# Patient Record
Sex: MALE | Race: OTHER | Employment: OTHER | URBAN - METROPOLITAN AREA
[De-identification: names, ages, dates, MRNs, and addresses within clinical notes are randomized per-mention and may not be internally consistent; named-entity substitution may affect disease eponyms.]

---

## 2019-06-16 ENCOUNTER — APPOINTMENT (OUTPATIENT)
Dept: CT IMAGING | Age: 69
DRG: 683 | End: 2019-06-16
Payer: COMMERCIAL

## 2019-06-16 ENCOUNTER — APPOINTMENT (OUTPATIENT)
Dept: GENERAL RADIOLOGY | Age: 69
DRG: 683 | End: 2019-06-16
Payer: COMMERCIAL

## 2019-06-16 ENCOUNTER — HOSPITAL ENCOUNTER (INPATIENT)
Age: 69
LOS: 3 days | Discharge: NURSING, REHAB OR LONG-TERM CARE | DRG: 683 | End: 2019-06-19
Attending: EMERGENCY MEDICINE | Admitting: INTERNAL MEDICINE
Payer: COMMERCIAL

## 2019-06-16 DIAGNOSIS — R55 SYNCOPE AND COLLAPSE: Primary | ICD-10-CM

## 2019-06-16 DIAGNOSIS — R73.9 HYPERGLYCEMIA: ICD-10-CM

## 2019-06-16 DIAGNOSIS — E87.20 LACTIC ACIDOSIS: ICD-10-CM

## 2019-06-16 LAB
ALBUMIN SERPL-MCNC: 4.4 G/DL (ref 3.5–4.6)
ALP BLD-CCNC: 104 U/L (ref 35–104)
ALT SERPL-CCNC: 16 U/L (ref 0–41)
ANION GAP SERPL CALCULATED.3IONS-SCNC: 12 MEQ/L (ref 9–15)
ANION GAP SERPL CALCULATED.3IONS-SCNC: 13 MEQ/L (ref 9–15)
ANION GAP SERPL CALCULATED.3IONS-SCNC: 25 MEQ/L (ref 9–15)
AST SERPL-CCNC: 13 U/L (ref 0–40)
BASE EXCESS ARTERIAL: -1 (ref -3–3)
BASOPHILS ABSOLUTE: 0.1 K/UL (ref 0–0.2)
BASOPHILS RELATIVE PERCENT: 1 %
BETA-HYDROXYBUTYRATE: 1.3 MG/DL (ref 0.2–2.8)
BILIRUB SERPL-MCNC: 0.5 MG/DL (ref 0.2–0.7)
BILIRUBIN URINE: NEGATIVE
BLOOD, URINE: NEGATIVE
BUN BLDV-MCNC: 18 MG/DL (ref 8–23)
BUN BLDV-MCNC: 18 MG/DL (ref 8–23)
BUN BLDV-MCNC: 22 MG/DL (ref 8–23)
CALCIUM IONIZED: 1.19 MMOL/L (ref 1.12–1.32)
CALCIUM SERPL-MCNC: 8.9 MG/DL (ref 8.5–9.9)
CALCIUM SERPL-MCNC: 8.9 MG/DL (ref 8.5–9.9)
CALCIUM SERPL-MCNC: 9.7 MG/DL (ref 8.5–9.9)
CHLORIDE BLD-SCNC: 101 MEQ/L (ref 95–107)
CHLORIDE BLD-SCNC: 103 MEQ/L (ref 95–107)
CHLORIDE BLD-SCNC: 88 MEQ/L (ref 95–107)
CLARITY: CLEAR
CO2: 21 MEQ/L (ref 20–31)
CO2: 23 MEQ/L (ref 20–31)
CO2: 24 MEQ/L (ref 20–31)
COLOR: YELLOW
CREAT SERPL-MCNC: 0.8 MG/DL (ref 0.7–1.2)
CREAT SERPL-MCNC: 0.85 MG/DL (ref 0.7–1.2)
CREAT SERPL-MCNC: 1.24 MG/DL (ref 0.7–1.2)
CREATININE URINE: 67.6 MG/DL
EKG ATRIAL RATE: 92 BPM
EKG P AXIS: 32 DEGREES
EKG P-R INTERVAL: 206 MS
EKG Q-T INTERVAL: 352 MS
EKG QRS DURATION: 88 MS
EKG QTC CALCULATION (BAZETT): 435 MS
EKG R AXIS: 8 DEGREES
EKG T AXIS: 6 DEGREES
EKG VENTRICULAR RATE: 92 BPM
EOSINOPHILS ABSOLUTE: 0.1 K/UL (ref 0–0.7)
EOSINOPHILS RELATIVE PERCENT: 0.9 %
ETHANOL PERCENT: NORMAL G/DL
ETHANOL PERCENT: NORMAL G/DL
ETHANOL: <10 MG/DL (ref 0–0.08)
ETHANOL: <10 MG/DL (ref 0–0.08)
GFR AFRICAN AMERICAN: >60
GFR NON-AFRICAN AMERICAN: 57.9
GFR NON-AFRICAN AMERICAN: >60
GLOBULIN: 3.2 G/DL (ref 2.3–3.5)
GLUCOSE BLD-MCNC: 142 MG/DL (ref 60–115)
GLUCOSE BLD-MCNC: 154 MG/DL (ref 70–99)
GLUCOSE BLD-MCNC: 158 MG/DL (ref 70–99)
GLUCOSE BLD-MCNC: 171 MG/DL (ref 60–115)
GLUCOSE BLD-MCNC: 185 MG/DL (ref 60–115)
GLUCOSE BLD-MCNC: 214 MG/DL (ref 60–115)
GLUCOSE BLD-MCNC: 323 MG/DL (ref 60–115)
GLUCOSE BLD-MCNC: 394 MG/DL (ref 60–115)
GLUCOSE BLD-MCNC: 574 MG/DL (ref 60–115)
GLUCOSE BLD-MCNC: 687 MG/DL (ref 70–99)
GLUCOSE BLD-MCNC: 692 MG/DL (ref 60–115)
GLUCOSE URINE: >=1000 MG/DL
HBA1C MFR BLD: 11.7 % (ref 4.8–5.9)
HCO3 ARTERIAL: 24 MMOL/L (ref 21–29)
HCT VFR BLD CALC: 46.8 % (ref 42–52)
HEMOGLOBIN: 15.4 G/DL (ref 14–18)
HEMOGLOBIN: 15.9 GM/DL (ref 13.5–17.5)
KETONES, URINE: NEGATIVE MG/DL
LACTATE: 7.05 MMOL/L (ref 0.4–2)
LACTIC ACID: 2 MMOL/L (ref 0.5–2.2)
LACTIC ACID: 2.1 MMOL/L (ref 0.5–2.2)
LEUKOCYTE ESTERASE, URINE: NEGATIVE
LYMPHOCYTES ABSOLUTE: 2.9 K/UL (ref 1–4.8)
LYMPHOCYTES RELATIVE PERCENT: 45.9 %
MAGNESIUM: 2.2 MG/DL (ref 1.7–2.4)
MCH RBC QN AUTO: 29.7 PG (ref 27–31.3)
MCHC RBC AUTO-ENTMCNC: 33 % (ref 33–37)
MCV RBC AUTO: 89.9 FL (ref 80–100)
MONOCYTES ABSOLUTE: 0.6 K/UL (ref 0.2–0.8)
MONOCYTES RELATIVE PERCENT: 9.9 %
NEUTROPHILS ABSOLUTE: 2.7 K/UL (ref 1.4–6.5)
NEUTROPHILS RELATIVE PERCENT: 42.3 %
NITRITE, URINE: NEGATIVE
O2 SAT, ARTERIAL: 94 % (ref 93–100)
OSMOLALITY: 294 MOSM/KG (ref 280–303)
PCO2 ARTERIAL: 39 MM HG (ref 35–45)
PDW BLD-RTO: 13.5 % (ref 11.5–14.5)
PERFORMED ON: ABNORMAL
PH ARTERIAL: 7.39 (ref 7.35–7.45)
PH UA: 5 (ref 5–9)
PHOSPHORUS: 2.5 MG/DL (ref 2.3–4.8)
PLATELET # BLD: 174 K/UL (ref 130–400)
PO2 ARTERIAL: 72 MM HG (ref 75–108)
POC CHLORIDE: 95 MEQ/L (ref 99–110)
POC CREATININE: 0.8 MG/DL (ref 0.8–1.3)
POC HEMATOCRIT: 47 % (ref 41–53)
POC POTASSIUM: 4 MEQ/L (ref 3.5–5.1)
POC SAMPLE TYPE: ABNORMAL
POC SODIUM: 131 MEQ/L (ref 136–145)
POTASSIUM SERPL-SCNC: 3.6 MEQ/L (ref 3.4–4.9)
POTASSIUM SERPL-SCNC: 3.7 MEQ/L (ref 3.4–4.9)
POTASSIUM SERPL-SCNC: 4 MEQ/L (ref 3.4–4.9)
PROTEIN UA: NEGATIVE MG/DL
RBC # BLD: 5.2 M/UL (ref 4.7–6.1)
SODIUM BLD-SCNC: 134 MEQ/L (ref 135–144)
SODIUM BLD-SCNC: 137 MEQ/L (ref 135–144)
SODIUM BLD-SCNC: 139 MEQ/L (ref 135–144)
SODIUM URINE: 72 MEQ/L
SPECIFIC GRAVITY UA: 1.03 (ref 1–1.03)
TCO2 ARTERIAL: 25 (ref 22–29)
TOTAL CK: 69 U/L (ref 0–190)
TOTAL PROTEIN: 7.6 G/DL (ref 6.3–8)
TROPONIN: <0.01 NG/ML (ref 0–0.01)
TROPONIN: <0.01 NG/ML (ref 0–0.01)
URINE REFLEX TO CULTURE: ABNORMAL
UROBILINOGEN, URINE: 0.2 E.U./DL
WBC # BLD: 6.3 K/UL (ref 4.8–10.8)

## 2019-06-16 PROCEDURE — 6370000000 HC RX 637 (ALT 250 FOR IP): Performed by: INTERNAL MEDICINE

## 2019-06-16 PROCEDURE — 70450 CT HEAD/BRAIN W/O DYE: CPT

## 2019-06-16 PROCEDURE — 82570 ASSAY OF URINE CREATININE: CPT

## 2019-06-16 PROCEDURE — 71045 X-RAY EXAM CHEST 1 VIEW: CPT

## 2019-06-16 PROCEDURE — 82330 ASSAY OF CALCIUM: CPT

## 2019-06-16 PROCEDURE — 72170 X-RAY EXAM OF PELVIS: CPT

## 2019-06-16 PROCEDURE — 96374 THER/PROPH/DIAG INJ IV PUSH: CPT

## 2019-06-16 PROCEDURE — 80053 COMPREHEN METABOLIC PANEL: CPT

## 2019-06-16 PROCEDURE — 83605 ASSAY OF LACTIC ACID: CPT

## 2019-06-16 PROCEDURE — 99285 EMERGENCY DEPT VISIT HI MDM: CPT

## 2019-06-16 PROCEDURE — 6370000000 HC RX 637 (ALT 250 FOR IP): Performed by: EMERGENCY MEDICINE

## 2019-06-16 PROCEDURE — 82565 ASSAY OF CREATININE: CPT

## 2019-06-16 PROCEDURE — 36415 COLL VENOUS BLD VENIPUNCTURE: CPT

## 2019-06-16 PROCEDURE — 83735 ASSAY OF MAGNESIUM: CPT

## 2019-06-16 PROCEDURE — 85014 HEMATOCRIT: CPT

## 2019-06-16 PROCEDURE — 36600 WITHDRAWAL OF ARTERIAL BLOOD: CPT

## 2019-06-16 PROCEDURE — 2580000003 HC RX 258

## 2019-06-16 PROCEDURE — 82550 ASSAY OF CK (CPK): CPT

## 2019-06-16 PROCEDURE — 84132 ASSAY OF SERUM POTASSIUM: CPT

## 2019-06-16 PROCEDURE — G0480 DRUG TEST DEF 1-7 CLASSES: HCPCS

## 2019-06-16 PROCEDURE — 84600 ASSAY OF VOLATILES: CPT

## 2019-06-16 PROCEDURE — 83036 HEMOGLOBIN GLYCOSYLATED A1C: CPT

## 2019-06-16 PROCEDURE — 85025 COMPLETE CBC W/AUTO DIFF WBC: CPT

## 2019-06-16 PROCEDURE — 93005 ELECTROCARDIOGRAM TRACING: CPT | Performed by: EMERGENCY MEDICINE

## 2019-06-16 PROCEDURE — 83930 ASSAY OF BLOOD OSMOLALITY: CPT

## 2019-06-16 PROCEDURE — 82803 BLOOD GASES ANY COMBINATION: CPT

## 2019-06-16 PROCEDURE — 82010 KETONE BODYS QUAN: CPT

## 2019-06-16 PROCEDURE — 84300 ASSAY OF URINE SODIUM: CPT

## 2019-06-16 PROCEDURE — 82693 ASSAY OF ETHYLENE GLYCOL: CPT

## 2019-06-16 PROCEDURE — 81003 URINALYSIS AUTO W/O SCOPE: CPT

## 2019-06-16 PROCEDURE — 84484 ASSAY OF TROPONIN QUANT: CPT

## 2019-06-16 PROCEDURE — 82948 REAGENT STRIP/BLOOD GLUCOSE: CPT

## 2019-06-16 PROCEDURE — 82435 ASSAY OF BLOOD CHLORIDE: CPT

## 2019-06-16 PROCEDURE — 6370000000 HC RX 637 (ALT 250 FOR IP): Performed by: PSYCHIATRY & NEUROLOGY

## 2019-06-16 PROCEDURE — 2580000003 HC RX 258: Performed by: EMERGENCY MEDICINE

## 2019-06-16 PROCEDURE — 84295 ASSAY OF SERUM SODIUM: CPT

## 2019-06-16 PROCEDURE — 1210000000 HC MED SURG R&B

## 2019-06-16 PROCEDURE — 6360000002 HC RX W HCPCS: Performed by: INTERNAL MEDICINE

## 2019-06-16 PROCEDURE — 84100 ASSAY OF PHOSPHORUS: CPT

## 2019-06-16 RX ORDER — DEXTROSE MONOHYDRATE 50 MG/ML
100 INJECTION, SOLUTION INTRAVENOUS PRN
Status: DISCONTINUED | OUTPATIENT
Start: 2019-06-16 | End: 2019-06-19 | Stop reason: HOSPADM

## 2019-06-16 RX ORDER — AMANTADINE HYDROCHLORIDE 100 MG/1
100 CAPSULE, GELATIN COATED ORAL 2 TIMES DAILY
Status: DISCONTINUED | OUTPATIENT
Start: 2019-06-16 | End: 2019-06-19 | Stop reason: HOSPADM

## 2019-06-16 RX ORDER — INSULIN GLARGINE 100 [IU]/ML
15 INJECTION, SOLUTION SUBCUTANEOUS DAILY
Status: DISCONTINUED | OUTPATIENT
Start: 2019-06-16 | End: 2019-06-16

## 2019-06-16 RX ORDER — SODIUM CHLORIDE 450 MG/100ML
INJECTION, SOLUTION INTRAVENOUS CONTINUOUS
Status: DISCONTINUED | OUTPATIENT
Start: 2019-06-16 | End: 2019-06-16

## 2019-06-16 RX ORDER — DEXTROSE MONOHYDRATE 25 G/50ML
12.5 INJECTION, SOLUTION INTRAVENOUS PRN
Status: DISCONTINUED | OUTPATIENT
Start: 2019-06-16 | End: 2019-06-19 | Stop reason: HOSPADM

## 2019-06-16 RX ORDER — DEXTROSE MONOHYDRATE 25 G/50ML
12.5 INJECTION, SOLUTION INTRAVENOUS PRN
Status: DISCONTINUED | OUTPATIENT
Start: 2019-06-16 | End: 2019-06-16 | Stop reason: SDUPTHER

## 2019-06-16 RX ORDER — GLIMEPIRIDE 4 MG/1
2 TABLET ORAL
Status: DISCONTINUED | OUTPATIENT
Start: 2019-06-16 | End: 2019-06-17

## 2019-06-16 RX ORDER — MAGNESIUM SULFATE 1 G/100ML
1 INJECTION INTRAVENOUS PRN
Status: DISCONTINUED | OUTPATIENT
Start: 2019-06-16 | End: 2019-06-16

## 2019-06-16 RX ORDER — NABUMETONE 750 MG/1
750 TABLET, FILM COATED ORAL 2 TIMES DAILY
Status: ON HOLD | COMMUNITY
End: 2019-07-02 | Stop reason: HOSPADM

## 2019-06-16 RX ORDER — GLIMEPIRIDE 2 MG/1
2 TABLET ORAL
Status: ON HOLD | COMMUNITY
End: 2019-06-19 | Stop reason: HOSPADM

## 2019-06-16 RX ORDER — SODIUM CHLORIDE 0.9 % (FLUSH) 0.9 %
3 SYRINGE (ML) INJECTION EVERY 8 HOURS
Status: DISCONTINUED | OUTPATIENT
Start: 2019-06-16 | End: 2019-06-16 | Stop reason: HOSPADM

## 2019-06-16 RX ORDER — NICOTINE POLACRILEX 4 MG
15 LOZENGE BUCCAL PRN
Status: DISCONTINUED | OUTPATIENT
Start: 2019-06-16 | End: 2019-06-19 | Stop reason: HOSPADM

## 2019-06-16 RX ORDER — DEXTROSE, SODIUM CHLORIDE, AND POTASSIUM CHLORIDE 5; .45; .15 G/100ML; G/100ML; G/100ML
INJECTION INTRAVENOUS CONTINUOUS PRN
Status: DISCONTINUED | OUTPATIENT
Start: 2019-06-16 | End: 2019-06-16

## 2019-06-16 RX ORDER — SODIUM CHLORIDE 9 MG/ML
INJECTION, SOLUTION INTRAVENOUS
Status: COMPLETED
Start: 2019-06-16 | End: 2019-06-16

## 2019-06-16 RX ORDER — LOSARTAN POTASSIUM 25 MG/1
25 TABLET ORAL DAILY
Status: DISCONTINUED | OUTPATIENT
Start: 2019-06-16 | End: 2019-06-19 | Stop reason: HOSPADM

## 2019-06-16 RX ORDER — OXYCODONE HYDROCHLORIDE 5 MG/1
2.5 TABLET ORAL EVERY 4 HOURS PRN
Status: DISCONTINUED | OUTPATIENT
Start: 2019-06-16 | End: 2019-06-19 | Stop reason: HOSPADM

## 2019-06-16 RX ORDER — AMANTADINE HYDROCHLORIDE 100 MG/1
100 CAPSULE, GELATIN COATED ORAL 2 TIMES DAILY
Status: ON HOLD | COMMUNITY
End: 2019-07-02 | Stop reason: HOSPADM

## 2019-06-16 RX ORDER — POTASSIUM CHLORIDE 1.5 G/1.77G
20 POWDER, FOR SOLUTION ORAL 2 TIMES DAILY
Status: COMPLETED | OUTPATIENT
Start: 2019-06-16 | End: 2019-06-16

## 2019-06-16 RX ORDER — 0.9 % SODIUM CHLORIDE 0.9 %
2000 INTRAVENOUS SOLUTION INTRAVENOUS ONCE
Status: COMPLETED | OUTPATIENT
Start: 2019-06-16 | End: 2019-06-16

## 2019-06-16 RX ORDER — SODIUM CHLORIDE 9 MG/ML
INJECTION, SOLUTION INTRAVENOUS
Status: DISCONTINUED
Start: 2019-06-16 | End: 2019-06-16 | Stop reason: WASHOUT

## 2019-06-16 RX ORDER — LOSARTAN POTASSIUM 50 MG/1
50 TABLET ORAL DAILY
Status: ON HOLD | COMMUNITY
End: 2019-07-03 | Stop reason: SDUPTHER

## 2019-06-16 RX ORDER — SODIUM CHLORIDE 9 MG/ML
INJECTION, SOLUTION INTRAVENOUS CONTINUOUS
Status: DISCONTINUED | OUTPATIENT
Start: 2019-06-16 | End: 2019-06-17

## 2019-06-16 RX ORDER — SIMVASTATIN 40 MG
40 TABLET ORAL NIGHTLY
COMMUNITY
End: 2022-01-01 | Stop reason: SDUPTHER

## 2019-06-16 RX ORDER — ACETAMINOPHEN 325 MG/1
650 TABLET ORAL EVERY 6 HOURS PRN
Status: DISCONTINUED | OUTPATIENT
Start: 2019-06-16 | End: 2019-06-19 | Stop reason: HOSPADM

## 2019-06-16 RX ORDER — LORAZEPAM 2 MG/ML
1 INJECTION INTRAMUSCULAR
Status: ACTIVE | OUTPATIENT
Start: 2019-06-16 | End: 2019-06-16

## 2019-06-16 RX ORDER — POTASSIUM CHLORIDE 7.45 MG/ML
10 INJECTION INTRAVENOUS PRN
Status: DISCONTINUED | OUTPATIENT
Start: 2019-06-16 | End: 2019-06-16

## 2019-06-16 RX ORDER — SIMVASTATIN 40 MG
40 TABLET ORAL NIGHTLY
Status: DISCONTINUED | OUTPATIENT
Start: 2019-06-16 | End: 2019-06-19 | Stop reason: HOSPADM

## 2019-06-16 RX ADMIN — INSULIN LISPRO 2 UNITS: 100 INJECTION, SOLUTION INTRAVENOUS; SUBCUTANEOUS at 17:33

## 2019-06-16 RX ADMIN — POTASSIUM CHLORIDE 20 MEQ: 1.5 POWDER, FOR SOLUTION ORAL at 08:54

## 2019-06-16 RX ADMIN — SIMVASTATIN 40 MG: 40 TABLET, FILM COATED ORAL at 20:33

## 2019-06-16 RX ADMIN — AMANTADINE HYDROCHLORIDE 100 MG: 100 CAPSULE ORAL at 08:54

## 2019-06-16 RX ADMIN — CARBIDOPA AND LEVODOPA 1 TABLET: 25; 100 TABLET ORAL at 13:51

## 2019-06-16 RX ADMIN — INSULIN HUMAN 10 UNITS: 100 INJECTION, SOLUTION PARENTERAL at 03:50

## 2019-06-16 RX ADMIN — POTASSIUM CHLORIDE 20 MEQ: 1.5 POWDER, FOR SOLUTION ORAL at 20:33

## 2019-06-16 RX ADMIN — LOSARTAN POTASSIUM 25 MG: 25 TABLET ORAL at 08:55

## 2019-06-16 RX ADMIN — AMANTADINE HYDROCHLORIDE 100 MG: 100 CAPSULE ORAL at 20:33

## 2019-06-16 RX ADMIN — INSULIN LISPRO 8 UNITS: 100 INJECTION, SOLUTION INTRAVENOUS; SUBCUTANEOUS at 12:42

## 2019-06-16 RX ADMIN — SERTRALINE HYDROCHLORIDE 50 MG: 50 TABLET ORAL at 08:55

## 2019-06-16 RX ADMIN — ENOXAPARIN SODIUM 40 MG: 40 INJECTION SUBCUTANEOUS at 08:55

## 2019-06-16 RX ADMIN — SODIUM CHLORIDE 1000 ML: 9 INJECTION, SOLUTION INTRAVENOUS at 07:01

## 2019-06-16 RX ADMIN — SODIUM CHLORIDE 10 UNITS/HR: 9 INJECTION, SOLUTION INTRAVENOUS at 03:32

## 2019-06-16 RX ADMIN — GLIMEPIRIDE 2 MG: 4 TABLET ORAL at 08:58

## 2019-06-16 RX ADMIN — SODIUM CHLORIDE 2000 ML: 9 INJECTION, SOLUTION INTRAVENOUS at 03:32

## 2019-06-16 ASSESSMENT — PAIN SCALES - WONG BAKER
WONGBAKER_NUMERICALRESPONSE: 0
WONGBAKER_NUMERICALRESPONSE: 0
WONGBAKER_NUMERICALRESPONSE: 8
WONGBAKER_NUMERICALRESPONSE: 0

## 2019-06-16 ASSESSMENT — PAIN DESCRIPTION - ORIENTATION: ORIENTATION: LEFT;RIGHT

## 2019-06-16 ASSESSMENT — PAIN DESCRIPTION - PAIN TYPE: TYPE: ACUTE PAIN

## 2019-06-16 ASSESSMENT — PAIN DESCRIPTION - LOCATION: LOCATION: LEG

## 2019-06-16 NOTE — ED NOTES
Bed: 04  Expected date:   Expected time:   Means of arrival:   Comments:  59yr male fell in bathroom?  Frisian speaking only, diabetic, blood sugar 600, #20 LAC     Aj Morris RN  06/16/19 5903

## 2019-06-16 NOTE — CONSULTS
Inpatient consult to Neurology  Consult performed by: Kirstie Ferguson MD  Consult ordered by: Alec Ross, DO      Encephalopathy  Possible seizure secondary to Hyperosmolar State  PD, add sinemet  EEG  MRI r/o PRES    Syd Griffith MD, Fidelina Menendez, American Board of Psychiatry & Neurology  Board Certified in Vascular Neurology  Board Certified in Neuromuscular Medicine  Certified in . [address]

## 2019-06-16 NOTE — H&P
Hospital Medicine  History and Physical    Patient:  Adwoa Dumont  MRN: [id number]    CHIEF COMPLAINT:    Chief Complaint   Patient presents with    Fall       History Obtained From:  patient, electronic medical record. All history obtained from several different family members over the translation phone. Primary Care Physician: PINEDA Judd CNP    HISTORY OF PRESENT ILLNESS:   The patient is a 77 y.o. male with a history only of type 2 diabetes controlled with 70/30 insulin at home who presents to the emergency department after syncopal episode. Patient states that he was ambulating to the bathroom to urinate when he felt very shaky in his legs and then woke up on the floor. He does not remember passing out. He does not remember hitting his head. He does have some mild headache afterwards. He did not lose control of his bowels or his bladder. He does report increased urinary frequency recently. Family reports very poor glycemic control with glucose regularly above 200. Patient has not had any frequent or recent fevers, the patient has had no cough wheezing nausea vomiting diarrhea constipation. Patient has no reported history of seizures in the past.  No recent head injuries or traumas. Arrival into the emergency department the patient is afebrile the patient was initially tachycardic in the 100s 110 blood pressure is initially elevated 143/93 respirations of 14 saturating in the 90s on room air. On arrival the patient has an elevated anion gap of 25 BUN 22 creatinine 1.24. Patient is lactic acidosis at 7.05. Patient has hyperglycemia 627. Patient's osmolality is 294.   Complete blood count is within normal limits, urinalysis within normal limits ABG normal.  CT head normal.  X-ray without clear infiltrates    Past Medical History:  Insulin-dependent diabetes mellitus    Past Surgical History:  Reviewed noncontributory    Medications Prior to Admission:    Reports using insulin 70/30 daily medications    Allergies: None    Social History:   TOBACCO:   has no tobacco history on file. ETOH:   has no alcohol history on file. OCCUPATION: None  As of this on    Family History:   No family history on file. REVIEW OF SYSTEMS:  Ten systems reviewed and negative except for as above. Physical Exam:    Vitals: /85   Pulse 90   Temp 98.1 °F (36.7 °C) (Oral)   Resp 14   Ht 5' 5\" (1.651 m)   Wt 150 lb (68 kg)   SpO2 98%   BMI 24.96 kg/m²   Constitutional: alert, appears stated age and cooperative  Skin: Skin color, texture, turgor normal. No rashes or lesions  Eyes:Eye: Normal external eye, conjunctiva, LEXY. ENT: Head: Normocephalic, no lesions, without obvious abnormality. Neck: no adenopathy, no carotid bruit, no JVD, supple, symmetrical, trachea midline and thyroid not enlarged, symmetric, no tenderness/mass/nodules  Respiratory: clear to auscultation bilaterally  Cardiovascular: regular rate and rhythm, S1, S2 normal, no murmur, click, rub or gallop  Gastrointestinal: soft, non-tender; bowel sounds normal; no masses,  no organomegaly  Genitourinary: Deferred  Musculoskeletal:extremities normal, atraumatic, no cyanosis or edema  Neurologic: Mental status AAOx3 No facial asymmetry or droop. Normal muscle strength b/l. Psychiatric: Appropriate mood and affect.  Good insight and judgement  Hematologic: No obvious bruising or bleeding    Recent Labs     06/16/19  0300 06/16/19  0319   WBC 6.3  --    HGB 15.4 15.9     --      Recent Labs     06/16/19  0300 06/16/19  0319   *  --    K 4.0  --    CL 88*  --    CO2 21  --    BUN 22  --    CREATININE 1.24* 0.8   GLUCOSE 687*  --    AST 13  --    ALT 16  --    BILITOT 0.5  --    ALKPHOS 104  --      Troponin T:   Recent Labs     06/16/19  0300   TROPONINI <0.010     Lactate:7.05  ABGs:   Lab Results   Component Value Date    PHART 7.393 06/16/2019    PO2ART 72 06/16/2019    EAQ2BSE 39 06/16/2019     INR: No results for input(s): INR in the last 72 hours. URINALYSIS:  Recent Labs     06/16/19  0344   COLORU Yellow   PHUR 5.0   CLARITYU Clear   SPECGRAV 1.031   LEUKOCYTESUR Negative   UROBILINOGEN 0.2   BILIRUBINUR Negative   BLOODU Negative   GLUCOSEU >=1000*     -----------------------------------------------------------------   No results found. EKG: Normal sinus rhythm    Assessment and Plan   1. Anion gap metabolic acidosis with hyperglycemia: HHS v early DKA. Insulin drip, BMP every 4 hours, consult endocrine, HbA1c. IVF per insulin drip protocol. Osmolar gap of 20. Check methanol, ethylene glycol    - initially concerns for hyperglycemic hyperosmolar state concurrent with dehydration and possible metformin induced lactic acidosis. Stat osmolality is now returned and is normal.  Glucose is improving currently. Repeat stat lactic acidosis. Along with stat BMP. Question new onset seizure disorder. Discontinue insulin drip if an gap is closed with subcu insulin coverage continue insulin endocrinology consult. Beta hydroxybutyrate of 1 and there are no ketones on urinalysis. 2. Syncope: Check orthostatic vitals. neuro checks, seizure precautions, MRI/posterior imaging as recommended by neurology. EEG. trend troponin. ECHO. Tele. 3. MADDI: UA obtained, check urine sodium/creatinine. Repeat BMP following aggressive hydration. D/c nsaid use. 4. Lactic acidosis: repeat lactic following hydration. ? New seizure d/o, no clear signs of sepsis or hypoperfusion. Hold metformin.      Patient Active Problem List   Diagnosis Code    Lactic acidosis E87.2       Gil Stanley MD  Admitting Hospitalist    Emergency Contact:

## 2019-06-16 NOTE — ED TRIAGE NOTES
Pt arrived to ED 4 via squad. Per squad, family reports the found pt on bathroom floor. Due to language barrier, minimal other information is available. Telephone  engaged in order to obtain history and information. Per family, son found pt slumped over side of tub. Pt c/o pain in legs. Even with  pt is unable to answer questions appropriately. He either stares blankly when asked questions or gives incorrect answers per family. Per family, baseline is mostly alert and oriented.

## 2019-06-16 NOTE — FLOWSHEET NOTE
Pt up to floor at 0630 - pt speaks only Swedish - responding to commands - speech is clear - Dr. Luis Win at bedside at this time. 4654 - glucose POC was 142 - Dr. Luis Win - stated he is going to order stat labs  0650: Dr. Luis Win called and said that he spoke with Dr. Abram Mccarthy about the patient and that he will come and assess pt this AM   0655: Dr. Abram Mccarthy at bedside assessing pt at this time. 0700: hand off of care given to Vitaly Taylor at bedside - pt skin WDL - mepelex placed - SCDs in place.  Electronically signed by Amada Grant RN on 6/16/2019 at 7:45 AM

## 2019-06-16 NOTE — ED PROVIDER NOTES
[facility] ED  eMERGENCY dEPARTMENT eNCOUnter      Pt Name: Naima Bain  MRN: [id number]  [place] 1950  Date of evaluation: 6/16/2019  Provider: Jonah Nguyen MD    CHIEF COMPLAINT       Chief Complaint   Patient presents with    Fall         HISTORY OF PRESENT ILLNESS   (Location/Symptom, Timing/Onset,Context/Setting, Quality, Duration, Modifying Factors, Severity)  Note limiting factors. Naima Bani is a 77 y.o. male who presents to the emergency department who was found in the shower by his family collapsed. He was not responding at that time. EMS was called. He responded during EMS transport to the house, and was able to talk during transport. Normal sinus is seen on monitor during EMS transport. He has been feeling a little bit weak. Denies trauma. Family note that he is a diabetic. He also has the beginnings of Parkinson's disease. No other history is available    HPI    NursingNotes were reviewed. REVIEW OF SYSTEMS    (2-9 systems for level 4, 10 or more for level 5)     Review of Systems     Constitutional symptoms:  no Fatigue, no fever, no chills. Skin symptoms:  Negative except as documented in HPI. ENMT symptoms:  Negative except as documented in HPI. Respiratory symptoms:  Negative except as documented in HPI. Cardiovascular symptoms:  Negative except as documented in HPI. Gastrointestinal symptoms: Negative except for documented as above in the HPI   Genitourinary symptoms:  Negative except as documented in HPI. Musculoskeletal symptoms:  Negative except as documented in HPI. Neurologic symptoms:  Negative except as documented in HPI. Remainder of 10 systems, all negative except for mentioned above      Except as noted above the remainder of the review of systems was reviewed and negative. PAST MEDICAL HISTORY   No past medical history on file. SURGICALHISTORY     No past surgical history on file.       CURRENT MEDICATIONS -Vitals reviewed. EYES: -EOM intact, LEXY:              -Sclera normal and conjunctiva: clear bilaterally. ENT: - Normal pharynx pink and moist.    NECK: -Supple (chin-to-chest). CARD: -Rate and rhythm: Regular              -Murmurs: No  RESP: -Respiratory effort and chest excursion with respirations: Normal             -Breath sounds equal bilaterally: Clear             -Wheezes: No             -Rales: No    BACK: -Flank pain: No              -Pain on palpation: No    ABD: -Distended: No           -Bruits: No           -Bowel sounds: Normal.           -Deep palpation: Non-tender           -Organomegaly palpable: No           -Abnormal masses: No    EXT: Gross appearance and use of all four extremities: Normal     SKIN: -Good turgor warm and dry. -Apparent lesions or rashes: No    NEURO: -Patient: alert                -Oriented to: person, place and time. -Appearance and judgment: appropriate.                -Cranial Nerves: Normal.                -Speech: Speaks Cymraes          DIAGNOSTIC RESULTS     EKG: All EKG's are interpreted by the Emergency Department Physician who either signs or Co-signsthis chart in the absence of a cardiologist.    Echo review    RADIOLOGY:   Rudene Mask such as CT, Ultrasound and MRI are read by the radiologist. Plain radiographic images are visualized and preliminarily interpreted by the emergency physician with the below findings:    Lactic acid is 7.04, glucose initially 660 now corrected to the high 200s. Heart rate tachycardia is resolved. CT scan of brain is negative. Patient complained of bilateral hip pain, x-ray of this is negative. There is no neck tenderness. Urinalysis chest x-ray are benign. I will contact hospitalist regarding admission for syncope collapse, lactic acidosis and hyperglycemia.   Condition is stable at this juncture    Interpretation per the Radiologist below, if available at the time ofthis note:    XR CHEST PORTABLE    (Results Pending)   CT Head WO Contrast    (Results Pending)   XR PELVIS (1-2 VIEWS)    (Results Pending)         ED BEDSIDE ULTRASOUND:   Performed by ED Physician - none    LABS:  Labs Reviewed   COMPREHENSIVE METABOLIC PANEL - Abnormal; Notable for the following components:       Result Value    Sodium 134 (*)     Chloride 88 (*)     Anion Gap 25 (*)     Glucose 687 (*)     CREATININE 1.24 (*)     GFR Non- 57.9 (*)     All other components within normal limits    Narrative:     Lindsey Bear  ED tel. [phone],  Glucose results called to and read back by Ender Smith RN, 06/16/2019  04:12, by María Larson  Glucose results called to and read back by Ender Smith RN, 06/16/2019  04:11, by María Larson   URINE RT REFLEX TO CULTURE - Abnormal; Notable for the following components:    Glucose, Ur >=1000 (*)     All other components within normal limits   POCT ARTERIAL - Abnormal; Notable for the following components:    POC Sodium 131 (*)     POC Chloride 95 (*)     POC Glucose 692 (*)     pO2, Arterial 72 (*)     O2 Sat, Arterial 94 (*)     Lactate 7.05 (*)     All other components within normal limits   POCT GLUCOSE - Abnormal; Notable for the following components:    POC Glucose 574 (*)     All other components within normal limits   POCT GLUCOSE - Abnormal; Notable for the following components:    POC Glucose 394 (*)     All other components within normal limits   CBC WITH AUTO DIFFERENTIAL   ETHANOL   TROPONIN   CK   BLOOD GAS, ARTERIAL       All other labs were within normal range or not returned as of this dictation.     EMERGENCY DEPARTMENT COURSE and DIFFERENTIAL DIAGNOSIS/MDM:   Vitals:    Vitals:    06/16/19 0315 06/16/19 0356 06/16/19 0403 06/16/19 0410   BP: (!) 140/80  (!) 140/80 136/81   Pulse: 110 104 110 96   Resp: 14   14   Temp: 98.1 °F (36.7 °C)      TempSrc: Oral      SpO2: 96% 96%  96%   Weight:       Height:               MDM    CRITICAL CARE TIME   Total Critical Care time was  minutes, excluding separately reportableprocedures. There was a high probability of clinicallysignificant/life threatening deterioration in the patient's condition which required my urgent intervention. CONSULTS:  None    PROCEDURES:  Unless otherwise noted below, none     Procedures    FINAL IMPRESSION      1. Syncope and collapse    2. Lactic acidosis    3. Hyperglycemia          DISPOSITION/PLAN   DISPOSITION Decision To Admit 06/16/2019 05:05:22 AM      PATIENT REFERRED TO:  No follow-up provider specified.     DISCHARGE MEDICATIONS:  New Prescriptions    No medications on file          (Please note that portions of this note were completed with a voice recognition program.  Efforts were made to edit the dictations but occasionally words are mis-transcribed.)    Janae Lee MD (electronically signed)  Attending Emergency Physician          Janae Lee MD  06/16/19 2141

## 2019-06-16 NOTE — CONSULTS
[address]phanie [address]ating [address]                      [address] [facility], [address] [facility]                                  CONSULTATION    PATIENT NAME: Bayron Davis                :        1950  MED REC NO:   [id number]                            ROOM:       [facility]  ACCOUNT NO:   [de-identified]                           ADMIT DATE: 2019  PROVIDER:     Renan Burrows MD    CONSULT DATE:  2019    ATTENDING:  Viviana Temple DO    HISTORY OF PRESENT ILLNESS:  This is a 76-year-old right-handed Pakistani  speaking gentleman who was admitted with history of loss of  consciousness. The history is very difficult to ascertain as we have  various issues with language and was not able to find any way to  communicate with him. He is awake and follows all commands. History is  therefore only obtained from the emergency room. The patient was  brought in as he was found on the bathroom floor. Due to language  barrier, minimal other information was available. Telephone   engaged in order to obtain history and information. A family friend  found the patient slumped over the side of the tub. Even with the  , it was very difficult to appropriately have some answers  here. The family had reported that he was baseline when he came to the  hospital.  In any case, the patient appears to be doing quite well. He  was admitted to the intensive care unit and he appears to be stable from  hematologic stand point. He has not had any seizure-like activity. PAST MEDICAL HISTORY:  There is no reported past medical history again  due to all this language issue. CURRENT MEDICATIONS:  It does appear that his medication list suggest  that he has diabetes and hypertension, and hypercholesterolemia. He was  Amantadine twice a day. Medication list is further reviewed and he is  on Amaryl, insulin, Cozaar, Zoloft, Zocor, and Symmetrel. REVIEW OF SYSTEMS:  Otherwise, not possible. I am not quite sure who  his neurologist is because he has suggestion of Parkinson disease. Dr. Hayley Heart evaluation is much more detailed. The patient has not had any  previous loss of consciousness. Family reports that his sugars have  been around 200. He arrived with tachycardia and anion gap of 25. PHYSICAL EXAMINATION:  GENERAL:  He is in no distress. No skin lesions or skin marks. There  is no pedal edema. There is no cyanosis or clubbing. NECK:  Supple. There are no meningeal signs. NEUROLOGIC:  Subtle parkinsonian tremor notable on the left with  cogwheel rigidity. This is a rest tremor. Decreased blink response. He moves all his extremities with good strength of 5/5 and his reflexes  are 2+. He has some contractures of the knees. Decreased saccades are  notable. Sensory levels are difficult to interpret. CARDIOVASCULAR:  S1, S2 normal.  No murmurs appreciated. No carotid  bruits. RESPIRATORY:  Clear to auscultation. LABORATORY DATA:  A complete lab examination is reviewed and on arrival,  his blood sugar was 687 with BUN of 22 with a creatinine of 1.24, anion  gap of 25 and over. Liver function tests are normal.  Calcium 9.7 and  normal and his CPK level is 69 and normal.  No alcohol in the system. White count of 6.3. Hemoglobin 15.4, hematocrit 46.8, and platelets of  016,699. Initial CT scan of the head did not show anything significant. IMPRESSION:  Encephalopathy with possibility of febrile seizure, which  could be related to underlying hyperosmolar state with acidosis. These  findings appears to have reversed and he appears to be at baseline  according to the family as noted in the emergency room visits. There is  no other history available in the charts here and therefore, recommended  that we need to reinvestigate from the cerebrovascular standpoint given  the underlying risks factors of cerebrovascular disease and to make sure  he does not have PRES syndrome. Further, the patient does have  Parkinson's disease on my clinical examination. He is on Amantadine,  which is unlikely to help. We will start him on low-dose Sinemet. We  will try and see who sees him and we will follow him with you. Further  evaluation will be obtained when we have some [place] interpreters  around during the weekday. We will arrange for an EEG and a carotid  ultrasound. Rehabilitation consultation will be obtained. Thank you, Dr. Asha Enamorado, for asking us to assist in the care of this  patient.       Dexter Canela MD    D: 06/16/2019 11:53:08       T: 06/16/2019 13:54:58     [email or web address]  Job#: [id number]     Doc#: [id number]    CC:

## 2019-06-17 ENCOUNTER — APPOINTMENT (OUTPATIENT)
Dept: MRI IMAGING | Age: 69
DRG: 683 | End: 2019-06-17
Payer: COMMERCIAL

## 2019-06-17 LAB
ANION GAP SERPL CALCULATED.3IONS-SCNC: 11 MEQ/L (ref 9–15)
BASOPHILS ABSOLUTE: 0 K/UL (ref 0–0.2)
BASOPHILS RELATIVE PERCENT: 0.6 %
BUN BLDV-MCNC: 9 MG/DL (ref 8–23)
CALCIUM SERPL-MCNC: 8.6 MG/DL (ref 8.5–9.9)
CHLORIDE BLD-SCNC: 106 MEQ/L (ref 95–107)
CO2: 23 MEQ/L (ref 20–31)
CREAT SERPL-MCNC: 0.68 MG/DL (ref 0.7–1.2)
EOSINOPHILS ABSOLUTE: 0.1 K/UL (ref 0–0.7)
EOSINOPHILS RELATIVE PERCENT: 1.3 %
GFR AFRICAN AMERICAN: >60
GFR NON-AFRICAN AMERICAN: >60
GLUCOSE BLD-MCNC: 150 MG/DL (ref 70–99)
GLUCOSE BLD-MCNC: 169 MG/DL (ref 60–115)
GLUCOSE BLD-MCNC: 169 MG/DL (ref 60–115)
GLUCOSE BLD-MCNC: 249 MG/DL (ref 60–115)
GLUCOSE BLD-MCNC: 281 MG/DL (ref 60–115)
HCT VFR BLD CALC: 37.3 % (ref 42–52)
HEMOGLOBIN: 12.8 G/DL (ref 14–18)
LV EF: 70 %
LVEF MODALITY: NORMAL
LYMPHOCYTES ABSOLUTE: 1.2 K/UL (ref 1–4.8)
LYMPHOCYTES RELATIVE PERCENT: 21.6 %
MCH RBC QN AUTO: 29.9 PG (ref 27–31.3)
MCHC RBC AUTO-ENTMCNC: 34.2 % (ref 33–37)
MCV RBC AUTO: 87.3 FL (ref 80–100)
MONOCYTES ABSOLUTE: 0.5 K/UL (ref 0.2–0.8)
MONOCYTES RELATIVE PERCENT: 8.8 %
NEUTROPHILS ABSOLUTE: 3.6 K/UL (ref 1.4–6.5)
NEUTROPHILS RELATIVE PERCENT: 67.7 %
PDW BLD-RTO: 13.6 % (ref 11.5–14.5)
PERFORMED ON: ABNORMAL
PLATELET # BLD: 129 K/UL (ref 130–400)
POTASSIUM SERPL-SCNC: 4 MEQ/L (ref 3.4–4.9)
RBC # BLD: 4.27 M/UL (ref 4.7–6.1)
SODIUM BLD-SCNC: 140 MEQ/L (ref 135–144)
WBC # BLD: 5.3 K/UL (ref 4.8–10.8)

## 2019-06-17 PROCEDURE — 93306 TTE W/DOPPLER COMPLETE: CPT

## 2019-06-17 PROCEDURE — 2580000003 HC RX 258: Performed by: INTERNAL MEDICINE

## 2019-06-17 PROCEDURE — 99222 1ST HOSP IP/OBS MODERATE 55: CPT | Performed by: INTERNAL MEDICINE

## 2019-06-17 PROCEDURE — 80048 BASIC METABOLIC PNL TOTAL CA: CPT

## 2019-06-17 PROCEDURE — 85025 COMPLETE CBC W/AUTO DIFF WBC: CPT

## 2019-06-17 PROCEDURE — A9579 GAD-BASE MR CONTRAST NOS,1ML: HCPCS | Performed by: PSYCHIATRY & NEUROLOGY

## 2019-06-17 PROCEDURE — 70553 MRI BRAIN STEM W/O & W/DYE: CPT

## 2019-06-17 PROCEDURE — 95816 EEG AWAKE AND DROWSY: CPT

## 2019-06-17 PROCEDURE — 1210000000 HC MED SURG R&B

## 2019-06-17 PROCEDURE — 36415 COLL VENOUS BLD VENIPUNCTURE: CPT

## 2019-06-17 PROCEDURE — 6370000000 HC RX 637 (ALT 250 FOR IP): Performed by: INTERNAL MEDICINE

## 2019-06-17 PROCEDURE — 6360000002 HC RX W HCPCS: Performed by: INTERNAL MEDICINE

## 2019-06-17 PROCEDURE — 6360000004 HC RX CONTRAST MEDICATION: Performed by: PSYCHIATRY & NEUROLOGY

## 2019-06-17 PROCEDURE — 93010 ELECTROCARDIOGRAM REPORT: CPT | Performed by: INTERNAL MEDICINE

## 2019-06-17 PROCEDURE — 6370000000 HC RX 637 (ALT 250 FOR IP): Performed by: PSYCHIATRY & NEUROLOGY

## 2019-06-17 RX ORDER — INSULIN GLARGINE 100 [IU]/ML
40 INJECTION, SOLUTION SUBCUTANEOUS NIGHTLY
Status: DISCONTINUED | OUTPATIENT
Start: 2019-06-17 | End: 2019-06-18

## 2019-06-17 RX ORDER — INSULIN GLARGINE 100 [IU]/ML
10 INJECTION, SOLUTION SUBCUTANEOUS NIGHTLY
Status: DISCONTINUED | OUTPATIENT
Start: 2019-06-17 | End: 2019-06-17

## 2019-06-17 RX ORDER — 0.9 % SODIUM CHLORIDE 0.9 %
10 VIAL (ML) INJECTION ONCE
Status: DISCONTINUED | OUTPATIENT
Start: 2019-06-17 | End: 2019-06-19 | Stop reason: HOSPADM

## 2019-06-17 RX ADMIN — INSULIN LISPRO 2 UNITS: 100 INJECTION, SOLUTION INTRAVENOUS; SUBCUTANEOUS at 10:21

## 2019-06-17 RX ADMIN — GLIMEPIRIDE 2 MG: 4 TABLET ORAL at 10:20

## 2019-06-17 RX ADMIN — ENOXAPARIN SODIUM 40 MG: 40 INJECTION SUBCUTANEOUS at 10:16

## 2019-06-17 RX ADMIN — SERTRALINE HYDROCHLORIDE 50 MG: 50 TABLET ORAL at 10:16

## 2019-06-17 RX ADMIN — INSULIN GLARGINE 40 UNITS: 100 INJECTION, SOLUTION SUBCUTANEOUS at 21:12

## 2019-06-17 RX ADMIN — SODIUM CHLORIDE: 9 INJECTION, SOLUTION INTRAVENOUS at 04:07

## 2019-06-17 RX ADMIN — CARBIDOPA AND LEVODOPA 1 TABLET: 25; 100 TABLET ORAL at 10:15

## 2019-06-17 RX ADMIN — INSULIN LISPRO 6 UNITS: 100 INJECTION, SOLUTION INTRAVENOUS; SUBCUTANEOUS at 17:49

## 2019-06-17 RX ADMIN — GADOTERIDOL 15 ML: 279.3 INJECTION, SOLUTION INTRAVENOUS at 13:04

## 2019-06-17 RX ADMIN — AMANTADINE HYDROCHLORIDE 100 MG: 100 CAPSULE ORAL at 21:07

## 2019-06-17 RX ADMIN — INSULIN LISPRO 4 UNITS: 100 INJECTION, SOLUTION INTRAVENOUS; SUBCUTANEOUS at 13:56

## 2019-06-17 RX ADMIN — CARBIDOPA AND LEVODOPA 1 TABLET: 25; 100 TABLET ORAL at 13:56

## 2019-06-17 RX ADMIN — AMANTADINE HYDROCHLORIDE 100 MG: 100 CAPSULE ORAL at 10:15

## 2019-06-17 RX ADMIN — SIMVASTATIN 40 MG: 40 TABLET, FILM COATED ORAL at 21:16

## 2019-06-17 RX ADMIN — LOSARTAN POTASSIUM 25 MG: 25 TABLET ORAL at 10:16

## 2019-06-17 ASSESSMENT — ENCOUNTER SYMPTOMS
WHEEZING: 0
TROUBLE SWALLOWING: 0
COUGH: 0
VOMITING: 0
SHORTNESS OF BREATH: 0
COLOR CHANGE: 0
NAUSEA: 0

## 2019-06-17 ASSESSMENT — PAIN SCALES - WONG BAKER
WONGBAKER_NUMERICALRESPONSE: 0

## 2019-06-17 NOTE — PROGRESS NOTES
[facility] Occupational Therapy      Date: 2019  Patient Name: Tricia Win        MRN: [id number]  Account: [de-identified]   : 1950  (77 y.o.)  Room: [place] [address]    Chart reviewed, attempted OT at [phone] for eval. Patient not seen 2° to:    [] Hold per nsg request    [] Pt declined     [x] Pt. off floor for test/procedure. RN aware. Will attempt again when able.     Electronically signed by SHARI Fowler/L on 2019 at 12:08 PM

## 2019-06-17 NOTE — PROGRESS NOTES
Assessment completed, patient alert and oriented x 4, lungs clear but diminished, heart rate regular, bowel sounds active, bilateral lower extremities noted with discoloration, per the  phone patient states that he has been seeing a doctor about this for a year, pedal pulse palpable bilaterally, patient assisted up to the bathroom from the chair this am, patient noted with a shuffling gait, unsteady with 2 assist

## 2019-06-17 NOTE — PROGRESS NOTES
Physical Therapy   [id number]B564-[facility]    NAME: Kristen Dumont    : 1950 (77 y.o.)  MRN: [id number]    Account: [de-identified]  Gender: male    PT evaluation and treatment orders received. Chart reviewed. PT eval attempted. Patient Unavailable: bedside testing @ 2:30 pm    Will attempt PT evaluation again at earliest convenience.       Electronically signed by Nicol Bahena PT on 19 at 2:32 PM

## 2019-06-17 NOTE — PROGRESS NOTES
Uneventful night. Patient is doing really well. Sitting in bed, no apparent distress. No chest pain or palpitation. No abdominal pain, nausea, vomiting, diarrhea, dysuria or hematuria. ROS: 12 system review otherwise is negative for acute signs or symptoms over the last 24 hrs.      Exam: Awake, alert oriented, in no apparent distress  HEENT: Pink conjunctiva, dry buccal mucosa. Normal and throat and nose  Neck: Supple, no nuchal rigidity. Endo: No thyromegaly. Vascular: No JVD or carotid bruit. Chest: Clear to auscultation, no dullness to percussion. Heart: Regular rate and rhythm, no extra sounds. No murmur, no rub. Abdomen: Soft, no tenderness, no rebound, no rigidity. Clinically I could not exclude the possibility of intra-abdominal mass or organomegaly. LE: No cyanosis or clubbing, no varices or edema. Neuro: Awake, alert, oriented, normal speech, normal comprehension, normal extension, normal cranial nerves, normal and symmetrical motor and tone examination throughout. MS: No joint effusion or tenderness. Skin: No skin rash, itching, bruising or significant findings.        Assessment and plan: This documentation may or may not be complete, inclusive or conclusive.     *Unwitnessed fall, questionable syncope versus seizure.     *Hyperglycemia, elevated anion gap however no ketones in the urine and normal hydroxybutyrate. Hyperosmolar hyperglycemia. Resolved.     *Mild degree of dehydration with volume loss. Resolved.     *Mild degree of renal failure, probable acute secondary to volume loss. Resolved.     *Metabolic acidosis which could be multifactorial secondary to dehydration, volume loss, seizure, metformin side effect, no evidence of sepsis or septic shock, toxin ingestion in view of elevated osmolar gap although less likely given the patient's denial taken or drinking any toxins or unusual products.     Resolved.     Plan:  Most of his acute illness and presentations as listed above however resolved today. Patient will be getting MRI today as well as EEG. Further decision to initiate antiepileptics to be addressed by neurology team otherwise patient is ready for discharge today or tomorrow.     I may or may not have addressed all of this pt symptoms, medical issues, abnormal labs and findings. Pt will need additional work up, investigation, testing, surveillance, and treatment to be done at a later time and date during this hospitalization or post discharge by PCP and other out pt providers. Portion of patient care is managed by other providers. Please refer to their notes for details. I will follow specialists recommendations given their expertise in specialty subject matters. I will transfer patient to a tertiary care center if recommended by specialists. Further workup and plan will be determined based on the clinical progression and a follow-up tests result. Night and next week  hospitalist will take care of the patient in my absence.

## 2019-06-17 NOTE — PROGRESS NOTES
Attempted to see pt 3 times today. Once he was at MRI, once echo and then an EEG. Started to speak with son briefly while waiting for EEG to be completed using a friend for some translation in waiting room, but the pt's wife who is also diabetic was very quiet and then they said she did not feel well and was sweaty and her glucose was 39 when checked so they took her to ER. Will try to see pt tomorrow. Per son and wife pt has had diabetes ed before, he lives in [facility] now for past 6 mos from [facility]. He is on insulin and oral anti diabetes meds at home. He checks his glucose 3 or 4 times a day and it is often elevated. He eats well but eats rice and starchy foods often. Was discussing his A1C when conversation was interuppted.

## 2019-06-17 NOTE — PROGRESS NOTES
2030 assessment complete, blue  phone used #[id number], to assist with translation for assessment and patient needs, denies pain, IV fluids infusing without difficulty, follows commands, noted bilateral feet regan color cool to touch positive pedal pulses denies pain numbness or tingling

## 2019-06-17 NOTE — PROGRESS NOTES
Nutrition Education    Type and Reason for Visit: Initial, Patient Education    Nutrition Assessment:  Diabetes Diet education provided in [address] documents with  on the phone. Discussed how foods impact blood glucose and how to choose foods to balance plate. Reinforcement needed and outpatient education recommended. · Verbally reviewed following information with Patient  · Written educational materials provided. · Contact name and number provided. · Refer to Patient Education activity for more details.     Electronically signed by Ailyn Tim RD, LD on 6/17/19 at 5:21 PM

## 2019-06-17 NOTE — PROGRESS NOTES
Neurology Daily Progress Note  Name: Ashley Dumont  Age: 77 y.o. Gender: male  CodeStatus: Full Code  Allergies: No Known Allergies    Chief Complaint:Fall    Primary Care Provider: PINEDA Colmenares CNP  InpatientTreatment Team: Treatment Team: Attending Provider: Vanessa Vincent MD; Consulting Physician: Josseline Smith MD; Consulting Physician: Juany English MD; : Carissa Chisholm RN; Patient Care Tech: Camryn Martin; Registered Nurse: Amy Nunez RN  Admission Date: 6/16/2019      HPI   Pt seen and examined for neuro follow up for syncope vs seizure. A&O x3, NAD, cooperative.  speaking. Spoke with nurse. Non focal neuro exam. No seizure activity. Shuffling gait. .  NO Headache, double vision, blurry vision, difficulty with speech, difficulty with swallowing, weakness, numbness, pain, nausea, vomiting, choking, neck pain, dizziness  Vitals:    06/16/19 2030   BP: (!) 152/76   Pulse: 63   Resp: 16   Temp: 98.4 °F (36.9 °C)   SpO2: 100%        Physical Exam   Constitutional: He is oriented to person, place, and time. He appears well-nourished. No distress. HENT:   Head: Normocephalic and atraumatic. Eyes: Pupils are equal, round, and reactive to light. EOM are normal.   Neck: Neck supple. No JVD present. Cardiovascular: Normal rate and regular rhythm. Pulmonary/Chest: Effort normal and breath sounds normal. No respiratory distress. Abdominal: Soft. Bowel sounds are normal. He exhibits no distension. There is no tenderness. Musculoskeletal: He exhibits no edema. Neurological: He is alert and oriented to person, place, and time. No cranial nerve deficit. Skin: Skin is warm and dry. He is not diaphoretic. Psychiatric: He has a normal mood and affect. Nursing note and vitals reviewed.     Mental Status Exam:             Level of Alertness:   awake            Orientation:   person, place, time            Memory:   normal            Attention/Concentration:  normal Language:  normal    Cranial Nerves         Cranial nerve III           Pupils:  equal, round, reactive to light      Cranial nerves III, IV, VI           Extraocular Movements: intact      Cranial nerve V           Facial sensation:  intact      Cranial nerve VII           Facial strength: intact      Cranial nerve VIII           Hearing:  intact      Cranial nerve IX           Palate:  intact      Cranial nerve XI         Shoulder shrug:  intact      Cranial nerve XII          Tongue movement:  normal    Motor:    Drift:  absent  Motor exam is symmetrical 5 out of 5 all extremities bilaterally  Tone:  normal  Abnormal Movements:  Tremor hand            Sensory:        Pinprick             Right Upper Extremity:  normal             Left Upper Extremity:  normal             Right Lower Extremity:  normal             Left Lower Extremity:  normal                    Touch              Right Upper Extremity:  normal              Left Upper Extremity:  normal              Right Lower Extremity:  normal              Left Lower Extremity:  normal                                    Reflexes:             Deep Tendon Reflexes:             Reflexes are 2 +             Plantar response:                Right:  downgoing               Left:  downgoing      Review of Systems   Constitutional: Negative for appetite change, fatigue and fever. HENT: Negative for hearing loss and trouble swallowing. Eyes: Negative for visual disturbance. Respiratory: Negative for cough, shortness of breath and wheezing. Cardiovascular: Negative for leg swelling. Gastrointestinal: Negative for nausea and vomiting. Musculoskeletal: Positive for gait problem (shuffling). Skin: Negative for color change and rash. Neurological: Negative for dizziness, tremors, seizures, syncope, facial asymmetry, speech difficulty, weakness, light-headedness, numbness and headaches.    Psychiatric/Behavioral: Negative for agitation, confusion and due to low lung volume The cardiac silhouette is of normal size configuration. The mediastinum is unremarkable. Pulmonary vascular unremarkable. Right sided trachea. No focal infiltrates. No Pneumothoraces. Impression NO ACUTE ACTIVE CARDIOPULMONARY PROCESS       Echo No results found for this or any previous visit. Assessment/Plan:  Encephalopathy, improved  Possible seizure secondary to Hyperosmolar State  PD, add sinemet  EEG  MRI r/o PRES    Syd FARHAN Matthews MD, Nurys Levin, American Board of Psychiatry & Neurology  Board Certified in Vascular Neurology  Board Certified in Neuromuscular Medicine  Certified in Neurorehabilitation         Collaborating physicians: Dr David Matthews, Dr CAITLIN Lennon, Dr Jennifer Garvin    Electronically signed by PINEDA Alatorre CNP on 6/17/2019 at 11:16 AM

## 2019-06-18 LAB
ETHYLENE GLYCOL: <5 MG/DL
GLUCOSE BLD-MCNC: 119 MG/DL (ref 60–115)
GLUCOSE BLD-MCNC: 189 MG/DL (ref 60–115)
GLUCOSE BLD-MCNC: 207 MG/DL (ref 60–115)
GLUCOSE BLD-MCNC: 228 MG/DL (ref 60–115)
METHANOL BLOOD: <5 MG/DL
PERFORMED ON: ABNORMAL

## 2019-06-18 PROCEDURE — 84681 ASSAY OF C-PEPTIDE: CPT

## 2019-06-18 PROCEDURE — 6370000000 HC RX 637 (ALT 250 FOR IP): Performed by: INTERNAL MEDICINE

## 2019-06-18 PROCEDURE — 97162 PT EVAL MOD COMPLEX 30 MIN: CPT

## 2019-06-18 PROCEDURE — 6370000000 HC RX 637 (ALT 250 FOR IP): Performed by: PSYCHIATRY & NEUROLOGY

## 2019-06-18 PROCEDURE — 1210000000 HC MED SURG R&B

## 2019-06-18 PROCEDURE — 97167 OT EVAL HIGH COMPLEX 60 MIN: CPT

## 2019-06-18 PROCEDURE — 99232 SBSQ HOSP IP/OBS MODERATE 35: CPT | Performed by: INTERNAL MEDICINE

## 2019-06-18 PROCEDURE — 6360000002 HC RX W HCPCS: Performed by: INTERNAL MEDICINE

## 2019-06-18 RX ORDER — INSULIN GLARGINE 100 [IU]/ML
30 INJECTION, SOLUTION SUBCUTANEOUS NIGHTLY
Status: DISCONTINUED | OUTPATIENT
Start: 2019-06-18 | End: 2019-06-19 | Stop reason: HOSPADM

## 2019-06-18 RX ADMIN — OXYCODONE HYDROCHLORIDE 2.5 MG: 5 TABLET ORAL at 20:23

## 2019-06-18 RX ADMIN — INSULIN LISPRO 2 UNITS: 100 INJECTION, SOLUTION INTRAVENOUS; SUBCUTANEOUS at 13:13

## 2019-06-18 RX ADMIN — SERTRALINE HYDROCHLORIDE 50 MG: 50 TABLET ORAL at 10:42

## 2019-06-18 RX ADMIN — AMANTADINE HYDROCHLORIDE 100 MG: 100 CAPSULE ORAL at 10:42

## 2019-06-18 RX ADMIN — AMANTADINE HYDROCHLORIDE 100 MG: 100 CAPSULE ORAL at 20:11

## 2019-06-18 RX ADMIN — ENOXAPARIN SODIUM 40 MG: 40 INJECTION SUBCUTANEOUS at 10:43

## 2019-06-18 RX ADMIN — INSULIN LISPRO 4 UNITS: 100 INJECTION, SOLUTION INTRAVENOUS; SUBCUTANEOUS at 17:05

## 2019-06-18 RX ADMIN — INSULIN GLARGINE 30 UNITS: 100 INJECTION, SOLUTION SUBCUTANEOUS at 20:10

## 2019-06-18 RX ADMIN — LOSARTAN POTASSIUM 25 MG: 25 TABLET ORAL at 10:42

## 2019-06-18 RX ADMIN — CARBIDOPA AND LEVODOPA 1 TABLET: 25; 100 TABLET ORAL at 14:43

## 2019-06-18 RX ADMIN — SIMVASTATIN 40 MG: 40 TABLET, FILM COATED ORAL at 20:11

## 2019-06-18 RX ADMIN — CARBIDOPA AND LEVODOPA 1 TABLET: 25; 100 TABLET ORAL at 10:42

## 2019-06-18 ASSESSMENT — ENCOUNTER SYMPTOMS
COUGH: 0
SHORTNESS OF BREATH: 0
NAUSEA: 0
CHEST TIGHTNESS: 0
ABDOMINAL PAIN: 0
COLOR CHANGE: 0
WHEEZING: 0
VOMITING: 0
TROUBLE SWALLOWING: 0

## 2019-06-18 ASSESSMENT — PAIN SCALES - GENERAL
PAINLEVEL_OUTOF10: 2
PAINLEVEL_OUTOF10: 6
PAINLEVEL_OUTOF10: 9
PAINLEVEL_OUTOF10: 9

## 2019-06-18 ASSESSMENT — PAIN DESCRIPTION - DIRECTION: RADIATING_TOWARDS: BACK AND CHEST

## 2019-06-18 ASSESSMENT — PAIN DESCRIPTION - ORIENTATION: ORIENTATION: RIGHT;LEFT

## 2019-06-18 ASSESSMENT — PAIN SCALES - WONG BAKER
WONGBAKER_NUMERICALRESPONSE: 0

## 2019-06-18 ASSESSMENT — PAIN DESCRIPTION - PAIN TYPE: TYPE: ACUTE PAIN

## 2019-06-18 ASSESSMENT — PAIN DESCRIPTION - LOCATION: LOCATION: LEG

## 2019-06-18 NOTE — CONSULTS
[address]                      [address], [address] [facility]                                  CONSULTATION    PATIENT NAME: Qiana Fernandez                :        1950  MED REC NO:   [id number]                            ROOM:       W288  ACCOUNT NO:   [de-identified]                           ADMIT DATE: 2019  PROVIDER:     Casandra Aviles MD    CONSULT DATE:  2019    REFERRING PROVIDER:  Jeannie Haas DO.    REASON FOR CONSULT:  Management of uncontrolled diabetes, hyperosmolar  state versus DKA. CHIEF COMPLAINT AND HISTORY OF PRESENT ILLNESS:  History was obtained  through prior H and P and also from family members who were in the room. The patient is a 60-year-old male with known history of diabetes for  undetermined amount of time. Recently moved to [facility] six to nine months ago, admitted with syncopal episode, was found  to have hyperglycemia with glucose of 627 with some dehydration. The  patient was on 70/30 insulin with metformin at home. The patient is  unsure about the dosages of his insulin. He was also taking glimepiride  2 mg morning and metformin 1000 mg twice daily. The patient was found  in the bathroom floor where he found himself there with significant  weakness. Does not remember passing out. Did not have any loss of  bladder or bowels. He has noticed increased urinary frequency and blood  sugars running in the 200+ range. He denies any history of seizures. The patient was started on IV insulin drip, and switched later to Lantus  and Humalog regimen with sliding scale coverage. Neuro consult was  obtained. Impression was possible seizure secondary to hyperosmolar  state. Sinemet was added. EEG was ordered. The patient's hemoglobin  A1c was 11.7. Does not remember his A1c from before. Blood sugars are  staying between 160 to 250 range today. Chemistries from baseline were  reviewed.   Glucose was 687, sodium 134, potassium 4.0, chloride was 88,  CO2 was 21, BUN 22, creatinine 1.24, and anion gap was 25. PAST MEDICAL HISTORY:  Significant for Parkinson's disease,  hypertension, hypercholesterolemia, and type 2 diabetes. SURGICAL HISTORY:  Reviewed, noncontributory. FAMILY HISTORY:  Reviewed, noncontributory. PERSONAL AND SOCIAL HISTORY:  Reviewed, noncontributory. ALLERGIES:  None. HOME MEDICATIONS:  The patient's home medications included metformin,  Zocor, Relafen, Zoloft, glimepiride, Amantadine, and Cozaar. MEDICATIONS:  Here include Lantus 30 units at night, Humalog coverage,  Lovenox, Cozaar, and Zoloft. REVIEW OF SYSTEMS:  Other than syncopal episode, hyperglycemia, and  possible seizure, 14-point review of system was negative. PHYSICAL EXAMINATION:  GENERAL:  The patient is alert and awake, in no obvious distress. VITAL SIGNS:  Blood pressure 154/80, pulse rate was 76, respiratory rate  was 18, and temperature was 97.3. HEENT:  Reveals no jaundice. Oral mucosa reveals bite marks over his  tongue on both sides. NECK:  Supple. No goiter or thyromegaly was noted. LUNGS:  Clear to auscultation bilaterally. No wheezing or crackles were  heard. HEART:  Heart sounds were normal.  No murmurs or thrills were present. ABDOMEN:  Soft and nontender. Bowel sounds are present. No  organomegaly. EXTREMITIES:  Lower extremities reveal no edema. SKIN:  Intact. NEUROLOGICAL:  Normal power bilateral upper and lower extremities. No  deficits. PSYCH EXAM:  Appears to be normal affect. LABORATORIES:  As above. ASSESSMENT:  Hyperosmolar state, syncopal episode, etiology unclear,  uncontrolled diabetes, lactic acidosis, and dehydration. PLAN:  We would increase Lantus to 40 units at night, Humalog 10 with  each meals plus sliding scale coverage. Diabetes education ongoing. A1c goal of less than 7. Blood sugar goal 140 to 200 range. Avoid  hypoglycemia.   The patient is to follow up in the office after  discharge. Thank you for the consult.         Priyanka Huddleston MD    D: 06/17/2019 22:13:16       T: 06/18/2019 0:58:30     [email or web address]  Job#: [id number]     Doc#: [id number]    CC:

## 2019-06-18 NOTE — PROGRESS NOTES
Neurology Daily Progress Note  Name: Marquez Dumont  Age: 77 y.o. Gender: male  CodeStatus: Full Code  Allergies: No Known Allergies    Chief Complaint:Fall    Primary Care Provider: PINEDA Suh CNP  InpatientTreatment Team: Treatment Team: Attending Provider: Bruna Vera MD; Consulting Physician: Mirna Delgado MD; Consulting Physician: Coby Del Real MD; Care Coordinator: Lucrecia Dominguez RN; Patient Care Tech: [employer]; Registered Nurse: Cecelia Su RN  Admission Date: 6/16/2019      HPI   Pt seen and examined for neuro follow up for encephalopathy, syncope vs seizure. A&O x3, NAD, cooperative.  speaking. Spoke with nurse. Non focal neuro exam. No seizure activity. Shuffling gait. NO Headache, double vision, blurry vision, difficulty with speech, difficulty with swallowing, weakness, numbness, pain, nausea, vomiting, choking, neck pain, dizziness        Vitals:    06/18/19 0718   BP: (!) 154/79   Pulse: 74   Resp: 18   Temp: 97.9 °F (36.6 °C)   SpO2: 98%        Physical Exam   Constitutional: He is oriented to person, place, and time. He appears well-nourished. No distress. HENT:   Head: Normocephalic and atraumatic. Eyes: Pupils are equal, round, and reactive to light. EOM are normal.   Neck: Neck supple. No JVD present. Cardiovascular: Normal rate and regular rhythm. Pulmonary/Chest: Effort normal and breath sounds normal. No respiratory distress. Abdominal: Soft. Bowel sounds are normal. He exhibits no distension. There is no tenderness. Musculoskeletal: He exhibits no edema. Neurological: He is alert and oriented to person, place, and time. See neuro exam     Skin: Skin is warm and dry. He is not diaphoretic. Psychiatric: He has a normal mood and affect. Nursing note and vitals reviewed.     Mental Status Exam:             Level of Alertness:   awake            Orientation:   person, place, time            Memory:   normal Units Subcutaneous TID WC    enoxaparin  40 mg Subcutaneous Daily    insulin lispro  0-12 Units Subcutaneous TID WC    insulin lispro  0-6 Units Subcutaneous Nightly    amantadine  100 mg Oral BID    sertraline  50 mg Oral Daily    simvastatin  40 mg Oral Nightly    losartan  25 mg Oral Daily    carbidopa-levodopa  1 tablet Oral BID     PRN Meds: dextrose, glucose, glucagon (rDNA), dextrose, acetaminophen, oxyCODONE    Labs:   Recent Labs     06/16/19  0300 06/16/19  0319 06/17/19  0515   WBC 6.3  --  5.3   HGB 15.4 15.9 12.8*   HCT 46.8  --  37.3*     --  129*     Recent Labs     06/16/19  0728 06/16/19  0729 06/17/19  0515    139 140   K 3.6 3.7 4.0    103 106   CO2 23 24 23   BUN 18 18 9   CREATININE 0.85 0.80 0.68*   CALCIUM 8.9 8.9 8.6   PHOS 2.5  --   --      Recent Labs     06/16/19  0300   AST 13   ALT 16   BILITOT 0.5   ALKPHOS 104     No results for input(s): INR in the last 72 hours. Recent Labs     06/16/19  0300 06/16/19  0728   CKTOTAL 69  --    TROPONINI <0.010 <0.010       Urinalysis:   Lab Results   Component Value Date    NITRU Negative 06/16/2019    BLOODU Negative 06/16/2019    SPECGRAV 1.031 06/16/2019    GLUCOSEU >=1000 06/16/2019       Radiology:   Most recent    Chest CT      WITH CONTRAST:No results found for this or any previous visit. WITHOUT CONTRAST: No results found for this or any previous visit. CXR      2-view: No results found for this or any previous visit. Portable:   Results for orders placed during the hospital encounter of 06/16/19   XR CHEST PORTABLE    Narrative EXAMINATION: CHEST PORTABLE VIEW     CLINICAL HISTORY: Generalized pain after fall    COMPARISONS: None     FINDINGS:    2 views of the chest is submitted. Limited exam due to low lung volume The cardiac silhouette is of normal size configuration. The mediastinum is unremarkable. Pulmonary vascular unremarkable. Right sided trachea. No focal infiltrates.   No

## 2019-06-18 NOTE — CARE COORDINATION
Spoke with son Rika Renteria and wife via telephone and . In agreement with rehab and freedom of choice offered, they prefer a rehab that is attached to hospital and is close our rehab. Spoke with Dr. Padma Webb orders rcvd. Notified Halie Amado and Jensen euceda RN caring for pt. Electronically signed by Noman Johnson RN on 6/18/2019 at 10:56 AM     Per [place] [facility], once PT/OT notes rcvd, she will star precert.  Electronically signed by Noman Johnson RN on 6/18/2019 at 11:21 AM

## 2019-06-18 NOTE — PROGRESS NOTES
[facility] OCCUPATIONAL THERAPY EVALUATION - ACUTE     Date: 2019  Patient Name: Dalton Daily        MRN: [id number]  Account: [de-identified]   : 1950  (77 y.o.)  Room: [place] [address]    Chart Review:  Diagnosis:  The primary encounter diagnosis was Syncope and collapse. Diagnoses of Lactic acidosis and Hyperglycemia were also pertinent to this visit. Past Medical History:   Diagnosis Date    Hyperlipidemia     Hypertension     Parkinson's disease ([facility] [address] [facility]     Type 2 diabetes mellitus without complication ([facility]      No past surgical history on file. Restrictions  Restrictions/Precautions: Fall Risk     Safety Devices: Safety Devices  Safety Devices in place: Yes  Type of devices:  All fall risk precautions in place    Subjective       Pain Reassessment:   Pain Assessment  Patient Currently in Pain: Yes  Pain Assessment: 0-10  Pain Level: 9  Pain Type: Acute pain  Pain Location: Leg  Pain Orientation: Right, Left  Pain Radiating Towards: back and chest       Orientation  Orientation  Overall Orientation Status: Within Functional Limits    Prior Level of Function:  Social/Functional History  Lives With: Spouse  Type of Home: House  Home Layout: One level  Home Access: Level entry  Bathroom Shower/Tub: Tub/Shower unit  Bathroom Equipment: Grab bars in shower, Shower chair  Home Equipment: 4 wheeled walker  Receives Help From: Family  ADL Assistance: Independent  Homemaking Assistance: (spouse and son able to assist)  Homemaking Responsibilities: No  Ambulation Assistance: Independent(rollator when needed)  Transfer Assistance: Independent  Active : No  Patient's  Info: son  Additional Comments: Pt Romansh speaking interpretor Surjit  present,  Pt recieves help from wife and son @ times    OBJECTIVE:     Orientation Status:  Orientation  Overall Orientation Status: Within Functional Limits    Observation:  Observation/Palpation  Observation: alert, cooperative    Cognition fall.  Pt has Parkinsons. Pt was found to have the above deficits and would benefit from further OT interventio  Performance deficits / Impairments: Decreased functional mobility , Decreased strength, Decreased endurance, Decreased coordination, Decreased ADL status, Decreased safe awareness, Decreased balance, Decreased fine motor control  Prognosis: Good  Discharge Recommendations: Continue to assess pending progress  History: multi comoerb  Exam: 8 deficts  Assistance / Modification: Max A    Six Click Score   How much help for putting on and taking off regular lower body clothing?: A Lot  How much help for Bathing?: A Lot  How much help for Toileting?: A Lot  How much help for putting on and taking off regular upper body clothing?: A Lot  How much help for taking care of personal grooming?: A Lot  How much help for eating meals?: A Lot  AM-[facility] Inpatient Daily Activity Raw Score: 12  AM-[facility] Inpatient ADL T-Scale Score : 30.6  ADL Inpatient CMS 0-100% Score: 66.57    Plan:  Plan  Times per week: 1-3x  Plan weeks: acute LOS  Current Treatment Recommendations: Strengthening, Functional Mobility Training, Patient/Caregiver Education & Training, Endurance Training, Equipment Evaluation, Education, & procurement, Balance Training, Neuromuscular Re-education, Safety Education & Training, Self-Care / ADL    Goals:   Patient will:    - Improve functional endurance to tolerate/complete 30 mins of ADL's  - Be Mod I in UB ADLs   - Be Min A in LB ADLs  - Be Min A in ADL transfers without LOB  - Be SBA in toileting tasks  - Improve B UE strength and endurance to [facility] in order to participate in self-care activities as projected. - Access appropriate D/C site with as few architectural barriers as possible.     Patient Goal:    Feel better and go home   Discussed and agreed upon: Yes Comments:     Therapy Time:   OT Individual Minutes  Time In: 1030  Time Out: [address]  Minutes: 23    Electronically signed by:    Sandra Martinez OTR/L  6/18/2019, 11:05 AM

## 2019-06-18 NOTE — PROGRESS NOTES
units + SSI    Unwitnessed fall, syncope vs seizure  - ECHO - EF 70%  - ECG - NSR  - telemetry - no arrhythmia  - neurology following  - EEG   - MRI brain - no acute process    MADDI - prerenal due to hyperglycemia. Improved    Confusion - unclear baseline mental status. He is oriented *2 on my exam today. Not oriented to time. UA negative for infection. CXR - no infiltrate. MRI brain - no acute process. He lives with his wife. Need to check with family about baseline mental status. Deconditioning - PT/OT. Rehab consult. Additional work up or/and treatment plan may be added today or then after based on clinical progression. I am managing a portion of pt care. Some medical issues are handled by other specialists. Additional work up and treatment should be done in out pt setting by pt PCP and other out pt providers. In addition to examining and evaluating pt, I spent additional time explaining care, normal and abnormal findings, and treatment plan. All of pt questions were answered. Counseling, diet and education were  provided. Case will be discussed with nursing staff when appropriate. Family will be updated if and when appropriate.       Diet: DIET CARB CONTROL; Carb Control: 4 carb choices (60 gms)/meal    Code Status: Full Code      Electronically signed by Raf Jay MD on 6/18/2019 at 9:05 AM

## 2019-06-18 NOTE — PROGRESS NOTES
Physical Therapy Med Surg Initial Assessment  [facility]  [facility]       NAME: Patience Dumont  : 1950 (77 y.o.)  MRN: [id number]  CODE STATUS: Full Code    Date of Service: 2019    Patient Diagnosis(es): Lactic acidosis [E87.2]  Lactic acidosis [E87.2]   Chief Complaint   Patient presents with    Fall     Patient Active Problem List    Diagnosis Date Noted    [facility]non[facility] ([facility] [address]     Lactic acidosis 2019        Past Medical History:   Diagnosis Date    Hyperlipidemia     Hypertension     Parkinson's disease ([facility] [address]     Type 2 diabetes mellitus without complication ([facility] [address]      No past surgical history on file. Chart Reviewed: Yes  Patient assessed for rehabilitation services?: Yes  Family / Caregiver Present: No  General Comment  Comments: Pt awake in bed, agreeable to PT eval though reports a lot of pain in legs and in rib cage bilaterally    Restrictions:  Restrictions/Precautions: Fall Risk     SUBJECTIVE: Subjective:  present.   Pre Treatment Pain Screening  Pain at present: 9  Intervention List: Patient able to continue with treatment;Nurse/physician notified    Post Treatment Pain Screening:   Pain Screening  Patient Currently in Pain: Yes  Pain Assessment  Pain Level: 9    Prior Level of Function:  Social/Functional History  Lives With: Spouse  Type of Home: House  Home Layout: One level  Home Access: Level entry  Bathroom Shower/Tub: Tub/Shower unit  Home Equipment: 4 wheeled walker  ADL Assistance: Independent  Homemaking Assistance: (spouse and son able to assist)  Ambulation Assistance: Independent(rollator when needed)  Transfer Assistance: Independent    OBJECTIVE:   Vision/Hearing:  Vision: Within Functional Limits  Hearing: Within functional limits    Cognition:  Overall Orientation Status: Within Functional Limits  Follows Commands: Within Functional Limits         ROM:  RLE AROM: WFL  LLE AROM : WFL    Strength:  Strength RLE  Comment: grossly 4-/5  Strength LLE  Comment: grossly 4-/5    Neuro:  Balance  Sitting - Static: Good;-  Sitting - Dynamic: Fair;+  Standing - Static: Fair;-(requires UE support to maintain standing balance in static and dynamic tasks)  Standing - Dynamic: Fair;-             Bed mobility  Supine to Sit: Contact guard assistance  Sit to Supine: Contact guard assistance  Scooting: Contact guard assistance  Comment: cues for repositioning in bed    Transfers  Sit to Stand: Contact guard assistance  Stand to sit: Contact guard assistance  Bed to Chair: Contact guard assistance  Comment: increased time to obtain upright, initially leaning posteriorly on bed with back of knees    Ambulation  Ambulation?: Yes  Ambulation 1  Surface: level tile  Device: Rolling Walker  Assistance: Contact guard assistance  Quality of Gait: initially freezing gait, then marked shuffling pattern throughout, freezing around turns  Distance: 25 ft  Comments: distance limited due to pain    Activity Tolerance  Activity Tolerance: Patient limited by pain          ASSESSMENT:   Body structures, Functions, Activity limitations: Decreased functional mobility ; Decreased strength;Decreased endurance;Decreased coordination;Decreased balance; Increased Pain  Decision Making: Medium Complexity  History: high  Exam: high  Clinical Presentation: medium    Prognosis: Good  Patient Education: PT POC, DC Planning, safety awareness, compensations for pain    DISCHARGE RECOMMENDATIONS:  Discharge Recommendations: Continue to assess pending progress, Patient would benefit from continued therapy after discharge    Assessment: Pt with fall at home, reporting pain throughout body most noted in legs, feet, and bilateral flank limiting his ability to ambulate. Pt also with exacerbated symtpoms of freezing and shuffling gait with balance concerns. Continue PT to address impairments and return to PLOF.   REQUIRES PT FOLLOW UP: Yes PLAN OF CARE:  Plan  Times per week: 3-6  Current Treatment Recommendations: Strengthening, Functional Mobility Training, Neuromuscular Re-education, Home Exercise Program, Equipment Evaluation, Education, & procurement, Transfer Training, Gait Training, Safety Education & Training, Balance Training, Endurance Training, Stair training, Patient/Caregiver Education & Training, Manual Therapy - Soft Tissue Mobilization  Safety Devices  Type of devices: Bed alarm in place, Call light within reach    Goals:  Patient goals : agreeable to PT POC  Long term goals  Long term goal 1: indep with bed mobility  Long term goal 2: indep with transfers  Long term goal 3: pt to ambulate >150 ft LRAD (FWW vs rollator) with supervision  Long term goal 4: pt to tolerate >10 min dynamic activity    [facility] (6 CLICK) [id number] Andi Munoz Mobility Raw Score : 15     Therapy Time:   Individual   Time In 1035   Time Out 1055   Minutes 235 [facility], PT, 06/18/19 at 11:07 AM

## 2019-06-18 NOTE — CARE COORDINATION
Precert initiated with Flora Ryan for the acute in pt. Program. Pt. and family in agreement with rehab if the precert is obtained.  Electronically signed by Rosamaria Duffy RN on 6/18/19 at 11:14 AM

## 2019-06-19 ENCOUNTER — HOSPITAL ENCOUNTER (INPATIENT)
Age: 69
LOS: 15 days | Discharge: HOME HEALTH | DRG: 093 | End: 2019-07-04
Attending: PHYSICAL MEDICINE & REHABILITATION | Admitting: PHYSICAL MEDICINE & REHABILITATION
Payer: COMMERCIAL

## 2019-06-19 VITALS
OXYGEN SATURATION: 97 % | HEIGHT: 65 IN | DIASTOLIC BLOOD PRESSURE: 84 MMHG | TEMPERATURE: 98.6 F | BODY MASS INDEX: 28.43 KG/M2 | HEART RATE: 85 BPM | SYSTOLIC BLOOD PRESSURE: 148 MMHG | WEIGHT: 170.64 LBS | RESPIRATION RATE: 16 BRPM

## 2019-06-19 DIAGNOSIS — S22.000A COMPRESSION FRACTURE OF BODY OF THORACIC VERTEBRA (HCC): Primary | ICD-10-CM

## 2019-06-19 PROBLEM — Z74.09 IMPAIRED MOBILITY: Status: ACTIVE | Noted: 2019-06-19

## 2019-06-19 PROBLEM — E11.40 TYPE 2 DIABETES MELLITUS WITH DIABETIC NEUROPATHY, WITH LONG-TERM CURRENT USE OF INSULIN (HCC): Status: ACTIVE | Noted: 2019-06-19

## 2019-06-19 PROBLEM — Z79.4 TYPE 2 DIABETES MELLITUS WITH DIABETIC NEUROPATHY, WITH LONG-TERM CURRENT USE OF INSULIN (HCC): Status: ACTIVE | Noted: 2019-06-19

## 2019-06-19 LAB
GLUCOSE BLD-MCNC: 117 MG/DL (ref 60–115)
GLUCOSE BLD-MCNC: 247 MG/DL (ref 60–115)
GLUCOSE BLD-MCNC: 296 MG/DL (ref 60–115)
GLUCOSE BLD-MCNC: 324 MG/DL (ref 60–115)
PERFORMED ON: ABNORMAL

## 2019-06-19 PROCEDURE — 6360000002 HC RX W HCPCS: Performed by: INTERNAL MEDICINE

## 2019-06-19 PROCEDURE — 6370000000 HC RX 637 (ALT 250 FOR IP): Performed by: INTERNAL MEDICINE

## 2019-06-19 PROCEDURE — 6370000000 HC RX 637 (ALT 250 FOR IP): Performed by: PSYCHIATRY & NEUROLOGY

## 2019-06-19 PROCEDURE — 1180000000 HC REHAB R&B

## 2019-06-19 PROCEDURE — 99221 1ST HOSP IP/OBS SF/LOW 40: CPT | Performed by: PHYSICAL MEDICINE & REHABILITATION

## 2019-06-19 PROCEDURE — 99232 SBSQ HOSP IP/OBS MODERATE 35: CPT | Performed by: PHYSICIAN ASSISTANT

## 2019-06-19 RX ORDER — SIMVASTATIN 40 MG
40 TABLET ORAL NIGHTLY
Status: CANCELLED | OUTPATIENT
Start: 2019-06-19

## 2019-06-19 RX ORDER — SIMVASTATIN 40 MG
40 TABLET ORAL NIGHTLY
Status: DISCONTINUED | OUTPATIENT
Start: 2019-06-20 | End: 2019-06-19

## 2019-06-19 RX ORDER — INSULIN GLARGINE 100 [IU]/ML
30 INJECTION, SOLUTION SUBCUTANEOUS NIGHTLY
Status: DISCONTINUED | OUTPATIENT
Start: 2019-06-20 | End: 2019-06-23

## 2019-06-19 RX ORDER — ACETAMINOPHEN 325 MG/1
650 TABLET ORAL EVERY 6 HOURS PRN
Status: DISCONTINUED | OUTPATIENT
Start: 2019-06-19 | End: 2019-06-20

## 2019-06-19 RX ORDER — INSULIN GLARGINE 100 [IU]/ML
30 INJECTION, SOLUTION SUBCUTANEOUS NIGHTLY
Status: DISCONTINUED | OUTPATIENT
Start: 2019-06-20 | End: 2019-06-19

## 2019-06-19 RX ORDER — LOSARTAN POTASSIUM 25 MG/1
25 TABLET ORAL DAILY
Status: CANCELLED | OUTPATIENT
Start: 2019-06-20

## 2019-06-19 RX ORDER — ACETAMINOPHEN 325 MG/1
650 TABLET ORAL EVERY 6 HOURS PRN
Status: CANCELLED | OUTPATIENT
Start: 2019-06-19

## 2019-06-19 RX ORDER — INSULIN GLARGINE 100 [IU]/ML
30 INJECTION, SOLUTION SUBCUTANEOUS NIGHTLY
Qty: 1 VIAL | Refills: 3 | Status: ON HOLD | DISCHARGE
Start: 2019-06-19 | End: 2019-07-03 | Stop reason: HOSPADM

## 2019-06-19 RX ORDER — SIMVASTATIN 40 MG
40 TABLET ORAL NIGHTLY
Status: DISCONTINUED | OUTPATIENT
Start: 2019-06-20 | End: 2019-07-04 | Stop reason: HOSPADM

## 2019-06-19 RX ORDER — INSULIN GLARGINE 100 [IU]/ML
30 INJECTION, SOLUTION SUBCUTANEOUS NIGHTLY
Status: CANCELLED | OUTPATIENT
Start: 2019-06-19

## 2019-06-19 RX ORDER — AMANTADINE HYDROCHLORIDE 100 MG/1
100 CAPSULE, GELATIN COATED ORAL 2 TIMES DAILY
Status: DISCONTINUED | OUTPATIENT
Start: 2019-06-20 | End: 2019-06-19

## 2019-06-19 RX ORDER — AMANTADINE HYDROCHLORIDE 100 MG/1
100 CAPSULE, GELATIN COATED ORAL 2 TIMES DAILY
Status: DISCONTINUED | OUTPATIENT
Start: 2019-06-20 | End: 2019-07-04 | Stop reason: HOSPADM

## 2019-06-19 RX ORDER — LOSARTAN POTASSIUM 25 MG/1
25 TABLET ORAL DAILY
Status: DISCONTINUED | OUTPATIENT
Start: 2019-06-20 | End: 2019-07-04 | Stop reason: HOSPADM

## 2019-06-19 RX ORDER — OXYCODONE HYDROCHLORIDE 5 MG/1
2.5 TABLET ORAL EVERY 4 HOURS PRN
Status: DISCONTINUED | OUTPATIENT
Start: 2019-06-19 | End: 2019-06-21

## 2019-06-19 RX ORDER — OXYCODONE HYDROCHLORIDE 5 MG/1
2.5 TABLET ORAL EVERY 4 HOURS PRN
Status: CANCELLED | OUTPATIENT
Start: 2019-06-19

## 2019-06-19 RX ORDER — ACETAMINOPHEN 80 MG
TABLET,CHEWABLE ORAL ONCE
Status: COMPLETED | OUTPATIENT
Start: 2019-06-19 | End: 2019-06-20

## 2019-06-19 RX ORDER — AMANTADINE HYDROCHLORIDE 100 MG/1
100 CAPSULE, GELATIN COATED ORAL 2 TIMES DAILY
Status: CANCELLED | OUTPATIENT
Start: 2019-06-19

## 2019-06-19 RX ADMIN — INSULIN LISPRO 6 UNITS: 100 INJECTION, SOLUTION INTRAVENOUS; SUBCUTANEOUS at 15:06

## 2019-06-19 RX ADMIN — ENOXAPARIN SODIUM 40 MG: 40 INJECTION SUBCUTANEOUS at 10:50

## 2019-06-19 RX ADMIN — SERTRALINE HYDROCHLORIDE 50 MG: 50 TABLET ORAL at 10:50

## 2019-06-19 RX ADMIN — LOSARTAN POTASSIUM 25 MG: 25 TABLET ORAL at 10:51

## 2019-06-19 RX ADMIN — INSULIN LISPRO 8 UNITS: 100 INJECTION, SOLUTION INTRAVENOUS; SUBCUTANEOUS at 18:52

## 2019-06-19 RX ADMIN — AMANTADINE HYDROCHLORIDE 100 MG: 100 CAPSULE ORAL at 10:50

## 2019-06-19 RX ADMIN — CARBIDOPA AND LEVODOPA 1 TABLET: 25; 100 TABLET ORAL at 10:50

## 2019-06-19 RX ADMIN — CARBIDOPA AND LEVODOPA 1 TABLET: 25; 100 TABLET ORAL at 15:04

## 2019-06-19 ASSESSMENT — ENCOUNTER SYMPTOMS
BACK PAIN: 1
ABDOMINAL PAIN: 0
COLOR CHANGE: 0
VOMITING: 0
BLOOD IN STOOL: 0
NAUSEA: 0
SORE THROAT: 0
BOWEL INCONTINENCE: 0
PHOTOPHOBIA: 0
COUGH: 0
EYE REDNESS: 0
SHORTNESS OF BREATH: 0
EYE PAIN: 0
DIARRHEA: 0
CONSTIPATION: 1
STRIDOR: 0
TROUBLE SWALLOWING: 0
WHEEZING: 0
SHORTNESS OF BREATH: 1
CHEST TIGHTNESS: 0

## 2019-06-19 NOTE — CARE COORDINATION
LINDA THE BLUE  PHONE FOR Arabic I CONFIRMED WITH THE PT AND HIS SON AWAITING [facility].

## 2019-06-19 NOTE — PROGRESS NOTES
Endocrinology Progress Note    Assessment and Plan:   Assessment-  1. Type 2 insulin-dependent diabetes  2. Hyperosmolar state  3. Hyperglycemia  4. Parkinson's disease        Plan-  1. Lantus 30 units at bedtime  2. Humalog 6 units 3 times daily with meals  3. Discontinue glipizide  4. Continue metformin  5. Monitor glucose closely, avoid hypoglycemia  6. Patient may be discharged to rehab from endocrinology standpoint    POC Glucose:   Recent Labs     06/18/19  1257 06/18/19  1645 06/18/19 2008 06/19/19  0852 06/19/19  1206   POCGLU 189* 207* 228* 117* 296*     HGBA1C:  Lab Results   Component Value Date    LABA1C 11.7 (H) 06/16/2019   CBC:   Recent Labs     06/17/19  0515   WBC 5.3   HGB 12.8*   *     CMP:    Recent Labs     06/17/19  0515      K 4.0      CO2 23   BUN 9   CREATININE 0.68*   GLUCOSE 150*   CALCIUM 8.6   LABGLOM >60.0         CC:   Chief Complaint   Patient presents with    Fall       Subjective: Interval History: Patient is a 25-year-old  man admitted for confusion, weakness, gait abnormality, hyperosmolar state due to hyperglycemia. Glycemic control has improved on current insulin dosing regiment. His hemoglobin A1c is 11.7. He will require 4 shots of insulin a day.   Patient may be discharged to the skilled nursing facility or rehab from endocrinology standpoint    Review of systems: denies polyuria, polydipsia, ABD pain, flank pain, N/V/D, or diaphoresis  Medications:   Scheduled Meds:   insulin glargine  30 Units Subcutaneous Nightly    insulin lispro  6 Units Subcutaneous TID WC    sodium chloride (PF)  10 mL Intravenous Once    enoxaparin  40 mg Subcutaneous Daily    insulin lispro  0-12 Units Subcutaneous TID WC    insulin lispro  0-6 Units Subcutaneous Nightly    amantadine  100 mg Oral BID    sertraline  50 mg Oral Daily    simvastatin  40 mg Oral Nightly    losartan  25 mg Oral Daily    carbidopa-levodopa  1 tablet Oral BID     Continuous Infusions:   dextrose         Objective:   Vitals: /78   Pulse 73   Temp 98.6 °F (37 °C) (Oral)   Resp 16   Ht 5' 5\" (1.651 m)   Wt 170 lb 10.2 oz (77.4 kg)   SpO2 99%   BMI 28.40 kg/m²    Wt Readings from Last 3 Encounters:   06/17/19 170 lb 10.2 oz (77.4 kg)        General appearance: alert, appears stated age, cooperative and no distress  Skin: Skin color, texture, turgor normal. No rashes or lesions. Neck: no lymphadenopathy  Lungs: clear to auscultation bilaterally  Heart: regular rate and rhythm, S1, S2 normal, no murmur, click, rub or gallop  Abdomen: soft, non-tender. Bowel sounds normal. No masses,  no organomegaly.   Extremities: extremities normal, atraumatic, no cyanosis or edema    Patient Active Problem List:     Lactic acidosis     Hyperosmolar (nonketotic) coma ([facility] [address]     Type 2 diabetes mellitus with diabetic neuropathy, with long-term current use of insulin [facility])            Electronically signed by SOCORRO Gutierrez on 6/19/2019 at 2:29 PM

## 2019-06-19 NOTE — CARE COORDINATION
HELEN LOPRESTI  CALLED ME AND VIOLETAERT APPROVED FOR [facility] AND I TOLD STAFF RN PT IS APPROVED FOR [facility] BUT NO ROOM NUMBER YET.

## 2019-06-19 NOTE — PROGRESS NOTES
Stanley Addison is a 77 y.o. male patient.   Chief complaints uncontrolled diabetes    Current Facility-Administered Medications   Medication Dose Route Frequency Provider Last Rate Last Dose    insulin glargine (LANTUS) injection vial 30 Units  30 Units Subcutaneous Nightly Chris Bowman MD   30 Units at 06/18/19 2010    insulin lispro (HUMALOG) injection vial 6 Units  6 Units Subcutaneous TID  Chris Bowman MD   6 Units at 06/18/19 1705    sodium chloride (PF) 0.9 % injection 10 mL  10 mL Intravenous Once Yaritza Salinas MD        enoxaparin (LOVENOX) injection 40 mg  40 mg Subcutaneous Daily Eugardenia Hernandez, DO   40 mg at 06/18/19 1043    dextrose 50 % IV solution  12.5 g Intravenous PRN Tim Hernandez DO        glucose (GLUTOSE) 40 % oral gel 15 g  15 g Oral PRN Keith Durham MD        glucagon (rDNA) injection 1 mg  1 mg Intramuscular PRN Keith Durham MD        dextrose 5 % solution  100 mL/hr Intravenous PRN Maite Everett MD        insulin lispro (HUMALOG) injection vial 0-12 Units  0-12 Units Subcutaneous TID  Keith Durham MD   4 Units at 06/18/19 1705    insulin lispro (HUMALOG) injection vial 0-6 Units  0-6 Units Subcutaneous Nightly Maite Everett MD   2 Units at 06/18/19 2010    amantadine (SYMMETREL) capsule 100 mg  100 mg Oral BID Keith Durham MD   100 mg at 06/18/19 2011    sertraline (ZOLOFT) tablet 50 mg  50 mg Oral Daily Keith Durham MD   50 mg at 06/18/19 1042    simvastatin (ZOCOR) tablet 40 mg  40 mg Oral Nightly Keith Durham MD   40 mg at 06/18/19 2011    losartan (COZAAR) tablet 25 mg  25 mg Oral Daily Keith Durham MD   25 mg at 06/18/19 1042    carbidopa-levodopa (SINEMET)  MG per tablet 1 tablet  1 tablet Oral BID Yaritza Salinas MD   1 tablet at 06/18/19 1443    acetaminophen (TYLENOL) tablet 650 mg  650 mg Oral Q6H PRN Keith Durham MD        oxyCODONE (ROXICODONE) immediate release tablet 2.5 mg  2.5 mg Oral Q4H PRN Keith Durham MD   2.5 mg at 06/18/19

## 2019-06-19 NOTE — PROGRESS NOTES
Physical Therapy Med Surg Daily Treatment Note  Facility/Department: [facility]  Room: [facility]-       NAME: Patience Dumont  : 1950 (77 y.o.)  MRN: [id number]  CODE STATUS: Full Code    Date of Service: 2019    Patient Diagnosis(es): Lactic acidosis [E87.2]  Lactic acidosis [E87.2]   Chief Complaint   Patient presents with    Fall     Patient Active Problem List    Diagnosis Date Noted    Type 2 diabetes mellitus with diabetic neuropathy, with long-term current use of insulin ([facility] [address] 2019    Syncope and collapse     Hyperosmolar (nonketotic) coma ([facility])     Lactic acidosis 2019        Past Medical History:   Diagnosis Date    Hyperlipidemia     Hypertension     Parkinson's disease ([facility] [address]     Type 2 diabetes mellitus without complication ([facility]      No past surgical history on file. Restrictions  Restrictions/Precautions: Fall Risk    Subjective   General  Chart Reviewed: Yes  Family / Caregiver Present: yes son present   Pain Screening  Patient Currently in Pain: Denies  Pain Reassessment: denies   Objective   Bed mobility  Sit to Supine: Minimal assistance  Scooting: Moderate assistance  Transfers  Sit to Stand: Contact guard assistance  Stand to sit: Contact guard assistance  Bed to Chair: Contact guard assistance  Comment: requires increased time to obtain upright stance, verbak cues for hand placement to improve safety     Ambulation  Ambulation?: Yes  Ambulation 1  Surface: level tile  Device: Rolling Walker  Assistance: Contact guard assistance  Quality of Gait: parkinsonian gait with several episodes of freezing, retropulsive causing 1 LOB with approach to bed   Distance: 40'x2   Comments: distance limited due to pain  Assessment  Cuban speaking PCA present for interpretation. Patient demonstrate decreased safety awareness with transfers and gait. Continues to demonstrate shuffling gait with several episodes of freezing. Retropulsive with turns causing LOB. Discharge Recommendations:  Continue to assess pending progress, Patient would benefit from continued therapy after discharge    Goals  Long term goals  Long term goal 1: indep with bed mobility  Long term goal 2: indep with transfers  Long term goal 3: pt to ambulate >150 ft LRAD (FWW vs rollator) with supervision  Long term goal 4: pt to tolerate >10 min dynamic activity  Patient Goals   Patient goals : agreeable to PT POC    Plan    Plan  Times per week: 3-6  Current Treatment Recommendations: Strengthening, Functional Mobility Training, Neuromuscular Re-education, Home Exercise Program, Equipment Evaluation, Education, & procurement, Transfer Training, Gait Training, Safety Education & Training, Balance Training, Endurance Training, Stair training, Patient/Caregiver Education & Training, Manual Therapy - Soft Tissue Mobilization  Safety Devices  Type of devices: Bed alarm in place, Call light within reach     [facility] (6 CLICK) [id number]i Munoz Mobility Raw Score : 15     Therapy Time   Individual   Time In 1445   Time Out 1505   Minutes 20     Timed Code Treatment Minutes: 20 Minutes     Gt 15   Tr 5   Janine Arnold PTA, 06/19/19 at 3:14 PM

## 2019-06-19 NOTE — FLOWSHEET NOTE
Patient ambulated to the bathroom with unsteady gait with staff assisting, had a large BM, ambulated back to chair using a walker. Gait steady, sitting up in chair family at bedside. Electronically signed by Andrea Gutierrez on 6/19/2019 at 12:38 PM

## 2019-06-19 NOTE — CONSULTS
Physical Medicine & Rehabilitation  Consult Note      Admitting Physician: Roberta Tom DO    Primary Care Provider: PINEDA Mccarthy CNP     Reason for Consult:  Asses rehab needs,promote physical and mental function, and decrease likelihood of re-admit to the hospital after discharge. History of Present Illness:    Joseph Beltran is a 77 y.o. male admitted to [facility] on 2019. Patient is recovering from impaired mobility shakiness and exacerbation of Parkinson's disease status post hyperosmolar state and uncontrolled diabetes. He was initially admitted through the emergency room with inability to ambulate and increased falls at home. Neurologic Problem   The patient's primary symptoms include an altered mental status, clumsiness, focal sensory loss, focal weakness and weakness. This is a recurrent problem. The current episode started in the past 7 days. The neurological problem developed insidiously. The problem is unchanged. There was left-sided, right-sided, upper extremity and lower extremity focality noted. Associated symptoms include back pain, confusion, dizziness, fatigue, light-headedness and shortness of breath. Pertinent negatives include no abdominal pain, auditory change, aura, bladder incontinence, bowel incontinence, chest pain, diaphoresis, fever, headaches, nausea, neck pain, palpitations or vomiting. Past treatments include walking and acetaminophen. The treatment provided mild relief. There is no history of a bleeding disorder, a clotting disorder, a CVA, dementia, head trauma, liver disease, mood changes or seizures. Their inpatient work up has included:    Imaging:    Imaging and other studies reviewed and discussed with patient and staff    Xr Pelvis   2019  EXAMINATION: XR PELVIS (1-2 VIEWS) DATE AND TIME:2019 4:00 AM CLINICAL HISTORY: Acute pelvic pain  trauma  COMPARISON: None available.     FINDINGS: A/P view of the degraded by motion artifact. Confluent periventricular hyperintense T2/FLAIR signal, a few scattered small hyperintensity/FLAIR signal foci within bilateral supratentorial white matter, and patchy hyperdensity/FLAIR signal within the babak are nonspecific findings most better with chronic small vessel ischemic changes in a patient of this age. Prominence of the sulci and ventricles compatible with moderate generalized parenchymal volume loss. No acute hemorrhage, enhancing mass or pathologic enhancement, mass effect, midline shift, or abnormal extra-axial fluid collection. The posterior fossa  is within normal limits. There is no diffusion restriction. No susceptibility artifact is identified on the gradient echo sequence. The major intracranial vascular flow voids are maintained. Cranial nerves 7/8 complexes appear grossly unremarkable. The visualized paranasal sinuses are clear. Trace fluid within the bilateral mastoid air cells. No acute ischemia or acute intracranial process. No enhancing mass or pathologic enhancement. Generalized parenchymal volume loss and nonspecific white matter findings most compatible with chronic small vessel ischemic changes in a patient of this age.              Labs:     labs reviewed and discussed with patient and staff    Lab Results   Component Value Date    POCGLU 117 06/19/2019    POCGLU 228 06/18/2019    POCGLU 207 06/18/2019    POCGLU 189 06/18/2019    POCGLU 119 06/18/2019     Lab Results   Component Value Date     06/17/2019    K 4.0 06/17/2019     06/17/2019    CO2 23 06/17/2019    BUN 9 06/17/2019    CREATININE 0.68 06/17/2019    CALCIUM 8.6 06/17/2019    LABALBU 4.4 06/16/2019    BILITOT 0.5 06/16/2019    ALKPHOS 104 06/16/2019    AST 13 06/16/2019    ALT 16 06/16/2019     Lab Results   Component Value Date    WBC 5.3 06/17/2019    RBC 4.27 06/17/2019    HGB 12.8 06/17/2019    HCT 37.3 06/17/2019    MCV 87.3 06/17/2019    MCH 29.9 06/17/2019    MCHC 34.2 06/17/2019    RDW 13.6 06/17/2019     06/17/2019     No results found for: VITD25  Lab Results   Component Value Date    COLORU Yellow 06/16/2019    NITRU Negative 06/16/2019    GLUCOSEU >=1000 06/16/2019    KETUA Negative 06/16/2019    UROBILINOGEN 0.2 06/16/2019    BILIRUBINUR Negative 06/16/2019     No results found for: PROTIME  No results found for: INR      I discussed results with patient. Current Rehabilitation Assessments:    Rehabilitation:  Physical therapy: FIMS:  BedMobility: Scooting: Contact guard assistance    Transfers: Sit to Stand: Contact guard assistance  Stand to sit: Contact guard assistance  Bed to Chair: Contact guard assistance, Ambulation 1  Surface: level tile  Device: Rolling Walker  Assistance: Contact guard assistance  Quality of Gait: initially freezing gait, then marked shuffling pattern throughout, freezing around turns  Distance: 25 ft  Comments: distance limited due to pain,      FIMS: ,  , Assessment: Pt with fall at home, reporting pain throughout body most noted in legs, feet, and bilateral flank limiting his ability to ambulate. Pt also with exacerbated symtpoms of freezing and shuffling gait with balance concerns. Continue PT to address impairments and return to PLOF. Occupational therapy: FIMS:   ,  , Assessment: Pt is a78 y/o M admitted s/p fall. Pt has Parkinsons.   Pt was found to have the above deficits and would benefit from further OT interventio      ADL  Feeding: Minimal assistance (06/18/19 1058)  Grooming: Minimal assistance (06/18/19 1058)  UE Bathing: Maximum assistance (06/18/19 1058)  LE Bathing: Maximum assistance (06/18/19 1058)  UE Dressing: Maximum assistance (06/18/19 1058)  LE Dressing: Maximum assistance (06/18/19 1058)  Toileting: Maximum assistance (06/18/19 1058)  Additional Comments: gown and slipper socks, per aide assist in shower this am  (06/18/19 1058)    LTG:                 Speech therapy: FIMS:          Past Medical History: Diagnosis Date    Hyperlipidemia     Hypertension     [facility]     Type 2 diabetes mellitus without complication ([facility]          PastSurgical History:      No past surgical history on file.       Allergies:   No Known Allergies     CurrentMedications:     Current Facility-Administered Medications: insulin glargine (LANTUS) injection vial 30 Units, 30 Units, Subcutaneous, Nightly  insulin lispro (HUMALOG) injection vial 6 Units, 6 Units, Subcutaneous, TID WC  sodium chloride (PF) 0.9 % injection 10 mL, 10 mL, Intravenous, Once  enoxaparin (LOVENOX) injection 40 mg, 40 mg, Subcutaneous, Daily  dextrose 50 % IV solution, 12.5 g, Intravenous, PRN  glucose (GLUTOSE) 40 % oral gel 15 g, 15 g, Oral, PRN  glucagon (rDNA) injection 1 mg, 1 mg, Intramuscular, PRN  dextrose 5 % solution, 100 mL/hr, Intravenous, PRN  insulin lispro (HUMALOG) injection vial 0-12 Units, 0-12 Units, Subcutaneous, TID WC  insulin lispro (HUMALOG) injection vial 0-6 Units, 0-6 Units, Subcutaneous, Nightly  amantadine (SYMMETREL) capsule 100 mg, 100 mg, Oral, BID  sertraline (ZOLOFT) tablet 50 mg, 50 mg, Oral, Daily  simvastatin (ZOCOR) tablet 40 mg, 40 mg, Oral, Nightly  losartan (COZAAR) tablet 25 mg, 25 mg, Oral, Daily  carbidopa-levodopa (SINEMET)  MG per tablet 1 tablet, 1 tablet, Oral, BID  acetaminophen (TYLENOL) tablet 650 mg, 650 mg, Oral, Q6H PRN  oxyCODONE (ROXICODONE) immediate release tablet 2.5 mg, 2.5 mg, Oral, Q4H PRN      Social History:    Social History     Socioeconomic History    Marital status:      Spouse name: Not on file    Number of children: Not on file    Years of education: Not on file    Highest education level: Not on file   Occupational History    Not on file   Social Needs    Financial resource strain: Not on file    Food insecurity:     Worry: Not on file     Inability: Not on file    Transportation needs:     Medical: Not on file     Non-medical: Not on file   Tobacco Use    Smoking status: Not on file   Substance and Sexual Activity    Alcohol use: Not on file    Drug use: Not on file    Sexual activity: Not on file   Lifestyle    Physical activity:     Days per week: Not on file     Minutes per session: Not on file    Stress: Not on file   Relationships    Social connections:     Talks on phone: Not on file     Gets together: Not on file     Attends Voodoo service: Not on file     Active member of club or organization: Not on file     Attends meetings of clubs or organizations: Not on file     Relationship status: Not on file    Intimate partner violence:     Fear of current or ex partner: Not on file     Emotionally abused: Not on file     Physically abused: Not on file     Forced sexual activity: Not on file   Other Topics Concern    Not on file   Social History Narrative         Lives With: Spouse    Recently moved to [facility] from [facility]  late--2018,             son Tatianna Spann in the area    Type of Home: House One level    Home Access: Level entry    Bathroom Shower/Tub: Tub/Shower unit    [employer]: Grab bars in shower, Shower chair    Home Equipment: 4 wheeled walker    Receives Help From: Family    ADL Assistance: Independent    Homemaking Assistance: (spouse and son able to assist)    Homemaking Responsibilities: No    Ambulation Assistance: Independent(rollator when needed)    Transfer Assistance: Independent    Active : No    Patient's  Info: son    Additional Comments: Pt [place]ian speaking interpretor Bk Tejeda present,  Pt recieves help from wife and son @ times    Primary Care Provider: PINEDA Medina CNP          Family History:     No family history on file. Review of Systems:    Review of Systems   Constitutional: Positive for fatigue. Negative for chills, diaphoresis and fever. HENT: Negative for congestion, ear discharge, ear pain, hearing loss, nosebleeds, sore throat and tinnitus.     Eyes: Negative for photophobia, pain and redness. Respiratory: Positive for shortness of breath. Negative for cough, wheezing and stridor. Shortness of breath on exertion   Cardiovascular: Negative for chest pain, palpitations and leg swelling. Gastrointestinal: Positive for constipation. Negative for abdominal pain, blood in stool, bowel incontinence, diarrhea, nausea and vomiting. Endocrine: Negative for polydipsia. Genitourinary: Negative for bladder incontinence, dysuria, flank pain, frequency, hematuria and urgency. Musculoskeletal: Positive for back pain and myalgias. Negative for neck pain. Skin: Negative for rash. Allergic/Immunologic: Negative for environmental allergies. Neurological: Positive for dizziness, tremors, focal weakness, speech difficulty, weakness and light-headedness. Negative for seizures and headaches. Hematological: Does not bruise/bleed easily. Psychiatric/Behavioral: Positive for confusion and decreased concentration. Negative for hallucinations and suicidal ideas. The patient is not nervous/anxious. Physical Exam:    /78   Pulse 73   Temp 98.6 °F (37 °C) (Oral)   Resp 16   Ht 5' 5\" (1.651 m)   Wt 170 lb 10.2 oz (77.4 kg)   SpO2 99%   BMI 28.40 kg/m²      Physical Exam   Constitutional: He appears well-developed and well-nourished. He appears listless. Non-toxic appearance. He does not have a sickly appearance. He does not appear ill. No distress. HENT:   Head: Normocephalic. Not macrocephalic and not microcephalic. Head is without raccoon's eyes and without Tripp's sign. Mouth/Throat: Oropharyngeal exudate present. Eyes: Pupils are equal, round, and reactive to light. Conjunctivae and EOM are normal. Right eye exhibits no discharge. Left eye exhibits no discharge. No scleral icterus. Neck: Normal range of motion. Normal carotid pulses, no hepatojugular reflux and no JVD present. Spinous process tenderness and muscular tenderness present. POC Glucose 228 (H) 60 - 115 mg/dl    Performed on ACCU-CHEK    POCT Glucose    Collection Time: 06/19/19  8:52 AM   Result Value Ref Range    POC Glucose 117 (H) 60 - 115 mg/dl    Performed on ACCU-CHEK               Impression:    1. Impaired mobility and ADLs due to acute exacerbation of Parkinson's disease  2. Fatigue and Malaise      Complex Active General Medical Issues thatcomplicate care Assess & Plan:     1. Active Problems:    Lactic acidosis    Hyperosmolar (nonketotic) coma (HCC)    Type 2 diabetes mellitus with diabetic neuropathy, with long-term current use of insulin (HCC)  Resolved Problems:    * No resolved hospital problems. *            Recommendations:    1. Considering all of the factors aboveincluding the patient's current medical status, social status/home envirnment, their functional needs and their ability to participate in a therapy program, I feel that they would best be served at a acute   Rehabilitationlevel of care. I reviewed the varous local options re these levels of care with the patient and family. 2. Vitamin B12 shot times one  3. Improve hydration and Nutrition by adding Proteinex and push PO fluids        It was my pleasure toevaluate Kristne Dumont today. Please call [phone] with questions.     Ruben Esparza, DO

## 2019-06-19 NOTE — PLAN OF CARE
Problem: Falls - Risk of:  Goal: Will remain free from falls  Description  Will remain free from falls  Outcome: Ongoing  Goal: Absence of physical injury  Description  Absence of physical injury  Outcome: Ongoing     Problem: Pain:  Goal: Pain level will decrease  Description  Pain level will decrease  Outcome: Ongoing  Goal: Control of acute pain  Description  Control of acute pain  Outcome: Ongoing  Goal: Control of chronic pain  Description  Control of chronic pain  Outcome: Ongoing     Problem: Mobility - Impaired:  Goal: Mobility will improve  Description  Mobility will improve  Outcome: Ongoing     Problem: IP BALANCE  Goal: LTG - Patient will maintain balance to allow for safe/functional mobility  Description  Max A with ADLs   Outcome: Ongoing

## 2019-06-19 NOTE — PROGRESS NOTES
Neurology Daily Progress Note  Name: Lokesh Dumont  Age: 77 y.o. Gender: male  CodeStatus: Full Code  Allergies: No Known Allergies    Chief Complaint:Fall    Primary Care Provider: PINEDA Oliver CNP  InpatientTreatment Team: Treatment Team: Attending Provider: Darylene Batten, DO; Consulting Physician: Yusra George MD; Consulting Physician: Nathaly Johnston MD; : Danial Meehan, RN; Registered Nurse: Meri Runner, RN; Registered Nurse: Senthil Beck, SAMUEL; : Dalton Mcdermott RN; Patient Care Tech: [facility]  Admission Date: 6/16/2019      HPI Pt seen and examined for neuro follow up for encephalopathy, syncope vs seizure. A&O x3, NAD, cooperative.  speaking. Spoke with nurse. Non focal neuro exam. No seizure activity. Shuffling gait.   NO Headache, double vision, blurry vision, difficulty with speech, difficulty with swallowing, weakness, numbness, pain, nausea, vomiting, choking, neck pain, dizziness    Vitals:    06/18/19 1915   BP: 139/78   Pulse: 73   Resp: 16   Temp: 98.6 °F (37 °C)   SpO2: 99%        Physical Exam   Constitutional: He is oriented to person, place, and time. He appears well-nourished. No distress. HENT:   Head: Normocephalic and atraumatic. Eyes: Pupils are equal, round, and reactive to light. EOM are normal.   Neck: Neck supple. No JVD present. Cardiovascular: Normal rate and regular rhythm. Pulmonary/Chest: Effort normal and breath sounds normal. No respiratory distress. Abdominal: Soft. Bowel sounds are normal. He exhibits no distension. There is no tenderness. Musculoskeletal: He exhibits no edema. Neurological: He is alert and oriented to person, place, and time. No cranial nerve deficit. Skin: Skin is warm and dry. He is not diaphoretic. Psychiatric: He has a normal mood and affect.      Mental Status Exam:             Level of Alertness:   awake            Orientation:   Person, place, time            Memory:   normal Attention/Concentration:  normal            Language:  normal    Cranial Nerves         Cranial nerve III           Pupils:  equal, round, reactive to light      Cranial nerves III, IV, VI           Extraocular Movements: intact      Cranial nerve V           Facial sensation:  intact      Cranial nerve VII           Facial strength: intact      Cranial nerve VIII           Hearing:  intact      Cranial nerve IX           Palate:  intact      Cranial nerve XI         Shoulder shrug:  intact      Cranial nerve XII          Tongue movement:  normal    Motor:    Drift:  absent  Motor exam is symmetrical 5 out of 5 all extremities bilaterally  Tone:  normal  Abnormal Movements:  absent                Coordination:           Finger/Nose   Right:  normal              Left:  normal                  Rapid Alternating Movements              Right:  normal              Left:  normal                   Reflexes:             Deep Tendon Reflexes:             Reflexes are 2 +             Plantar response:                Right:  downgoing               Left:  downgoing      Review of Systems   Constitutional: Negative for appetite change, chills, fatigue and fever. HENT: Negative for hearing loss and trouble swallowing. Eyes: Negative for visual disturbance. Respiratory: Negative for cough, chest tightness, shortness of breath and wheezing. Cardiovascular: Negative for chest pain, palpitations and leg swelling. Gastrointestinal: Negative for abdominal pain, nausea and vomiting. Musculoskeletal: Positive for gait problem. Skin: Negative for color change and rash. Neurological: Positive for tremors. Negative for dizziness, seizures, syncope, facial asymmetry, speech difficulty, weakness, light-headedness, numbness and headaches. Psychiatric/Behavioral: Negative for agitation, confusion and hallucinations. The patient is not nervous/anxious.         Medications:  Reviewed    Infusion Medications:    dextrose       Scheduled Medications:    insulin glargine  30 Units Subcutaneous Nightly    insulin lispro  6 Units Subcutaneous TID     sodium chloride (PF)  10 mL Intravenous Once    enoxaparin  40 mg Subcutaneous Daily    insulin lispro  0-12 Units Subcutaneous TID     insulin lispro  0-6 Units Subcutaneous Nightly    amantadine  100 mg Oral BID    sertraline  50 mg Oral Daily    simvastatin  40 mg Oral Nightly    losartan  25 mg Oral Daily    carbidopa-levodopa  1 tablet Oral BID     PRN Meds: dextrose, glucose, glucagon (rDNA), dextrose, acetaminophen, oxyCODONE    Labs:   Recent Labs     06/17/19  0515   WBC 5.3   HGB 12.8*   HCT 37.3*   *     Recent Labs     06/17/19  0515      K 4.0      CO2 23   BUN 9   CREATININE 0.68*   CALCIUM 8.6     No results for input(s): AST, ALT, BILIDIR, BILITOT, ALKPHOS in the last 72 hours. No results for input(s): INR in the last 72 hours. No results for input(s): Mustapha Relic in the last 72 hours. Urinalysis:   Lab Results   Component Value Date    NITRU Negative 06/16/2019    BLOODU Negative 06/16/2019    SPECGRAV 1.031 06/16/2019    GLUCOSEU >=1000 06/16/2019       Radiology:   Most recent    Chest CT      WITH CONTRAST:No results found for this or any previous visit. WITHOUT CONTRAST: No results found for this or any previous visit. CXR      2-view: No results found for this or any previous visit. Portable:   Results for orders placed during the hospital encounter of 06/16/19   XR CHEST PORTABLE    Narrative EXAMINATION: CHEST PORTABLE VIEW     CLINICAL HISTORY: Generalized pain after fall    COMPARISONS: None     FINDINGS:    2 views of the chest is submitted. Limited exam due to low lung volume The cardiac silhouette is of normal size configuration. The mediastinum is unremarkable. Pulmonary vascular unremarkable. Right sided trachea. No focal infiltrates. No Pneumothoraces.

## 2019-06-20 PROBLEM — R26.9 GAIT ABNORMALITY: Status: ACTIVE | Noted: 2019-06-20

## 2019-06-20 PROBLEM — Z78.9 USES SPANISH AS PRIMARY SPOKEN LANGUAGE: Status: ACTIVE | Noted: 2019-06-20

## 2019-06-20 PROBLEM — R26.9 ABNORMALITY OF GAIT AND MOBILITY: Status: ACTIVE | Noted: 2019-06-20

## 2019-06-20 PROBLEM — E11.65 TYPE 2 DIABETES MELLITUS WITH HYPERGLYCEMIA, WITH LONG-TERM CURRENT USE OF INSULIN (HCC): Status: ACTIVE | Noted: 2019-06-20

## 2019-06-20 PROBLEM — G20 PARKINSON'S DISEASE (HCC): Status: ACTIVE | Noted: 2019-06-20

## 2019-06-20 PROBLEM — E78.5 HYPERLIPIDEMIA: Status: ACTIVE | Noted: 2019-06-20

## 2019-06-20 PROBLEM — Z79.4 TYPE 2 DIABETES MELLITUS WITH HYPERGLYCEMIA, WITH LONG-TERM CURRENT USE OF INSULIN (HCC): Status: ACTIVE | Noted: 2019-06-20

## 2019-06-20 PROBLEM — I10 ESSENTIAL HYPERTENSION: Status: ACTIVE | Noted: 2019-06-20

## 2019-06-20 PROBLEM — G20.A1 PARKINSON'S DISEASE: Status: ACTIVE | Noted: 2019-06-20

## 2019-06-20 LAB
ANION GAP SERPL CALCULATED.3IONS-SCNC: 10 MEQ/L (ref 9–15)
BACTERIA: NEGATIVE /HPF
BASOPHILS ABSOLUTE: 0 K/UL (ref 0–0.2)
BASOPHILS RELATIVE PERCENT: 0.9 %
BILIRUBIN URINE: NEGATIVE
BLOOD, URINE: NEGATIVE
BUN BLDV-MCNC: 13 MG/DL (ref 8–23)
C-PEPTIDE: 0.8 NG/ML (ref 1.1–4.4)
CALCIUM SERPL-MCNC: 8.4 MG/DL (ref 8.5–9.9)
CHLORIDE BLD-SCNC: 101 MEQ/L (ref 95–107)
CLARITY: CLEAR
CO2: 26 MEQ/L (ref 20–31)
COLOR: YELLOW
CREAT SERPL-MCNC: 0.81 MG/DL (ref 0.7–1.2)
EOSINOPHILS ABSOLUTE: 0.1 K/UL (ref 0–0.7)
EOSINOPHILS RELATIVE PERCENT: 1.9 %
EPITHELIAL CELLS, UA: NORMAL /HPF (ref 0–5)
GFR AFRICAN AMERICAN: >60
GFR NON-AFRICAN AMERICAN: >60
GLUCOSE BLD-MCNC: 114 MG/DL (ref 60–115)
GLUCOSE BLD-MCNC: 128 MG/DL (ref 60–115)
GLUCOSE BLD-MCNC: 136 MG/DL (ref 60–115)
GLUCOSE BLD-MCNC: 142 MG/DL (ref 70–99)
GLUCOSE BLD-MCNC: 209 MG/DL (ref 60–115)
GLUCOSE BLD-MCNC: 259 MG/DL (ref 60–115)
GLUCOSE URINE: 500 MG/DL
HCT VFR BLD CALC: 39 % (ref 42–52)
HEMOGLOBIN: 13.4 G/DL (ref 14–18)
HYALINE CASTS: NORMAL /HPF (ref 0–5)
KETONES, URINE: NEGATIVE MG/DL
LEUKOCYTE ESTERASE, URINE: NEGATIVE
LYMPHOCYTES ABSOLUTE: 1.4 K/UL (ref 1–4.8)
LYMPHOCYTES RELATIVE PERCENT: 29.7 %
MCH RBC QN AUTO: 30 PG (ref 27–31.3)
MCHC RBC AUTO-ENTMCNC: 34.3 % (ref 33–37)
MCV RBC AUTO: 87.2 FL (ref 80–100)
MONOCYTES ABSOLUTE: 0.6 K/UL (ref 0.2–0.8)
MONOCYTES RELATIVE PERCENT: 11.9 %
NEUTROPHILS ABSOLUTE: 2.6 K/UL (ref 1.4–6.5)
NEUTROPHILS RELATIVE PERCENT: 55.6 %
NITRITE, URINE: NEGATIVE
PDW BLD-RTO: 13.6 % (ref 11.5–14.5)
PERFORMED ON: ABNORMAL
PERFORMED ON: NORMAL
PH UA: 5 (ref 5–9)
PLATELET # BLD: 150 K/UL (ref 130–400)
POTASSIUM SERPL-SCNC: 3.9 MEQ/L (ref 3.4–4.9)
PROTEIN UA: 30 MG/DL
RBC # BLD: 4.47 M/UL (ref 4.7–6.1)
RBC UA: NORMAL /HPF (ref 0–5)
SODIUM BLD-SCNC: 137 MEQ/L (ref 135–144)
SPECIFIC GRAVITY UA: 1.02 (ref 1–1.03)
UROBILINOGEN, URINE: 0.2 E.U./DL
WBC # BLD: 4.7 K/UL (ref 4.8–10.8)
WBC UA: NORMAL /HPF (ref 0–5)

## 2019-06-20 PROCEDURE — 99222 1ST HOSP IP/OBS MODERATE 55: CPT | Performed by: PHYSICAL MEDICINE & REHABILITATION

## 2019-06-20 PROCEDURE — 36415 COLL VENOUS BLD VENIPUNCTURE: CPT

## 2019-06-20 PROCEDURE — 97110 THERAPEUTIC EXERCISES: CPT

## 2019-06-20 PROCEDURE — 97530 THERAPEUTIC ACTIVITIES: CPT

## 2019-06-20 PROCEDURE — 81001 URINALYSIS AUTO W/SCOPE: CPT

## 2019-06-20 PROCEDURE — 97162 PT EVAL MOD COMPLEX 30 MIN: CPT

## 2019-06-20 PROCEDURE — 80048 BASIC METABOLIC PNL TOTAL CA: CPT

## 2019-06-20 PROCEDURE — 1180000000 HC REHAB R&B

## 2019-06-20 PROCEDURE — 87086 URINE CULTURE/COLONY COUNT: CPT

## 2019-06-20 PROCEDURE — 6370000000 HC RX 637 (ALT 250 FOR IP): Performed by: INTERNAL MEDICINE

## 2019-06-20 PROCEDURE — 97535 SELF CARE MNGMENT TRAINING: CPT

## 2019-06-20 PROCEDURE — 97166 OT EVAL MOD COMPLEX 45 MIN: CPT

## 2019-06-20 PROCEDURE — 6370000000 HC RX 637 (ALT 250 FOR IP): Performed by: PHYSICAL MEDICINE & REHABILITATION

## 2019-06-20 PROCEDURE — 97116 GAIT TRAINING THERAPY: CPT

## 2019-06-20 PROCEDURE — 92610 EVALUATE SWALLOWING FUNCTION: CPT

## 2019-06-20 PROCEDURE — 85025 COMPLETE CBC W/AUTO DIFF WBC: CPT

## 2019-06-20 RX ORDER — ACETAMINOPHEN 325 MG/1
650 TABLET ORAL EVERY 4 HOURS PRN
Status: DISCONTINUED | OUTPATIENT
Start: 2019-06-20 | End: 2019-06-21

## 2019-06-20 RX ADMIN — AMANTADINE HYDROCHLORIDE 100 MG: 100 CAPSULE ORAL at 21:41

## 2019-06-20 RX ADMIN — SIMVASTATIN 40 MG: 40 TABLET, FILM COATED ORAL at 00:23

## 2019-06-20 RX ADMIN — SERTRALINE HYDROCHLORIDE 50 MG: 50 TABLET ORAL at 10:34

## 2019-06-20 RX ADMIN — AMANTADINE HYDROCHLORIDE 100 MG: 100 CAPSULE ORAL at 00:23

## 2019-06-20 RX ADMIN — CARBIDOPA AND LEVODOPA 1 TABLET: 25; 100 TABLET ORAL at 13:30

## 2019-06-20 RX ADMIN — INSULIN GLARGINE 30 UNITS: 100 INJECTION, SOLUTION SUBCUTANEOUS at 21:42

## 2019-06-20 RX ADMIN — CARBIDOPA AND LEVODOPA 1 TABLET: 25; 100 TABLET ORAL at 10:35

## 2019-06-20 RX ADMIN — INSULIN GLARGINE 30 UNITS: 100 INJECTION, SOLUTION SUBCUTANEOUS at 00:23

## 2019-06-20 RX ADMIN — AMANTADINE HYDROCHLORIDE 100 MG: 100 CAPSULE ORAL at 10:34

## 2019-06-20 RX ADMIN — LOSARTAN POTASSIUM 25 MG: 25 TABLET ORAL at 10:35

## 2019-06-20 RX ADMIN — Medication: at 00:26

## 2019-06-20 RX ADMIN — SIMVASTATIN 40 MG: 40 TABLET, FILM COATED ORAL at 21:44

## 2019-06-20 RX ADMIN — ACETAMINOPHEN 650 MG: 325 TABLET ORAL at 10:40

## 2019-06-20 RX ADMIN — OXYCODONE HYDROCHLORIDE 2.5 MG: 5 TABLET ORAL at 10:38

## 2019-06-20 SDOH — HEALTH STABILITY: PHYSICAL HEALTH: ON AVERAGE, HOW MANY DAYS PER WEEK DO YOU ENGAGE IN MODERATE TO STRENUOUS EXERCISE (LIKE A BRISK WALK)?: 0 DAYS

## 2019-06-20 SDOH — SOCIAL STABILITY: SOCIAL NETWORK
DO YOU BELONG TO ANY CLUBS OR ORGANIZATIONS SUCH AS CHURCH GROUPS UNIONS, FRATERNAL OR ATHLETIC GROUPS, OR SCHOOL GROUPS?: NO

## 2019-06-20 SDOH — SOCIAL STABILITY: SOCIAL NETWORK: ARE YOU MARRIED, WIDOWED, DIVORCED, SEPARATED, NEVER MARRIED, OR LIVING WITH A PARTNER?: MARRIED

## 2019-06-20 SDOH — HEALTH STABILITY: PHYSICAL HEALTH: ON AVERAGE, HOW MANY MINUTES DO YOU ENGAGE IN EXERCISE AT THIS LEVEL?: 0 MIN

## 2019-06-20 SDOH — SOCIAL STABILITY: SOCIAL NETWORK: HOW OFTEN DO YOU ATTENT MEETINGS OF THE CLUB OR ORGANIZATION YOU BELONG TO?: NEVER

## 2019-06-20 SDOH — SOCIAL STABILITY: SOCIAL NETWORK: HOW OFTEN DO YOU ATTEND CHURCH OR RELIGIOUS SERVICES?: NEVER

## 2019-06-20 SDOH — SOCIAL STABILITY: SOCIAL NETWORK: HOW OFTEN DO YOU GET TOGETHER WITH FRIENDS OR RELATIVES?: MORE THAN THREE TIMES A WEEK

## 2019-06-20 ASSESSMENT — ENCOUNTER SYMPTOMS
ABDOMINAL PAIN: 0
SHORTNESS OF BREATH: 1
COUGH: 0
BOWEL INCONTINENCE: 0
DIARRHEA: 0
BLOOD IN STOOL: 0
PHOTOPHOBIA: 0
SORE THROAT: 0
NAUSEA: 0
CONSTIPATION: 1
EYE PAIN: 0
EYE REDNESS: 0
VOMITING: 0
WHEEZING: 0
STRIDOR: 0
BACK PAIN: 1

## 2019-06-20 ASSESSMENT — PAIN SCALES - GENERAL
PAINLEVEL_OUTOF10: 10
PAINLEVEL_OUTOF10: 10

## 2019-06-20 ASSESSMENT — PAIN DESCRIPTION - DESCRIPTORS: DESCRIPTORS: ACHING

## 2019-06-20 ASSESSMENT — PAIN DESCRIPTION - ORIENTATION: ORIENTATION: LEFT;LOWER;MID

## 2019-06-20 ASSESSMENT — PAIN DESCRIPTION - LOCATION: LOCATION: BACK;HIP

## 2019-06-20 NOTE — DISCHARGE INSTR - COC
Continuity of Care Form    Patient Name: Dalton Daily   :  1950  MRN:  [id number]    Admit date:  2019  Discharge date:  19    Code Status Order: Full Code   Advance Directives:     Admitting Physician:  No admitting provider for patient encounter. PCP: PINEDA Colmenares CNP    Discharging Nurse: Albert Fournier Unit/Room#: Z790/D285-60  Discharging Unit Phone Number: [phone]    Emergency Contact:   Extended Emergency Contact Information  Primary Emergency Contact: Gary Pinzon  Address: [address] [place] [address], [address] [place] [address] Phone: [phone]  Relation: Child    Past Surgical History:  No past surgical history on file. Immunization History: There is no immunization history on file for this patient. Active Problems:  Patient Active Problem List   Diagnosis Code    Lactic acidosis E87.2    Hyperosmolar (nonketotic) coma (HCC) E11.01    Type 2 diabetes mellitus with diabetic neuropathy, with long-term current use of insulin (HCC) E11.40, Z79.4    Syncope and collapse R55       Isolation/Infection:   Isolation          No Isolation            Nurse Assessment:  Last Vital Signs: BP (!) 148/84   Pulse 85   Temp 98.6 °F (37 °C) (Oral)   Resp 16   Ht 5' 5\" (1.651 m)   Wt 170 lb 10.2 oz (77.4 kg)   SpO2 97%   BMI 28.40 kg/m²     Last documented pain score (0-10 scale): Pain Level: 2  Last Weight:   Wt Readings from Last 1 Encounters:   19 170 lb 10.2 oz (77.4 kg)     Mental Status:  oriented    IV Access:  - None    Nursing Mobility/ADLs:  Walking   Assisted  Transfer  Assisted  Bathing  Assisted  Dressing  Assisted  Toileting  Assisted  Feeding  Independent  Med Admin  Dependent  Med Delivery   whole    Wound Care Documentation and Therapy:        Elimination:  Continence:   · Bowel:  Yes  · Bladder: Yes  Urinary Catheter: None   Colostomy/Ileostomy/Ileal Conduit: No       Date of Last BM: 6/19/19    Intake/Output Summary (Last 24 hours) at 6/19/2019 2019  Last data filed at 6/19/2019 0600  Gross per 24 hour   Intake --   Output 500 ml   Net -500 ml     I/O last 3 completed shifts:  In: -   Out: 500 [Urine:500]    Safety Concerns:     None    Impairments/Disabilities:      Language Barrier - Uzbek speaking only    Nutrition Therapy:  Current Nutrition Therapy:   - Oral Diet:  Carb Control 4 carbs/meal (1800kcals/day)    Routes of Feeding: Oral  Liquids: Thin Liquids  Daily Fluid Restriction: no  Last Modified Barium Swallow with Video (Video Swallowing Test): not done    Treatments at the Time of Hospital Discharge:   Respiratory Treatments: none  Oxygen Therapy:  is not on home oxygen therapy.   Ventilator:    - No ventilator support    Rehab Therapies: Physical Therapy  Weight Bearing Status/Restrictions: No weight bearing restirctions  Other Medical Equipment (for information only, NOT a DME order):  none  Other Treatments: none    Patient's personal belongings (please select all that are sent with patient):  None    RN SIGNATURE:  Electronically signed by Nava Foy RN on 6/19/19 at 8:24 PM    CASE MANAGEMENT/SOCIAL WORK SECTION    Inpatient Status Date: ***    Readmission Risk Assessment Score:  Readmission Risk              Risk of Unplanned Readmission:        9           Discharging to Facility/ Agency   · Name:   · Address:  · Phone:  · Fax:    Dialysis Facility (if applicable)   · Name:  · Address:  · Dialysis Schedule:  · Phone:  · Fax:    / signature: {Esignature:[id number]}    PHYSICIAN SECTION    Prognosis: {Prognosis:4848104131}    Condition at Discharge: [address] [place] [address] Patient Condition:[id number]}    Rehab Potential (if transferring to Rehab): {Prognosis:8752854091}    Recommended Labs or Other Treatments After Discharge: ***    Physician Certification: I certify the above information and transfer of Anil Bolton  is necessary for the continuing treatment of the diagnosis listed and that he requires {Admit to Appropriate Level of Care:29332} for {GREATER/LESS:114732422} 30 days.      Update Admission H&P: {CHP DME Changes in [facility]:708804324}    PHYSICIAN SIGNATURE:  {Esignature:[id number]}

## 2019-06-20 NOTE — PROGRESS NOTES
Occupational Therapy   Occupational Therapy Initial Assessment  Date: 2019   Patient Name: Jamari Gould  MRN: [id number]     : 1950    Date of Service: 2019    Discharge Recommendations:  Continue to assess pending progress       Assessment   Performance deficits / Impairments: Decreased functional mobility ; Decreased strength;Decreased endurance;Decreased ADL status; Decreased balance  Prognosis: Good  Decision Making: Medium Complexity  History: multiple  Exam: 5 deficits  Assistance / Modification: Mod A  REQUIRES OT FOLLOW UP: Yes  Activity Tolerance  Activity Tolerance: Patient limited by fatigue  Activity Tolerance: Fair-  Safety Devices  Safety Devices in place: Yes  Type of devices: All fall risk precautions in place  Restraints  Initially in place: No           Patient Diagnosis(es): There were no encounter diagnoses. has a past medical history of Hyperlipidemia, Hypertension, Parkinson's disease ([facility] [address] and Type 2 diabetes mellitus without complication ([facility] [address] has no past surgical history on file.            Restrictions  Restrictions/Precautions  Restrictions/Precautions: Fall Risk    Subjective   General  Chart Reviewed: Yes  Family / Caregiver Present: No  Referring Practitioner: Dr Fili Montilla  Diagnosis: Exac of Parkinson's with impaired mobility     Social/Functional History  Social/Functional History  Lives With: Spouse  Type of Home: House  Home Layout: One level  Home Access: Level entry  Bathroom Shower/Tub: Tub/Shower unit  Bathroom Equipment: Grab bars in shower, Shower chair  Home Equipment: 4 wheeled walker  Receives Help From: Family  ADL Assistance: Independent  Homemaking Responsibilities: No  Ambulation Assistance: Independent  Transfer Assistance: Independent  Active : No  Occupation: Retired  Type of occupation:   Leisure & Hobbies: No interested reported  Additional Comments: Pt speaks minimal English, mainly Yoruba speaking       Objective

## 2019-06-20 NOTE — CARE COORDINATION
Spoke with patient via communication IPAD and explained role in the team. Patient questions answered appropriately. Explained discharge process. Patient stated understanding. Per patient, patient has a wheelchair at home and he lives in a one level with son and wife. Patient helps take care of wife as he is able. Patient does not drive. Per patient, son speaks Afghan as well. Will follow up once son comes to visit with ipad.  Electronically signed by Terrell Perez RN on 6/20/2019 at 1:51 PM

## 2019-06-20 NOTE — PROGRESS NOTES
Functional Mobility Training, Patient/Caregiver Education & Training, Endurance Training, Self-Care / ADL, Neuromuscular Re-education, Balance Training  G-Code     OutComes Score                                                  AM-PAC Score             Goals  Patient Goals   Patient goals : Not stated at this time       Therapy Time   Individual Concurrent Group Co-treatment   Time In 1500         Time Out 1530         Minutes [address]  [address], OTR/L Electronically signed by Thor Stewart OTR/L on 8/90/73 at 4:20 PM

## 2019-06-20 NOTE — FLOWSHEET NOTE
IPad used for interpretation services. Patient states he is having low back pain-medicated per order. Patient states he would like to be able to walk around his house and help out with his wife.

## 2019-06-20 NOTE — PROGRESS NOTES
[facility]   Acute  Rehabilitation  RECREATIONAL THERAPY    Recreation Therapy Assessment    Date:  6/20/2019        Patient Name: Nadira Spain       MRN: [id number]        YOB: 1950 (78 y.o.)       Gender: male  Diagnosis: Exac of Parkinson's with impaired mobility  Referring Practitioner: Dr Frankie Hale    RESTRICTIONS/PRECAUTIONS:  Restrictions/Precautions: Fall Risk  Vision: Within Functional Limits  Hearing: Within functional limits  [id number]   Recreation Therapy referral received.       , Chart reviewed       and Patient interviewed     Pt states Hungarian is his preferred method of communication. RT assessment was completed in Hungarian as CTRS' native language. Wife and son present. Some of the recreation therapy services available were discussed. Patient reports pain is a  New current issue     Ability to provide information regarding leisure and recreation interests:   No issues noted at this time    Patient:    Educated on relaxation techniques and Provided with leisure education    Interests: Pt reports he sleeps often and stays in bed most of the time. He likes [employer]. He has only been in [facility] for six months. He is living with his son and his son's family. 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 In [place], he would work around the house. Observed/noted emotions:    Cooperative and Pleasant       Recommendations:   Recreation Therapy services offered to all [facility] inpatients:  Recommended music therapy  Declines pet therapy. Comments: He is Taoist but has not gone to [employer] since they've been here.      Electronically signed by: Yareli Hilton  Date: 6/20/2019   Electronically signed by Yareli Hilton on 6/20/2019 at 3:51 PM

## 2019-06-20 NOTE — PROGRESS NOTES
Facility/Department: [facility]   CLINICAL BEDSIDE SWALLOW EVALUATION    NAME: Yannick Dumont  : 1950  MRN: [id number]    ADMISSION DATE: 2019  REHAB DIAGNOSIS(ES): Gait abnormality due to exacerbation of Parkinson's disease with acute rehab admission on 2019  ADMITTING DIAGNOSIS: has Lactic acidosis; Hyperosmolar (nonketotic) coma ([address] Type 2 diabetes mellitus with diabetic neuropathy, with long-term current use of insulin ([facility] [address] Syncope and collapse; Impaired mobility; Parkinson's disease ([facility] [address] Gait abnormality due to exacerbation of Parkinson's disease with acute rehab admission on 2019; Type 2 diabetes mellitus with hyperglycemia, with long-term current use of insulin ([facility] [address] Essential hypertension; Abnormality of gait and mobility; Hyperlipidemia; BMI 28.0-28.9,adult; and Uses Gabonese as primary spoken language on their problem list.    ONSET DATE: 19    Date of Eval: 2019  Evaluating Therapist: Linda Larry    Recommended Diet and Intervention  Diet Solids Recommendation: Regular  Liquid Consistency Recommendation: Thin     Recommendations: Dysphagia treatment  Therapeutic Interventions: Diet tolerance monitoring    Compensatory Swallowing Strategies  Compensatory Swallowing Strategies: Upright as possible for all oral intake;Small bites/sips    Reason for Referral  Nadira Spain was referred for a bedside swallow evaluation to assess the efficiency of his swallow function, identify signs and symptoms of aspiration and make recommendations regarding safe dietary consistencies, effective compensatory strategies, and safe eating environment. General  Chart Reviewed: Yes  Comments: video call with  Ileana Franco #[id number]  Behavior/Cognition: Alert; Cooperative;Pleasant mood    Current Diet level:       Oral Motor Deficits  Oral/Motor  Oral Motor:  Within functional limits    Oral Phase Dysfunction  Oral Phase  Oral Phase: WFL  Oral Phase  Oral Phase -

## 2019-06-20 NOTE — PROGRESS NOTES
10  Scale Used: Numeric Score  Intervention List: Patient able to continue with treatment;Nurse/physician notified;Nurse called to administer meds  Comments / Details: back, hip, ribs    Post Treatment Pain Screening:  Pain Assessment  Pain Assessment: (unchanged)    Prior Level of Function:  Social/Functional History  Lives With: Spouse  Type of Home: House  Home Layout: One level  Home Access: Stairs to enter without rails  Entrance Stairs - Number of Steps: 1 - pt states that he manages step \"very carefully\"  Home Equipment: (Pt reports no AD at home)  Receives Help From: Family  ADL Assistance: (Pt states that his wife usually assists in bathing/dressing. Son available for assistance as well)  Ambulation Assistance: Independent  Transfer Assistance: Independent  Active : No  Patient's  Info: son  Occupation: Retired  Type of occupation:   Additional Comments: Pt states that his wife has been ill and won't be able to help much when he goes home. Currently, son is tending to pt's spouse. Pt mildly inconsistent reporting PLOF and home set up     OBJECTIVE:   Vision/Hearing:  Vision: Within Functional Limits  Hearing: Within functional limits    Cognition:  Overall Orientation Status: Within Functional Limits  Follows Commands: Within Functional Limits  Observation/Palpation  Observation: alert, cooperative. No acute distress. ROM:  RLE PROM: WFL  LLE PROM: WFL  LLE General PROM: except capsular tightness observed throughout hip. Pt with pain limitations and shortened hamstrings  Spine  Thoracic: limited rotation  Lumbar: limited rotation    Strength:  Strength Other  Other: Pt demonstrates gross strength of 2/5 throughout B LEs and 1+/5 throughout trunk. Pt mostly pain limited. Neuro:        Balance  Sitting - Static: Good  Sitting - Dynamic: Fair  Standing - Static: Fair  Standing - Dynamic: Poor  Comments: Pt retropulsive. Maintains balance in static standing with SBA.  Unable to

## 2019-06-20 NOTE — H&P
HISTORY & PHYSICAL       DATE OF ADMISSION:  6/19/2019    DATE OF SERVICE:  6/20/19    Subjective:    Sandra Dumont, 77 y.o. male presents today with:     CHIEF COMPLAINT:  61-year-old male who was brought to the emergency room after syncopal episode in 6/16/2019 he was especially diagnosed with exacerbation of Parkinson's disease and electrolyte abnormality as well as hyperosmolar state secondary to elevated blood sugar and uncontrolled diabetes. Cardiopulmonary work-up was negative including chest x-ray EKG and MRI of the brain. CT the brain showed no acute findings. Pelvic films were negative TTE showed normal left ventricular structures. He was found to have acute mobility ADL and cognitive needs as well as medical complexity requiring frequent medical follow-up and was admitted to [address] [place] [address] on 6/19/2019. Neurologic Problem   The patient's primary symptoms include an altered mental status, clumsiness, focal sensory loss, focal weakness and weakness. This is a recurrent problem. The current episode started in the past 7 days. The neurological problem developed insidiously. The problem has been rapidly improving since onset. There was no focality noted. Associated symptoms include back pain, confusion, dizziness, light-headedness and shortness of breath. Pertinent negatives include no abdominal pain, bladder incontinence, bowel incontinence, chest pain, diaphoresis, fever, headaches, nausea, neck pain, palpitations or vomiting. The treatment provided mild relief. His past medical history is significant for mood changes. There is no history of a bleeding disorder, a clotting disorder, a CVA, dementia, head trauma, liver disease or seizures.            The patient has stabilized medically andis able to participate at acute level rehab but is too medically complex for SNF due to need for therapy at the acute level with at least 15 hours a week of PT OT and cognitive and 06/20/2019    CALCIUM 8.4 06/20/2019    LABALBU 4.4 06/16/2019    BILITOT 0.5 06/16/2019    ALKPHOS 104 06/16/2019    AST 13 06/16/2019    ALT 16 06/16/2019     Lab Results   Component Value Date    WBC 4.7 06/20/2019    RBC 4.47 06/20/2019    HGB 13.4 06/20/2019    HCT 39.0 06/20/2019    MCV 87.2 06/20/2019    MCH 30.0 06/20/2019    MCHC 34.3 06/20/2019    RDW 13.6 06/20/2019     06/20/2019     No results found for: VITD25  Lab Results   Component Value Date    COLORU Yellow 06/20/2019    NITRU Negative 06/20/2019    GLUCOSEU 500 06/20/2019    KETUA Negative 06/20/2019    UROBILINOGEN 0.2 06/20/2019    BILIRUBINUR Negative 06/20/2019     No results found for: PROTIME  No results found for: INR      I discussed results with patient. The patient remains highly medically complex and continues to have severe problems with activities of daily living and mobility. The patient was assessed to be able to tolerate intensive rehabilitation and therefore was admitted to Rehabilitation to address these needs.         Prior Function; everyday activities:     Social History     Socioeconomic History    Marital status:      Spouse name: Not on file    Number of children: Not on file    Years of education: Not on file    Highest education level: Not on file   Occupational History    Not on file   Social Needs    Financial resource strain: Not on file    Food insecurity:     Worry: Not on file     Inability: Not on file    Transportation needs:     Medical: Not on file     Non-medical: Not on file   Tobacco Use    Smoking status: Not on file   Substance and Sexual Activity    Alcohol use: Not on file    Drug use: Not on file    Sexual activity: Not on file   Lifestyle    Physical activity:     Days per week: Not on file     Minutes per session: Not on file    Stress: Not on file   Relationships    Social connections:     Talks on phone: Not on file     Gets together: Not on file     Attends Negative for congestion, ear discharge, ear pain, hearing loss, nosebleeds, sore throat and tinnitus. Eyes: Negative for photophobia, pain and redness. Respiratory: Positive for shortness of breath. Negative for cough, wheezing and stridor. Shortness of breath on exertion   Cardiovascular: Negative for chest pain, palpitations and leg swelling. Gastrointestinal: Positive for constipation. Negative for abdominal pain, blood in stool, bowel incontinence, diarrhea, nausea and vomiting. Endocrine: Positive for cold intolerance. Negative for polydipsia. Genitourinary: Positive for difficulty urinating. Negative for bladder incontinence, dysuria, flank pain, frequency, hematuria and urgency. Musculoskeletal: Positive for arthralgias, back pain, gait problem, myalgias and neck stiffness. Negative for neck pain. Skin: Negative for rash. Allergic/Immunologic: Positive for immunocompromised state. Negative for environmental allergies. Neurological: Positive for dizziness, focal weakness, speech difficulty, weakness and light-headedness. Negative for tremors, seizures and headaches. Hematological: Does not bruise/bleed easily. Psychiatric/Behavioral: Positive for confusion, decreased concentration and dysphoric mood. Negative for hallucinations and suicidal ideas. The patient is not nervous/anxious. Objective  BP (!) 149/85   Pulse 72   Temp 98 °F (36.7 °C) (Oral)   Resp 16   Ht 5' 5\" (1.651 m)   Wt 161 lb (73 kg)   SpO2 96%   BMI 26.79 kg/m² *    Physical Exam   Constitutional: He is oriented to person, place, and time. Vital signs are normal. He appears well-developed and well-nourished. Non-toxic appearance. He does not have a sickly appearance. He does not appear ill. No distress. HENT:   Head: Normocephalic and atraumatic.    Right Ear: Hearing normal.   Left Ear: Hearing normal.   Nose: Nose normal.   Mouth/Throat: Oropharynx is clear and moist and mucous membranes are normal. No oral lesions. Normal dentition. No oropharyngeal exudate. No lesions   Eyes: Pupils are equal, round, and reactive to light. Conjunctivae and EOM are normal. Right eye exhibits no chemosis, no discharge and no exudate. Left eye exhibits no chemosis, no discharge and no exudate. No scleral icterus. Neck: Normal range of motion. Neck supple. No JVD present. No neck rigidity. No tracheal deviation and no edema present. No thyromegaly present. Cardiovascular: Intact distal pulses. Exam reveals no decreased pulses. Pulmonary/Chest: Effort normal. No accessory muscle usage. No apnea, no tachypnea and no bradypnea. No respiratory distress. He has decreased breath sounds. He has no wheezes. He has no rales. He exhibits no tenderness. Abdominal: Soft. Bowel sounds are normal. He exhibits no distension and no mass. There is no tenderness. There is no rebound and no guarding. Musculoskeletal: He exhibits tenderness. He exhibits no edema. Right shoulder: Normal.        Left shoulder: Normal.        Right elbow: Normal.       Left elbow: Normal.        Right wrist: Normal.        Left wrist: Normal.        Right hip: Normal.        Left hip: Normal.        Right knee: Normal.        Left knee: Normal.        Right ankle: Normal. Achilles tendon normal.        Left ankle: Normal. Achilles tendon normal.        Cervical back: Normal.        Thoracic back: Normal.        Lumbar back: He exhibits decreased range of motion, tenderness, bony tenderness and pain. He exhibits no swelling, no edema, no deformity, no laceration and normal pulse.         Right upper arm: Normal.        Left upper arm: Normal.        Right forearm: Normal.        Left forearm: Normal.        Right hand: Normal.        Left hand: Normal.        Right upper leg: Normal.        Left upper leg: Normal.        Right lower leg: Normal.        Left lower leg: Normal.        Right foot: Normal.        Left foot: Normal. knee    Gait, Coordination, and Reflexes     Gait  Gait: shuffling    Coordination   Romberg: positive  Finger to nose coordination: abnormal  Tandem walking coordination: abnormal    Tremor   Resting tremor: present  Intention tremor: present    Reflexes   Right brachioradialis: 1+  Left brachioradialis: 1+  Right biceps: 1+  Left biceps: 1+  Right triceps: 1+  Left triceps: 1+  Right patellar: 0  Left patellar: 0  Right achilles: 0  Left achilles: 0  Right : 1+  Left : 1+      After extensive review of the records and above physical exam, I have formulated the followingdiagnoses and plan:      DIAGNOSES:    1. The patient was admitted to the acute rehabilitation unit with the primary rehab diagnoses being severe abnormality of gait and mobility andimpaired self care and ADL's due to severe exacerbation of Parkinson's disease    Compared to Pre-Admission Assessment, patients medical and functional status remain challengingly complex and patient continues to requireintensive therapeutic intervention from multiple therapies, therefore, initiate acute intensive comprehensive inpatient rehabilitation program including PT/OT to improve balance, ambulation, ADLs, and to improve the P/AROM. Functional and medical status reassessed regarding patients ability to participate in therapies and patient found to be able to participate in acute intensivecomprehensive inpatient rehabilitation program.  Therapeutic modifications regarding activities in therapies, place, amount of time per day and intensity of therapy made daily. Enroll in acute course of therapy program to include 1 1/2 hours per day of PT 5 to 7days per week and 1 1/2 hours per day of OT 5 to 7 days per week, and   SLP and Rec T 1/2 hour per day 3-5 days per week.   The patient is stable medically and physically on previous exam.       This patient present with significant new onset decreased mobility andinability to perform activities of daily hours per day, direct RN supervision and daily monitoring by a physician for medical status. This cannot be sufficiently provided by home health care, a skilled nursing facility or in an outpatient setting. I furtherfeel that the patient has the potential to improve functional abilities in an acute intensive rehabilitation program.    Old records were reviewed and summarized. 2.  Other diagnoses which complicate rehabilitation stay include:     Principal Problem:    Gait abnormality due to exacerbation of Parkinson's disease with acute rehab admission on 6/19/2019  Active Problems:  1. Lactic acidosis  and hyperosmolar (nonketotic) due to uncontrolled diabetes mellitus-fingerstick blood sugars q. before meals and at bedtime, add ADA restrictions in the diet consult hospitalist for back-up medical  2. Type 2 diabetes mellitus with diabetic neuropathy, with long-term current use of insulin -fingerstick blood sugars q. before meals and at bedtime dose and titrate diabetic medications.-Fingerstick blood sugars q. before meals and at bedtime, titrate anti-glycemic medications, protect kidneys consult hospitalist for back-up medical  3. Syncope and collapse-due to Parkinson's medications as well as possibly secondary to other medications and Parkinson's disease scatter medications recheck BPs check Ortho BPs daily. 4.   Parkinson's disease -titrate dosing of Sinemet, check Ortho BPs, reconsult neurology  5. Type 2 diabetes mellitus with hyperglycemia, with long-term current use of insulin   6. Essential hypertension, Hyperlipidemia-vital signs every shift, dose and titrate cardiac medications, recheck CBC and BMP  7. Uses Indonesian as primary spoken language-add translation device         I am especially concerned about difficulty communicating I have asked the nurses to have the iPad with Indonesian translation services available 24/7. The patient's high risk medication use includes OxyIR.  I reviewed her Aswell as introducing myself, I also wrote my name on their bedside marker board with their name as well as the names of the other physicians with an explanation of our individual roles in their care, as well as the rehab process.             Kelly Varghese D.O., NICOLAS.&FARHAN

## 2019-06-20 NOTE — DISCHARGE SUMMARY
Hospital Medicine Discharge Summary    Aure Dumont  :  1950  MRN:  [id number]    Admit date:  2019  Discharge date:      Admitting Physician:  No admitting provider for patient encounter. Primary Care Physician:  PINEDA Perez - CNP      Discharge Diagnoses: Active Problems:    Lactic acidosis    Hyperosmolar (nonketotic) coma (HCC)    Type 2 diabetes mellitus with diabetic neuropathy, with long-term current use of insulin (HCC)    Syncope and collapse  Resolved Problems:    * No resolved hospital problems. *      Hospital Course:   Merdis Bernheim is a 77 y.o. male Citizen of [place]-speaking male with a history of type 2 diabetes managed with both insulin and oral agents who was brought in from home after syncopal episode. Patient was apparently in his bathroom when he had a syncopal episode while getting up. On arrival in the emergency department the patient had an anion gap metabolic acidosis along with a severe lactic acidosis. It should be noted the patient was taking both insulin and metformin at this time. On arrival the patient did have an elevated anion gap however there is no ketones beta hydroxybutyrate was normal and the patient had only mild elevation in the serum osmolar. There was a significant degree of dehydration and acute kidney injury this improved with aggressive IV hydration anion gap closed toxic work-up was negative. Patient experienced a possible seizure as well neurology was consulted EEG was obtained and negative there is some degree of Parkinson's disease on Sinemet has been entered for that. PRS was ruled out with MRI. Echocardiogram obtained normal ejection fraction 70%. MADDI resolved. Endocrine consulted to optimize diabetic regimen. Will be started on 30 units Lantus with 6units Humalog with pre-meal and SSI. Will be discharged to acute rehab for further physical therapy ADL training and ambulatory improvements.         Patient was seen by the following consultants while admitted to [facility]:   Consults:  IP CONSULT TO ENDOCRINOLOGY  IP CONSULT TO NEUROLOGY  IP CONSULT TO NEUROLOGY  IP CONSULT TO DIETITIAN  IP CONSULT TO DIABETES EDUCATOR  IP CONSULT TO REHAB/TCU ADMISSION COORDINATOR  IP CONSULT TO DIABETES EDUCATOR    Physical Exam:   General appearance: No apparent distress, appears stated age and cooperative. HEENT: Pupils equal, round, and reactive to light. Conjunctivae/corneas clear. Neck: Supple, with full range of motion. No jugular venous distention. Trachea midline. Respiratory:  Normal respiratory effort. Clear to auscultation, bilaterally without Rales/Wheezes/Rhonchi. Cardiovascular: Regular rate and rhythm with normal S1/S2 without murmurs, rubs or gallops. Abdomen: Soft, non-tender, non-distended with normal bowel sounds. Musculoskeletal: No clubbing, cyanosis or edema bilaterally. Full range of motion without deformity. Skin: Skin color, texture, turgor normal.  No rashes or lesions. Neuro: Non Focal. Symetrical motor and tone. Nl Comprehension, Alert,awake and oriented. Psychiatric: Alert and oriented, thought content appropriate, normal insight  Peripheral Pulses: +2 palpable, equal bilaterally    Significant Diagnostic Studies:  MRI brain  Impression       No acute ischemia or acute intracranial process. No enhancing mass or pathologic enhancement.       Generalized parenchymal volume loss and nonspecific white matter findings most compatible with chronic small vessel ischemic changes in a patient of this age.    CT head      NO ACUTE INTRA-AXIAL OR EXTRA-AXIAL FINDINGS.       IF SIGNS OR SYMPTOMS PERSIST THEN CONSIDER REPEAT CT SCAN IN 12 TO 24 HOURS OR MRI TO FURTHER EVALUATE IF THERE ARE NO CONTRAINDICATIONS       All CT scans at this facility use dose modulation, iterative reconstruction, and/or weight based dosing when appropriate to reduce radiation dose to as low as reasonably achievable Discharge Medications:       Vincent Dumont [facility] Medication Instructions DBO:[id number]    Printed on:06/19/19 2012   Medication Information                      amantadine (SYMMETREL) 100 MG capsule  Take 100 mg by mouth 2 times daily             insulin glargine (LANTUS) 100 UNIT/ML injection vial  Inject 30 Units into the skin nightly             insulin lispro (HUMALOG) 100 UNIT/ML injection vial  Inject 0-12 Units into the skin 3 times daily (with meals)             insulin lispro (HUMALOG) 100 UNIT/ML injection vial  Inject 0-6 Units into the skin nightly             insulin lispro (HUMALOG) 100 UNIT/ML injection vial  Inject 6 Units into the skin 3 times daily (with meals)             losartan (COZAAR) 50 MG tablet  Take 50 mg by mouth daily             metFORMIN (GLUCOPHAGE) 1000 MG tablet  Take 1,000 mg by mouth 2 times daily (with meals)             nabumetone (RELAFEN) 750 MG tablet  Take 750 mg by mouth 2 times daily             sertraline (ZOLOFT) 50 MG tablet  Take 50 mg by mouth daily             simvastatin (ZOCOR) 40 MG tablet  Take 40 mg by mouth nightly                 Disposition:   Discharged to Acute Rehab    Condition at discharge: Pt was medically stable at the time of discharge. Activity: activity as tolerated    Total time taken for discharging this patient: 40 minutes. Greater than 70% of time was spent focused exclusively on this patient. Time was taken to review chart, discuss plans with consultants, reconciling medications, discussing plan answering questions with patient.      SignedDenirene Hernandez  6/19/2019, 8:12 PM  -------------------------------------------

## 2019-06-21 LAB
GLUCOSE BLD-MCNC: 103 MG/DL (ref 60–115)
GLUCOSE BLD-MCNC: 215 MG/DL (ref 60–115)
GLUCOSE BLD-MCNC: 243 MG/DL (ref 60–115)
GLUCOSE BLD-MCNC: 88 MG/DL (ref 60–115)
HCT VFR BLD CALC: 40 % (ref 42–52)
HEMOGLOBIN: 13.3 G/DL (ref 14–18)
MCH RBC QN AUTO: 29.5 PG (ref 27–31.3)
MCHC RBC AUTO-ENTMCNC: 33.2 % (ref 33–37)
MCV RBC AUTO: 88.9 FL (ref 80–100)
PDW BLD-RTO: 13.7 % (ref 11.5–14.5)
PERFORMED ON: ABNORMAL
PERFORMED ON: ABNORMAL
PERFORMED ON: NORMAL
PERFORMED ON: NORMAL
PLATELET # BLD: 170 K/UL (ref 130–400)
RBC # BLD: 4.49 M/UL (ref 4.7–6.1)
URINE CULTURE, ROUTINE: NORMAL
WBC # BLD: 4.6 K/UL (ref 4.8–10.8)

## 2019-06-21 PROCEDURE — 6370000000 HC RX 637 (ALT 250 FOR IP): Performed by: INTERNAL MEDICINE

## 2019-06-21 PROCEDURE — 36415 COLL VENOUS BLD VENIPUNCTURE: CPT

## 2019-06-21 PROCEDURE — 92523 SPEECH SOUND LANG COMPREHEN: CPT

## 2019-06-21 PROCEDURE — 99233 SBSQ HOSP IP/OBS HIGH 50: CPT | Performed by: PHYSICAL MEDICINE & REHABILITATION

## 2019-06-21 PROCEDURE — 97116 GAIT TRAINING THERAPY: CPT

## 2019-06-21 PROCEDURE — 85027 COMPLETE CBC AUTOMATED: CPT

## 2019-06-21 PROCEDURE — 97535 SELF CARE MNGMENT TRAINING: CPT

## 2019-06-21 PROCEDURE — 6370000000 HC RX 637 (ALT 250 FOR IP): Performed by: PHYSICAL MEDICINE & REHABILITATION

## 2019-06-21 PROCEDURE — 97530 THERAPEUTIC ACTIVITIES: CPT

## 2019-06-21 PROCEDURE — 97110 THERAPEUTIC EXERCISES: CPT

## 2019-06-21 PROCEDURE — 1180000000 HC REHAB R&B

## 2019-06-21 PROCEDURE — 96125 COGNITIVE TEST BY HC PRO: CPT

## 2019-06-21 PROCEDURE — 97112 NEUROMUSCULAR REEDUCATION: CPT

## 2019-06-21 RX ORDER — OXYCODONE HYDROCHLORIDE 5 MG/1
5 TABLET ORAL EVERY 4 HOURS PRN
Status: DISCONTINUED | OUTPATIENT
Start: 2019-06-21 | End: 2019-06-22

## 2019-06-21 RX ORDER — ACETAMINOPHEN 500 MG
500 TABLET ORAL
Status: DISCONTINUED | OUTPATIENT
Start: 2019-06-21 | End: 2019-06-24 | Stop reason: ALTCHOICE

## 2019-06-21 RX ADMIN — ACETAMINOPHEN 500 MG: 500 TABLET ORAL at 12:42

## 2019-06-21 RX ADMIN — LOSARTAN POTASSIUM 25 MG: 25 TABLET ORAL at 09:47

## 2019-06-21 RX ADMIN — INSULIN GLARGINE 30 UNITS: 100 INJECTION, SOLUTION SUBCUTANEOUS at 21:42

## 2019-06-21 RX ADMIN — CARBIDOPA AND LEVODOPA 1 TABLET: 25; 100 TABLET ORAL at 09:49

## 2019-06-21 RX ADMIN — CARBIDOPA AND LEVODOPA 1 TABLET: 25; 100 TABLET ORAL at 12:42

## 2019-06-21 RX ADMIN — AMANTADINE HYDROCHLORIDE 100 MG: 100 CAPSULE ORAL at 21:39

## 2019-06-21 RX ADMIN — SERTRALINE HYDROCHLORIDE 50 MG: 50 TABLET ORAL at 09:46

## 2019-06-21 RX ADMIN — OXYCODONE HYDROCHLORIDE 5 MG: 5 TABLET ORAL at 15:45

## 2019-06-21 RX ADMIN — ACETAMINOPHEN 500 MG: 500 TABLET ORAL at 21:39

## 2019-06-21 RX ADMIN — AMANTADINE HYDROCHLORIDE 100 MG: 100 CAPSULE ORAL at 09:47

## 2019-06-21 RX ADMIN — OXYCODONE HYDROCHLORIDE 5 MG: 5 TABLET ORAL at 09:46

## 2019-06-21 RX ADMIN — SIMVASTATIN 40 MG: 40 TABLET, FILM COATED ORAL at 21:39

## 2019-06-21 RX ADMIN — ACETAMINOPHEN 500 MG: 500 TABLET ORAL at 16:30

## 2019-06-21 ASSESSMENT — PAIN DESCRIPTION - LOCATION
LOCATION: CHEST;FLANK
LOCATION: BACK;HIP
LOCATION: BACK

## 2019-06-21 ASSESSMENT — PAIN DESCRIPTION - DESCRIPTORS
DESCRIPTORS: ACHING
DESCRIPTORS: ACHING

## 2019-06-21 ASSESSMENT — PAIN DESCRIPTION - ORIENTATION: ORIENTATION: LOWER

## 2019-06-21 ASSESSMENT — PAIN SCALES - GENERAL
PAINLEVEL_OUTOF10: 8
PAINLEVEL_OUTOF10: 6
PAINLEVEL_OUTOF10: 5
PAINLEVEL_OUTOF10: 2
PAINLEVEL_OUTOF10: 5
PAINLEVEL_OUTOF10: 6
PAINLEVEL_OUTOF10: 2
PAINLEVEL_OUTOF10: 2
PAINLEVEL_OUTOF10: 10

## 2019-06-21 ASSESSMENT — ENCOUNTER SYMPTOMS
VOMITING: 0
CHEST TIGHTNESS: 0
CONSTIPATION: 0
ABDOMINAL DISTENTION: 0
NAUSEA: 0
COLOR CHANGE: 0
SHORTNESS OF BREATH: 0
COUGH: 0
TROUBLE SWALLOWING: 0
DIARRHEA: 0
WHEEZING: 0
ABDOMINAL PAIN: 0

## 2019-06-21 ASSESSMENT — PAIN DESCRIPTION - PAIN TYPE: TYPE: ACUTE PAIN

## 2019-06-21 ASSESSMENT — PAIN DESCRIPTION - FREQUENCY: FREQUENCY: CONTINUOUS

## 2019-06-21 ASSESSMENT — PAIN SCALES - WONG BAKER: WONGBAKER_NUMERICALRESPONSE: 6

## 2019-06-21 ASSESSMENT — PAIN DESCRIPTION - PROGRESSION: CLINICAL_PROGRESSION: GRADUALLY WORSENING

## 2019-06-21 ASSESSMENT — PAIN DESCRIPTION - ONSET: ONSET: ON-GOING

## 2019-06-21 NOTE — CONSULTS
Not on file    Years of education: Not on file    Highest education level: Not on file   Occupational History    Not on file   Social Needs    Financial resource strain: Not on file    Food insecurity:     Worry: Not on file     Inability: Not on file    Transportation needs:     Medical: Not on file     Non-medical: Not on file   Tobacco Use    Smoking status: Not on file   Substance and Sexual Activity    Alcohol use: Not on file    Drug use: Not on file    Sexual activity: Not on file   Lifestyle    Physical activity:     Days per week: 0 days     Minutes per session: 0 min    Stress: Not on file   Relationships    Social connections:     Talks on phone: Not on file     Gets together: More than three times a week     Attends Quaker service: Never     Active member of club or organization: No     Attends meetings of clubs or organizations: Never     Relationship status:     Intimate partner violence:     Fear of current or ex partner: Not on file     Emotionally abused: Not on file     Physically abused: Not on file     Forced sexual activity: Not on file   Other Topics Concern    Not on file   Social History Narrative         Lives With: Spouse    Recently moved to [facility] from [place]  [id number],             son Khurram Darnell in the area    Type of Home: House One level    Home Access: Level entry    Bathroom Shower/Tub: Tub/Shower unit    [employer]: Grab bars in shower, Shower chair    Home Equipment: 4 wheeled walker    Receives Help From: Family    ADL Assistance: Independent    Homemaking Assistance: (spouse and son able to assist)    Homemaking Responsibilities: No    Ambulation Assistance: Independent(rollator when needed)    Transfer Assistance: Independent    Active : No    Patient's  Info: son    Additional Comments: Pt Turkish speaking interpretor Naima Lares present,  Pt recieves help from wife and son @ times    Primary Care Provider: Evelyn Lopez, 06/20/2019    BLOODU Negative 06/20/2019    SPECGRAV 1.023 06/20/2019    GLUCOSEU 500 06/20/2019       Radiology:   Most recent    Chest CT      WITH CONTRAST:No results found for this or any previous visit. WITHOUT CONTRAST: No results found for this or any previous visit. CXR      2-view: No results found for this or any previous visit. Portable:   Results for orders placed during the hospital encounter of 06/16/19   XR CHEST PORTABLE    Narrative EXAMINATION: CHEST PORTABLE VIEW     CLINICAL HISTORY: Generalized pain after fall    COMPARISONS: None     FINDINGS:    2 views of the chest is submitted. Limited exam due to low lung volume The cardiac silhouette is of normal size configuration. The mediastinum is unremarkable. Pulmonary vascular unremarkable. Right sided trachea. No focal infiltrates. No Pneumothoraces. Impression NO ACUTE ACTIVE CARDIOPULMONARY PROCESS       Echo No results found for this or any previous visit. Assessment/Plan:  Encephalopathy, improved  Possible seizure secondary to Hyperosmolar State  PD, added sinemet, tremors much better  EEG done  MRI no acute process  PT/OT          Collaborating physicians: Dr Franck Guerrero, Dr CAITLIN Harris, Dr Italia Davis    Electronically signed by PINEDA Domínguez CNP on 6/21/2019 at 4:57 PM

## 2019-06-21 NOTE — PROGRESS NOTES
tenderness. Skin:   Intact to general survey, no visualized or palpated problems.     Rehabilitation:  Physical therapy: FIMS:  Bed Mobility: Scooting: Stand by assistance    Transfers: Sit to Stand: Minimal Assistance, Contact guard assistance  Stand to sit: Contact guard assistance, Minimal Assistance  Bed to Chair: Minimal assistance, Moderate assistance, Ambulation 1  Surface: level tile, carpet  Device: Rolling Walker  Assistance: Contact guard assistance  Quality of Gait: Festinating gait able to correct with verbal and visual cues momentarily   Distance: 100', Stairs  # Steps : 4  Stairs Height: 6\"  Rails: Bilateral  Assistance: Contact guard assistance  Comment: Cues for foot placement on step    FIMS: Bed, Chair, Wheel Chair: 3 - Requires 25-49% assistance to transfer  Walk: 1 - Total Assistance Walks < 50 feet OR requires two or more people OR patient performs < 25% of locomotion effort  Distance Walked: 10  Stairs: 0 - Activity Does not Occur ( 0 only for the admission assessment),  , Assessment: Patient shows improvement in activity tolerance ambulates 100' and negotiates 4 stairs    Occupational therapy: FIMS:  Eatin - Feeds self with setup/supervision/cues and/or requires only setup/supervision/cues to perform tube feedings  Groomin - Requires setup/cues to do all tasks  Bathing: 3 - Able to bathe 5-7 areas  Dressing-Upper: 0 - Did not occur  Dressing-Lower: 3 - Requires assist with 2-3 parts of dressing  Toiletin - Did not occur(urinal)  Toilet Transfer: 4 - Requires steadying assistance only < 25% assist  Tub Transfer: 0 - Activity does not occur  Shower Transfer: 4 - Minimal contact assistance, pt. expends 75% or more effort,  , Assessment: Pt making gains to increase independence with adl completion    Speech therapy: FIMS: Comprehension: 5 - Patient understands basic needs (hungry/hot/pain)  Expression: 5 - Expresses basic ideas/needs only (hungry/hot/pain)  Social Interaction: 6 - The mediastinum is unremarkable. Pulmonary vascular unremarkable. Right sided trachea. No focal infiltrates. No Pneumothoraces. NO ACUTE ACTIVE CARDIOPULMONARY PROCESS    Mri Brain  : 6/17/2019  EXAM: MRI of the brain without and with contrast History: Cerebral ischemia. Weakness of both legs. Confusion. Technique: Multiplanar multisequence MRI of the brain was performed without and with contrast. Comparison: CT brain from June 16, 2019 Findings: Examination is degraded by motion artifact. Confluent periventricular hyperintense T2/FLAIR signal, a few scattered small hyperintensity/FLAIR signal foci within bilateral supratentorial white matter, and patchy hyperdensity/FLAIR signal within the babak are nonspecific findings most better with chronic small vessel ischemic changes in a patient of this age. Prominence of the sulci and ventricles compatible with moderate generalized parenchymal volume loss. No acute hemorrhage, enhancing mass or pathologic enhancement, mass effect, midline shift, or abnormal extra-axial fluid collection. The posterior fossa  is within normal limits. There is no diffusion restriction. No susceptibility artifact is identified on the gradient echo sequence. The major intracranial vascular flow voids are maintained. Cranial nerves 7/8 complexes appear grossly unremarkable. The visualized paranasal sinuses are clear. Trace fluid within the bilateral mastoid air cells. No acute ischemia or acute intracranial process. No enhancing mass or pathologic enhancement. Generalized parenchymal volume loss and nonspecific white matter findings most compatible with chronic small vessel ischemic changes in a patient of this age. Previous extensive, complex labs, notes and diagnostics reviewed and analyzed.      ALLERGIES:    Allergies as of 06/19/2019    (No Known Allergies)      (please also verify by checking MAR)      Yesterday I evaluated this patient for periodic reassessment of medical and functional status. The patient was discussed in detail at the treatment team meeting focusing on current medical issues, progress in therapies, social issues, psychological issues, barriers to progress and strategies to address these barriers, and discharge planning. See the hand written addendum to rehab progress note. The patient continues to be high risk for future disability and their medical and rehabilitation prognosis continue to be good and therefore, we will continue the patient's rehabilitation course as planned. The patient's tentative discharge date was set. Patient and family education was discussed. The patient was made aware of the team discussion regarding their progress. Discharge plans were discussed along with barriers to progress and strategies to address these barriers, patient encouraged to continue to discuss discharge plans with . Complex Physical Medicine & Rehab Issues Assess & Plan:   1. Severe abnormality of gait and mobility and impaired self-care and ADL's secondary to progressive exacerbation of severe Parkinson's disease. Functional and medical status reassessed regarding patients ability to participate in therapies and patient found to be able to participate in acute intensive comprehensive inpatient rehabilitation program including PT/OT to improve balance, ambulation, ADLs, and to improve the P/AROM. Therapeutic modifications regarding activities in therapies, place, amount of time per day and intensity of therapy made daily. In bed therapies or bedside therapies prn.   2. Bowel and Bladder dysfunction:  frequent toileting, ambulate to bathroom with assistance, check post void residuals. Check for C.difficile x1 if >2 loose stools in 24 hours, continue bowel & bladder program.  Monitor bowel and bladder function. Lactinex 2 PO every AC.   MOM prn, [employer] prn, Glycerin suppository prn, enema prn.  3. Severe generalized OA pain: reassess pain every shift and prior to and after each therapy session, give prn Tylenol and add OxyIR, modalities prn in therapy, Lidoderm, K-pad prn.   4. Skin healing and breakdown risk:  continue pressure relief program.  Daily skin exams and reports from nursing. 5. Severe progressive fatigue due to nutritional and hydration deficiency:  continue to monitor I&Os, calorie counts prn, dietary consult prn. Add vitamin B12 vitamin D co-Q10 and consider Provigil  6. Acute episodic insomnia with situational adjustment disorder:  prn Ambien, monitor for day time sedation. 7. Falls risk elevated:  patient to use call light to get nursing assistance to get up, bed and chair alarm. 8. Elevated DVT risk: progressive activities in PT, continue prophylaxis SO hose, elevation  . 9. Complex discharge planning:  Weekly team meeting every Monday to assess progress towards goals, discuss and address social, psychological and medical comorbidities and to address difficulties they may be having progressing in therapy. Patient and family education is in progress. The patient is to follow-up with their family physician after discharge. Complex Active General Medical Issues that complicate care Assess & Plan:    1. Lactic acidosis  and hyperosmolar (nonketotic) due to uncontrolled diabetes mellitus-fingerstick blood sugars q. before meals and at bedtime, add ADA restrictions in the diet consult hospitalist for back-up medical  2. Type 2 diabetes mellitus with diabetic neuropathy, with long-term current use of insulin -fingerstick blood sugars q. before meals and at bedtime dose and titrate diabetic medications.-Fingerstick blood sugars q. before meals and at bedtime, titrate anti-glycemic medications, protect kidneys consult hospitalist for back-up medical  3.    Syncope and collapse-due to Parkinson's medications as well as possibly secondary to

## 2019-06-21 NOTE — CONSULTS
RBCUA 0-2 06/20/2019    BLOODU Negative 06/20/2019    SPECGRAV 1.023 06/20/2019    GLUCOSEU 500 06/20/2019       Radiology:   Most recent    Chest CT      WITH CONTRAST:No results found for this or any previous visit. WITHOUT CONTRAST: No results found for this or any previous visit. CXR      2-view: No results found for this or any previous visit. Portable:   Results for orders placed during the hospital encounter of 06/16/19   XR CHEST PORTABLE    Narrative EXAMINATION: CHEST PORTABLE VIEW     CLINICAL HISTORY: Generalized pain after fall    COMPARISONS: None     FINDINGS:    2 views of the chest is submitted. Limited exam due to low lung volume The cardiac silhouette is of normal size configuration. The mediastinum is unremarkable. Pulmonary vascular unremarkable. Right sided trachea. No focal infiltrates. No Pneumothoraces. Impression NO ACUTE ACTIVE CARDIOPULMONARY PROCESS       Echo No results found for this or any previous visit. Assessment/Plan:  Encephalopathy, improved  Possible seizure secondary to Hyperosmolar State  PD, added sinemet, tremors much better  EEG done  MRI no acute process  PT/OT          Collaborating physicians: Dr CAITLIN Rodrigues,   Electronically signed by Cassia Mcallister MD on 6/21/2019 at 7:56 PM

## 2019-06-21 NOTE — PROGRESS NOTES
Strengthening, Functional Mobility Training, Patient/Caregiver Education & Training, Endurance Training, Self-Care / ADL, Neuromuscular Re-education, Balance Training    Goals  Patient Goals   Patient goals : Not stated at this time       Therapy Time   Individual Concurrent Group Co-treatment   Time In 1500         Time Out 1530         Minutes 30             Electronically signed by SHARI Cordova/L on 6/21/2019 at 4:13 PM  Josafat Lua OTR/MARIAJOSE

## 2019-06-21 NOTE — PROGRESS NOTES
Facility/Department: [facility]  Initial Speech/Language/Cognitive Assessment    NAME: Tawanna Dumont  : 1950   MRN: [id number]  ADMISSION DATE: 2019  REHAB DIAGNOSIS(ES): Gait abnormality due to exacerbation of Parkinson's disease with acute rehab admission on 2019  ADMITTING DIAGNOSIS: has Lactic acidosis; Hyperosmolar (nonketotic) coma ([address] Type 2 diabetes mellitus with diabetic neuropathy, with long-term current use of insulin ([address] Syncope and collapse; Impaired mobility; Parkinson's disease ([address] Gait abnormality due to exacerbation of Parkinson's disease with acute rehab admission on 2019; Type 2 diabetes mellitus with hyperglycemia, with long-term current use of insulin ([facility] [address] Essential hypertension; Abnormality of gait and mobility; Hyperlipidemia; BMI 28.0-28.9,adult; and Uses [place]ian as primary spoken language on their problem list.  DATE ONSET: 19    Date of Eval: 2019   Evaluating Therapist: CURTIS Kenney    Assessment:  Cognitive Diagnosis: Pt p/w moderate-severe cognitive deficits characterized by decreased insight for safety and problem solving; decreased working and short term memory; poor executive functioning skills for ADLs; difficulty following complex/abstract directives.    Aphasia Diagnosis: NA  Speech Diagnosis: NA  Communication Diagnosis: Minimal-moderate word finding deficits for structured tasks secondary to decreased semantic organization   Diagnosis: Moderate-severe cognitive deficits     Recommendations:  Requires SLP Intervention: Yes  Duration/Frequency of Treatment: 3-5x/week of individual and/or group treatment sessions, as applicable, during LOS or until goals met  D/C Recommendations: Home with intermittent assistance          Goals:  Short-term Goals  Timeframe for Short-term Goals: 1-2 weeks   Goal 1: To increase safety awareness and judgment for safe completion of ADLs secondary to pt's cognitive deficits, pt will provide reasonable impairments related to diabetes)  Hearing  Hearing: Within functional limits           Objective:     Oral/Motor  Oral Motor: Within functional limits    Auditory Comprehension  Comprehension: Exceptions  Multistep Basic Commands: Mild  Complex/Abstract Commands: Moderate  Interfering Components: Working memory  Effective Techniques: Repetition    Reading Comprehension  Reading Status: Unable to assess(d/t pt reported difficulty seeing)    Expression  Primary Mode of Expression: Verbal    Verbal Expression  Verbal Expression: Exceptions to functional limits  Initiation: Mild   Repetition: Moderate  Convergent: Moderate  Divergent: Moderate  Interfering Components: Impaired thought organization  Effective Techniques: Word retrived strategies    Written Expression  Written Expression: Unable to assess    Motor Speech  Motor Speech: Within Functional Limits    Pragmatics/Social Functioning  Pragmatics: Within functional limits    Cognition:      Orientation  Overall Orientation Status: Impaired  Orientation Level: Oriented to person;Oriented to situation;Disoriented to time;Disoriented to place  Attention  Attention: Within Functional Limits  Memory  Memory: Exceptions to [facility]  Daily Routines: Moderate  Prospective Memory: To be assessed in therapy  Short-term Memory: Severe  Working Memory: Moderate  Problem Solving  Problem Solving: Exceptions to [facility]  Complex Functional Tasks: Moderate  Managing Finances: (managed by wife)  Managing Medications: (managed by wife)  Performing Discharge Planning: To be assessed in therapy  Simple Functional Tasks: Mild  Verbal Reasoning Skills: Mild  Numeric Reasoning  Numeric Reasoning: Exceptions to [facility]   Calculations: Severe  Money Management: (pt reported this is managed by his son and wife)  Time: Severe  Abstract Reasoning  Abstract Reasoning: Exceptions to [facility]  Convergent Thinking: Moderate  Divergent Thinking:  Moderate  Safety/Judgement  Safety/Judgement: Exceptions to

## 2019-06-22 ENCOUNTER — APPOINTMENT (OUTPATIENT)
Dept: GENERAL RADIOLOGY | Age: 69
DRG: 093 | End: 2019-06-22
Attending: PHYSICAL MEDICINE & REHABILITATION
Payer: COMMERCIAL

## 2019-06-22 LAB
GLUCOSE BLD-MCNC: 330 MG/DL (ref 60–115)
GLUCOSE BLD-MCNC: 357 MG/DL (ref 60–115)
GLUCOSE BLD-MCNC: 71 MG/DL (ref 60–115)
PERFORMED ON: ABNORMAL
PERFORMED ON: ABNORMAL
PERFORMED ON: NORMAL

## 2019-06-22 PROCEDURE — 72070 X-RAY EXAM THORAC SPINE 2VWS: CPT

## 2019-06-22 PROCEDURE — 6370000000 HC RX 637 (ALT 250 FOR IP): Performed by: PHYSICAL MEDICINE & REHABILITATION

## 2019-06-22 PROCEDURE — 97116 GAIT TRAINING THERAPY: CPT

## 2019-06-22 PROCEDURE — 1180000000 HC REHAB R&B

## 2019-06-22 PROCEDURE — 97110 THERAPEUTIC EXERCISES: CPT

## 2019-06-22 PROCEDURE — 6370000000 HC RX 637 (ALT 250 FOR IP): Performed by: INTERNAL MEDICINE

## 2019-06-22 PROCEDURE — 99232 SBSQ HOSP IP/OBS MODERATE 35: CPT | Performed by: PHYSICAL MEDICINE & REHABILITATION

## 2019-06-22 PROCEDURE — 97530 THERAPEUTIC ACTIVITIES: CPT

## 2019-06-22 PROCEDURE — 97535 SELF CARE MNGMENT TRAINING: CPT

## 2019-06-22 PROCEDURE — 72100 X-RAY EXAM L-S SPINE 2/3 VWS: CPT

## 2019-06-22 PROCEDURE — 71111 X-RAY EXAM RIBS/CHEST4/> VWS: CPT

## 2019-06-22 RX ORDER — OXYCODONE AND ACETAMINOPHEN 7.5; 325 MG/1; MG/1
1 TABLET ORAL EVERY 4 HOURS PRN
Status: DISCONTINUED | OUTPATIENT
Start: 2019-06-22 | End: 2019-07-04 | Stop reason: HOSPADM

## 2019-06-22 RX ORDER — LIDOCAINE 4 G/G
3 PATCH TOPICAL DAILY
Status: DISCONTINUED | OUTPATIENT
Start: 2019-06-22 | End: 2019-07-04 | Stop reason: HOSPADM

## 2019-06-22 RX ADMIN — ACETAMINOPHEN 500 MG: 500 TABLET ORAL at 21:12

## 2019-06-22 RX ADMIN — AMANTADINE HYDROCHLORIDE 100 MG: 100 CAPSULE ORAL at 21:12

## 2019-06-22 RX ADMIN — SERTRALINE HYDROCHLORIDE 50 MG: 50 TABLET ORAL at 08:24

## 2019-06-22 RX ADMIN — ACETAMINOPHEN 500 MG: 500 TABLET ORAL at 08:23

## 2019-06-22 RX ADMIN — OXYCODONE HYDROCHLORIDE 5 MG: 5 TABLET ORAL at 10:43

## 2019-06-22 RX ADMIN — ACETAMINOPHEN 500 MG: 500 TABLET ORAL at 14:17

## 2019-06-22 RX ADMIN — INSULIN GLARGINE 30 UNITS: 100 INJECTION, SOLUTION SUBCUTANEOUS at 21:13

## 2019-06-22 RX ADMIN — SIMVASTATIN 40 MG: 40 TABLET, FILM COATED ORAL at 21:12

## 2019-06-22 RX ADMIN — CARBIDOPA AND LEVODOPA 1 TABLET: 25; 100 TABLET ORAL at 08:24

## 2019-06-22 RX ADMIN — LOSARTAN POTASSIUM 25 MG: 25 TABLET ORAL at 08:24

## 2019-06-22 RX ADMIN — CARBIDOPA AND LEVODOPA 1 TABLET: 25; 100 TABLET ORAL at 14:17

## 2019-06-22 RX ADMIN — AMANTADINE HYDROCHLORIDE 100 MG: 100 CAPSULE ORAL at 10:43

## 2019-06-22 ASSESSMENT — PAIN DESCRIPTION - PAIN TYPE
TYPE: CHRONIC PAIN
TYPE: ACUTE PAIN
TYPE: ACUTE PAIN;CHRONIC PAIN

## 2019-06-22 ASSESSMENT — PAIN SCALES - GENERAL
PAINLEVEL_OUTOF10: 10
PAINLEVEL_OUTOF10: 4
PAINLEVEL_OUTOF10: 3
PAINLEVEL_OUTOF10: 10
PAINLEVEL_OUTOF10: 5
PAINLEVEL_OUTOF10: 0
PAINLEVEL_OUTOF10: 3
PAINLEVEL_OUTOF10: 10

## 2019-06-22 ASSESSMENT — PAIN DESCRIPTION - ORIENTATION
ORIENTATION: LOWER

## 2019-06-22 ASSESSMENT — PAIN DESCRIPTION - LOCATION
LOCATION: CHEST
LOCATION: CHEST;BACK
LOCATION: BACK

## 2019-06-22 ASSESSMENT — PAIN DESCRIPTION - DESCRIPTORS
DESCRIPTORS: ACHING

## 2019-06-22 ASSESSMENT — PAIN - FUNCTIONAL ASSESSMENT: PAIN_FUNCTIONAL_ASSESSMENT: PREVENTS OR INTERFERES SOME ACTIVE ACTIVITIES AND ADLS

## 2019-06-22 ASSESSMENT — PAIN DESCRIPTION - FREQUENCY
FREQUENCY: CONTINUOUS

## 2019-06-22 NOTE — PROGRESS NOTES
unavailable in the pm.                Plan   Plan  Times per week: 5-7x/wk  Plan weeks: 1 1/2 weeks  Current Treatment Recommendations: Strengthening, Functional Mobility Training, Patient/Caregiver Education & Training, Endurance Training, Self-Care / ADL, Neuromuscular Re-education, Balance Training            Goals  Patient Goals   Patient goals : Not stated at this time       Therapy Time   Individual Concurrent Group Co-treatment   Time In 1438         Time Out 1515         Minutes [address], CABEZAS/L  Electronically signed by TONY Flores on 6/22/19 at 3:23 PM

## 2019-06-23 ENCOUNTER — APPOINTMENT (OUTPATIENT)
Dept: MRI IMAGING | Age: 69
DRG: 093 | End: 2019-06-23
Attending: PHYSICAL MEDICINE & REHABILITATION
Payer: COMMERCIAL

## 2019-06-23 PROBLEM — S22.000A COMPRESSION FRACTURE OF BODY OF THORACIC VERTEBRA (HCC): Status: ACTIVE | Noted: 2019-06-23

## 2019-06-23 LAB
GLUCOSE BLD-MCNC: 155 MG/DL (ref 60–115)
GLUCOSE BLD-MCNC: 163 MG/DL (ref 60–115)
GLUCOSE BLD-MCNC: 173 MG/DL (ref 60–115)
GLUCOSE BLD-MCNC: 261 MG/DL (ref 60–115)
PERFORMED ON: ABNORMAL

## 2019-06-23 PROCEDURE — 6370000000 HC RX 637 (ALT 250 FOR IP): Performed by: INTERNAL MEDICINE

## 2019-06-23 PROCEDURE — 1180000000 HC REHAB R&B

## 2019-06-23 PROCEDURE — 6370000000 HC RX 637 (ALT 250 FOR IP): Performed by: NEUROLOGICAL SURGERY

## 2019-06-23 PROCEDURE — 72146 MRI CHEST SPINE W/O DYE: CPT

## 2019-06-23 PROCEDURE — 6370000000 HC RX 637 (ALT 250 FOR IP): Performed by: PHYSICAL MEDICINE & REHABILITATION

## 2019-06-23 PROCEDURE — 99232 SBSQ HOSP IP/OBS MODERATE 35: CPT | Performed by: PHYSICAL MEDICINE & REHABILITATION

## 2019-06-23 RX ORDER — DEXTROSE MONOHYDRATE 25 G/50ML
12.5 INJECTION, SOLUTION INTRAVENOUS PRN
Status: DISCONTINUED | OUTPATIENT
Start: 2019-06-23 | End: 2019-07-04 | Stop reason: HOSPADM

## 2019-06-23 RX ORDER — NICOTINE POLACRILEX 4 MG
15 LOZENGE BUCCAL PRN
Status: DISCONTINUED | OUTPATIENT
Start: 2019-06-23 | End: 2019-07-04 | Stop reason: HOSPADM

## 2019-06-23 RX ORDER — DEXTROSE MONOHYDRATE 50 MG/ML
100 INJECTION, SOLUTION INTRAVENOUS PRN
Status: DISCONTINUED | OUTPATIENT
Start: 2019-06-23 | End: 2019-07-04 | Stop reason: HOSPADM

## 2019-06-23 RX ORDER — INSULIN GLARGINE 100 [IU]/ML
20 INJECTION, SOLUTION SUBCUTANEOUS 2 TIMES DAILY
Status: DISCONTINUED | OUTPATIENT
Start: 2019-06-23 | End: 2019-06-26

## 2019-06-23 RX ORDER — IBUPROFEN 400 MG/1
400 TABLET ORAL
Status: DISPENSED | OUTPATIENT
Start: 2019-06-23 | End: 2019-06-28

## 2019-06-23 RX ADMIN — SERTRALINE HYDROCHLORIDE 50 MG: 50 TABLET ORAL at 09:20

## 2019-06-23 RX ADMIN — AMANTADINE HYDROCHLORIDE 100 MG: 100 CAPSULE ORAL at 21:04

## 2019-06-23 RX ADMIN — ACETAMINOPHEN 500 MG: 500 TABLET ORAL at 09:10

## 2019-06-23 RX ADMIN — IBUPROFEN 400 MG: 400 TABLET ORAL at 15:24

## 2019-06-23 RX ADMIN — SIMVASTATIN 40 MG: 40 TABLET, FILM COATED ORAL at 21:04

## 2019-06-23 RX ADMIN — ACETAMINOPHEN 500 MG: 500 TABLET ORAL at 21:04

## 2019-06-23 RX ADMIN — INSULIN GLARGINE 20 UNITS: 100 INJECTION, SOLUTION SUBCUTANEOUS at 21:05

## 2019-06-23 RX ADMIN — ACETAMINOPHEN 500 MG: 500 TABLET ORAL at 13:49

## 2019-06-23 RX ADMIN — AMANTADINE HYDROCHLORIDE 100 MG: 100 CAPSULE ORAL at 09:17

## 2019-06-23 RX ADMIN — CARBIDOPA AND LEVODOPA 1 TABLET: 25; 100 TABLET ORAL at 13:50

## 2019-06-23 RX ADMIN — LOSARTAN POTASSIUM 25 MG: 25 TABLET ORAL at 09:11

## 2019-06-23 RX ADMIN — INSULIN GLARGINE 20 UNITS: 100 INJECTION, SOLUTION SUBCUTANEOUS at 09:51

## 2019-06-23 RX ADMIN — CARBIDOPA AND LEVODOPA 1 TABLET: 25; 100 TABLET ORAL at 09:11

## 2019-06-23 ASSESSMENT — PAIN SCALES - GENERAL
PAINLEVEL_OUTOF10: 0
PAINLEVEL_OUTOF10: 7
PAINLEVEL_OUTOF10: 0

## 2019-06-23 NOTE — PROGRESS NOTES
pm, cont to have diffic with safe approach to chair.      Occupational therapy: FIMS:  Eatin - Feeds self with setup/supervision/cues and/or requires only setup/supervision/cues to perform tube feedings  Groomin - Requires setup/cues to do all tasks(set up for oral care and hair care while seated at sink )  Bathin - Able to bathe all 10 areas with setup/sup/cues(Set Up/Supervision with VCs for safety )  Dressing-Upper: 4 - Requires assist with buttons/zippers only and/or requires assist with one arm only(Assistance with buttons )  Dressing-Lower: 5 - Requires setup/supervision/cues and/or staff applies TEDS/prosthesis/brace only(Supervision )  Toiletin - Requires setup/supervision/cues  Toilet Transfer: 4 - Requires steadying assistance only < 25% assist  Tub Transfer: 0 - Activity does not occur  Shower Transfer: 5 - Supervision, set-up, cues(SBA),  , Assessment: Pt making gains to increase independence with adl completion    Speech therapy: FIMS: Comprehension: 6 - Complex ideas 90% or device (hearing aid or glasses- if patient is primarily a visual learner)  Expression: 6 - Device used to express complex ideas/needs  Social Interaction: 7 - Patient has appropriate behavior/relations 100% of the time  Problem Solvin - Patient solves simple/routine tasks 75-90%+   Memory: 4 - Patient remembers 75-90%+ of the time      Lab/X-ray studies reviewed, analyzed and discussed with patient and staff:   Recent Results (from the past 24 hour(s))   POCT Glucose    Collection Time: 19  7:09 PM   Result Value Ref Range    POC Glucose 330 (H) 60 - 115 mg/dl    Performed on ACCU-CHEK    POCT Glucose    Collection Time: 19  8:20 PM   Result Value Ref Range    POC Glucose 357 (H) 60 - 115 mg/dl    Performed on ACCU-CHEK    POCT Glucose    Collection Time: 19  6:20 AM   Result Value Ref Range    POC Glucose 163 (H) 60 - 115 mg/dl    Performed on ACCU-CHEK        Xr Pelvis  : 2019   NEGATIVE amount of time per day and intensity of therapy made daily. In bed therapies or bedside therapies prn.   2. Bowel and Bladder dysfunction:  frequent toileting, ambulate to bathroom with assistance, check post void residuals. Check for C.difficile x1 if >2 loose stools in 24 hours, continue bowel & bladder program.  Monitor bowel and bladder function. Lactinex 2 PO every AC. MOM prn, Brown Bomb prn, Glycerin suppository prn, enema prn.  3. Severe mid back pain likely related to compression fractures generalized OA pain: reassess pain every shift and prior to and after each therapy session, give prn Tylenol and add OxyIR changed to Percocet 7.5, modalities prn in therapy, Lidoderm, K-pad prn. Add BenGay consider lumbar spine films and rib series consult neuro spine surgery. 4. Skin healing and breakdown risk:  continue pressure relief program.  Daily skin exams and reports from nursing. 5. Severe progressive fatigue due to nutritional and hydration deficiency:  continue to monitor I&Os, calorie counts prn, dietary consult prn. Add vitamin B12 vitamin D co-Q10 and consider Provigil  6. Acute episodic insomnia with situational adjustment disorder:  prn Ambien, monitor for day time sedation. 7. Falls risk elevated:  patient to use call light to get nursing assistance to get up, bed and chair alarm. 8. Elevated DVT risk: progressive activities in PT, continue prophylaxis SO hose, elevation  . 9. Complex discharge planning:  Weekly team meeting every Monday to assess progress towards goals, discuss and address social, psychological and medical comorbidities and to address difficulties they may be having progressing in therapy. Patient and family education is in progress. The patient is to follow-up with their family physician after discharge. Complex Active General Medical Issues that complicate care Assess & Plan:    1.   Lactic acidosis  and hyperosmolar (nonketotic) due to uncontrolled diabetes mellitus-fingerstick blood sugars q. before meals and at bedtime, add ADA restrictions in the diet consult hospitalist for back-up medical  2. Uncontrolled type 2 diabetes mellitus with diabetic neuropathy, with long-term current use of insulin -fingerstick blood sugars q. before meals and at bedtime dose and titrate diabetic medications.-Fingerstick blood sugars q. before meals and at bedtime, titrate anti-glycemic medications, protect kidneys consult hospitalist for back-up medical teach carb counting decrease carbohydrates to 3 carbs per meal and diabetic education  3. Syncope and collapse-due to Parkinson's medications as well as possibly secondary to other medications and Parkinson's disease scatter medications recheck BPs check Ortho BPs daily. 4.   Parkinson's disease -titrate dosing of Sinemet, check Ortho BPs, reconsult neurology  5. Type 2 diabetes mellitus with hyperglycemia, with long-term current use of insulin   6. Essential hypertension, Hyperlipidemia-vital signs every shift, dose and titrate cardiac medications, recheck CBC and BMP  7. Uses Thai as primary spoken language-add translation device  8. 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           Maggie Robertson D.O., PM&R     Attending    [address]

## 2019-06-23 NOTE — CONSULTS
Consult to Hospitalist  Consult performed by: SOCORRO Adhikari  Consult ordered by: Champ Aguila DO        Consult Note    Reason for Consult:  Management of DM2, HTN, HLD and Parkinson's disease    Requesting Physician:  Champ Aguila DO    HISTORY OF PRESENT ILLNESS:    The patient is a 77 y.o. male who is admitted to [facility] rehab floor for gait instability and immobility secondary to progressive exac of severe Parkinson's disease. Kinyarwanda speaking use of . Past Medical History:   Diagnosis Date    Hyperlipidemia     Hypertension     Parkinson's disease ([facility] [address]     Type 2 diabetes mellitus without complication ([facility] [address]        History reviewed. No pertinent surgical history. Prior to Admission medications    Medication Sig Start Date End Date Taking?  Authorizing Provider   insulin glargine (LANTUS) 100 UNIT/ML injection vial Inject 30 Units into the skin nightly 6/19/19   Bryanna Shaver MD   insulin lispro (HUMALOG) 100 UNIT/ML injection vial Inject 0-12 Units into the skin 3 times daily (with meals) 6/19/19   Bryanna Shaver MD   insulin lispro (HUMALOG) 100 UNIT/ML injection vial Inject 0-6 Units into the skin nightly 6/19/19   Bryanna hSaver MD   insulin lispro (HUMALOG) 100 UNIT/ML injection vial Inject 6 Units into the skin 3 times daily (with meals) 6/19/19   Bryanna Shaver MD   metFORMIN (GLUCOPHAGE) 1000 MG tablet Take 1,000 mg by mouth 2 times daily (with meals)    Historical Provider, MD   simvastatin (ZOCOR) 40 MG tablet Take 40 mg by mouth nightly    Historical Provider, MD   nabumetone (RELAFEN) 750 MG tablet Take 750 mg by mouth 2 times daily    Historical Provider, MD   sertraline (ZOLOFT) 50 MG tablet Take 50 mg by mouth daily    Historical Provider, MD   amantadine (SYMMETREL) 100 MG capsule Take 100 mg by mouth 2 times daily    Historical Provider, MD   losartan (COZAAR) 50 MG tablet Take 50 mg by mouth daily    Historical Provider, MD       Scheduled Meds:   insulin

## 2019-06-24 LAB
GLUCOSE BLD-MCNC: 116 MG/DL (ref 60–115)
GLUCOSE BLD-MCNC: 185 MG/DL (ref 60–115)
GLUCOSE BLD-MCNC: 253 MG/DL (ref 60–115)
GLUCOSE BLD-MCNC: 96 MG/DL (ref 60–115)
PERFORMED ON: ABNORMAL
PERFORMED ON: NORMAL

## 2019-06-24 PROCEDURE — 6370000000 HC RX 637 (ALT 250 FOR IP): Performed by: INTERNAL MEDICINE

## 2019-06-24 PROCEDURE — 97535 SELF CARE MNGMENT TRAINING: CPT

## 2019-06-24 PROCEDURE — 1180000000 HC REHAB R&B

## 2019-06-24 PROCEDURE — 97530 THERAPEUTIC ACTIVITIES: CPT

## 2019-06-24 PROCEDURE — 97110 THERAPEUTIC EXERCISES: CPT

## 2019-06-24 PROCEDURE — 97112 NEUROMUSCULAR REEDUCATION: CPT

## 2019-06-24 PROCEDURE — 6370000000 HC RX 637 (ALT 250 FOR IP): Performed by: PHYSICAL MEDICINE & REHABILITATION

## 2019-06-24 PROCEDURE — 99233 SBSQ HOSP IP/OBS HIGH 50: CPT | Performed by: PHYSICAL MEDICINE & REHABILITATION

## 2019-06-24 PROCEDURE — 6370000000 HC RX 637 (ALT 250 FOR IP): Performed by: NEUROLOGICAL SURGERY

## 2019-06-24 PROCEDURE — 97127 HC SP THER IVNTJ W/FOCUS COG FUNCJ: CPT

## 2019-06-24 PROCEDURE — 97116 GAIT TRAINING THERAPY: CPT

## 2019-06-24 RX ORDER — FENTANYL 12 UG/H
1 PATCH TRANSDERMAL
Status: DISCONTINUED | OUTPATIENT
Start: 2019-06-24 | End: 2019-06-28

## 2019-06-24 RX ADMIN — ACETAMINOPHEN 500 MG: 500 TABLET ORAL at 13:13

## 2019-06-24 RX ADMIN — IBUPROFEN 400 MG: 400 TABLET ORAL at 08:04

## 2019-06-24 RX ADMIN — SERTRALINE HYDROCHLORIDE 50 MG: 50 TABLET ORAL at 09:41

## 2019-06-24 RX ADMIN — INSULIN GLARGINE 20 UNITS: 100 INJECTION, SOLUTION SUBCUTANEOUS at 09:40

## 2019-06-24 RX ADMIN — ACETAMINOPHEN 500 MG: 500 TABLET ORAL at 08:04

## 2019-06-24 RX ADMIN — LOSARTAN POTASSIUM 25 MG: 25 TABLET ORAL at 09:41

## 2019-06-24 RX ADMIN — CARBIDOPA AND LEVODOPA 1 TABLET: 25; 100 TABLET ORAL at 13:12

## 2019-06-24 RX ADMIN — INSULIN GLARGINE 20 UNITS: 100 INJECTION, SOLUTION SUBCUTANEOUS at 22:15

## 2019-06-24 RX ADMIN — AMANTADINE HYDROCHLORIDE 100 MG: 100 CAPSULE ORAL at 09:44

## 2019-06-24 RX ADMIN — IBUPROFEN 400 MG: 400 TABLET ORAL at 19:01

## 2019-06-24 RX ADMIN — AMANTADINE HYDROCHLORIDE 100 MG: 100 CAPSULE ORAL at 22:14

## 2019-06-24 RX ADMIN — CARBIDOPA AND LEVODOPA 1 TABLET: 25; 100 TABLET ORAL at 09:41

## 2019-06-24 RX ADMIN — SIMVASTATIN 40 MG: 40 TABLET, FILM COATED ORAL at 22:14

## 2019-06-24 RX ADMIN — IBUPROFEN 400 MG: 400 TABLET ORAL at 13:13

## 2019-06-24 ASSESSMENT — PAIN DESCRIPTION - DESCRIPTORS
DESCRIPTORS: ACHING
DESCRIPTORS: ACHING

## 2019-06-24 ASSESSMENT — PAIN SCALES - GENERAL
PAINLEVEL_OUTOF10: 4
PAINLEVEL_OUTOF10: 4
PAINLEVEL_OUTOF10: 3
PAINLEVEL_OUTOF10: 4

## 2019-06-24 ASSESSMENT — PAIN DESCRIPTION - FREQUENCY
FREQUENCY: CONTINUOUS
FREQUENCY: CONTINUOUS

## 2019-06-24 ASSESSMENT — PAIN DESCRIPTION - LOCATION
LOCATION: BACK
LOCATION: BACK

## 2019-06-24 ASSESSMENT — PAIN DESCRIPTION - PAIN TYPE
TYPE: CHRONIC PAIN
TYPE: CHRONIC PAIN

## 2019-06-24 ASSESSMENT — PAIN DESCRIPTION - ORIENTATION
ORIENTATION: LOWER
ORIENTATION: LOWER

## 2019-06-24 NOTE — PROGRESS NOTES
increasing complexity in everyday activities with 80% accuracy and min cues. --Pt followed verbal directions with 80% accuracy independently and increased to 100% accuracy with mod verbal cues. Goal 5: To decrease pt's cognitive deficits through the use of compensatory strategies, the pt will be educated on 3 different memory strategies and verbalize how he/she might use them at home in 3 ways with min cues. --Pt educated on three memory strategies (ie., establishing a routine, making lists, and using a calender)    Goal 6: To address pt's cognitive deficits and promote recall of personal and medical information, pt will answer questions addressing remote, recent, and delayed recall with 80% accuracy and min cues. Dysphagia Goals:  Not targeted  Timeframe for Short-term Goals: 1 week  Goal 1: Pt will tolerate regular foods/thin liquids with no overt s/s of aspiration during a therapeutic meal monitor. Compensatory Swallowing Strategies: Upright as possible for all oral intake, Small bites/sips      Treatment/Activity Tolerance:  Patient tolerated treatment well    Plan:  Continue per POC    Pain:  Patient demonstrated no s/s of pain. - denied pain    Patient/Caregiver Education:  Patient educated on session and progression towards goals. and Education needs reinforcement.     Safety Devices:  Call light within reach and Chair alarm in place    Comprehension:  5- Supervision/Stand by cues    Expression:  5- Supervision/Stand by cues    Problem Solving:  3- Moderate assist    Memory:  3- Moderate assist    Signature: Electronically signed by CURTIS Pro on 6/24/2019 at 12:26 PM

## 2019-06-24 NOTE — CONSULTS
[address]                      [address], [address] [facility]                                  CONSULTATION    PATIENT NAME: Oleg Merrill                :        1950  MED REC NO:   [id number]                            ROOM:       R236  ACCOUNT NO:   [de-identified]                           ADMIT DATE: 2019  PROVIDER:     Emilia Pham MD    CONSULT DATE:  2019    CHIEF COMPLAINT:  Pain after the fall. HISTORY OF PRESENT ILLNESS:  This is a 70-year-old gentleman who has  Parkinson's, who was at home. He had a syncopal or lightheaded type  feeling, collapsed to the ground hitting the bathtub. He came in with a  very high blood sugar, apparently has been not taking any medications at  home. He has been complaining of pain in the mediastinum, in the ribs,  and in the mid back region. X-rays of the lumbar spine were  unremarkable. CT scan of the brain was unremarkable as well. The  thoracic study sought that there were some compression fractures, I  think none of them looked acute when I reviewed them. They all looked  fairly unremarkable. He is on OxyContin at this time and Lidoderm and a  ______. The patient was examined. He is able to ambulate with some  help. He is mildly tender over the mid back region roughly in the  T10-T11 area. He has some mild discomfort in the mediastinum to  pressure in the ribs. He has no focal weakness. He has hypoactive  reflexes. Negative straight leg raising. PHYSICAL EXAMINATION:  CHEST:  The lungs are clear. HEART:  Regular rhythm. Good carotid pulses. ASSESSMENT:  The patient's blood sugars are in the somewhat better  control. I think, at this point, I am not convinced that he has a  compression fracture of the thoracic spine. An MRI has been scheduled. I think, at this point, perhaps placing him on some mild  anti-inflammatories would be probably adequate.   His BUN and creatinine  appear to be quite unremarkable at this time. So, I will order some  Motrin _____ b.i.d., which I think will help some of his discomfort as  well. We will follow him and review the studies when available.         Prachi Bran MD    D: 06/23/2019 15:14:26       T: 06/23/2019 20:18:43     [email or web address]  Job#: [id number]     Doc#: [id number]    CC:

## 2019-06-24 NOTE — PROGRESS NOTES
Subjective: The patient complains of severe  acute on chronic stiffness rigidity posteriorly and festination's and cogwheel rigidity partially relieved by dopaminergic medications, PT, OT, speech-language pathology and exacerbated by dehydration exertion and UTI. I am concerned about patients falls risk and significant progress of his Parkinson's disease of late. Also concerned about his elevated blood sugars above 200. I will add a ADA restriction to his diet and dietary education. Patient continues to complain of pain  -in his mid back. Recent x-ray showed possible acute fractures I ordered an MRI to assess the acuteness versus chronicity of these fractures. I discussed the case with Dr. Serenity Nash. The MRI seems to show old healed fractures -moderate to marked healed compression fractures of T4, T5, and T6 are present, without abnormal bone marrow edema, retropulsion of bone, subluxation, or other acute findings identified. His severe back pain however seems to be fairly well controlled at present with the Percocet Lidoderm and heat. ROS x10: The patient also complains of severely impaired mobility and activities of daily living. Otherwise no new problems with vision, hearing, nose, mouth, throat, dermal, cardiovascular, GI, , pulmonary, musculoskeletal, psychiatric or neurological. See Rehab H&P on Rehab chart dated . Vital signs:  BP (!) 146/77   Pulse 68   Temp 98 °F (36.7 °C) (Oral)   Resp 17   Ht 5' 5\" (1.651 m)   Wt 158 lb 11.7 oz (72 kg)   SpO2 95%   BMI 26.41 kg/m²   I/O:   PO/Intake:  fair PO intake, no problems observed or reported. Bowel/Bladder:  continent, no problems noted. General:  Patient is well developed, adequately nourished, non-obese and     well kempt. HEENT:    PERRLA, hearing intact to loud voice, external inspection of ear     and nose benign.   Inspection of lips, tongue and gums benign  Musculoskeletal: No significant change in strength or tone.  All joints stable. Inspection and palpation of digits and nails show no clubbing,       cyanosis or inflammatory conditions. Neuro/Psychiatric: Affect: flat but pleasant. Alert and oriented to person, place and     situation. No significant change in deep tendon reflexes or     Sensation-flat parkinsonian affect posteriorly and festination's  Lungs:  CTA-B. Respiration effort is normal at rest.     Heart:   S1 = S2, RRR. No loud murmurs. Abdomen:  Soft, non-tender, no enlargement of liver or spleen. Extremities:  No significant lower extremity edema or tenderness. Skin:   Intact to general survey, no visualized or palpated problems. Rehabilitation:  Physical therapy: FIMS:  Bed Mobility: Scooting: Supervision    Transfers: Sit to Stand: Stand by assistance, Contact guard assistance  Stand to sit: Contact guard assistance  Bed to Chair: Contact guard assistance, Ambulation 1  Surface: carpet, level tile  Device: Rollator  Assistance: Contact guard assistance, Stand by assistance  Quality of Gait: improved step length on straight path, increased festination with change direction, corrects with cues  Distance: 150 feet  Comments: distance limited by caity leg pain, Stairs  # Steps : 12  Stairs Height: 6\"  Rails: Bilateral  Device: No Device  Assistance: Stand by assistance, Contact guard assistance  Comment: Reciprocal ascending; non-reciprocal descending. Cues for full foot contact     FIMS: Bed, Chair, Wheel Chair: 4 - Requires steadying assistance only <25% assist  and/or requires assist with one leg only  Walk: 4 - Contact Guard/Minimal Assistance Requires up to Contact Guard or Minimal Assistance to walk at least 150 feet  Distance Walked: 150'  Stairs: 2- Maximal Assistance Performs 25-49% of the effort to go up and down 4 to 6 stairs and requires the assistance of one person only,  , Assessment: Improved step length noted this pm, cont to have diffic with safe approach to chair. hypertension, Hyperlipidemia-vital signs every shift, dose and titrate cardiac medications, recheck CBC and BMP  7. Uses Sudanese as primary spoken language-add translation device  8. 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           Malinda Valdez D.O., PM&R     Attending    [address]

## 2019-06-24 NOTE — PROGRESS NOTES
Treatment Recommendations: Strengthening, Functional Mobility Training, Patient/Caregiver Education & Training, Endurance Training, Self-Care / ADL, Neuromuscular Re-education, Balance Training    Goals  Patient Goals   Patient goals : Not stated at this time       Therapy Time   Individual Concurrent Group Co-treatment   Time In [facility]         Minutes [address] TONY [address] Electronically signed by TONY Delong on 6/24/2019 at 3:35 PM

## 2019-06-25 LAB
GLUCOSE BLD-MCNC: 103 MG/DL (ref 60–115)
GLUCOSE BLD-MCNC: 162 MG/DL (ref 60–115)
GLUCOSE BLD-MCNC: 250 MG/DL (ref 60–115)
GLUCOSE BLD-MCNC: 84 MG/DL (ref 60–115)
PERFORMED ON: ABNORMAL
PERFORMED ON: ABNORMAL
PERFORMED ON: NORMAL
PERFORMED ON: NORMAL

## 2019-06-25 PROCEDURE — 97110 THERAPEUTIC EXERCISES: CPT

## 2019-06-25 PROCEDURE — 99232 SBSQ HOSP IP/OBS MODERATE 35: CPT | Performed by: PHYSICAL MEDICINE & REHABILITATION

## 2019-06-25 PROCEDURE — 1180000000 HC REHAB R&B

## 2019-06-25 PROCEDURE — 6370000000 HC RX 637 (ALT 250 FOR IP): Performed by: NEUROLOGICAL SURGERY

## 2019-06-25 PROCEDURE — 6370000000 HC RX 637 (ALT 250 FOR IP): Performed by: PHYSICAL MEDICINE & REHABILITATION

## 2019-06-25 PROCEDURE — 97116 GAIT TRAINING THERAPY: CPT

## 2019-06-25 PROCEDURE — 97112 NEUROMUSCULAR REEDUCATION: CPT

## 2019-06-25 PROCEDURE — 97530 THERAPEUTIC ACTIVITIES: CPT

## 2019-06-25 PROCEDURE — 6370000000 HC RX 637 (ALT 250 FOR IP): Performed by: INTERNAL MEDICINE

## 2019-06-25 RX ADMIN — CARBIDOPA AND LEVODOPA 1 TABLET: 25; 100 TABLET ORAL at 08:57

## 2019-06-25 RX ADMIN — AMANTADINE HYDROCHLORIDE 100 MG: 100 CAPSULE ORAL at 21:30

## 2019-06-25 RX ADMIN — IBUPROFEN 400 MG: 400 TABLET ORAL at 12:03

## 2019-06-25 RX ADMIN — IBUPROFEN 400 MG: 400 TABLET ORAL at 07:05

## 2019-06-25 RX ADMIN — SERTRALINE HYDROCHLORIDE 50 MG: 50 TABLET ORAL at 08:57

## 2019-06-25 RX ADMIN — INSULIN GLARGINE 20 UNITS: 100 INJECTION, SOLUTION SUBCUTANEOUS at 08:57

## 2019-06-25 RX ADMIN — AMANTADINE HYDROCHLORIDE 100 MG: 100 CAPSULE ORAL at 12:03

## 2019-06-25 RX ADMIN — LOSARTAN POTASSIUM 25 MG: 25 TABLET ORAL at 08:57

## 2019-06-25 RX ADMIN — CARBIDOPA AND LEVODOPA 1 TABLET: 25; 100 TABLET ORAL at 14:48

## 2019-06-25 RX ADMIN — SIMVASTATIN 40 MG: 40 TABLET, FILM COATED ORAL at 21:31

## 2019-06-25 RX ADMIN — IBUPROFEN 400 MG: 400 TABLET ORAL at 17:21

## 2019-06-25 ASSESSMENT — ENCOUNTER SYMPTOMS
ABDOMINAL PAIN: 0
NAUSEA: 0
DIARRHEA: 0
WHEEZING: 0
TROUBLE SWALLOWING: 0
VOMITING: 0
SHORTNESS OF BREATH: 0
COLOR CHANGE: 0
ABDOMINAL DISTENTION: 0
COUGH: 0
CHEST TIGHTNESS: 0
CONSTIPATION: 0

## 2019-06-25 ASSESSMENT — PAIN DESCRIPTION - LOCATION
LOCATION: BACK

## 2019-06-25 ASSESSMENT — PAIN DESCRIPTION - PAIN TYPE
TYPE: CHRONIC PAIN
TYPE: CHRONIC PAIN

## 2019-06-25 ASSESSMENT — PAIN DESCRIPTION - PROGRESSION: CLINICAL_PROGRESSION: GRADUALLY WORSENING

## 2019-06-25 ASSESSMENT — PAIN DESCRIPTION - FREQUENCY
FREQUENCY: INTERMITTENT
FREQUENCY: CONTINUOUS

## 2019-06-25 ASSESSMENT — PAIN DESCRIPTION - ORIENTATION
ORIENTATION: LOWER

## 2019-06-25 ASSESSMENT — PAIN SCALES - GENERAL
PAINLEVEL_OUTOF10: 1
PAINLEVEL_OUTOF10: 4
PAINLEVEL_OUTOF10: 3
PAINLEVEL_OUTOF10: 0
PAINLEVEL_OUTOF10: 3
PAINLEVEL_OUTOF10: 0
PAINLEVEL_OUTOF10: 2
PAINLEVEL_OUTOF10: 3
PAINLEVEL_OUTOF10: 3

## 2019-06-25 ASSESSMENT — PAIN SCALES - WONG BAKER: WONGBAKER_NUMERICALRESPONSE: 6

## 2019-06-25 ASSESSMENT — PAIN DESCRIPTION - DESCRIPTORS
DESCRIPTORS: ACHING
DESCRIPTORS: ACHING

## 2019-06-25 NOTE — PROGRESS NOTES
Physical Therapy Rehab Treatment Note  Facility/Department: [facility]  Room: R236R236-01       NAME: Marina Dumont  : 1950 (77 y.o.)  MRN: [id number]  CODE STATUS: Full Code    Date of Service: 2019  Chart Reviewed: Yes  Family / Caregiver Present: No    Restrictions:  Restrictions/Precautions: Fall Risk       SUBJECTIVE: Subjective: Hot pack  Response To Previous Treatment: Patient with no complaints from previous session. Pain Screening  Patient Currently in Pain: No  Pre Treatment Pain Screening  Pain at present: 3  Scale Used: Numeric Score  Intervention List: Patient able to continue with treatment    Post Treatment Pain Screening:  Pain Assessment  Pain Assessment: 0-10  Pain Level: 0  Pain Location: Back  Pain Orientation: Lower    OBJECTIVE:     Neuromuscular Education  NDT Treatment: Standing  Neuromuscular Comments: Dynamic standing activity  stepping to targets on floor while doing D1 flexion with physioball. No LOB with activity. Stepping to targets in sequence for improved step length     Transfers  Sit to Stand: Supervision  Stand to sit: Supervision    Ambulation  Ambulation?: Yes  More Ambulation?: Yes  Ambulation 1  Surface: level tile;carpet  Device: Rollator  Assistance: Supervision  Quality of Gait: improved step length with verbal cues varying sujata  Distance: 200' x 4  Ambulation 2  Stairs?: No(tested in a.m.)     Exercises  Hamstring Sets: x 20 RTB  Hip Flexion: x20  Hip Abduction: x 20 RTB/ Ball squeezes  Knee Long Arc Quad: x20  Ankle Pumps: x20  Comments: Hot pack applied to low back during exercises     ASSESSMENT/COMMENTS:  Body structures, Functions, Activity limitations: Decreased functional mobility ; Decreased strength;Decreased endurance;Decreased coordination;Decreased balance; Increased Pain;Decreased safe awareness;Decreased ADL status; Decreased cognition  Assessment: Patient shows improved dynamic standing balance with no LOB this p.m.  Patient shows good activity tolerance    PLAN OF CARE/Safety:   Safety Devices  Type of devices: All fall risk precautions in place; Bed alarm in place      Therapy Time:   Individual   Time In 1400   Time Out 1500   Minutes 60     Minutes: 60      Gait training: 15      Neuro re education: 25     Therapeutic ex:   Brunilda Childs PTA, 06/25/19 at 3:00 PM

## 2019-06-25 NOTE — PROGRESS NOTES
recent    Chest CT      WITH CONTRAST:No results found for this or any previous visit. WITHOUT CONTRAST: No results found for this or any previous visit. CXR      2-view: No results found for this or any previous visit. Portable:   Results for orders placed during the hospital encounter of 06/16/19   XR CHEST PORTABLE    Narrative EXAMINATION: CHEST PORTABLE VIEW     CLINICAL HISTORY: Generalized pain after fall    COMPARISONS: None     FINDINGS:    2 views of the chest is submitted. Limited exam due to low lung volume The cardiac silhouette is of normal size configuration. The mediastinum is unremarkable. Pulmonary vascular unremarkable. Right sided trachea. No focal infiltrates. No Pneumothoraces. Impression NO ACUTE ACTIVE CARDIOPULMONARY PROCESS       Echo No results found for this or any previous visit. Assessment/Plan:    Encephalopathy, improved  Possible seizure secondary to Hyperosmolar State  PD, added sinemet, tremors much better  EEG done  MRI no acute process  PT/OT    Syd Lara MD, Walter Menjivar, American Board of Psychiatry & Neurology  Board Certified in Vascular Neurology  Board Certified in Neuromuscular Medicine  Certified in Neurorehabilitation         Collaborating physicians: Dr Jaret Lara, Dr CAITLIN Ruiz, Dr Emma Michelle    Electronically signed by PINEDA Fuller CNP on 6/25/2019 at 1:51 PM

## 2019-06-25 NOTE — PROGRESS NOTES
Occupational Therapy  Facility/Department: [facility]  Daily Treatment Note  NAME: Dayne Dumont  : 1950  MRN: [id number]    Date of Service: 2019    Discharge Recommendations:  Continue to assess pending progress       Assessment      Activity Tolerance  Activity Tolerance: Patient Tolerated treatment well  Safety Devices  Safety Devices in place: Yes  Type of devices: All fall risk precautions in place         Patient Diagnosis(es): There were no encounter diagnoses. has a past medical history of Hyperlipidemia, Hypertension, Parkinson's disease ([facility] [address] and Type 2 diabetes mellitus without complication ([facility] [address] has no past surgical history on file. Restrictions  Restrictions/Precautions  Restrictions/Precautions: Fall Risk  Position Activity Restriction  Other position/activity restrictions: Gabonese speaking, utilize  services. Subjective   General  Chart Reviewed: Yes  Patient assessed for rehabilitation services?: Yes  Response to previous treatment: Patient with no complaints from previous session  Family / Caregiver Present: No  Referring Practitioner: Dr Alicja Nielsen  Diagnosis: Exac of Parkinson's with impaired mobility  Pain Assessment  Pain Assessment: 0-10  Pain Level: 2  Pain Type: Chronic pain  Pain Location: Back  Pain Orientation: Lower  Pain Descriptors: Aching  Pain Frequency: Intermittent  Vital Signs  Patient Currently in Pain: Yes   Orientation     Objective     Pt. was in bed upon arrival. Pt. completed supine to sit eob at S and sit to stand pivot to w/c at [facility]. Pt. engaged in B UE strengthening and endurance tasks to promote independence and safety with ADLs. Pt. pushed bean bags over incline board with micah and 2 lb weight x 2 trials. Pt. had 1 lb wrist weights donned to B wrists to assemble nuts and bolts in a platform. Pt. required moderate assist with finding the correct size hole to place the bolt in. Pt. tolerated the wrist weights well.       Plan

## 2019-06-25 NOTE — PROGRESS NOTES
Physical Therapy Rehab Treatment Note  [facility]Department: [facility]  [facility]       NAME: eJssica Dumont  : 1950 (77 y.o.)  MRN: [id number]  CODE STATUS: Full Code    Date of Service: 2019  Chart Reviewed: Yes  Family / Caregiver Present: No    Restrictions:  Restrictions/Precautions: Fall Risk       SUBJECTIVE: Subjective: I am good  Response To Previous Treatment: Patient with no complaints from previous session. Pain Screening  Patient Currently in Pain: Yes  Pre Treatment Pain Screening  Pain at present: 3  Scale Used: Numeric Score  Intervention List: Patient able to continue with treatment    Post Treatment Pain Screening:  Pain Assessment  Pain Assessment: 0-10  Pain Level: 3  Pain Location: Back  Pain Orientation: Lower    OBJECTIVE:     Neuromuscular Education  NDT Treatment: Standing;Gait   Neuromuscular Comments: Dynamic standing activities stepping and opening up into extension. Ball bounce with stepping. LOB x 1 with modA to correct    Transfers  Sit to Stand: Supervision  Stand to sit: Supervision  Car Transfer: Supervision    Ambulation  Ambulation?: Yes  More Ambulation?: Yes  Ambulation 1  Surface: level tile;carpet;ramp  Device: Rollator  Assistance: Supervision  Quality of Gait: improved step length with verbal cues varying sujata  Distance: 200' x 2    Ambulation 2  Surface - 2: level tile;carpet  Device 2: No device  Assistance 2: Stand by assistance  Gait Deviations: Decreased step length;Decreased step height;Shuffles  Distance: 60' x 2    Stairs/Curb  Stairs?: Yes   Stairs  # Steps : 12  Stairs Height: 6\"  Rails: Bilateral  Device: No Device  Assistance: Stand by assistance  Comment: Reciprocal ascending; non-reciprocal descending. Cues for full foot contact     ASSESSMENT/COMMENTS:  Body structures, Functions, Activity limitations: Decreased functional mobility ; Decreased strength;Decreased endurance;Decreased coordination;Decreased balance; Increased Pain;Decreased safe awareness;Decreased ADL status; Decreased cognition  Assessment: Patient shows LOB x 1 with dynamic standing activities. Improved tolerance to gait without AD    PLAN OF CARE/Safety:   Safety Devices  Type of devices: All fall risk precautions in place; Bed alarm in place      Therapy Time:   Individual   Time In 1130   Time Out 1200   Minutes 30     Minutes: 30      Gait training:15      Neuro re education: [address], [place], 06/25/19 at 12:14 PM

## 2019-06-25 NOTE — PROGRESS NOTES
Occupational Therapy  Facility/Department: [facility]  Daily Treatment Note  NAME: Mane Dumont  : 1950  MRN: [id number]    Date of Service: 2019    Discharge Recommendations:  Continue to assess pending progress       Assessment      Activity Tolerance  Activity Tolerance: Patient Tolerated treatment well  Safety Devices  Safety Devices in place: Yes  Type of devices: All fall risk precautions in place         Patient Diagnosis(es): There were no encounter diagnoses. has a past medical history of Hyperlipidemia, Hypertension, Parkinson's disease ([facility] [address] and Type 2 diabetes mellitus without complication ([facility] [address] has no past surgical history on file. Restrictions  Restrictions/Precautions  Restrictions/Precautions: Fall Risk  Position Activity Restriction  Other position/activity restrictions: Bengali speaking, utilize  services. Subjective   General  Chart Reviewed: Yes  Patient assessed for rehabilitation services?: Yes  Response to previous treatment: Patient with no complaints from previous session  Family / Caregiver Present: No  Referring Practitioner: Dr Chace Mayen  Diagnosis: Exac of Parkinson's with impaired mobility  Pain Assessment  Pain Assessment: 0-10  Pain Level: 3  Pain Type: Chronic pain  Pain Location: Back  Pain Orientation: Lower  Pain Descriptors: Aching  Pain Frequency: Continuous  Vital Signs  Patient Currently in Pain: Yes   Orientation     Objective     Pt. engaged in B UE strengthening task with 1 lb wrist weights on B wrists. Pt. reached to grab a large block from a basket from his right side to place on dowels at 4 different heights. Pt. was encouraged to alternate hands during the task but had min difficulty with following directions. Pt. was unable to follow a color pattern due to patient stating \"my vision is blury\" and was unable to tell the difference in the colors. Pt. stood to remove the blocks and place into the basket.  Pt. was Supervision for sit

## 2019-06-26 LAB
GLUCOSE BLD-MCNC: 119 MG/DL (ref 60–115)
GLUCOSE BLD-MCNC: 184 MG/DL (ref 60–115)
GLUCOSE BLD-MCNC: 280 MG/DL (ref 60–115)
GLUCOSE BLD-MCNC: 68 MG/DL (ref 60–115)
GLUCOSE BLD-MCNC: 97 MG/DL (ref 60–115)
PERFORMED ON: ABNORMAL
PERFORMED ON: NORMAL
PERFORMED ON: NORMAL

## 2019-06-26 PROCEDURE — 97535 SELF CARE MNGMENT TRAINING: CPT

## 2019-06-26 PROCEDURE — 6370000000 HC RX 637 (ALT 250 FOR IP): Performed by: INTERNAL MEDICINE

## 2019-06-26 PROCEDURE — 1180000000 HC REHAB R&B

## 2019-06-26 PROCEDURE — 97530 THERAPEUTIC ACTIVITIES: CPT

## 2019-06-26 PROCEDURE — 97127 HC SP THER IVNTJ W/FOCUS COG FUNCJ: CPT

## 2019-06-26 PROCEDURE — 97116 GAIT TRAINING THERAPY: CPT

## 2019-06-26 PROCEDURE — 99232 SBSQ HOSP IP/OBS MODERATE 35: CPT | Performed by: PHYSICAL MEDICINE & REHABILITATION

## 2019-06-26 PROCEDURE — 97112 NEUROMUSCULAR REEDUCATION: CPT

## 2019-06-26 PROCEDURE — 6370000000 HC RX 637 (ALT 250 FOR IP): Performed by: PHYSICAL MEDICINE & REHABILITATION

## 2019-06-26 PROCEDURE — 97110 THERAPEUTIC EXERCISES: CPT

## 2019-06-26 PROCEDURE — 6370000000 HC RX 637 (ALT 250 FOR IP): Performed by: NEUROLOGICAL SURGERY

## 2019-06-26 PROCEDURE — 99232 SBSQ HOSP IP/OBS MODERATE 35: CPT | Performed by: INTERNAL MEDICINE

## 2019-06-26 PROCEDURE — 92507 TX SP LANG VOICE COMM INDIV: CPT

## 2019-06-26 RX ORDER — INSULIN GLARGINE 100 [IU]/ML
12 INJECTION, SOLUTION SUBCUTANEOUS 2 TIMES DAILY
Status: DISCONTINUED | OUTPATIENT
Start: 2019-06-26 | End: 2019-06-28

## 2019-06-26 RX ADMIN — AMANTADINE HYDROCHLORIDE 100 MG: 100 CAPSULE ORAL at 21:59

## 2019-06-26 RX ADMIN — SIMVASTATIN 40 MG: 40 TABLET, FILM COATED ORAL at 21:59

## 2019-06-26 RX ADMIN — IBUPROFEN 400 MG: 400 TABLET ORAL at 16:41

## 2019-06-26 RX ADMIN — IBUPROFEN 400 MG: 400 TABLET ORAL at 12:12

## 2019-06-26 RX ADMIN — LOSARTAN POTASSIUM 25 MG: 25 TABLET ORAL at 08:27

## 2019-06-26 RX ADMIN — SERTRALINE HYDROCHLORIDE 50 MG: 50 TABLET ORAL at 08:28

## 2019-06-26 RX ADMIN — CARBIDOPA AND LEVODOPA 1 TABLET: 25; 100 TABLET ORAL at 15:45

## 2019-06-26 RX ADMIN — CARBIDOPA AND LEVODOPA 1 TABLET: 25; 100 TABLET ORAL at 08:28

## 2019-06-26 RX ADMIN — IBUPROFEN 400 MG: 400 TABLET ORAL at 08:27

## 2019-06-26 RX ADMIN — AMANTADINE HYDROCHLORIDE 100 MG: 100 CAPSULE ORAL at 08:28

## 2019-06-26 RX ADMIN — INSULIN GLARGINE 12 UNITS: 100 INJECTION, SOLUTION SUBCUTANEOUS at 22:01

## 2019-06-26 ASSESSMENT — PAIN SCALES - GENERAL
PAINLEVEL_OUTOF10: 1
PAINLEVEL_OUTOF10: 2
PAINLEVEL_OUTOF10: 0

## 2019-06-26 ASSESSMENT — PAIN DESCRIPTION - LOCATION: LOCATION: BACK

## 2019-06-26 ASSESSMENT — PAIN DESCRIPTION - FREQUENCY: FREQUENCY: CONTINUOUS

## 2019-06-26 ASSESSMENT — PAIN DESCRIPTION - ORIENTATION: ORIENTATION: LOWER

## 2019-06-26 ASSESSMENT — PAIN DESCRIPTION - PAIN TYPE: TYPE: CHRONIC PAIN

## 2019-06-26 ASSESSMENT — PAIN DESCRIPTION - DESCRIPTORS: DESCRIPTORS: ACHING

## 2019-06-26 NOTE — CARE COORDINATION
Continued Stay Review Note    19  [facility])  Clinical Case Management Department  Written by: Anastacio Villeda RN    Patient Name: Breana Dumont  Attending Provider: Afia Handley DO      Admit Date: 2019  MRN: [id number]                           : 1950    Patient Visit Status: Inpatient  Level of Care:Patient admitted to acute inpatient rehab on 19. Anticipated Discharge Plan: At this time, patient is scheduled to discharge on 19 home with family. At this time, we feel that patient will be safe to discharge and that if needed, it will be safe to provide family training at this time. Last set of vitals:  Temp: 97 °F (36.1 °C) (19)   Pulse: 67 (19)  Resp: 18 (19)  BP: (!) 155/85 (19)  SpO2: 96 % (19)    Current Treatments:    Results/Findings:     Labs:    Lab Results   Component Value Date     2019    K 3.9 2019     2019    CO2 26 2019    BUN 13 2019    CREATININE 0.81 2019    GLUCOSE 142 2019    CALCIUM 8.4 2019      Lab Results   Component Value Date    WBC 4.6 (L) 2019    HGB 13.3 (L) 2019    HCT 40.0 (L) 2019    MCV 88.9 2019     2019     Lab Results   Component Value Date    CKTOTAL 69 2019     Facility/Department: [facility] REHAB   CLINICAL BEDSIDE SWALLOW EVALUATION     NAME: Breana Dumont  : 1950  MRN: [id number]     ADMISSION DATE: 2019  REHAB DIAGNOSIS(ES): Gait abnormality due to exacerbation of Parkinson's disease with acute rehab admission on 2019  ADMITTING DIAGNOSIS: has Lactic acidosis; Hyperosmolar (nonketotic) coma ([facility] [address] Type 2 diabetes mellitus with diabetic neuropathy, with long-term current use of insulin ([facility] [address] Syncope and collapse; Impaired mobility; Parkinson's disease ([facility] [address]  Gait abnormality due to promote his/her ability to safely follow directives in a variety of environments, pt will carry out verbal directives of increasing complexity in everyday activities with 80% accuracy and min cues. Goal 5: To decrease pt's cognitive deficits through the use of compensatory strategies, the pt will be educated on 3 different memory strategies and verbalize how he/she might use them at home in 3 ways with min cues. Goal 6: To address pt's cognitive deficits and promote recall of personal and medical information, pt will answer questions addressing remote, recent, and delayed recall with 80% accuracy and min cues. Long-term Goals  Timeframe for Long-term Goals: 1-2 weeks   Goal 1: Pt will improve his Cognition from mod assist to supervision for adequate functional recall and safety awareness for home, community. Patient/family involved in developing goals and treatment plan: pt only      Subjective:   Previous level of function and limitations: pt reported he is never home alone, his son and wife manage his medications and finances; he does not drive or complete household tasks   General  Chart Reviewed: Yes  Family / Caregiver Present: No  General Comment  Comments:  Lexie Willett present to interpret for cognitive evaluation   Vision  Vision: Impaired(pt reported difficulty reading d/t visual impairments related to diabetes)  Hearing  Hearing: Within functional limits     Objective:  Oral/Motor  Oral Motor: Within functional limits     Auditory Comprehension  Comprehension: Exceptions  Multistep Basic Commands: Mild  Complex/Abstract Commands:  Moderate  Interfering Components: Working memory  Effective Techniques: Repetition     Reading Comprehension  Reading Status: Unable to assess(d/t pt reported difficulty seeing)     Expression  Primary Mode of Expression: Verbal     Verbal Expression  Verbal Expression: Exceptions to functional limits  Initiation: Mild   Repetition: Moderate  Convergent: Total 4/22         Prognosis:  Speech Therapy Prognosis  Prognosis: Good  Additional Comments: pt verbalized good motivation to improve cognition   Individuals consulted  Consulted and agree with results and recommendations: Patient     Education:  Patient Education: ST POC  Patient Education Response: Verbalizes understanding  Safety Devices in place: Yes  Type of devices: Chair alarm in place     Pain:  Pain Assessment  Patient Currently in Pain: Yes(\"all over pain\" RN aware)        Comprehension                                         Expression              []7 - Independent                                   []7 - Indpendent              []6 - Modified Independent                    []6 - Modified Independent              [x]5 - Supervision                                    [x]5 - Supervision              []4 - Min Assist                          []4 - Min Assist              []3 - Mod Assist                         []3 - Mod Assist              []2 - Max Assist                         []2 - Max Assist              []1 - Dependent                         []1 - Dependent     Problem Solving                                       Memory              []7 - Independent                                   []7 - Independent              []6 - Modified Independent                    []6 - Modified Independent              []5 - Supervision                                    []5 - Supervision              []4 - Min Assist                          []4 - Min Assist              [x]3 - Mod Assist                         [x]3 - Mod Assist              []2 - Max Assist                         [x]2 - Max Assist              []1 - Dependent                         [] 1 -[facility], Date: 6/21/2019, Time: 1:14 PM                       [facility]  Rehabilitation Initial Assessment: Physical Therapy  Room: [place] [address]     NAME:

## 2019-06-26 NOTE — PROGRESS NOTES
Speech Language Pathology Treatment Note  [facility] [place] [address] [place]  6/26/2019    Rehab Dx/Hx: Impaired mobility [Z74.09]  Abnormality of gait and mobility [R26.9]    Precautions: falls    ST Dx: Cognitive Impairment     Date of Admit: 6/19/2019  Room #: T626/I895-82    Time in: 1130  Time out: 1200  Cognition: 1692-1531  Language: 2019-1872    Subjective:  Alert, Cooperative, Pleasant and Confused        Interventions used this date:  Expressive Language, Cognitive Skill Development and Instruction in Compensatory Strategies    Objective/Assessment:  Patient progressing towards goals:  Short-term Goals  Timeframe for Short-term Goals: 1-2 weeks    Jessie interpreted on this date for clinician. Pt participated well throughout the session. Goal 1: To increase safety awareness and judgment for safe completion of ADLs secondary to pt's cognitive deficits, pt will provide reasonable solutions to problems of everyday living with 80% accuracy and min cues. Goal 2: To increase safety awareness and judgment for safe completion of ADLs secondary to pt's cognitive deficits, pt will complete abstract reasoning tasks (i.e. Word deduction, convergent and divergent naming, similarities/differences) with 80% accuracy and min cues. --Pt required moderate assistance for divergent naming in a concrete category. Goal 3: To address pt's cognitive deficits and promote orientation, pt will state name of facility, time within 1 hour, reason in hospital, current month and year with 100% accuracy with min assist, with use of external aid. --Was oriented to month, year, time, and day of the week. Required min assist for location and date    Goal 4: To address pt's cognitive deficits and promote his/her ability to safely follow directives in a variety of environments, pt will carry out verbal directives of increasing complexity in everyday activities with 80% accuracy and min cues.  --Pt exhibited

## 2019-06-26 NOTE — PROGRESS NOTES
Facility/Department: [facility]  In-Patient Rehabilitation Insurance Update Note:   Physical Therapy  [facility]      NAME: Tiago Dumont  : 1950  MRN: [id number]    Current Level of Function:  Bed mobility  Rolling to Left: Modified independent  Rolling to Right: Modified independent  Supine to Sit: Modified independent  Sit to Supine: Modified independent  Scooting: Modified independent    Transfers  Sit to Stand: Supervision  Stand to sit: Supervision  Bed to Chair: Supervision  Car Transfer: Supervision    Ambulation  Ambulation?: Yes  More Ambulation?: No  Ambulation 1  Surface: level tile;ramp;carpet;outdoors  Device: Rollator  Assistance: Supervision  Quality of Gait: improved step length with verbal cues varying sujata  Distance: 200' x 2  Ambulation 2  Surface - 2: carpet  Device 2: No device  Assistance 2: Stand by assistance  Distance: 50' x 2  Comments: Decreased step length NBOS  Stairs/Curb  Stairs?: Yes   Stairs  # Steps : 12  Stairs Height: 6\"  Rails: Bilateral  Device: No Device  Assistance: Stand by assistance  Comment: Reciprocal ascending; non-reciprocal descending. Cues for full foot contact     Assessment: Assessment: Patient shows improved gait this a.m. with no LOB improved sujata and stability. Does still require some verbal cueing for consistency and safety during transfers and gait activities. TUG score of 17.31 indicates increased risk of falls. Continued PT indicated for mobility training and neuromuscular reeducation to improve balance and safety.      Updated Goals:  Short term goals  Short term goal 1: Pt to complete HEP with indep  Short term goal 2: Pt to complete TUG with safe and appropriate  Long term goals  Long term goal 1: Pt to complete bed mobility with indep  Long term goal 2: Pt to complete all transfers (STS, bed<>chair, and car) with indep  Long term goal 3: Pt to ambulate 150ft without AD and indep  Long term goal 4: Pt to manage 12 steps with HR

## 2019-06-26 NOTE — PROGRESS NOTES
Plan of Care as stated above.         Electronically signed by Cheri Reina CCC-SLP, Date: 6/26/2019, Time: 8:59 AM

## 2019-06-26 NOTE — PROGRESS NOTES
Physical Therapy Rehab Treatment Note  [facility]Department[facility] [place]  [facility]       NAME: Tania Dumont  : 1950 (77 y.o.)  MRN: [id number]  CODE STATUS: Full Code    Date of Service: 2019  Chart Reviewed: Yes  Patient assessed for rehabilitation services?: Yes  Family / Caregiver Present: No  Diagnosis: Gait abnormality due to exacerbation of Parkinson's disease with acute rehab admission on 2019    Restrictions:  Restrictions/Precautions: Fall Risk       SUBJECTIVE: Subjective: No pain  Response To Previous Treatment: Patient with no complaints from previous session. Pain Screening  Patient Currently in Pain: No  Pre Treatment Pain Screening  Pain at present: 0  Scale Used: Numeric Score  Intervention List: Patient able to continue with treatment    Post Treatment Pain Screening:  Pain Assessment  Pain Assessment: 0-10  Pain Level: 0    OBJECTIVE:   Follows Commands: Within Functional Limits     Outcomes Measures:  Timed Up and Go: 17.31(without AD)     Neuromuscular Education  NDT Treatment: Gait   Neuromuscular Comments: Walking while dribbling physioball with focus on large steps. Patient requires verbal cues to maintain large steps. Dual tasks activity to tefunction with cognitive task    Transfers  Sit to Stand: Supervision  Stand to sit: Supervision  Bed to Chair: Supervision    Ambulation  Ambulation?: Yes  More Ambulation?: No  Ambulation 1  Surface: level tile;ramp;carpet;outdoors  Device: Rollator  Assistance: Supervision  Quality of Gait: improved step length with verbal cues varying sujata  Distance: 200' x 2    Stairs/Curb  Stairs?: Yes   Stairs  # Steps : 12  Stairs Height: 6\"  Rails: Bilateral  Device: No Device  Assistance: Stand by assistance  Comment: Reciprocal ascending; non-reciprocal descending.   Cues for full foot contact     Exercises  Hamstring Sets: x 20 RTB  Hip Flexion: x20  Hip Abduction: x 20 RTB/ Ball squeezes  Knee Long Arc Quad: x20  Ankle Pumps: x20     ASSESSMENT/COMMENTS:  Body structures, Functions, Activity limitations: Decreased functional mobility ; Decreased strength;Decreased endurance;Decreased coordination;Decreased balance; Increased Pain;Decreased safe awareness;Decreased ADL status; Decreased cognition  Assessment: Patient shows ability to ambulate on multiple level surfaces with no LOB Patient corrects shuffling gait pattern with verbal cues    PLAN OF CARE/Safety:   Safety Devices  Type of devices: All fall risk precautions in place; Bed alarm in place      Therapy Time:   Individual   Time In 1400   Time Out 1500   Minutes 60     Minutes: 60      Transfer/Bed mobility training: 10      Gait training: 15      Neuro re education: 15     Therapeutic ex: Vic Hewitt PTA, 06/26/19 at 3:52 PM

## 2019-06-27 LAB
GLUCOSE BLD-MCNC: 114 MG/DL (ref 60–115)
GLUCOSE BLD-MCNC: 195 MG/DL (ref 60–115)
GLUCOSE BLD-MCNC: 241 MG/DL (ref 60–115)
GLUCOSE BLD-MCNC: 336 MG/DL (ref 60–115)
GLUCOSE BLD-MCNC: 96 MG/DL (ref 60–115)
PERFORMED ON: ABNORMAL
PERFORMED ON: NORMAL
PERFORMED ON: NORMAL

## 2019-06-27 PROCEDURE — 97535 SELF CARE MNGMENT TRAINING: CPT

## 2019-06-27 PROCEDURE — 97116 GAIT TRAINING THERAPY: CPT

## 2019-06-27 PROCEDURE — 97530 THERAPEUTIC ACTIVITIES: CPT

## 2019-06-27 PROCEDURE — 6370000000 HC RX 637 (ALT 250 FOR IP): Performed by: PHYSICAL MEDICINE & REHABILITATION

## 2019-06-27 PROCEDURE — 97110 THERAPEUTIC EXERCISES: CPT

## 2019-06-27 PROCEDURE — 6370000000 HC RX 637 (ALT 250 FOR IP): Performed by: INTERNAL MEDICINE

## 2019-06-27 PROCEDURE — 1180000000 HC REHAB R&B

## 2019-06-27 PROCEDURE — 99232 SBSQ HOSP IP/OBS MODERATE 35: CPT | Performed by: PHYSICAL MEDICINE & REHABILITATION

## 2019-06-27 PROCEDURE — 6370000000 HC RX 637 (ALT 250 FOR IP): Performed by: NEUROLOGICAL SURGERY

## 2019-06-27 RX ADMIN — CARBIDOPA AND LEVODOPA 1 TABLET: 25; 100 TABLET ORAL at 15:21

## 2019-06-27 RX ADMIN — CARBIDOPA AND LEVODOPA 1 TABLET: 25; 100 TABLET ORAL at 11:19

## 2019-06-27 RX ADMIN — INSULIN GLARGINE 12 UNITS: 100 INJECTION, SOLUTION SUBCUTANEOUS at 20:59

## 2019-06-27 RX ADMIN — SIMVASTATIN 40 MG: 40 TABLET, FILM COATED ORAL at 20:59

## 2019-06-27 RX ADMIN — IBUPROFEN 400 MG: 400 TABLET ORAL at 11:19

## 2019-06-27 RX ADMIN — IBUPROFEN 400 MG: 400 TABLET ORAL at 16:55

## 2019-06-27 RX ADMIN — LOSARTAN POTASSIUM 25 MG: 25 TABLET ORAL at 11:19

## 2019-06-27 RX ADMIN — INSULIN GLARGINE 12 UNITS: 100 INJECTION, SOLUTION SUBCUTANEOUS at 11:22

## 2019-06-27 RX ADMIN — SERTRALINE HYDROCHLORIDE 50 MG: 50 TABLET ORAL at 11:19

## 2019-06-27 RX ADMIN — AMANTADINE HYDROCHLORIDE 100 MG: 100 CAPSULE ORAL at 20:59

## 2019-06-27 RX ADMIN — AMANTADINE HYDROCHLORIDE 100 MG: 100 CAPSULE ORAL at 11:42

## 2019-06-27 ASSESSMENT — ENCOUNTER SYMPTOMS
NAUSEA: 0
TROUBLE SWALLOWING: 0
VOMITING: 0
CHEST TIGHTNESS: 0
COLOR CHANGE: 0
COUGH: 0
WHEEZING: 0
SHORTNESS OF BREATH: 0

## 2019-06-27 ASSESSMENT — PAIN DESCRIPTION - DESCRIPTORS
DESCRIPTORS: ACHING

## 2019-06-27 ASSESSMENT — PAIN DESCRIPTION - PAIN TYPE
TYPE: CHRONIC PAIN

## 2019-06-27 ASSESSMENT — PAIN SCALES - GENERAL
PAINLEVEL_OUTOF10: 0
PAINLEVEL_OUTOF10: 10
PAINLEVEL_OUTOF10: 10
PAINLEVEL_OUTOF10: 5
PAINLEVEL_OUTOF10: 3
PAINLEVEL_OUTOF10: 10

## 2019-06-27 ASSESSMENT — PAIN DESCRIPTION - ORIENTATION
ORIENTATION: LOWER

## 2019-06-27 ASSESSMENT — PAIN DESCRIPTION - LOCATION
LOCATION: BACK

## 2019-06-27 ASSESSMENT — PAIN DESCRIPTION - FREQUENCY
FREQUENCY: CONTINUOUS

## 2019-06-27 NOTE — PROGRESS NOTES
previous visit. CXR      2-view: No results found for this or any previous visit. Portable:   Results for orders placed during the hospital encounter of 06/16/19   XR CHEST PORTABLE    Narrative EXAMINATION: CHEST PORTABLE VIEW     CLINICAL HISTORY: Generalized pain after fall    COMPARISONS: None     FINDINGS:    2 views of the chest is submitted. Limited exam due to low lung volume The cardiac silhouette is of normal size configuration. The mediastinum is unremarkable. Pulmonary vascular unremarkable. Right sided trachea. No focal infiltrates. No Pneumothoraces. Impression NO ACUTE ACTIVE CARDIOPULMONARY PROCESS       Echo No results found for this or any previous visit. Assessment/Plan:  Encephalopathy, improved  Possible seizure secondary to Hyperosmolar State  PD, added sinemet, tremors much better  EEG normal 6/18  MRI no acute process  PT/OT    Syd Polo MD, Alisha Shabazz, American Board of Psychiatry & Neurology  Board Certified in Vascular Neurology  Board Certified in Neuromuscular Medicine  Certified in Neurorehabilitation       Collaborating physicians: Dr Jass Polo, Dr CAITLIN Darnell, Dr Natacha Sultana    Electronically signed by PINEDA Clarke CNP on 6/27/2019 at 3:41 PM

## 2019-06-27 NOTE — PROGRESS NOTES
Subjective: The patient complains of severe  acute on chronic stiffness rigidity posteriorly and festination's and cogwheel rigidity partially relieved by dopaminergic medications, PT, OT, speech-language pathology and exacerbated by dehydration exertion and UTI. I am still concerned about patients falls risk and significant progress of his Parkinson's disease of late. Also concerned about his elevated blood sugars above 200. I will add a ADA restriction to his diet and dietary education. ROS x10: The patient also complains of severely impaired mobility and activities of daily living. Otherwise no new problems with vision, hearing, nose, mouth, throat, dermal, cardiovascular, GI, , pulmonary, musculoskeletal, psychiatric or neurological. See Rehab H&P on Rehab chart dated . Vital signs:  BP (!) 156/83   Pulse 64   Temp 98 °F (36.7 °C) (Oral)   Resp 19   Ht 5' 5\" (1.651 m)   Wt 162 lb 4.1 oz (73.6 kg)   SpO2 95%   BMI 27.00 kg/m²   I/O:   PO/Intake:  fair PO intake, no problems observed or reported-4 carb per meal diet with at bedtime snack. Bowel/Bladder:  continent, no problems noted. General:  Patient is well developed, adequately nourished, non-obese and     well kempt. HEENT:    PERRLA, hearing intact to loud voice, external inspection of ear     and nose benign. Inspection of lips, tongue and gums benign  Musculoskeletal: No significant change in strength or tone. All joints stable. Inspection and palpation of digits and nails show no clubbing,       cyanosis or inflammatory conditions. Neuro/Psychiatric: Affect: flat but pleasant. Alert and oriented to person, place and     situation. No significant change in deep tendon reflexes or     Sensation-flat parkinsonian affect posteriorly and festination's  Lungs:  CTA-B. Respiration effort is normal at rest.     Heart:   S1 = S2, RRR. No loud murmurs.     Abdomen:  Soft, non-tender, no enlargement of liver or spleen. Extremities:  No significant lower extremity edema or tenderness. Skin:   Intact to general survey, no visualized or palpated problems. Rehabilitation:  Physical therapy: FIMS:  Bed Mobility: Scooting: Modified independent    Transfers: Sit to Stand: Supervision  Stand to sit: Supervision  Bed to Chair: Supervision, Ambulation 1  Surface: level tile, ramp, carpet, outdoors  Device: Rollator  Assistance: Supervision  Quality of Gait: improved step length with verbal cues varying sujata  Distance: 200' x 2  Comments: distance limited by caity leg pain, Stairs  # Steps : 12  Stairs Height: 6\"  Rails: Bilateral  Device: No Device  Assistance: Stand by assistance  Comment: Reciprocal ascending; non-reciprocal descending.   Cues for full foot contact     FIMS: Bed, Chair, Wheel Chair: 0 - Did not occur  Walk: 5 - Supervision Requires standby supervision or cuing to walk at least 150 feet  Distance Walked: 200'  Stairs: 5- Supervision Requires supervision(e.g., standing by, cuing, or coaxing) to go up and down one flight of stairs,  , Assessment: Patient shows ability to ambulate on multiple level surfaces with no LOB Patient corrects shuffling gait pattern with verbal cues    Occupational therapy: FIMS:  Eatin - Feeds self with adaptive equipment/dentures and/or feeds self with modified diet and/or performs own tube feeding  Groomin - Requires setup/cues to do all tasks  Bathin - Able to bathe all 10 areas with setup/sup/cues  Dressing-Upper: 5 - Requires setup/supervision/cues and/or requires assist with presthesis/brace only  Dressing-Lower: 4 - Requires assist with buttons/zippers/shoelaces and/or assist with shoes only  Toiletin - Did not occur  Toilet Transfer: 0 - Did not occur  Tub Transfer: 0 - Activity does not occur  Shower Transfer: 5 - Supervision, set-up, cues,  , Assessment: Pt making gains to increase independence with adl completion    Speech therapy: FIMS: Comprehension: 5 - Patient understands basic needs (hungry/hot/pain)  Expression: 4 - Expresses basic ideas/needs 75-90%+ of the time  Social Interaction: 6 - Patient requires medication for mood and/or effect  Problem Solving: 3 - Patient solves simple/routine tasks 50%-74%  Memory: 3 - Patient remembers 50%-74% of the time      Lab/X-ray studies reviewed, analyzed and discussed with patient and staff:   Recent Results (from the past 24 hour(s))   POCT Glucose    Collection Time: 06/26/19 11:27 AM   Result Value Ref Range    POC Glucose 280 (H) 60 - 115 mg/dl    Performed on ACCU-CHEK    POCT Glucose    Collection Time: 06/26/19  4:02 PM   Result Value Ref Range    POC Glucose 184 (H) 60 - 115 mg/dl    Performed on ACCU-CHEK    POCT Glucose    Collection Time: 06/26/19  8:48 PM   Result Value Ref Range    POC Glucose 119 (H) 60 - 115 mg/dl    Performed on ACCU-CHEK    POCT Glucose    Collection Time: 06/27/19  6:13 AM   Result Value Ref Range    POC Glucose 96 60 - 115 mg/dl    Performed on ACCU-CHEK      MRI of the thoracic spine dated 6/23/2019-Moderate to marked healed compression fractures of T4, T5, and T6 are present, without abnormal bone marrow edema, retropulsion of bone, subluxation, or other acute findings identified. Xr Pelvis  : 6/16/2019   NEGATIVE PELVIS. Ct Head  6/16/2019   NO ACUTE INTRA-AXIAL OR EXTRA-AXIAL FINDINGS. IF SIGNS OR SYMPTOMS PERSIST THEN CONSIDER REPEAT CT SCAN IN 12 TO 24 HOURS OR MRI TO FURTHER EVALUATE IF THERE ARE NO CONTRAINDICATIONS          Xr Chest   6/16/2019  NO ACUTE ACTIVE CARDIOPULMONARY PROCESS    Mri Brain  : 6/17/2019   No acute ischemia or acute intracranial process. No enhancing mass or pathologic enhancement. Generalized parenchymal volume loss and nonspecific white matter findings most compatible with chronic small vessel ischemic changes in a patient of this age. OSTEOPENIA AND DEGENERATIVE BONE SPURRING IN THE THORACIC SPINE.    2. T3, T4, T5 AND T6 COMPRESSION FX OF INDETERMINATE AGE AND CLINICAL CORRELATION WARRANTED. Previous extensive, complex labs, notes and diagnostics reviewed and analyzed. ALLERGIES:    Allergies as of 06/19/2019    (No Known Allergies)      (please also verify by checking MAR)       Part of patient's rehabilitation program I am encouraging them to attend game day today under recreational therapy to improve their dexterity, cognition, socialization and endurance. Complex Physical Medicine & Rehab Issues Assess & Plan:   1. Severe abnormality of gait and mobility and impaired self-care and ADL's secondary to progressive exacerbation of severe Parkinson's disease. Functional and medical status reassessed regarding patients ability to participate in therapies and patient found to be able to participate in acute intensive comprehensive inpatient rehabilitation program including PT/OT to improve balance, ambulation, ADLs, and to improve the P/AROM. Therapeutic modifications regarding activities in therapies, place, amount of time per day and intensity of therapy made daily. In bed therapies or bedside therapies prn.   2. Bowel and Bladder dysfunction:  frequent toileting, ambulate to bathroom with assistance, check post void residuals. Check for C.difficile x1 if >2 loose stools in 24 hours, continue bowel & bladder program.  Monitor bowel and bladder function. Lactinex 2 PO every AC. MOM prn, Brown Bomb prn, Glycerin suppository prn, enema prn.  3. Severe mid back pain likely related to old chronic healed compression fractures generalized OA pain: reassess pain every shift and prior to and after each therapy session, give prn Tylenol and add OxyIR changed to Percocet 7.5 titrate low-dose Duragesic 12 mcg/h patch monitor for sedation discontinue Flexeril, modalities prn in therapy, Lidoderm, K-pad prn. Add BenGay consider lumbar spine films and rib series SP consult neuro spine surgery.   Avoid muscle relaxers as they seem to sedate him

## 2019-06-28 LAB
GLUCOSE BLD-MCNC: 164 MG/DL (ref 60–115)
GLUCOSE BLD-MCNC: 165 MG/DL (ref 60–115)
GLUCOSE BLD-MCNC: 297 MG/DL (ref 60–115)
GLUCOSE BLD-MCNC: 83 MG/DL (ref 60–115)
PERFORMED ON: ABNORMAL
PERFORMED ON: NORMAL

## 2019-06-28 PROCEDURE — 97535 SELF CARE MNGMENT TRAINING: CPT

## 2019-06-28 PROCEDURE — 1180000000 HC REHAB R&B

## 2019-06-28 PROCEDURE — 97116 GAIT TRAINING THERAPY: CPT

## 2019-06-28 PROCEDURE — 97112 NEUROMUSCULAR REEDUCATION: CPT

## 2019-06-28 PROCEDURE — 99232 SBSQ HOSP IP/OBS MODERATE 35: CPT | Performed by: PHYSICAL MEDICINE & REHABILITATION

## 2019-06-28 PROCEDURE — 6370000000 HC RX 637 (ALT 250 FOR IP): Performed by: NEUROLOGICAL SURGERY

## 2019-06-28 PROCEDURE — 6370000000 HC RX 637 (ALT 250 FOR IP): Performed by: PHYSICAL MEDICINE & REHABILITATION

## 2019-06-28 PROCEDURE — 99232 SBSQ HOSP IP/OBS MODERATE 35: CPT | Performed by: INTERNAL MEDICINE

## 2019-06-28 PROCEDURE — 6370000000 HC RX 637 (ALT 250 FOR IP): Performed by: INTERNAL MEDICINE

## 2019-06-28 PROCEDURE — 97530 THERAPEUTIC ACTIVITIES: CPT

## 2019-06-28 PROCEDURE — 97110 THERAPEUTIC EXERCISES: CPT

## 2019-06-28 PROCEDURE — 97127 HC SP THER IVNTJ W/FOCUS COG FUNCJ: CPT

## 2019-06-28 RX ORDER — FENTANYL 25 UG/H
1 PATCH TRANSDERMAL
Status: DISCONTINUED | OUTPATIENT
Start: 2019-06-28 | End: 2019-07-04 | Stop reason: HOSPADM

## 2019-06-28 RX ORDER — INSULIN GLARGINE 100 [IU]/ML
10 INJECTION, SOLUTION SUBCUTANEOUS 2 TIMES DAILY
Status: DISCONTINUED | OUTPATIENT
Start: 2019-06-28 | End: 2019-07-02

## 2019-06-28 RX ADMIN — IBUPROFEN 400 MG: 400 TABLET ORAL at 12:42

## 2019-06-28 RX ADMIN — AMANTADINE HYDROCHLORIDE 100 MG: 100 CAPSULE ORAL at 20:51

## 2019-06-28 RX ADMIN — SERTRALINE HYDROCHLORIDE 50 MG: 50 TABLET ORAL at 09:53

## 2019-06-28 RX ADMIN — IBUPROFEN 400 MG: 400 TABLET ORAL at 09:53

## 2019-06-28 RX ADMIN — LOSARTAN POTASSIUM 25 MG: 25 TABLET ORAL at 09:53

## 2019-06-28 RX ADMIN — CARBIDOPA AND LEVODOPA 1 TABLET: 25; 100 TABLET ORAL at 12:42

## 2019-06-28 RX ADMIN — AMANTADINE HYDROCHLORIDE 100 MG: 100 CAPSULE ORAL at 09:53

## 2019-06-28 RX ADMIN — CARBIDOPA AND LEVODOPA 1 TABLET: 25; 100 TABLET ORAL at 09:53

## 2019-06-28 RX ADMIN — SIMVASTATIN 40 MG: 40 TABLET, FILM COATED ORAL at 20:51

## 2019-06-28 RX ADMIN — INSULIN GLARGINE 10 UNITS: 100 INJECTION, SOLUTION SUBCUTANEOUS at 21:13

## 2019-06-28 ASSESSMENT — PAIN DESCRIPTION - ORIENTATION
ORIENTATION: LOWER
ORIENTATION: LOWER;RIGHT
ORIENTATION: RIGHT;LOWER

## 2019-06-28 ASSESSMENT — PAIN SCALES - GENERAL
PAINLEVEL_OUTOF10: 1
PAINLEVEL_OUTOF10: 2
PAINLEVEL_OUTOF10: 1
PAINLEVEL_OUTOF10: 4
PAINLEVEL_OUTOF10: 5

## 2019-06-28 ASSESSMENT — PAIN DESCRIPTION - PAIN TYPE
TYPE: CHRONIC PAIN

## 2019-06-28 ASSESSMENT — PAIN DESCRIPTION - FREQUENCY
FREQUENCY: CONTINUOUS
FREQUENCY: CONTINUOUS
FREQUENCY: INTERMITTENT

## 2019-06-28 ASSESSMENT — PAIN DESCRIPTION - LOCATION
LOCATION: BACK;RIB CAGE
LOCATION: BACK
LOCATION: BACK

## 2019-06-28 ASSESSMENT — PAIN DESCRIPTION - PROGRESSION: CLINICAL_PROGRESSION: GRADUALLY IMPROVING

## 2019-06-28 ASSESSMENT — PAIN DESCRIPTION - DESCRIPTORS
DESCRIPTORS: ACHING
DESCRIPTORS: ACHING

## 2019-06-28 NOTE — PROGRESS NOTES
Occupational Therapy  [facility]Department[facility]  Daily Treatment Note  NAME: Tawanna Dumont  : 1950  MRN: [id number]    Date of Service: 2019    Discharge Recommendations:  Continue to assess pending progress       Assessment      Activity Tolerance  Activity Tolerance: Patient Tolerated treatment well  Safety Devices  Safety Devices in place: Yes  Type of devices: All fall risk precautions in place         Patient Diagnosis(es): There were no encounter diagnoses. has a past medical history of Hyperlipidemia, Hypertension, Parkinson's disease ([facility] [address] and Type 2 diabetes mellitus without complication ([facility] [address] has no past surgical history on file. Restrictions  Restrictions/Precautions  Restrictions/Precautions: Fall Risk  Position Activity Restriction  Other position/activity restrictions: Kazakh speaking, utilize  services. Subjective   General  Chart Reviewed: Yes  Patient assessed for rehabilitation services?: Yes  Response to previous treatment: Patient with no complaints from previous session  Family / Caregiver Present: No  Referring Practitioner: Dr Soriano Player  Diagnosis: Exac of Parkinson's with impaired mobility  Pain Assessment  Pain Assessment: 0-10  Pain Level: 2  Pain Type: Chronic pain  Pain Location: Back  Pain Orientation: Lower  Pain Descriptors: Aching  Pain Frequency: Intermittent  Vital Signs  Patient Currently in Pain: Yes   Orientation     Objective   Pt. engaged in B UE strengthening and endurance task with 1 lb wrist weights on B wrists. Pt. reached to  rings from the table top surface and then placed them on vertical poles. Pt. then removed the rings and placed them in a bucket alternating hands. Pt. started strengthening task with placing golf tees into a board with his R hand with no difficulty.       Plan   Plan  Times per week: 5-7x/wk  Plan weeks: 1 /2 weeks  Current Treatment Recommendations: Strengthening, Functional Mobility Training,

## 2019-06-28 NOTE — PROGRESS NOTES
liver or spleen. Extremities:  No significant lower extremity edema or tenderness. Skin:   Intact to general survey, no visualized or palpated problems. Rehabilitation:  Physical therapy: FIMS:  Bed Mobility: Scooting: Modified independent    Transfers: Sit to Stand: Supervision  Stand to sit: Supervision  Bed to Chair: Supervision, Ambulation 1  Surface: level tile, carpet, ramp, outdoors  Device: Rollator  Assistance: Supervision  Quality of Gait: improved step length with verbal cues varying sujata  Distance: 200' x 2  Comments: No increase in back pain with gait training, Stairs  # Steps : 12  Stairs Height: 6\"  Rails: Bilateral  Device: No Device  Assistance: Supervision  Comment: Reciprocal ascending; non-reciprocal descending. FIMS: Bed, Chair, Wheel Chair: 0 - Did not occur  Walk: 5 - Supervision Requires standby supervision or cuing to walk at least 150 feet  Distance Walked: 200'  Stairs: 5- Supervision Requires supervision(e.g., standing by, cuing, or coaxing) to go up and down one flight of stairs,  , Assessment: Patient shows a decrease in back pain with functional activities.  Patient shows improved balance with retro and sideways walking    Occupational therapy: FIMS:  Eatin - Patient feeds self  Groomin - Requires setup/cues to do all tasks  Bathin - Able to bathe all 10 areas with setup/sup/cues  Dressing-Upper: 5 - Requires setup/supervision/cues and/or requires assist with presthesis/brace only  Dressing-Lower: 5 - Requires setup/supervision/cues and/or staff applies TEDS/prosthesis/brace only  Toiletin - Did not occur  Toilet Transfer: 0 - Did not occur  Tub Transfer: 0 - Activity does not occur  Shower Transfer: 5 - Supervision, set-up, cues,  , Assessment: Pt making gains to increase independence with adl completion    Speech therapy: FIMS: Comprehension: 5 - Patient understands basic needs (hungry/hot/pain)  Expression: 5 - Expresses basic ideas/needs only

## 2019-06-28 NOTE — PROGRESS NOTES
Speech Language Pathology Treatment Note  Facility/Department: [facility] [address]  6/28/2019    Rehab Dx/Hx: Impaired mobility [Z74.09]  Abnormality of gait and mobility [R26.9]    Precautions: falls    ST Dx: Cognitive Impairment     Date of Admit: 6/19/2019  Room #: A795/J161-27    Time in: 1130  Time out: 1200       Subjective:  Alert, Cooperative and Pleasant        Interventions used this date:  Cognitive Skill Development and Instruction in Compensatory Strategies    Objective/Assessment:  Patient progressing towards goals:  Short-term Goals  Timeframe for Short-term Goals: 1-2 weeks    Jessie interpreted on this date for clinician. Pt participated well throughout the session. Goal 1: To increase safety awareness and judgment for safe completion of ADLs secondary to pt's cognitive deficits, pt will provide reasonable solutions to problems of everyday living with 80% accuracy and min cues. Goal 2: To increase safety awareness and judgment for safe completion of ADLs secondary to pt's cognitive deficits, pt will complete abstract reasoning tasks (i.e. Word deduction, convergent and divergent naming, similarities/differences) with 80% accuracy and min cues. --not addressed     Goal 3: To address pt's cognitive deficits and promote orientation, pt will state name of facility, time within 1 hour, reason in hospital, current month and year with 100% accuracy with min assist, with use of external aid. --Oriented x4 using visual aid, and with min verbal cues. Goal 4: To address pt's cognitive deficits and promote his/her ability to safely follow directives in a variety of environments, pt will carry out verbal directives of increasing complexity in everyday activities with 80% accuracy and min cues. --not addressed     Goal 5:  To decrease pt's cognitive deficits through the use of compensatory strategies, the pt will be educated on 3 different memory strategies and verbalize how he/she might

## 2019-06-29 LAB
GLUCOSE BLD-MCNC: 108 MG/DL (ref 60–115)
GLUCOSE BLD-MCNC: 140 MG/DL (ref 60–115)
GLUCOSE BLD-MCNC: 225 MG/DL (ref 60–115)
GLUCOSE BLD-MCNC: 92 MG/DL (ref 60–115)
PERFORMED ON: ABNORMAL
PERFORMED ON: ABNORMAL
PERFORMED ON: NORMAL
PERFORMED ON: NORMAL

## 2019-06-29 PROCEDURE — 97110 THERAPEUTIC EXERCISES: CPT

## 2019-06-29 PROCEDURE — 6370000000 HC RX 637 (ALT 250 FOR IP): Performed by: INTERNAL MEDICINE

## 2019-06-29 PROCEDURE — 1180000000 HC REHAB R&B

## 2019-06-29 PROCEDURE — 6370000000 HC RX 637 (ALT 250 FOR IP): Performed by: PHYSICAL MEDICINE & REHABILITATION

## 2019-06-29 PROCEDURE — 97116 GAIT TRAINING THERAPY: CPT

## 2019-06-29 RX ADMIN — AMANTADINE HYDROCHLORIDE 100 MG: 100 CAPSULE ORAL at 10:00

## 2019-06-29 RX ADMIN — CARBIDOPA AND LEVODOPA 1 TABLET: 25; 100 TABLET ORAL at 10:00

## 2019-06-29 RX ADMIN — SERTRALINE HYDROCHLORIDE 50 MG: 50 TABLET ORAL at 10:00

## 2019-06-29 RX ADMIN — SIMVASTATIN 40 MG: 40 TABLET, FILM COATED ORAL at 22:24

## 2019-06-29 RX ADMIN — INSULIN GLARGINE 10 UNITS: 100 INJECTION, SOLUTION SUBCUTANEOUS at 10:01

## 2019-06-29 RX ADMIN — CARBIDOPA AND LEVODOPA 1 TABLET: 25; 100 TABLET ORAL at 14:03

## 2019-06-29 RX ADMIN — LOSARTAN POTASSIUM 25 MG: 25 TABLET ORAL at 10:00

## 2019-06-29 RX ADMIN — AMANTADINE HYDROCHLORIDE 100 MG: 100 CAPSULE ORAL at 22:23

## 2019-06-29 RX ADMIN — INSULIN GLARGINE 10 UNITS: 100 INJECTION, SOLUTION SUBCUTANEOUS at 22:28

## 2019-06-29 ASSESSMENT — ENCOUNTER SYMPTOMS
VOMITING: 0
COLOR CHANGE: 0
COUGH: 0
CHEST TIGHTNESS: 0
TROUBLE SWALLOWING: 0
SHORTNESS OF BREATH: 0
WHEEZING: 0
NAUSEA: 0

## 2019-06-29 ASSESSMENT — PAIN DESCRIPTION - LOCATION: LOCATION: BACK

## 2019-06-29 ASSESSMENT — PAIN SCALES - GENERAL: PAINLEVEL_OUTOF10: 7

## 2019-06-29 NOTE — PROGRESS NOTES
Subjective: The patient complains of severe  acute on chronic stiffness rigidity posteriorly and festination's and cogwheel rigidity partially relieved by dopaminergic medications, PT, OT, speech-language pathology and exacerbated by dehydration exertion and UTI. ROS x10: The patient also complains of severely impaired mobility and activities of daily living. Otherwise no new problems with vision, hearing, nose, mouth, throat, dermal, cardiovascular, GI, , pulmonary, musculoskeletal, psychiatric or neurological. See Rehab H&P on Rehab chart dated . Vital signs:  /78   Pulse 62   Temp 97 °F (36.1 °C) (Oral)   Resp 16   Ht 5' 5\" (1.651 m)   Wt 164 lb 3.9 oz (74.5 kg)   SpO2 94%   BMI 27.33 kg/m²   I/O:   PO/Intake:  fair PO intake, no problems observed or reported-4 carb per meal diet with at bedtime snack. Bowel/Bladder:  continent, no problems noted. General:  Patient is well developed, adequately nourished, non-obese and     well kempt. HEENT:    PERRLA, hearing intact to loud voice, external inspection of ear     and nose benign. Inspection of lips, tongue and gums benign  Musculoskeletal: No significant change in strength or tone. All joints stable. Inspection and palpation of digits and nails show no clubbing,       cyanosis or inflammatory conditions. Neuro/Psychiatric: Affect: flat but pleasant. Alert and oriented to person, place and     situation. No significant change in deep tendon reflexes or     Sensation-flat parkinsonian affect posteriorly and festination's  Lungs:  CTA-B. Respiration effort is normal at rest.     Heart:   S1 = S2, RRR. No loud murmurs. Abdomen:  Soft, non-tender, no enlargement of liver or spleen. Extremities:  No significant lower extremity edema or tenderness. Skin:   Intact to general survey, no visualized or palpated problems.     Rehabilitation:  Physical therapy: FIMS:  Bed Mobility: Scooting: Independent    Transfers: diagnostics reviewed and analyzed. ALLERGIES:    Allergies as of 06/19/2019    (No Known Allergies)      (please also verify by checking [facility])           Complex Physical Medicine & Rehab Issues Assess & Plan:   1. Severe abnormality of gait and mobility and impaired self-care and ADL's secondary to progressive exacerbation of severe Parkinson's disease. Functional and medical status reassessed regarding patients ability to participate in therapies and patient found to be able to participate in acute intensive comprehensive inpatient rehabilitation program including PT/OT to improve balance, ambulation, ADLs, and to improve the P/AROM. Therapeutic modifications regarding activities in therapies, place, amount of time per day and intensity of therapy made daily. In bed therapies or bedside therapies prn.   2. Bowel and Bladder dysfunction:  frequent toileting, ambulate to bathroom with assistance, check post void residuals. Check for C.difficile x1 if >2 loose stools in 24 hours, continue bowel & bladder program.  Monitor bowel and bladder function. Lactinex 2 PO every AC. MOM prn, Brown Bomb prn, Glycerin suppository prn, enema prn.  3. Severe mid back pain likely related to old chronic healed compression fractures generalized OA pain: reassess pain every shift and prior to and after each therapy session, give prn Tylenol and add OxyIR changed to Percocet 7.5 titrate low-dose Duragesic to 25 mcg from the dose of 12 mcg/h patch monitor for sedation discontinue Flexeril, modalities prn in therapy, Lidoderm, K-pad prn. Add BenGay consider lumbar spine films and rib series SP consult neuro spine surgery. Avoid muscle relaxers as they seem to sedate him too much. 4. Skin healing and breakdown risk:  continue pressure relief program.  Daily skin exams and reports from nursing.   5. Severe progressive fatigue due to nutritional and hydration deficiency:  continue to monitor I&Os, calorie counts prn, dietary Essential hypertension, Hyperlipidemia-vital signs every shift, dose and titrate cardiac medications, recheck CBC and BMP  7. Uses Wolof as primary spoken language-add translation device  8. 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   Abad Luo D.O., PM&R     Attending    [address]

## 2019-06-29 NOTE — PROGRESS NOTES
type 1   S/p hyperosmolar state   Weakness    Parkinson's disease ). Plan:   (Lower  lantus to  10 units bid plus  humalog 8  units tid plus medium humalog coverage plus ).        Yareli Blanca MD  6/28/2019

## 2019-06-30 LAB
GLUCOSE BLD-MCNC: 131 MG/DL (ref 60–115)
GLUCOSE BLD-MCNC: 133 MG/DL (ref 60–115)
GLUCOSE BLD-MCNC: 212 MG/DL (ref 60–115)
GLUCOSE BLD-MCNC: 249 MG/DL (ref 60–115)
PERFORMED ON: ABNORMAL

## 2019-06-30 PROCEDURE — 6370000000 HC RX 637 (ALT 250 FOR IP): Performed by: INTERNAL MEDICINE

## 2019-06-30 PROCEDURE — 6370000000 HC RX 637 (ALT 250 FOR IP): Performed by: PHYSICAL MEDICINE & REHABILITATION

## 2019-06-30 PROCEDURE — 1180000000 HC REHAB R&B

## 2019-06-30 RX ADMIN — AMANTADINE HYDROCHLORIDE 100 MG: 100 CAPSULE ORAL at 21:51

## 2019-06-30 RX ADMIN — CARBIDOPA AND LEVODOPA 1 TABLET: 25; 100 TABLET ORAL at 09:57

## 2019-06-30 RX ADMIN — SERTRALINE HYDROCHLORIDE 50 MG: 50 TABLET ORAL at 09:57

## 2019-06-30 RX ADMIN — AMANTADINE HYDROCHLORIDE 100 MG: 100 CAPSULE ORAL at 09:57

## 2019-06-30 RX ADMIN — INSULIN GLARGINE 10 UNITS: 100 INJECTION, SOLUTION SUBCUTANEOUS at 09:58

## 2019-06-30 RX ADMIN — LOSARTAN POTASSIUM 25 MG: 25 TABLET ORAL at 09:57

## 2019-06-30 RX ADMIN — CARBIDOPA AND LEVODOPA 1 TABLET: 25; 100 TABLET ORAL at 17:59

## 2019-06-30 RX ADMIN — SIMVASTATIN 40 MG: 40 TABLET, FILM COATED ORAL at 21:51

## 2019-06-30 ASSESSMENT — PAIN SCALES - GENERAL: PAINLEVEL_OUTOF10: 0

## 2019-06-30 NOTE — PROGRESS NOTES
for color change and rash. Neurological: Positive for tremors (slight left hand). Negative for dizziness, seizures, syncope, facial asymmetry, speech difficulty, weakness, light-headedness, numbness and headaches. Psychiatric/Behavioral: Negative for agitation, confusion and hallucinations. The patient is not nervous/anxious. Medications:  Reviewed    Infusion Medications:    dextrose       Scheduled Medications:    fentaNYL  1 patch Transdermal Q72H    insulin lispro  8 Units Subcutaneous TID WC    insulin glargine  10 Units Subcutaneous BID    lidocaine  3 patch Transdermal Daily    carbidopa-levodopa  1 tablet Oral BID    losartan  25 mg Oral Daily    sertraline  50 mg Oral Daily    amantadine  100 mg Oral BID    simvastatin  40 mg Oral Nightly    insulin lispro  0-6 Units Subcutaneous Nightly    insulin lispro  0-12 Units Subcutaneous TID WC     PRN Meds: glucose, dextrose, glucagon (rDNA), dextrose, oxyCODONE-acetaminophen, magnesium hydroxide    Labs:   No results for input(s): WBC, HGB, HCT, PLT in the last 72 hours. No results for input(s): NA, K, CL, CO2, BUN, CREATININE, CALCIUM, PHOS in the last 72 hours. Invalid input(s): MAGNES  No results for input(s): AST, ALT, BILIDIR, BILITOT, ALKPHOS in the last 72 hours. No results for input(s): INR in the last 72 hours. No results for input(s): Cassia Callander in the last 72 hours. Urinalysis:   Lab Results   Component Value Date    NITRU Negative 06/20/2019    WBCUA 0-2 06/20/2019    BACTERIA Negative 06/20/2019    RBCUA 0-2 06/20/2019    BLOODU Negative 06/20/2019    SPECGRAV 1.023 06/20/2019    GLUCOSEU 500 06/20/2019       Radiology:   Most recent    Chest CT      WITH CONTRAST:No results found for this or any previous visit. WITHOUT CONTRAST: No results found for this or any previous visit. CXR      2-view: No results found for this or any previous visit.      Portable:   Results for orders placed during the hospital encounter of 06/16/19   XR CHEST PORTABLE    Narrative EXAMINATION: CHEST PORTABLE VIEW     CLINICAL HISTORY: Generalized pain after fall    COMPARISONS: None     FINDINGS:    2 views of the chest is submitted. Limited exam due to low lung volume The cardiac silhouette is of normal size configuration. The mediastinum is unremarkable. Pulmonary vascular unremarkable. Right sided trachea. No focal infiltrates. No Pneumothoraces. Impression NO ACUTE ACTIVE CARDIOPULMONARY PROCESS       Echo No results found for this or any previous visit.           Assessment/Plan:  Encephalopathy, improved  Possible seizure secondary to Hyperosmolar State  PD, added sinemet, tremors much better  EEG normal 6/18  MRI no acute process  PT/OT    Electronically signed by Torin Coelho MD on 6/29/2019 at 9:15 PM

## 2019-07-01 LAB
GLUCOSE BLD-MCNC: 139 MG/DL (ref 60–115)
GLUCOSE BLD-MCNC: 155 MG/DL (ref 60–115)
GLUCOSE BLD-MCNC: 215 MG/DL (ref 60–115)
GLUCOSE BLD-MCNC: 264 MG/DL (ref 60–115)
PERFORMED ON: ABNORMAL

## 2019-07-01 PROCEDURE — 97127 HC SP THER IVNTJ W/FOCUS COG FUNCJ: CPT

## 2019-07-01 PROCEDURE — 99233 SBSQ HOSP IP/OBS HIGH 50: CPT | Performed by: PHYSICAL MEDICINE & REHABILITATION

## 2019-07-01 PROCEDURE — 6370000000 HC RX 637 (ALT 250 FOR IP): Performed by: INTERNAL MEDICINE

## 2019-07-01 PROCEDURE — 97530 THERAPEUTIC ACTIVITIES: CPT

## 2019-07-01 PROCEDURE — 97535 SELF CARE MNGMENT TRAINING: CPT

## 2019-07-01 PROCEDURE — 97112 NEUROMUSCULAR REEDUCATION: CPT

## 2019-07-01 PROCEDURE — 99232 SBSQ HOSP IP/OBS MODERATE 35: CPT | Performed by: PHYSICIAN ASSISTANT

## 2019-07-01 PROCEDURE — 92507 TX SP LANG VOICE COMM INDIV: CPT

## 2019-07-01 PROCEDURE — 97116 GAIT TRAINING THERAPY: CPT

## 2019-07-01 PROCEDURE — 1180000000 HC REHAB R&B

## 2019-07-01 PROCEDURE — 6370000000 HC RX 637 (ALT 250 FOR IP): Performed by: PHYSICAL MEDICINE & REHABILITATION

## 2019-07-01 PROCEDURE — 97110 THERAPEUTIC EXERCISES: CPT

## 2019-07-01 RX ADMIN — SERTRALINE HYDROCHLORIDE 50 MG: 50 TABLET ORAL at 08:52

## 2019-07-01 RX ADMIN — INSULIN GLARGINE 10 UNITS: 100 INJECTION, SOLUTION SUBCUTANEOUS at 01:35

## 2019-07-01 RX ADMIN — LOSARTAN POTASSIUM 25 MG: 25 TABLET ORAL at 08:52

## 2019-07-01 RX ADMIN — INSULIN GLARGINE 10 UNITS: 100 INJECTION, SOLUTION SUBCUTANEOUS at 20:38

## 2019-07-01 RX ADMIN — CARBIDOPA AND LEVODOPA 1 TABLET: 25; 100 TABLET ORAL at 14:03

## 2019-07-01 RX ADMIN — INSULIN GLARGINE 10 UNITS: 100 INJECTION, SOLUTION SUBCUTANEOUS at 08:59

## 2019-07-01 RX ADMIN — CARBIDOPA AND LEVODOPA 1 TABLET: 25; 100 TABLET ORAL at 08:52

## 2019-07-01 RX ADMIN — SIMVASTATIN 40 MG: 40 TABLET, FILM COATED ORAL at 20:38

## 2019-07-01 RX ADMIN — AMANTADINE HYDROCHLORIDE 100 MG: 100 CAPSULE ORAL at 20:38

## 2019-07-01 RX ADMIN — AMANTADINE HYDROCHLORIDE 100 MG: 100 CAPSULE ORAL at 08:52

## 2019-07-01 ASSESSMENT — PAIN DESCRIPTION - DESCRIPTORS
DESCRIPTORS: ACHING

## 2019-07-01 ASSESSMENT — PAIN SCALES - GENERAL
PAINLEVEL_OUTOF10: 1
PAINLEVEL_OUTOF10: 0
PAINLEVEL_OUTOF10: 2
PAINLEVEL_OUTOF10: 5
PAINLEVEL_OUTOF10: 1
PAINLEVEL_OUTOF10: 1

## 2019-07-01 ASSESSMENT — PAIN DESCRIPTION - PROGRESSION: CLINICAL_PROGRESSION: GRADUALLY WORSENING

## 2019-07-01 ASSESSMENT — ENCOUNTER SYMPTOMS
CHEST TIGHTNESS: 0
WHEEZING: 0
NAUSEA: 0
TROUBLE SWALLOWING: 0
VOMITING: 0
SHORTNESS OF BREATH: 0
COLOR CHANGE: 0
COUGH: 0

## 2019-07-01 ASSESSMENT — PAIN DESCRIPTION - PAIN TYPE
TYPE: CHRONIC PAIN
TYPE: CHRONIC PAIN

## 2019-07-01 ASSESSMENT — PAIN - FUNCTIONAL ASSESSMENT: PAIN_FUNCTIONAL_ASSESSMENT: ACTIVITIES ARE NOT PREVENTED

## 2019-07-01 ASSESSMENT — PAIN DESCRIPTION - FREQUENCY
FREQUENCY: INTERMITTENT
FREQUENCY: CONTINUOUS

## 2019-07-01 ASSESSMENT — PAIN DESCRIPTION - LOCATION
LOCATION: BACK
LOCATION: BACK
LOCATION: BACK;RIB CAGE
LOCATION: BACK

## 2019-07-01 ASSESSMENT — PAIN DESCRIPTION - ORIENTATION: ORIENTATION: LOWER

## 2019-07-01 NOTE — PROGRESS NOTES
Supervision     Cognition  Arousal/Alertness: Appropriate responses to stimuli  Following Commands: Follows one step commands with increased time; Follows multistep commands with repitition; Follows multistep commands with increased time  Attention Span: Difficulty attending to directions  Memory: Decreased short term memory  Safety Judgement: Decreased awareness of need for safety  Problem Solving: Assistance required to correct errors made;Assistance required to identify errors made;Assistance required to generate solutions;Decreased awareness of errors  Insights: Decreased awareness of deficits  Initiation: Requires cues for some  Sequencing: Requires cues for some      Pt. continued his session in a strengthening and endurance task with pushing bean bags over incline board with mciah and 3 lb weight x 2 trials. Pt. completed ROM arc on first level with B hands as well with fatigue noted toward the end of the session.       Plan   Plan  Times per week: 5-7x/wk  Plan weeks: 1 1/2 weeks  Current Treatment Recommendations: Strengthening, Functional Mobility Training, Patient/Caregiver Education & Training, Endurance Training, Self-Care / ADL, Neuromuscular Re-education, Balance Training    Goals  Patient Goals   Patient goals : Not stated at this time       Therapy Time   Individual Concurrent Group Co-treatment   Time In 0900         Time Out 1000         Minutes 92 TONY Perry Electronically signed by TONY Durand on 7/1/2019 at 10:21 AM

## 2019-07-01 NOTE — PROGRESS NOTES
sounds are normal. He exhibits no distension. There is no tenderness. Musculoskeletal: He exhibits no edema. Neurological: He is alert and oriented to person, place, and time. No cranial nerve deficit. See neuro exam   Skin: Skin is warm and dry. He is not diaphoretic. Psychiatric: He has a normal mood and affect. Nursing note and vitals reviewed. Mental Status Exam:             Level of Alertness:   awake            Orientation:   person, place, time            Memory:   normal            Attention/Concentration:  normal            Language:  normal    Cranial Nerves         Cranial nerve III           Pupils:  equal, round, reactive to light      Cranial nerves III, IV, VI           Extraocular Movements: intact      Cranial nerve V           Facial sensation:  intact      Cranial nerve VII           Facial strength: intact      Cranial nerve VIII           Hearing:  intact      Cranial nerve IX           Palate:  intact      Cranial nerve XI         Shoulder shrug:  intact      Cranial nerve XII          Tongue movement:  normal    Motor:    Drift:  absent  Motor exam is symmetrical 5 out of 5 all extremities bilaterally  Tone:  normal  Abnormal Movements:  slighthand tremor            Coordination:           Finger/Nose   Right:  normal              Left:  normal          Heel-Knee-Shin                Right:  normal              Left:  normal          Rapid Alternating Movements              Right:  normal              Left:  normal                     Reflexes:             Deep Tendon Reflexes:             Reflexes are 2 +             Plantar response:                Right:  downgoing               Left:  downgoing      Review of Systems   Constitutional: Negative for appetite change, chills, fatigue and fever. HENT: Negative for hearing loss and trouble swallowing. Eyes: Negative for visual disturbance. Respiratory: Negative for cough, chest tightness, shortness of breath and wheezing.

## 2019-07-01 NOTE — PROGRESS NOTES
Physical Therapy Rehab Treatment Note  [facility]       NAME: Cameron Dumont  : 1950 (77 y.o.)  MRN: [id number]  CODE STATUS: Full Code    Date of Service: 2019  Chart Reviewed: Yes  Patient assessed for rehabilitation services?: Yes  Family / Caregiver Present: No  Diagnosis: Gait abnormality due to exacerbation of Parkinson's disease with acute rehab admission on 2019    Restrictions:  Restrictions/Precautions: Fall Risk     SUBJECTIVE: Subjective: A little pain  Response To Previous Treatment: Patient with no complaints from previous session. Pain Screening  Patient Currently in Pain: Yes     Post Treatment Pain Screening:  Pain Assessment  Pain Level: 5  Pain Location: Back  Non-Pharmaceutical Pain Intervention(s): Declines  OBJECTIVE:   Follows Commands: Within Functional Limits             Transfers  Sit to Stand: Supervision  Stand to sit: Supervision    Ambulation  Ambulation?: Yes  Ambulation 1  Surface: carpet;level tile  Device: Rollator  Assistance: Stand by assistance  Quality of Gait: Festinating gait pattern. Focused gait training on increasing step length. Gait Deviations: Slow Erica; Shuffles;Decreased step height;Decreased step length  Distance: [id number]         Exercises  Neurodevelopmental Techniques: power up sit to stands x5, reaching out of PARVEZ and across midline for cones, single stepping with UE ext all focusing on big exaggerated movements with extention. Other exercises  Other exercises?: Yes  Other exercises 1: LTR stretches in supine [id number]     ASSESSMENT/COMMENTS:  Assessment: Improved gait quality with decreased festination 2nd ambulation after intervention. PLAN OF CARE/Safety:   Safety Devices  Type of devices:  All fall risk precautions in place      Therapy Time:   Individual   Time In 1330   Time Out 1400   Minutes 30     Minutes:      Transfer/Bed mobility trainin      Gait training: 10      Neuro re education:15     Therapeutic ex:5    Veronica Montano, ALF, 07/01/19 at 2:04 PM

## 2019-07-02 LAB
GLUCOSE BLD-MCNC: 141 MG/DL (ref 60–115)
GLUCOSE BLD-MCNC: 158 MG/DL (ref 60–115)
GLUCOSE BLD-MCNC: 241 MG/DL (ref 60–115)
GLUCOSE BLD-MCNC: 89 MG/DL (ref 60–115)
PERFORMED ON: ABNORMAL
PERFORMED ON: NORMAL

## 2019-07-02 PROCEDURE — 97112 NEUROMUSCULAR REEDUCATION: CPT

## 2019-07-02 PROCEDURE — 97535 SELF CARE MNGMENT TRAINING: CPT

## 2019-07-02 PROCEDURE — 97127 HC SP THER IVNTJ W/FOCUS COG FUNCJ: CPT

## 2019-07-02 PROCEDURE — 1180000000 HC REHAB R&B

## 2019-07-02 PROCEDURE — 97116 GAIT TRAINING THERAPY: CPT

## 2019-07-02 PROCEDURE — 6370000000 HC RX 637 (ALT 250 FOR IP): Performed by: INTERNAL MEDICINE

## 2019-07-02 PROCEDURE — 6370000000 HC RX 637 (ALT 250 FOR IP): Performed by: PHYSICAL MEDICINE & REHABILITATION

## 2019-07-02 PROCEDURE — 99232 SBSQ HOSP IP/OBS MODERATE 35: CPT | Performed by: PHYSICAL MEDICINE & REHABILITATION

## 2019-07-02 PROCEDURE — G0108 DIAB MANAGE TRN  PER INDIV: HCPCS

## 2019-07-02 PROCEDURE — 97110 THERAPEUTIC EXERCISES: CPT

## 2019-07-02 PROCEDURE — 97530 THERAPEUTIC ACTIVITIES: CPT

## 2019-07-02 RX ORDER — INSULIN GLARGINE 100 [IU]/ML
10 INJECTION, SOLUTION SUBCUTANEOUS NIGHTLY
Status: DISCONTINUED | OUTPATIENT
Start: 2019-07-02 | End: 2019-07-04 | Stop reason: HOSPADM

## 2019-07-02 RX ADMIN — AMANTADINE HYDROCHLORIDE 100 MG: 100 CAPSULE ORAL at 20:46

## 2019-07-02 RX ADMIN — INSULIN GLARGINE 10 UNITS: 100 INJECTION, SOLUTION SUBCUTANEOUS at 20:46

## 2019-07-02 RX ADMIN — LOSARTAN POTASSIUM 25 MG: 25 TABLET ORAL at 08:22

## 2019-07-02 RX ADMIN — SIMVASTATIN 40 MG: 40 TABLET, FILM COATED ORAL at 20:46

## 2019-07-02 RX ADMIN — CARBIDOPA AND LEVODOPA 1 TABLET: 25; 100 TABLET ORAL at 08:22

## 2019-07-02 RX ADMIN — METFORMIN HYDROCHLORIDE 1000 MG: 500 TABLET, FILM COATED ORAL at 08:22

## 2019-07-02 RX ADMIN — SERTRALINE HYDROCHLORIDE 50 MG: 50 TABLET ORAL at 08:22

## 2019-07-02 RX ADMIN — METFORMIN HYDROCHLORIDE 1000 MG: 500 TABLET, FILM COATED ORAL at 17:04

## 2019-07-02 RX ADMIN — AMANTADINE HYDROCHLORIDE 100 MG: 100 CAPSULE ORAL at 08:22

## 2019-07-02 RX ADMIN — CARBIDOPA AND LEVODOPA 1 TABLET: 25; 100 TABLET ORAL at 14:13

## 2019-07-02 ASSESSMENT — PAIN SCALES - GENERAL
PAINLEVEL_OUTOF10: 5
PAINLEVEL_OUTOF10: 0
PAINLEVEL_OUTOF10: 0
PAINLEVEL_OUTOF10: 1

## 2019-07-02 ASSESSMENT — PAIN DESCRIPTION - DESCRIPTORS: DESCRIPTORS: ACHING

## 2019-07-02 ASSESSMENT — PAIN DESCRIPTION - LOCATION: LOCATION: BACK

## 2019-07-02 ASSESSMENT — PAIN DESCRIPTION - FREQUENCY: FREQUENCY: CONTINUOUS

## 2019-07-02 ASSESSMENT — PAIN DESCRIPTION - ORIENTATION: ORIENTATION: LOWER

## 2019-07-02 ASSESSMENT — PAIN DESCRIPTION - PAIN TYPE: TYPE: CHRONIC PAIN

## 2019-07-02 NOTE — PROGRESS NOTES
Physical Therapy Rehab Treatment Note  Facility/Department: [facility]  [facility]       NAME: Jessica Dumont  : 1950 (77 y.o.)  MRN: [id number]  CODE STATUS: Full Code    Date of Service: 2019  Chart Reviewed: Yes  Patient assessed for rehabilitation services?: Yes  Family / Caregiver Present: No  Diagnosis: Gait abnormality due to exacerbation of Parkinson's disease with acute rehab admission on 2019    Restrictions:  Restrictions/Precautions: Fall Risk       SUBJECTIVE: Subjective: No pain  Response To Previous Treatment: Patient with no complaints from previous session. Pain Screening  Patient Currently in Pain: No  Pre Treatment Pain Screening  Pain at present: 1  Scale Used: Numeric Score  Intervention List: Patient able to continue with treatment    Post Treatment Pain Screening:  Pain Assessment  Pain Assessment: 0-10  Pain Level: 0    OBJECTIVE:   Follows Commands: Within Functional Limits    Neuromuscular Education  NDT Treatment: Standing  Neuromuscular Comments: Standing and twisting reaching for bean bags stepping to toss into bucket to improve standing dynamic balance    Transfers  Sit to Stand: Supervision  Stand to sit: Supervision    Ambulation  Ambulation?: Yes  More Ambulation?: No  Ambulation 1  Surface: level tile;carpet  Device: Rollator  Assistance: Supervision  Distance 150' x 2  Quality of Gait: Sguffling gait pattern corrected momentarily with verbal cues  Stairs/Curb  Stairs?: No    ASSESSMENT/COMMENTS:  Body structures, Functions, Activity limitations: Decreased functional mobility ; Decreased strength;Decreased endurance;Decreased coordination;Decreased balance; Increased Pain;Decreased safe awareness;Decreased ADL status; Decreased cognition  Assessment: Patient completes dynamic balance activity with no LOB and supervision. Patient fatigues eaily this p.m. requiring increased time to complete tasks    PLAN OF CARE/Safety:   Safety Devices  Type of devices:  All

## 2019-07-02 NOTE — PROGRESS NOTES
Occupational Therapy  Facility/Department: [facility]  Daily Treatment Note  NAME: Ronald Dumont  : 1950  MRN: [id number]    Date of Service: 2019    Discharge Recommendations:  Continue to assess pending progress       Assessment      Activity Tolerance  Activity Tolerance: Patient Tolerated treatment well  Safety Devices  Safety Devices in place: Yes  Type of devices: All fall risk precautions in place         Patient Diagnosis(es): There were no encounter diagnoses. has a past medical history of Hyperlipidemia, Hypertension, Parkinson's disease ([facility] [address] and Type 2 diabetes mellitus without complication ([facility] [address] has no past surgical history on file. Restrictions  Restrictions/Precautions  Restrictions/Precautions: Fall Risk  Position Activity Restriction  Other position/activity restrictions: Kyrgyz speaking, utilize  services. Subjective   General  Chart Reviewed: Yes  Patient assessed for rehabilitation services?: Yes  Response to previous treatment: Patient with no complaints from previous session  Family / Caregiver Present: No  Referring Practitioner: Dr Alexandra No  Diagnosis: Exac of Parkinson's with impaired mobility  Pain Assessment  Pain Assessment: 0-10  Pain Level: 0  Pain Type: Chronic pain  Pain Location: Back  Pain Orientation: Lower  Pain Descriptors: Aching  Pain Frequency: Continuous  Vital Signs  Patient Currently in Pain: No   Orientation     Objective     Pt. engaged in B UE strengthening, endurance, and problem solving skill task with 2 lb wrist weights on B wrists. Pt. was instructed to place large pegs into the pegboard and alternate hands after every row. Pt. was instructed 3 times that color did not matter. Pt. required maximal verbal cues to switch hands after every row and then started to switch hands in the middle of the row. Pt.'s son and present arrived and was educated regarding patients current safety concerns with his ADLs and transfers.  Pt. was distracted with family present. Pt. then removed the pegs and placed into the container alternating hands with no difficulty. Pt. tolerated the weights well.       Plan   Plan  Times per week: 5-7x/wk  Plan weeks: 1 1/2 weeks  Current Treatment Recommendations: Strengthening, Functional Mobility Training, Patient/Caregiver Education & Training, Endurance Training, Self-Care / ADL, Neuromuscular Re-education, Balance Training    Goals  Patient Goals   Patient goals : Not stated at this time       Therapy Time   Individual Concurrent Group Co-treatment   Time In 1300         Time Out 1330         Minutes [id number] [facility] Electronically signed by TONY Friedman on 7/2/2019 at 1:58 PM

## 2019-07-02 NOTE — PROGRESS NOTES
and bladder function. Lactinex 2 PO every AC. MOM prn, Brown Bomb prn, Glycerin suppository prn, enema prn.  3. Severe mid back pain likely related to old chronic healed compression fractures generalized OA pain: reassess pain every shift and prior to and after each therapy session, give prn Tylenol and add OxyIR changed to Percocet 7.5 titrate low-dose Duragesic consider long-term use if it helps his active chronic pain he would need to follow-up with his family doctor or pain management. Monitor for sedation discontinue Flexeril, modalities prn in therapy, Lidoderm, K-pad prn. Add BenGay consider lumbar spine films and rib series SP consult neuro spine surgery. Avoid muscle relaxers as they seem to sedate him too much. 4. Skin healing and breakdown risk:  continue pressure relief program.  Daily skin exams and reports from nursing. 5. Severe progressive fatigue due to nutritional and hydration deficiency:  continue to monitor I&Os, calorie counts prn, dietary consult prn. Add vitamin B12 vitamin D co-Q10 and consider Provigil  6. Acute episodic insomnia with situational adjustment disorder:  prn Ambien, monitor for day time sedation. 7. Falls risk elevated:  patient to use call light to get nursing assistance to get up, bed and chair alarm. 8. Elevated DVT risk: progressive activities in PT, continue prophylaxis SO hose, elevation  . 9. Complex discharge planning: Discharge July 4, 2019 home with his son and the son's wife with outpatient physical therapy available. SP weekly team meeting  Monday to assess progress towards goals, discuss and address social, psychological and medical comorbidities and to address difficulties they may be having progressing in therapy. Patient and family education is in progress. The patient is to follow-up with their family physician after discharge. Complex Active General Medical Issues that complicate care Assess & Plan:    1.   Lactic acidosis  and

## 2019-07-02 NOTE — PROGRESS NOTES
out-patient Diabetes education services and encouraged follow up with a PCP/endocrinologist.    Would recommend follow -up education at outpatient diabetes education at [facility]. PT ALREADY HAS AN APT ON July 15th at 0900. Gave pt's son reminder card. Patient will need prescriptions for the following supplies at discharge:   Preferred / formulary blood glucose meter, with strips and lancets for 4 and prn  times per day blood glucose testing  Insulin pen needle tips - 4mm robert for insulin injections   Insulin pens    Patient's son Anibal Puente and wife verbalizes understanding  of survival skills education.           Meghan Jiang RN

## 2019-07-02 NOTE — PROGRESS NOTES
state, hospital, month, year  Pt not oriented to [facility]". Pt only one day off for date. Goal 4: To address pt's cognitive deficits and promote his/her ability to safely follow directives in a variety of environments, pt will carry out verbal directives of increasing complexity in everyday activities with 80% accuracy and min cues. Goal 5: To decrease pt's cognitive deficits through the use of compensatory strategies, the pt will be educated on 3 different memory strategies and verbalize how he/she might use them at home in 3 ways with min cues. Pt independently recalled use of call light button. Goal 6: To address pt's cognitive deficits and promote recall of personal and medical information, pt will answer questions addressing remote, recent, and delayed recall with 80% accuracy and min cues. Dysphagia Goals:  Not targeted  Timeframe for Short-term Goals: 1 week  Goal 1: Pt will tolerate regular foods/thin liquids with no overt s/s of aspiration during a therapeutic meal monitor. Compensatory Swallowing Strategies: Upright as possible for all oral intake, Small bites/sips      Treatment/Activity Tolerance:  Patient tolerated treatment well    Plan:  Continue per POC    Pain:  Patient demonstrated no s/s of pain. Patient/Caregiver Education:  Patient educated on session and progression towards goals. and Education needs reinforcement.     Safety Devices:  Call light within reach and Chair alarm in place    Comprehension:  4- Minimal assist    Expression:  3- Moderate assist    Problem Solving:  3- Moderate assist    Memory:  3- Moderate assist    Signature:   Electronically signed by William Medina SLP on 7/2/2019 at 12:06 PM

## 2019-07-02 NOTE — PROGRESS NOTES
an extra lb to the wrist weights to promote his endurance and strengthening. Pt. reached alternating hands to place rubber washers on poles with rest breaks. Pt. was instructed that he could alternate his hands as needed for fatigue due to the increase in wrist weights. However, patient would just stop the task and take a break instead of alternating hands. Pt. required verbal cues to follow that step and only switched hands when cued. Therapist removed the weights and patient then removed the rubber washers from the poles. Pt. tolerated well.       Plan   Plan  Times per week: 5-7x/wk  Plan weeks: 1 1/2 weeks  Current Treatment Recommendations: Strengthening, Functional Mobility Training, Patient/Caregiver Education & Training, Endurance Training, Self-Care / ADL, Neuromuscular Re-education, Balance Training       Goals  Patient Goals   Patient goals : Not stated at this time       Therapy Time   Individual Concurrent Group Co-treatment   Time In 0900         Time Out 1000         Minutes [address] Electronically signed by TONY Rajput on 7/2/2019 at 12:27 PM

## 2019-07-02 NOTE — DISCHARGE INSTR - COC
Continuity of Care Form    Patient Name: Alyssa Membreno   :  1950  MRN:  [id number]    Admit date:  2019  Discharge date:  19    Code Status Order: Full Code   Advance Directives:     Admitting Physician:  Natalya Lucia DO  PCP: PINEDA Shultz CNP    Discharging Nurse: Electronically signed by Michi León RN on 2019 at 10:56 AM  [facility] [address] Unit/Room#: A865/P771-20  Discharging Unit Phone Number: [phone]    Emergency Contact:   Extended Emergency Contact Information  Primary Emergency Contact: Gary Pinzon  Address: [address] [place] [address], [address] [place] [address] Phone: [phone]  Relation: Child    Past Surgical History:  History reviewed. No pertinent surgical history. Immunization History: There is no immunization history on file for this patient.     Active Problems:  Patient Active Problem List   Diagnosis Code    Lactic acidosis E87.2    DM w/ coma type I, uncontrolled ([facility]) E10.69, E10.65    Type 2 diabetes mellitus with diabetic neuropathy, with long-term current use of insulin ([facility]) E11.40, Z79.4    Syncope and collapse R55    Impaired mobility Z74.09    Parkinson's disease ([facility] [address] G20    Gait abnormality due to exacerbation of Parkinson's disease with acute rehab admission on 2019 R26.9    Type 2 diabetes mellitus with hyperglycemia, with long-term current use of insulin ([facility]) E11.65, Z79.4    Essential hypertension I10    Abnormality of gait and mobility R26.9    Hyperlipidemia E78.5    BMI 28.0-28.9,adult Z68.28    Uses Irish as primary spoken language Z78.9    Compression fracture of body of thoracic vertebra ([facility]) S22.000A       Isolation/Infection:   Isolation          No Isolation            Nurse Assessment:  Last Vital Signs: /76   Pulse 76   Temp 98 °F (36.7 °C) (Oral)   Resp 17   Ht 5' 5\" (1.651 m)   Wt 164 lb 3.9 oz (74.5 kg)   SpO2 97%   BMI 27.33 kg/m²     Last

## 2019-07-03 LAB
GLUCOSE BLD-MCNC: 126 MG/DL (ref 60–115)
GLUCOSE BLD-MCNC: 223 MG/DL (ref 60–115)
GLUCOSE BLD-MCNC: 251 MG/DL (ref 60–115)
GLUCOSE BLD-MCNC: 61 MG/DL (ref 60–115)
GLUCOSE BLD-MCNC: 89 MG/DL (ref 60–115)
PERFORMED ON: ABNORMAL
PERFORMED ON: NORMAL
PERFORMED ON: NORMAL

## 2019-07-03 PROCEDURE — 1180000000 HC REHAB R&B

## 2019-07-03 PROCEDURE — 97110 THERAPEUTIC EXERCISES: CPT

## 2019-07-03 PROCEDURE — 6370000000 HC RX 637 (ALT 250 FOR IP): Performed by: PHYSICAL MEDICINE & REHABILITATION

## 2019-07-03 PROCEDURE — 99232 SBSQ HOSP IP/OBS MODERATE 35: CPT | Performed by: PHYSICAL MEDICINE & REHABILITATION

## 2019-07-03 PROCEDURE — 6370000000 HC RX 637 (ALT 250 FOR IP): Performed by: INTERNAL MEDICINE

## 2019-07-03 PROCEDURE — 97116 GAIT TRAINING THERAPY: CPT

## 2019-07-03 PROCEDURE — 99232 SBSQ HOSP IP/OBS MODERATE 35: CPT | Performed by: INTERNAL MEDICINE

## 2019-07-03 PROCEDURE — 97530 THERAPEUTIC ACTIVITIES: CPT

## 2019-07-03 PROCEDURE — 97112 NEUROMUSCULAR REEDUCATION: CPT

## 2019-07-03 PROCEDURE — 97535 SELF CARE MNGMENT TRAINING: CPT

## 2019-07-03 RX ORDER — LOSARTAN POTASSIUM 50 MG/1
25 TABLET ORAL DAILY
Qty: 30 TABLET | Refills: 3 | Status: SHIPPED | OUTPATIENT
Start: 2019-07-03 | End: 2020-08-24

## 2019-07-03 RX ADMIN — OXYCODONE HYDROCHLORIDE AND ACETAMINOPHEN 1 TABLET: 7.5; 325 TABLET ORAL at 09:09

## 2019-07-03 RX ADMIN — METFORMIN HYDROCHLORIDE 1000 MG: 500 TABLET, FILM COATED ORAL at 09:10

## 2019-07-03 RX ADMIN — SERTRALINE HYDROCHLORIDE 50 MG: 50 TABLET ORAL at 09:10

## 2019-07-03 RX ADMIN — AMANTADINE HYDROCHLORIDE 100 MG: 100 CAPSULE ORAL at 09:09

## 2019-07-03 RX ADMIN — LOSARTAN POTASSIUM 25 MG: 25 TABLET ORAL at 09:09

## 2019-07-03 RX ADMIN — CARBIDOPA AND LEVODOPA 1 TABLET: 25; 100 TABLET ORAL at 09:09

## 2019-07-03 RX ADMIN — INSULIN GLARGINE 10 UNITS: 100 INJECTION, SOLUTION SUBCUTANEOUS at 20:27

## 2019-07-03 RX ADMIN — CARBIDOPA AND LEVODOPA 1 TABLET: 25; 100 TABLET ORAL at 14:29

## 2019-07-03 RX ADMIN — AMANTADINE HYDROCHLORIDE 100 MG: 100 CAPSULE ORAL at 20:27

## 2019-07-03 RX ADMIN — SIMVASTATIN 40 MG: 40 TABLET, FILM COATED ORAL at 20:27

## 2019-07-03 RX ADMIN — METFORMIN HYDROCHLORIDE 1000 MG: 500 TABLET, FILM COATED ORAL at 16:56

## 2019-07-03 ASSESSMENT — PAIN SCALES - GENERAL
PAINLEVEL_OUTOF10: 3
PAINLEVEL_OUTOF10: 2
PAINLEVEL_OUTOF10: 0
PAINLEVEL_OUTOF10: 3
PAINLEVEL_OUTOF10: 0

## 2019-07-03 ASSESSMENT — ENCOUNTER SYMPTOMS
COUGH: 0
ABDOMINAL PAIN: 0
CHEST TIGHTNESS: 0
SHORTNESS OF BREATH: 0
DIARRHEA: 0
VOMITING: 0
TROUBLE SWALLOWING: 0
CONSTIPATION: 0
WHEEZING: 0
ABDOMINAL DISTENTION: 0
NAUSEA: 0
COLOR CHANGE: 0

## 2019-07-03 ASSESSMENT — PAIN DESCRIPTION - LOCATION
LOCATION: BACK
LOCATION: BACK

## 2019-07-03 ASSESSMENT — PAIN DESCRIPTION - DESCRIPTORS: DESCRIPTORS: ACHING

## 2019-07-03 ASSESSMENT — PAIN DESCRIPTION - FREQUENCY: FREQUENCY: CONTINUOUS

## 2019-07-03 ASSESSMENT — PAIN DESCRIPTION - PAIN TYPE
TYPE: CHRONIC PAIN
TYPE: CHRONIC PAIN

## 2019-07-03 ASSESSMENT — PAIN DESCRIPTION - ORIENTATION: ORIENTATION: LEFT;LOWER

## 2019-07-03 NOTE — CARE COORDINATION
consistencies with thin liquids     Comprehension:  5- Supervision/Stand by cues     Expression:  5- Supervision/Stand by cues     Problem Solving:  3- Moderate assist     Memory:  2- Maximal assist - 3 Moderate Assist           Treatment Area(s):  Communication, Cognition and Swallow     Progress made:  Pt participated nicely throughout [facility] intervention. He continues to exhibit moderate cognitive impairment (improved from moderate-severe cognitive impairment) and would benefit from ongoing intervention at the next level of care.      Short-term Goals  Timeframe for Short-term Goals: 1-2 weeks   Goal 1: To increase safety awareness and judgment for safe completion of ADLs secondary to pt's cognitive deficits, pt will provide reasonable solutions to problems of everyday living with 80% accuracy and min cues. Goal 2: To increase safety awareness and judgment for safe completion of ADLs secondary to pt's cognitive deficits, pt will complete abstract reasoning tasks (i.e. Word deduction, convergent and divergent naming, similarities/differences) with 80% accuracy and min cues. Goal 3: To address pt's cognitive deficits and promote orientation, pt will state name of facility, time within 1 hour, reason in hospital, current month and year with 100% accuracy with min assist, with use of external aid. Goal 4: To address pt's cognitive deficits and promote his/her ability to safely follow directives in a variety of environments, pt will carry out verbal directives of increasing complexity in everyday activities with 80% accuracy and min cues. Goal 5: To decrease pt's cognitive deficits through the use of compensatory strategies, the pt will be educated on 3 different memory strategies and verbalize how he/she might use them at home in 3 ways with min cues. Goal 6:  To address pt's cognitive deficits and promote recall of personal and medical information, pt will answer questions addressing remote, recent, and delayed

## 2019-07-03 NOTE — PROGRESS NOTES
Occupational Therapy  Facility/Department: [facility]  Daily Treatment Note  NAME: Cherise Dumont  : 1950  MRN: [id number]    Date of Service: 7/3/2019    Discharge Recommendations:  Continue to assess pending progress       Assessment      REQUIRES OT FOLLOW UP: Yes  Activity Tolerance  Activity Tolerance: Patient Tolerated treatment well  Safety Devices  Safety Devices in place: Yes  Type of devices: All fall risk precautions in place         Patient Diagnosis(es): There were no encounter diagnoses. has a past medical history of Hyperlipidemia, Hypertension, Parkinson's disease ([facility] [address] and Type 2 diabetes mellitus without complication ([facility] [address] has no past surgical history on file. Restrictions  Restrictions/Precautions  Restrictions/Precautions: Fall Risk  Position Activity Restriction  Other position/activity restrictions: Divehi speaking, utilize  services. Subjective   General  Chart Reviewed: Yes  Patient assessed for rehabilitation services?: Yes  Response to previous treatment: Patient with no complaints from previous session  Family / Caregiver Present: No  Referring Practitioner: Dr Viral Littlejohn  Diagnosis: Exac of Parkinson's with impaired mobility  Pain Assessment  Pain Assessment: 0-10  Pain Level: 0  Pain Type: Chronic pain  Pain Location: Back  Pain Orientation: Left; Lower  Pain Descriptors: Aching  Pain Frequency: Continuous  Vital Signs  Patient Currently in Pain: No   Orientation  Orientation  Overall Orientation Status: Within Functional Limits  Objective    Pt. completed transfer supine to sit eob at independent level. Pt. donned B shoes with increased time. Pt. completed sit to stand transfer to ww with cues to push from the bed and not pull from the ww. Pt. completed toileting at below status.    ADL  Toileting: Supervision    Toilet Transfers  Toilet - Technique: Ambulating  Equipment Used: Grab bars  Toilet Transfer: Unable to assess  Toilet Transfers Comments: Pt.

## 2019-07-03 NOTE — PROGRESS NOTES
Physical Therapy Rehab Treatment Note  [facility]  [facility]       NAME: Dayne Dumont  : 1950 (77 y.o.)  MRN: [id number]  CODE STATUS: Full Code    Date of Service: 7/3/2019  Chart Reviewed: Yes  Patient assessed for rehabilitation services?: Yes  Family / Caregiver Present: No  Diagnosis: Gait abnormality due to exacerbation of Parkinson's disease with acute rehab admission on 2019    Restrictions:  Restrictions/Precautions: Fall Risk       SUBJECTIVE: Subjective: No pain   Response To Previous Treatment: Patient with no complaints from previous session. Pain Screening  Patient Currently in Pain: Denies  Pre Treatment Pain Screening  Pain at present: 0  Scale Used: Numeric Score  Intervention List: Patient able to continue with treatment    Post Treatment Pain Screening:  Pain Assessment  Pain Assessment: 0-10  Pain Level: 0    OBJECTIVE:   Follows Commands: Within Functional Limits  Outcomes Measures:  Timed Up and Go: 16.89     Transfers  Sit to Stand: Modified independent  Stand to sit: Modified independent  Bed to Chair: Modified independent    Ambulation  Ambulation?: Yes  More Ambulation?: No  Ambulation 1  Surface: level tile;carpet;ramp  Device: Rollator  Assistance: Modified Independent  Quality of Gait: Occasional shuffling gait pattern  Gait Deviations: Slow Erica; Shuffles;Decreased step height;Decreased step length  Distance: 150' x 2    ASSESSMENT/COMMENTS:  Body structures, Functions, Activity limitations: Decreased functional mobility ; Decreased strength;Decreased endurance;Decreased coordination;Decreased balance; Increased Pain;Decreased safe awareness;Decreased ADL status; Decreased cognition  Assessment: Patient scores a 16.89 on a TUG. Patient has met goals with transfers gait and stairs    PLAN OF CARE/Safety:   Safety Devices  Type of devices:  All fall risk precautions in place      Therapy Time:   Individual   Time In 1330   Time Out 1400

## 2019-07-03 NOTE — PROGRESS NOTES
understands basic needs 75-90%+ of the time  Expression: 3 - Expresses basic ideas/needs 50-74% of the time  Social Interaction: 5 - Patient is appropriate with supervision/cues  Problem Solving: 3 - Patient solves simple/routine tasks 50%-74%  Memory: 3 - Patient remembers 50%-74% of the time      Lab/X-ray studies reviewed, analyzed and discussed with patient and staff:   Recent Results (from the past 24 hour(s))   POCT Glucose    Collection Time: 07/02/19 11:04 AM   Result Value Ref Range    POC Glucose 241 (H) 60 - 115 mg/dl    Performed on ACCU-CHEK    POCT Glucose    Collection Time: 07/02/19  4:22 PM   Result Value Ref Range    POC Glucose 89 60 - 115 mg/dl    Performed on ACCU-CHEK    POCT Glucose    Collection Time: 07/02/19  8:30 PM   Result Value Ref Range    POC Glucose 158 (H) 60 - 115 mg/dl    Performed on ACCU-CHEK    POCT Glucose    Collection Time: 07/03/19  6:08 AM   Result Value Ref Range    POC Glucose 126 (H) 60 - 115 mg/dl    Performed on ACCU-CHEK      MRI of the thoracic spine dated 6/23/2019-Moderate to marked healed compression fractures of T4, T5, and T6 are present, without abnormal bone marrow edema, retropulsion of bone, subluxation, or other acute findings identified. Xr Pelvis  : 6/16/2019   NEGATIVE PELVIS. Ct Head  6/16/2019   NO ACUTE INTRA-AXIAL OR EXTRA-AXIAL FINDINGS. Xr Chest   6/16/2019  NO ACUTE ACTIVE CARDIOPULMONARY PROCESS    Mri Brain  : 6/17/2019   No acute ischemia or acute intracranial process. No enhancing mass or pathologic enhancement. Generalized parenchymal volume loss and nonspecific white matter findings most compatible with chronic small vessel ischemic changes in a patient of this age. OSTEOPENIA AND DEGENERATIVE BONE SPURRING IN THE THORACIC SPINE. 2. T3, T4, T5 AND T6 COMPRESSION FX OF INDETERMINATE AGE AND CLINICAL CORRELATION WARRANTED. Previous extensive, complex labs, notes and diagnostics reviewed and analyzed.      ALLERGIES: Allergies as of 06/19/2019    (No Known Allergies)      (please also verify by checking MAR)        I reviewed her [facility] prescription monitoring service data sheets in hopes of eliminating polypharmacy and weaning to the lowest effective dose of pain medications and eliminating the concomitant use of benzodiazepines. I see no medications of concern. I see no habits of combining sedatives and narcotics. Complex Physical Medicine & Rehab Issues Assess & Plan:   1. Severe abnormality of gait and mobility and impaired self-care and ADL's secondary to progressive exacerbation of severe Parkinson's disease. Functional and medical status reassessed regarding patients ability to participate in therapies and patient found to be able to participate in acute intensive comprehensive inpatient rehabilitation program including PT/OT to improve balance, ambulation, ADLs, and to improve the P/AROM. Therapeutic modifications regarding activities in therapies, place, amount of time per day and intensity of therapy made daily. In bed therapies or bedside therapies prn.   2. Bowel and Bladder dysfunction:  frequent toileting, ambulate to bathroom with assistance, check post void residuals. Check for C.difficile x1 if >2 loose stools in 24 hours, continue bowel & bladder program.  Monitor bowel and bladder function. Lactinex 2 PO every AC. MOM prn, Brown Bomb prn, Glycerin suppository prn, enema prn.  3. Severe mid back pain likely related to old chronic healed compression fractures generalized OA pain: reassess pain every shift and prior to and after each therapy session, give prn Tylenol and add OxyIR changed to Percocet 7.5 titrate low-dose Duragesic consider long-term use if it helps his active chronic pain he would need to follow-up with his family doctor or pain management. Monitor for sedation discontinue Flexeril, modalities prn in therapy, Lidoderm, K-pad prn.   Add BenGay consider lumbar spine films and rib series SP consult neuro spine surgery. Avoid muscle relaxers as they seem to sedate him too much. 4. Skin healing and breakdown risk:  continue pressure relief program.  Daily skin exams and reports from nursing. 5. Severe progressive fatigue due to nutritional and hydration deficiency:  continue to monitor I&Os, calorie counts prn, dietary consult prn. Add vitamin B12 vitamin D co-Q10 and consider Provigil  6. Acute episodic insomnia with situational adjustment disorder:  prn Ambien, monitor for day time sedation. 7. Falls risk elevated:  patient to use call light to get nursing assistance to get up, bed and chair alarm. 8. Elevated DVT risk: progressive activities in PT, continue prophylaxis SO hose, elevation  . 9. Complex discharge planning: Discharge July 4, 2019 home with his son and the son's wife with outpatient physical therapy available. SP weekly team meeting  Monday to assess progress towards goals, discuss and address social, psychological and medical comorbidities and to address difficulties they may be having progressing in therapy. Patient and family education is in progress. The patient is to follow-up with their family physician after discharge. Complex Active General Medical Issues that complicate care Assess & Plan:    1. Lactic acidosis  and hyperosmolar (nonketotic) due to uncontrolled diabetes mellitus-fingerstick blood sugars q. before meals and at bedtime, add ADA restrictions in the diet consult hospitalist for back-up medical  2. Uncontrolled type 2 diabetes mellitus with diabetic neuropathy, with long-term current use of insulin -fingerstick blood sugars q. before meals and at bedtime dose and titrate diabetic medications.-Fingerstick blood sugars q. before meals and at bedtime, titrate anti-glycemic medications, protect kidneys consult hospitalist for back-up medical teach carb counting decrease carbohydrates to 3 carbs per meal and diabetic education  3.

## 2019-07-03 NOTE — PROGRESS NOTES
[facility] OCCUPATIONAL THERAPY DISCHARGE SUMMARY- REHAB     Date: 7/3/2019  Patient Name: Huyen Clinton        MRN: [id number]  Account: [de-identified]   : 1950  (77 y.o.)  Room: [place] [address]    Diagnosis:  Exac of Parkinson's with impaired mobility    Past Medical History:   Diagnosis Date    Hyperlipidemia     Hypertension     Parkinson's disease ([facility] [address] [facility]     Type 2 diabetes mellitus without complication ([facility]      History reviewed. No pertinent surgical history. Precautions:   Restrictions/Precautions: Fall Risk  Other position/activity restrictions: Serbian speaking, utilize  services. Social/Functional History:  Social/Functional History  Lives With: Spouse  Type of Home: House  Home Layout: One level  Home Access: Stairs to enter without rails  Entrance Stairs - Number of Steps: 1 - pt states that he manages step \"very carefully\"  Bathroom Shower/Tub: Tub/Shower unit  Bathroom Equipment: Grab bars in shower, Shower chair  Home Equipment: (Pt reports no AD at home)  Receives Help From: Family  ADL Assistance: (Pt states that his wife usually assists in bathing/dressing. Son available for assistance as well)  Homemaking Responsibilities: No  Ambulation Assistance: Independent  Transfer Assistance: Independent  Active : No  Patient's  Info: son  Occupation: Retired  Type of occupation:   Leisure & Hobbies: No interested reported  Additional Comments: Pt states that his wife has been ill and won't be able to help much when he goes home. Currently, son is tending to pt's spouse.  Pt mildly inconsistent reporting PLOF and home set up     Current Functional Status:  ADL  Feeding: Independent  Grooming: Independent  UE Bathing: Supervision, Setup  LE Bathing: Supervision, Setup  UE Dressing: Supervision  LE Dressing: Supervision  Toileting: None(pt, did not need to go)  Toilet Transfers  Toilet - Technique: Ambulating  Equipment Used: Grab bars  Toilet Transfer: Supervision  Toilet Transfers Comments: Pt. did not need to go  Tub Transfers  Tub Transfers: Not tested  Shower Transfers  Shower - Transfer From: Other(rollator)  Shower - Transfer Type: To and From  Shower - Transfer To: Shower seat with back  Shower - Technique: Ambulating  Shower Transfers: Supervision    Orientation Status:  Orientation  Overall Orientation Status: Within Functional Limits    Cognition Status:  Cognition  Overall Cognitive Status: Exceptions  Arousal/Alertness: Appropriate responses to stimuli  Following Commands: Follows one step commands with increased time  Attention Span: Difficulty dividing attention, Difficulty attending to directions  Memory: Decreased short term memory  Safety Judgement: Decreased awareness of need for assistance, Decreased awareness of need for safety  Problem Solving: Assistance required to correct errors made, Assistance required to identify errors made, Assistance required to generate solutions, Decreased awareness of errors  Insights: Decreased awareness of deficits  Initiation: Does not require cues  Sequencing: Requires cues for some  Cognition Comment: 5 6 6 4 5    Perception Status:  Perception  Overall Perceptual Status: WFL    Sensation Status:  Sensation  Overall Sensation Status: WFL    Vision and Hearing Status:  Vision  Vision: Impaired  Hearing  Hearing: Within functional limits     UE Function Status:    ROM:   LUE AROM (degrees)  LUE AROM : WFL  Left Hand AROM (degrees)  Left Hand AROM: WFL  RUE AROM (degrees)  RUE AROM : WFL  Right Hand AROM (degrees)  Right Hand AROM: WFL    Strength:  LUE Strength  Gross LUE Strength: WFL  L Hand General: 4-/5  RUE Strength  Gross RUE Strength: WFL  R Hand General: 4-/5    Coordination, Tone, Quality of Movement:    Tone RUE  RUE Tone: Normotonic  Tone LUE  LUE Tone: Normotonic  Coordination  Movements Are Fluid And Coordinated: No  Coordination and Movement description: Decreased speed, Left UE, Right UE, Resting

## 2019-07-03 NOTE — PROGRESS NOTES
Speech Language Pathology    NAME:  Yesy Dumont  ROOM: [id number]  :  1950  DATE: 7/3/2019      Speech Therapy attempted to see Yesy Dumont on this date for a/an:    Treatment    Pt was unable to be seen due to patient:   Other: Attempted to see pt for therapeutic meal monitor at noon. Pt had already finished his lunch tray and politely declined further PO.          Electronically signed by CURTIS Lam on 7/3/19 at 12:57 PM

## 2019-07-03 NOTE — PROGRESS NOTES
current month and year with 100% accuracy with min assist, with use of external aid. Goal 4: To address pt's cognitive deficits and promote his/her ability to safely follow directives in a variety of environments, pt will carry out verbal directives of increasing complexity in everyday activities with 80% accuracy and min cues. Goal 5: To decrease pt's cognitive deficits through the use of compensatory strategies, the pt will be educated on 3 different memory strategies and verbalize how he/she might use them at home in 3 ways with min cues. Goal 6: To address pt's cognitive deficits and promote recall of personal and medical information, pt will answer questions addressing remote, recent, and delayed recall with 80% accuracy and min cues. Long-term Goals  Timeframe for Long-term Goals: 1-2 weeks   Goal 1: Pt will improve his Cognition from mod assist to supervision for adequate functional recall and safety awareness for home, community. Short-term Goals  Timeframe for Short-term Goals: 1 week  Goal 1: Pt will tolerate regular foods/thin liquids with no overt s/s of aspiration during a therapeutic meal monitor. Compensatory Swallowing Strategies: Upright as possible for all oral intake, Small bites/sips     STATUS AT DISCHARGE:  DIET:  regular consistencies with thin liquids      Comprehension:  4- Minimal assist    Expression:  3- Moderate assist    Problem Solving:  3- Moderate assist    Memory:  3- Moderate assist       Functional Status at time of Discharge:    · Cognition: Patient demonstrates moderate cognitive deficits. · Communication: Patient demonstrates moderate communication deficits. · Language: Patient demonstrates moderate language deficits. · Motor Speech: Patient demonstrates minimal motor speech deficits. · Swallow: Patient demonstrates no dysphagia.                                  Patient is discharged to Home with family                [x] Recommend continued speech therapy

## 2019-07-03 NOTE — PROGRESS NOTES
Neurology Daily Progress Note  Name: Alphonso Dumont  Age: 77 y.o. Gender: male  CodeStatus: Full Code  Allergies: No Known Allergies    Chief Complaint:No chief complaint on file. Primary Care Provider: PINEDA Peña CNP  InpatientTreatment Team: Treatment Team: Attending Provider: Josefina Mccartney DO; Consulting Physician: Eva White MD; Consulting Physician: Adam Edward MD; Consulting Physician: Jonas Sutherland MD; LPN: Florencio Feliciano LPN; Consulting Physician: Kerwin Hernandez MD; Registered Nurse: Kaylan Erickson RN; Occupational Therapist Assistant: TONY Hensley; : Acosta Billy, SAMUEL; Occupational Therapist Assistant: TONY Baez; Occupational Therapist: Justino Winn OT; Registered Nurse: Jolene Asher RN; Occupational Therapist: Aparna Estrada OTR/L  Admission Date: 6/19/2019      HPI Pt seen and examined on rehab unit for  neuro follow up. [facility] for hyperosmolar seizure and PD. Sinemet was started for hand tremor. Pt tolerating it thus far without adverse effect. No further seizure activity. A&O x3, NAD, pleasant and cooperative.  speaking.   NO Headache, double vision, blurry vision, difficulty with speech, difficulty with swallowing, weakness, numbness, pain, nausea, vomiting, choking, neck pain, dizziness  Patient is doing better with the rehab  No new complaints    Vitals:    07/03/19 0613   BP: 128/79   Pulse: 66   Resp: 15   Temp: 97 °F (36.1 °C)   SpO2: 95%        Physical Exam   Constitutional: He is oriented to person, place, and time. He appears well-nourished. No distress. HENT:   Head: Normocephalic and atraumatic. Eyes: Pupils are equal, round, and reactive to light. EOM are normal.   Neck: Neck supple. No JVD present. Cardiovascular: Normal rate and regular rhythm. Pulmonary/Chest: Effort normal and breath sounds normal. No respiratory distress. Abdominal: Soft.  Bowel sounds are normal. He exhibits no distension. There is no tenderness. Musculoskeletal: He exhibits no edema. Neurological: He is alert and oriented to person, place, and time. No cranial nerve deficit. Skin: Skin is warm and dry. He is not diaphoretic. Psychiatric: He has a normal mood and affect. Nursing note and vitals reviewed. Mental Status Exam:             Level of Alertness:   awake            Orientation:   person, place, time            Memory:   normal            Attention/Concentration:  normal            Language:  normal    Cranial Nerves         Cranial nerve III           Pupils:  equal, round, reactive to light      Cranial nerves III, IV, VI           Extraocular Movements: intact      Cranial nerve V           Facial sensation:  intact      Cranial nerve VII           Facial strength: intact      Cranial nerve VIII           Hearing:  intact      Cranial nerve IX           Palate:  intact      Cranial nerve XI         Shoulder shrug:  intact      Cranial nerve XII          Tongue movement:  normal    Motor:    Drift:  absent  Motor exam is symmetrical 5 out of 5 all extremities bilaterally  Tone:  normal  Abnormal Movements:  Hand tremor            Coordination:           Finger/Nose   Right:  normal              Left:  normal                  Rapid Alternating Movements              Right:  normal              Left:  normal                         Review of Systems   Constitutional: Negative for appetite change, chills, fatigue and fever. HENT: Negative for hearing loss and trouble swallowing. Eyes: Negative for visual disturbance. Respiratory: Negative for cough, chest tightness, shortness of breath and wheezing. Cardiovascular: Negative for chest pain, palpitations and leg swelling. Gastrointestinal: Negative for abdominal distention, abdominal pain, constipation, diarrhea, nausea and vomiting. Skin: Negative for color change and rash. Neurological: Positive for tremors.

## 2019-07-04 VITALS
HEIGHT: 65 IN | DIASTOLIC BLOOD PRESSURE: 76 MMHG | TEMPERATURE: 97 F | RESPIRATION RATE: 15 BRPM | SYSTOLIC BLOOD PRESSURE: 160 MMHG | OXYGEN SATURATION: 96 % | BODY MASS INDEX: 27.73 KG/M2 | WEIGHT: 166.45 LBS | HEART RATE: 62 BPM

## 2019-07-04 LAB
GLUCOSE BLD-MCNC: 156 MG/DL (ref 60–115)
GLUCOSE BLD-MCNC: 248 MG/DL (ref 60–115)
PERFORMED ON: ABNORMAL
PERFORMED ON: ABNORMAL

## 2019-07-04 PROCEDURE — 6370000000 HC RX 637 (ALT 250 FOR IP): Performed by: PHYSICAL MEDICINE & REHABILITATION

## 2019-07-04 PROCEDURE — 6370000000 HC RX 637 (ALT 250 FOR IP): Performed by: INTERNAL MEDICINE

## 2019-07-04 RX ORDER — OXYCODONE AND ACETAMINOPHEN 7.5; 325 MG/1; MG/1
1 TABLET ORAL EVERY 4 HOURS PRN
Qty: 30 TABLET | Refills: 0 | Status: SHIPPED | OUTPATIENT
Start: 2019-07-04 | End: 2019-07-11

## 2019-07-04 RX ADMIN — CARBIDOPA AND LEVODOPA 1 TABLET: 25; 100 TABLET ORAL at 08:24

## 2019-07-04 RX ADMIN — SERTRALINE HYDROCHLORIDE 50 MG: 50 TABLET ORAL at 08:25

## 2019-07-04 RX ADMIN — METFORMIN HYDROCHLORIDE 1000 MG: 500 TABLET, FILM COATED ORAL at 08:24

## 2019-07-04 RX ADMIN — LOSARTAN POTASSIUM 25 MG: 25 TABLET ORAL at 08:24

## 2019-07-04 RX ADMIN — AMANTADINE HYDROCHLORIDE 100 MG: 100 CAPSULE ORAL at 08:24

## 2019-07-04 RX ADMIN — CARBIDOPA AND LEVODOPA 1 TABLET: 25; 100 TABLET ORAL at 15:46

## 2019-07-04 ASSESSMENT — PAIN SCALES - GENERAL
PAINLEVEL_OUTOF10: 2

## 2019-07-04 NOTE — DISCHARGE SUMMARY
[address] [facility] Course: The patient was admitted to the Rehabilitation Unit to address ADL and mobility deficits. The patient was enrolled in acute PT, OT program.  Weekly team meetings were held to assess functional progress toward their goals. The patient's medical issues were addressed. The patient progressed in the rehab program and is now ready for discharge. Refer to FIM scores summary report for detailed functional status. Greater than 25 minutes was spent on coordinating patients discharge including follow-up care, medications and patient/family education. Subjective: The patient complains of severe  acute on chronic stiffness rigidity posteriorly and festination's and cogwheel rigidity partially relieved by dopaminergic medications, PT, OT, speech-language pathology and exacerbated by dehydration exertion and UTI. Is progressing well in therapy I have ordered his medications his discharge therapy and will continue his med reconciliation and patient instruction. ROS x10: The patient also complains of severely impaired mobility and activities of daily living. Otherwise no new problems with vision, hearing, nose, mouth, throat, dermal, cardiovascular, GI, , pulmonary, musculoskeletal, psychiatric or neurological. See Rehab H&P on Rehab chart dated . Vital signs:  BP (!) 160/76   Pulse 62   Temp 97 °F (36.1 °C)   Resp 15   Ht 5' 5\" (1.651 m)   Wt 166 lb 7.2 oz (75.5 kg)   SpO2 96%   BMI 27.70 kg/m²   I/O:   PO/Intake:  fair PO intake, no problems observed or reported-4 carb per meal diet with at bedtime snack. Bowel/Bladder:  continent, no problems noted. General:  Patient is well developed, adequately nourished, non-obese and     well kempt. HEENT:    PERRLA, hearing intact to loud voice, external inspection of ear     and nose benign. Inspection of lips, tongue and gums benign  Musculoskeletal: No significant change in strength or tone.   All joints stable. Inspection and palpation of digits and nails show no clubbing,       cyanosis or inflammatory conditions. Neuro/Psychiatric: Affect: flat but pleasant. Alert and oriented to person, place and     situation. No significant change in deep tendon reflexes or     Sensation-flat parkinsonian affect posteriorly and festination's  Lungs:  CTA-B. Respiration effort is normal at rest.     Heart:   S1 = S2, RRR. No loud murmurs. Abdomen:  Soft, non-tender, no enlargement of liver or spleen. Extremities:  No significant lower extremity edema or tenderness. Skin:   Intact to general survey, no visualized or palpated problems.     Rehabilitation:  Physical therapy: FIMS:  Bed Mobility: Scooting: Independent    Transfers: Sit to Stand: Modified independent  Stand to sit: Modified independent  Bed to Chair: Modified independent, Ambulation 1  Surface: level tile, carpet, ramp  Device: Rollator  Assistance: Modified Independent  Quality of Gait: Occasional shuffling gait pattern  Gait Deviations: Slow Erica, Shuffles, Decreased step height, Decreased step length  Distance: 150' x 2  Comments: upon second attempt at ambulation, pt declined, indicating increased B knee pain, Stairs  # Steps : 12  Stairs Height: 6\"  Rails: Bilateral  Curbs: 6\"  Device: No Device  Assistance: Modified independent   Comment: Reciprocal pattern ascending, non-reciprocal descending    FIMS: Bed, Chair, Wheel Chair: 6 - Requires assistive device (slide rail)  Walk: 6 - Modified [place]  Walks at least 150 feet with an ambulatory device, orthosis or prosthesis OR requires extra amount of time OR there is concern for safety  Distance Walked: 150'  Stairs: 6 - [address] [facility] goes up and down at least one flight of stairs but requries a side support, handrail, cane, or portable supports, or the activity takes more than a reasonable amount of times, or there are safety considerations,  , Assessment: Patient scores a MRI of the thoracic spine dated 6/23/2019-Moderate to marked healed compression fractures of T4, T5, and T6 are present, without abnormal bone marrow edema, retropulsion of bone, subluxation, or other acute findings identified. Xr Pelvis  : 6/16/2019   NEGATIVE PELVIS. Ct Head  6/16/2019   NO ACUTE INTRA-AXIAL OR EXTRA-AXIAL FINDINGS. Xr Chest   6/16/2019  NO ACUTE ACTIVE CARDIOPULMONARY PROCESS    Mri Brain  : 6/17/2019   No acute ischemia or acute intracranial process. No enhancing mass or pathologic enhancement. Generalized parenchymal volume loss and nonspecific white matter findings most compatible with chronic small vessel ischemic changes in a patient of this age. OSTEOPENIA AND DEGENERATIVE BONE SPURRING IN THE THORACIC SPINE. 2. T3, T4, T5 AND T6 COMPRESSION FX OF INDETERMINATE AGE AND CLINICAL CORRELATION WARRANTED. Previous extensive, complex labs, notes and diagnostics reviewed and analyzed. ALLERGIES:    Allergies as of 06/19/2019    (No Known Allergies)      (please also verify by checking MAR)        I reviewed her [facility] prescription monitoring service data sheets in hopes of eliminating polypharmacy and weaning to the lowest effective dose of pain medications and eliminating the concomitant use of benzodiazepines. I see no medications of concern. I see no habits of combining sedatives and narcotics. Complex Physical Medicine & Rehab Issues Assess & Plan:   1. Severe abnormality of gait and mobility and impaired self-care and ADL's secondary to progressive exacerbation of severe Parkinson's disease. Functional and medical status reassessed regarding patients ability to participate in therapies and patient found to be able to participate in acute intensive comprehensive inpatient rehabilitation program including PT/OT to improve balance, ambulation, ADLs, and to improve the P/AROM.   Therapeutic modifications regarding activities in therapies, place, amount of time per day and intensity of therapy made daily. In bed therapies or bedside therapies prn.   2. Bowel and Bladder dysfunction:  frequent toileting, ambulate to bathroom with assistance, check post void residuals. Check for C.difficile x1 if >2 loose stools in 24 hours, continue bowel & bladder program.  Monitor bowel and bladder function. Lactinex 2 PO every AC. MOM prn, Brown Bomb prn, Glycerin suppository prn, enema prn.  3. Severe mid back pain likely related to old chronic healed compression fractures generalized OA pain: reassess pain every shift and prior to and after each therapy session, give prn Tylenol and add OxyIR changed to Percocet 7.5 titrate low-dose Duragesic consider long-term use if it helps his active chronic pain he would need to follow-up with his family doctor or pain management. Monitor for sedation discontinue Flexeril, modalities prn in therapy, Lidoderm, K-pad prn. Add BenGay consider lumbar spine films and rib series SP consult neuro spine surgery. Avoid muscle relaxers as they seem to sedate him too much. 4. Skin healing and breakdown risk:  continue pressure relief program.  Daily skin exams and reports from nursing. 5. Severe progressive fatigue due to nutritional and hydration deficiency:  continue to monitor I&Os, calorie counts prn, dietary consult prn. Add vitamin B12 vitamin D co-Q10 and consider Provigil  6. Acute episodic insomnia with situational adjustment disorder:  prn Ambien, monitor for day time sedation. 7. Falls risk elevated:  patient to use call light to get nursing assistance to get up, bed and chair alarm. 8. Elevated DVT risk: progressive activities in PT, continue prophylaxis SO hose, elevation  . 9. Complex discharge planning: Discharge July 4, 2019 home with his son and the son's wife with outpatient physical therapy available.     SP weekly team meeting  Monday to assess progress towards goals, discuss and address social, psychological and Merlin

## 2019-07-05 NOTE — PROGRESS NOTES
Physical Therapy  Facility/Department: [facility]  Rehabilitation Discharge note    NAME: Nayla Dumont  : 1950  MRN: [id number]    Date of discharge: 19    Past Medical History:   Diagnosis Date    Hyperlipidemia     Hypertension     Parkinson's disease ([facility] [address]     Type 2 diabetes mellitus without complication ([facility] [address]      History reviewed. No pertinent surgical history. Restrictions  Restrictions/Precautions  Restrictions/Precautions: Fall Risk  Position Activity Restriction  Other position/activity restrictions: Polish speaking, utilize  services.     Objective    Bed mobility  Bridging: Independent  Rolling to Left: Independent  Rolling to Right: Independent  Supine to Sit: Independent  Sit to Supine: Independent  Scooting: Independent  Comment: HOB flat without use of bedrails    Transfers  Sit to Stand: Modified independent  Stand to sit: Modified independent  Bed to Chair: Modified independent  Car Transfer: Modified independent  Comment: Patient shows improved hand placement without verbal cues    Ambulation  Ambulation?: Yes  More Ambulation?: No  Ambulation 1  Surface: level tile, carpet, ramp  Device: Rollator  Assistance: Modified Independent  Quality of Gait: Occasional shuffling gait pattern  Gait Deviations: Slow Erica, Shuffles, Decreased step height, Decreased step length  Distance: 150' x 2  Comments: upon second attempt at ambulation, pt declined, indicating increased B knee pain    Ambulation 2  Surface - 2: carpet  Device 2: No device  Assistance 2: Supervision  Quality of Gait 2: decreased foot clearance reaches for objects for balance  Gait Deviations: Shuffles, Slow Erica  Distance: 50' x 2  Comments: Decreased step length NBOS    # Steps : 12  Stairs Height: 6\"  Rails: Bilateral  Curbs: 6\"  Device: No Device  Assistance: Modified independent   Comment: Reciprocal pattern ascending, non-reciprocal descending      Outcomes Measures:  Timed Up and Go: 16.89

## 2019-07-15 ENCOUNTER — HOSPITAL ENCOUNTER (OUTPATIENT)
Dept: DIABETES SERVICES | Age: 69
Setting detail: THERAPIES SERIES
Discharge: HOME | End: 2019-07-15
Payer: COMMERCIAL

## 2019-07-15 VITALS
HEIGHT: 65 IN | BODY MASS INDEX: 27.66 KG/M2 | SYSTOLIC BLOOD PRESSURE: 160 MMHG | WEIGHT: 166 LBS | DIASTOLIC BLOOD PRESSURE: 76 MMHG

## 2019-07-15 PROCEDURE — G0108 DIAB MANAGE TRN  PER INDIV: HCPCS

## 2019-07-15 SDOH — ECONOMIC STABILITY: FOOD INSECURITY: ADDITIONAL INFORMATION: NO

## 2019-07-15 ASSESSMENT — PROBLEM AREAS IN DIABETES QUESTIONNAIRE (PAID)
COPING WITH COMPLICATIONS OF DIABETES: 4
FEELING THAT DIABETES IS TAKING UP TOO MUCH OF YOUR MENTAL AND PHYSICAL ENERGY EVERY DAY: 2
FEELING SCARED WHEN YOU THINK ABOUT LIVING WITH DIABETES: 4
PAID-5 TOTAL SCORE: 16
FEELING DEPRESSED WHEN YOU THINK ABOUT LIVING WITH DIABETES: 2
WORRYING ABOUT THE FUTURE AND THE POSSIBILITY OF SERIOUS COMPLICATIONS: 4

## 2019-07-15 ASSESSMENT — PATIENT HEALTH QUESTIONNAIRE - PHQ9
SUM OF ALL RESPONSES TO PHQ QUESTIONS 1-9: 3
SUM OF ALL RESPONSES TO PHQ QUESTIONS 1-9: 3

## 2019-07-15 ASSESSMENT — SLEEP AND FATIGUE QUESTIONNAIRES
HAVE YOU BEEN TOLD, OR NOTICED ON YOUR OWN, THAT YOU STOP BREATHING OR STRUGGLE TO BREATHE IN YOUR SLEEP: NO
HOW MANY HOURS OF SLEEP ARE YOU GETTING, ON AVERAGE: LESS THAN 7
HAVE YOU EVER BEEN TESTED FOR SLEEP APNEA: NO
HOW DO YOU RATE THE QUALITY OF YOUR SLEEP: GOOD

## 2019-07-23 ENCOUNTER — OFFICE VISIT (OUTPATIENT)
Dept: ENDOCRINOLOGY | Age: 69
End: 2019-07-23
Payer: COMMERCIAL

## 2019-07-23 VITALS
DIASTOLIC BLOOD PRESSURE: 64 MMHG | BODY MASS INDEX: 27.99 KG/M2 | SYSTOLIC BLOOD PRESSURE: 136 MMHG | HEART RATE: 81 BPM | WEIGHT: 168 LBS | HEIGHT: 65 IN

## 2019-07-23 DIAGNOSIS — E11.65 TYPE 2 DIABETES MELLITUS WITH HYPERGLYCEMIA, WITH LONG-TERM CURRENT USE OF INSULIN (HCC): Primary | ICD-10-CM

## 2019-07-23 DIAGNOSIS — Z79.4 TYPE 2 DIABETES MELLITUS WITH HYPERGLYCEMIA, WITH LONG-TERM CURRENT USE OF INSULIN (HCC): Primary | ICD-10-CM

## 2019-07-23 LAB
CHP ED QC CHECK: NORMAL
GLUCOSE BLD-MCNC: 209 MG/DL

## 2019-07-23 PROCEDURE — 1111F DSCHRG MED/CURRENT MED MERGE: CPT | Performed by: PHYSICIAN ASSISTANT

## 2019-07-23 PROCEDURE — 1123F ACP DISCUSS/DSCN MKR DOCD: CPT | Performed by: PHYSICIAN ASSISTANT

## 2019-07-23 PROCEDURE — 2022F DILAT RTA XM EVC RTNOPTHY: CPT | Performed by: PHYSICIAN ASSISTANT

## 2019-07-23 PROCEDURE — 3046F HEMOGLOBIN A1C LEVEL >9.0%: CPT | Performed by: PHYSICIAN ASSISTANT

## 2019-07-23 PROCEDURE — G8427 DOCREV CUR MEDS BY ELIG CLIN: HCPCS | Performed by: PHYSICIAN ASSISTANT

## 2019-07-23 PROCEDURE — 1036F TOBACCO NON-USER: CPT | Performed by: PHYSICIAN ASSISTANT

## 2019-07-23 PROCEDURE — 3017F COLORECTAL CA SCREEN DOC REV: CPT | Performed by: PHYSICIAN ASSISTANT

## 2019-07-23 PROCEDURE — 4040F PNEUMOC VAC/ADMIN/RCVD: CPT | Performed by: PHYSICIAN ASSISTANT

## 2019-07-23 PROCEDURE — G8419 CALC BMI OUT NRM PARAM NOF/U: HCPCS | Performed by: PHYSICIAN ASSISTANT

## 2019-07-23 PROCEDURE — 82962 GLUCOSE BLOOD TEST: CPT | Performed by: PHYSICIAN ASSISTANT

## 2019-07-23 PROCEDURE — 99214 OFFICE O/P EST MOD 30 MIN: CPT | Performed by: PHYSICIAN ASSISTANT

## 2019-07-23 RX ORDER — GLUCOSAMINE HCL/CHONDROITIN SU 500-400 MG
1 CAPSULE ORAL
Qty: 150 STRIP | Refills: 3 | Status: SHIPPED | OUTPATIENT
Start: 2019-07-23 | End: 2019-12-30

## 2019-07-23 RX ORDER — LANCETS 30 GAUGE
1 EACH MISCELLANEOUS
Qty: 150 EACH | Refills: 3 | Status: SHIPPED | OUTPATIENT
Start: 2019-07-23 | End: 2022-01-01 | Stop reason: SDUPTHER

## 2019-07-23 ASSESSMENT — ENCOUNTER SYMPTOMS
SHORTNESS OF BREATH: 0
SINUS PRESSURE: 0
WHEEZING: 0
SORE THROAT: 0
EYE PAIN: 0
COUGH: 0
RHINORRHEA: 0
NAUSEA: 0
VOMITING: 0
DIARRHEA: 0
EYE REDNESS: 0
ABDOMINAL PAIN: 0

## 2019-07-23 NOTE — PROGRESS NOTES
History reviewed. No pertinent surgical history.   Social History     Socioeconomic History    Marital status:      Spouse name: Not on file    Number of children: Not on file    Years of education: Not on file    Highest education level: Not on file   Occupational History    Not on file   Social Needs    Financial resource strain: Not on file    Food insecurity:     Worry: Not on file     Inability: Not on file    Transportation needs:     Medical: Not on file     Non-medical: Not on file   Tobacco Use    Smoking status: Never Smoker    Smokeless tobacco: Never Used   Substance and Sexual Activity    Alcohol use: Not on file    Drug use: Not on file    Sexual activity: Not on file   Lifestyle    Physical activity:     Days per week: 0 days     Minutes per session: 0 min    Stress: Not on file   Relationships    Social connections:     Talks on phone: Not on file     Gets together: More than three times a week     Attends Catholic service: Never     Active member of club or organization: No     Attends meetings of clubs or organizations: Never     Relationship status:     Intimate partner violence:     Fear of current or ex partner: Not on file     Emotionally abused: Not on file     Physically abused: Not on file     Forced sexual activity: Not on file   Other Topics Concern    Not on file   Social History Narrative         Lives With: Spouse    [place] [facility]  [id number],             son Sergio Dominguez in the area    Type of Home: House One level    Home Access: Level entry    Bathroom Shower/Tub: Tub/Shower unit    [employer]: Grab bars in shower, Shower chair    Home Equipment: 4 wheeled walker    Receives Help From: Family    ADL Assistance: Independent    Homemaking Assistance: (spouse and son able to assist)    Homemaking Responsibilities: No    Ambulation Assistance: Independent(rollator when needed)    Transfer Assistance: Independent Active : No    Patient's  Info: son    Additional Comments: Pt [place] speaking interpretor Lige Boas present,  Pt recieves help from wife and son @ times    Primary Care Provider: PINEDA Wells CNP     Family History   Problem Relation Age of Onset    Diabetes Mother      No Known Allergies    Current Outpatient Medications:     insulin aspart (NOVOLOG FLEXPEN) 100 UNIT/ML injection pen, 30 units at each meals, Disp: 5 pen, Rfl: 3    insulin glargine (BASAGLAR KWIKPEN) 100 UNIT/ML injection pen, 20 units at bedtime, Disp: 5 pen, Rfl: 3    blood glucose monitor strips, 1 strip by Other route 4 times daily (before meals and nightly), Disp: 150 strip, Rfl: 3    Lancets MISC, 1 each by Does not apply route 4 times daily (before meals and nightly), Disp: 150 each, Rfl: 3    Insulin Pen Needle (NOVOFINE) 32G X 6 MM MISC, 1 Device by Does not apply route 4 times daily (before meals and nightly), Disp: 300 each, Rfl: 3    losartan (COZAAR) 50 MG tablet, Take 0.5 tablets by mouth daily, Disp: 30 tablet, Rfl: 3    carbidopa-levodopa (SINEMET)  MG per tablet, Take 1 tablet by mouth 2 times daily, Disp: 90 tablet, Rfl: 3    metFORMIN (GLUCOPHAGE) 1000 MG tablet, Take 1,000 mg by mouth 2 times daily (with meals), Disp: , Rfl:     simvastatin (ZOCOR) 40 MG tablet, Take 40 mg by mouth nightly, Disp: , Rfl:     sertraline (ZOLOFT) 50 MG tablet, Take 50 mg by mouth daily, Disp: , Rfl:   Lab Results   Component Value Date     06/20/2019    K 3.9 06/20/2019     06/20/2019    CO2 26 06/20/2019    BUN 13 06/20/2019    CREATININE 0.81 06/20/2019    GLUCOSE 209 07/23/2019    CALCIUM 8.4 (L) 06/20/2019    PROT 7.6 06/16/2019    LABALBU 4.4 06/16/2019    BILITOT 0.5 06/16/2019    ALKPHOS 104 06/16/2019    AST 13 06/16/2019    ALT 16 06/16/2019    LABGLOM >60.0 06/20/2019    GFRAA >60.0 06/20/2019    GLOB 3.2 06/16/2019     Lab Results   Component Value Date    WBC 4.6 (L) 06/21/2019

## 2019-07-29 ENCOUNTER — OFFICE VISIT (OUTPATIENT)
Dept: UROLOGY | Age: 69
End: 2019-07-29
Payer: COMMERCIAL

## 2019-07-29 VITALS
HEART RATE: 73 BPM | HEIGHT: 65 IN | WEIGHT: 168 LBS | DIASTOLIC BLOOD PRESSURE: 74 MMHG | BODY MASS INDEX: 27.99 KG/M2 | SYSTOLIC BLOOD PRESSURE: 134 MMHG

## 2019-07-29 DIAGNOSIS — R30.0 DYSURIA: ICD-10-CM

## 2019-07-29 DIAGNOSIS — R33.9 URINARY RETENTION: Primary | ICD-10-CM

## 2019-07-29 LAB
BILIRUBIN, POC: NORMAL
BLOOD URINE, POC: NORMAL
CLARITY, POC: CLEAR
COLOR, POC: YELLOW
GLUCOSE URINE, POC: NORMAL
KETONES, POC: NORMAL
LEUKOCYTE EST, POC: NORMAL
NITRITE, POC: NORMAL
PH, POC: 5.5
POST VOID RESIDUAL (PVR): 12 ML
PROTEIN, POC: NORMAL
SPECIFIC GRAVITY, POC: 1.02
UROBILINOGEN, POC: 0.2

## 2019-07-29 PROCEDURE — 4040F PNEUMOC VAC/ADMIN/RCVD: CPT | Performed by: UROLOGY

## 2019-07-29 PROCEDURE — G8427 DOCREV CUR MEDS BY ELIG CLIN: HCPCS | Performed by: UROLOGY

## 2019-07-29 PROCEDURE — 99202 OFFICE O/P NEW SF 15 MIN: CPT | Performed by: UROLOGY

## 2019-07-29 PROCEDURE — 1123F ACP DISCUSS/DSCN MKR DOCD: CPT | Performed by: UROLOGY

## 2019-07-29 PROCEDURE — 51798 US URINE CAPACITY MEASURE: CPT | Performed by: UROLOGY

## 2019-07-29 PROCEDURE — 3017F COLORECTAL CA SCREEN DOC REV: CPT | Performed by: UROLOGY

## 2019-07-29 PROCEDURE — 1036F TOBACCO NON-USER: CPT | Performed by: UROLOGY

## 2019-07-29 PROCEDURE — G8419 CALC BMI OUT NRM PARAM NOF/U: HCPCS | Performed by: UROLOGY

## 2019-07-29 PROCEDURE — 1111F DSCHRG MED/CURRENT MED MERGE: CPT | Performed by: UROLOGY

## 2019-07-29 PROCEDURE — 81003 URINALYSIS AUTO W/O SCOPE: CPT | Performed by: UROLOGY

## 2019-07-29 RX ORDER — TAMSULOSIN HYDROCHLORIDE 0.4 MG/1
CAPSULE ORAL
Refills: 0 | COMMUNITY
Start: 2019-07-11

## 2019-07-29 RX ORDER — AMANTADINE HYDROCHLORIDE 100 MG/1
CAPSULE, GELATIN COATED ORAL
Refills: 2 | COMMUNITY
Start: 2019-07-20 | End: 2019-10-03

## 2019-07-29 RX ORDER — GLIMEPIRIDE 2 MG/1
TABLET ORAL
Refills: 2 | COMMUNITY
Start: 2019-07-11 | End: 2020-07-08 | Stop reason: ALTCHOICE

## 2019-08-13 ENCOUNTER — NURSE ONLY (OUTPATIENT)
Dept: PHYSICAL MEDICINE AND REHAB | Age: 69
End: 2019-08-13

## 2019-10-01 DIAGNOSIS — Z79.4 TYPE 2 DIABETES MELLITUS WITH HYPERGLYCEMIA, WITH LONG-TERM CURRENT USE OF INSULIN (HCC): ICD-10-CM

## 2019-10-01 DIAGNOSIS — E11.65 TYPE 2 DIABETES MELLITUS WITH HYPERGLYCEMIA, WITH LONG-TERM CURRENT USE OF INSULIN (HCC): ICD-10-CM

## 2019-10-01 LAB
ANION GAP SERPL CALCULATED.3IONS-SCNC: 15 MEQ/L (ref 9–15)
BUN BLDV-MCNC: 17 MG/DL (ref 8–23)
CALCIUM SERPL-MCNC: 9.4 MG/DL (ref 8.5–9.9)
CHLORIDE BLD-SCNC: 99 MEQ/L (ref 95–107)
CHOLESTEROL, TOTAL: 148 MG/DL (ref 0–199)
CO2: 25 MEQ/L (ref 20–31)
CREAT SERPL-MCNC: 0.93 MG/DL (ref 0.7–1.2)
CREATININE URINE: 117.9 MG/DL
GFR AFRICAN AMERICAN: >60
GFR NON-AFRICAN AMERICAN: >60
GLUCOSE BLD-MCNC: 205 MG/DL (ref 70–99)
HBA1C MFR BLD: 8.8 % (ref 4.8–5.9)
HDLC SERPL-MCNC: 62 MG/DL (ref 40–59)
LDL CHOLESTEROL CALCULATED: 73 MG/DL (ref 0–129)
MICROALBUMIN UR-MCNC: 22.5 MG/DL
MICROALBUMIN/CREAT UR-RTO: 190.8 MG/G (ref 0–30)
POTASSIUM SERPL-SCNC: 4.5 MEQ/L (ref 3.4–4.9)
SODIUM BLD-SCNC: 139 MEQ/L (ref 135–144)
TRIGL SERPL-MCNC: 63 MG/DL (ref 0–150)

## 2019-10-03 ENCOUNTER — OFFICE VISIT (OUTPATIENT)
Dept: ENDOCRINOLOGY | Age: 69
End: 2019-10-03
Payer: COMMERCIAL

## 2019-10-03 VITALS
DIASTOLIC BLOOD PRESSURE: 84 MMHG | HEART RATE: 77 BPM | BODY MASS INDEX: 29.66 KG/M2 | SYSTOLIC BLOOD PRESSURE: 160 MMHG | HEIGHT: 65 IN | WEIGHT: 178 LBS

## 2019-10-03 DIAGNOSIS — E11.65 TYPE 2 DIABETES MELLITUS WITH HYPERGLYCEMIA, WITH LONG-TERM CURRENT USE OF INSULIN (HCC): Primary | ICD-10-CM

## 2019-10-03 DIAGNOSIS — Z79.4 TYPE 2 DIABETES MELLITUS WITH HYPERGLYCEMIA, WITH LONG-TERM CURRENT USE OF INSULIN (HCC): Primary | ICD-10-CM

## 2019-10-03 LAB
CHP ED QC CHECK: NORMAL
GLUCOSE BLD-MCNC: 333 MG/DL

## 2019-10-03 PROCEDURE — 3045F PR MOST RECENT HEMOGLOBIN A1C LEVEL 7.0-9.0%: CPT | Performed by: PHYSICIAN ASSISTANT

## 2019-10-03 PROCEDURE — 1123F ACP DISCUSS/DSCN MKR DOCD: CPT | Performed by: PHYSICIAN ASSISTANT

## 2019-10-03 PROCEDURE — G8427 DOCREV CUR MEDS BY ELIG CLIN: HCPCS | Performed by: PHYSICIAN ASSISTANT

## 2019-10-03 PROCEDURE — 99214 OFFICE O/P EST MOD 30 MIN: CPT | Performed by: PHYSICIAN ASSISTANT

## 2019-10-03 PROCEDURE — 4040F PNEUMOC VAC/ADMIN/RCVD: CPT | Performed by: PHYSICIAN ASSISTANT

## 2019-10-03 PROCEDURE — 1036F TOBACCO NON-USER: CPT | Performed by: PHYSICIAN ASSISTANT

## 2019-10-03 PROCEDURE — G8419 CALC BMI OUT NRM PARAM NOF/U: HCPCS | Performed by: PHYSICIAN ASSISTANT

## 2019-10-03 PROCEDURE — 3017F COLORECTAL CA SCREEN DOC REV: CPT | Performed by: PHYSICIAN ASSISTANT

## 2019-10-03 PROCEDURE — 2022F DILAT RTA XM EVC RTNOPTHY: CPT | Performed by: PHYSICIAN ASSISTANT

## 2019-10-03 PROCEDURE — G8484 FLU IMMUNIZE NO ADMIN: HCPCS | Performed by: PHYSICIAN ASSISTANT

## 2019-10-03 PROCEDURE — 82962 GLUCOSE BLOOD TEST: CPT | Performed by: PHYSICIAN ASSISTANT

## 2019-10-03 RX ORDER — FLASH GLUCOSE SCANNING READER
1 EACH MISCELLANEOUS
Qty: 1 DEVICE | Refills: 0 | Status: SHIPPED | OUTPATIENT
Start: 2019-10-03 | End: 2020-10-27 | Stop reason: SDUPTHER

## 2019-10-03 RX ORDER — GLUCOSAMINE HCL/CHONDROITIN SU 500-400 MG
1 CAPSULE ORAL
Qty: 150 STRIP | Refills: 3 | Status: SHIPPED | OUTPATIENT
Start: 2019-10-03

## 2019-10-03 RX ORDER — LANCETS 30 GAUGE
1 EACH MISCELLANEOUS
Qty: 150 EACH | Refills: 3 | Status: SHIPPED | OUTPATIENT
Start: 2019-10-03

## 2019-10-03 RX ORDER — FLASH GLUCOSE SENSOR
2 KIT MISCELLANEOUS
Qty: 2 EACH | Refills: 3 | Status: SHIPPED | OUTPATIENT
Start: 2019-10-03 | End: 2020-05-01 | Stop reason: SDUPTHER

## 2019-10-03 RX ORDER — LOSARTAN POTASSIUM 50 MG/1
50 TABLET ORAL DAILY
Qty: 30 TABLET | Refills: 3 | Status: SHIPPED | OUTPATIENT
Start: 2019-10-03 | End: 2020-01-27

## 2019-10-03 ASSESSMENT — ENCOUNTER SYMPTOMS
ABDOMINAL PAIN: 0
VOMITING: 0
DIARRHEA: 0
EYE PAIN: 0
SHORTNESS OF BREATH: 0
SORE THROAT: 0
NAUSEA: 0
RHINORRHEA: 0
COUGH: 0
EYE REDNESS: 0
WHEEZING: 0
SINUS PRESSURE: 0

## 2019-12-30 RX ORDER — BLOOD-GLUCOSE METER
KIT MISCELLANEOUS
Qty: 150 STRIP | Refills: 3 | Status: SHIPPED | OUTPATIENT
Start: 2019-12-30 | End: 2021-01-04

## 2020-01-07 NOTE — FLOWSHEET NOTE
OFFICE VISIT      Patient: Anitra Tucker Date of Service: 2020   : 1952 MRN: [id number]     SUBJECTIVE:     Chief Complaint   Patient presents with   • Follow-up     Discuss if lab work is needed    • Hypertension   • Knee Pain       HISTORY OF PRESENT ILLNESS:  Anitra Tucker is a 67 year old female who presents today for medicare wellness visit.    Encounter for Medicare annual wellness exam:   Transportation needs: Does drive on her own.   Current medications: Does not take daily Aspirin. No family history of heart attack/stroke. Mother was 99 when she  and father for 95 year old.  Safety: Does not have any safety concerns at home.  Has a good support system at home.  No problems with medication refill.  No major aches and pains.  No hearing issues.  No depression. Has no memory related concerns.  Labs: Up to date with labs.   Does not take vaccinations.    Essential hypertension:   Reports history of HTN. She does follow intermittent fasting. Inquires what is keto friendly meal. Her home blood pressure reading was systolic 138, 140 and 160 mmHg. Intends to lose weight. Requests refill for Amlodipine.    Advance directive discussed with patient:   Advanced care planning: Has filled the ACP form; Moshe is her healthcare proxy.    Full code status:   Wants to be resuscitated.    H/O knee surgery:   Reports past history of knee surgery. Has knee pain for the past couple of days. Is taking supplements for arthritis except for turmeric. States that she noticed her blood pressure was elevated after taking supplements so inquires if she should stop taking supplements. Is concerned about her knee pain. Does exercise and physical activity for knee pain.    Additional comments:  Requests refill for Loratadine.   Last labs were done in 2019. Requests order for labs.       PAST MEDICAL HISTORY:  No past medical history on file.    MEDICATIONS:  Current Outpatient Medications   Medication Sig   •  1415  Patient arrived from ICU to the floor via cart at this time. Patient is Togolese speaking only. Blue phone  used at this time to communicate with patient. He denies any needs at this time. Vitals obtained at this time and are stable. Patient stated he is comfortable. Urinal, water, and call light all within reach of the patient. 1600  Family to bedside. amLODIPine (NORVASC) 2.5 MG tablet Take 3 tablets by mouth daily.   • loratadine (CLARITIN) 10 MG tablet Take 1 tablet by mouth daily.   • amLODIPine (NORVASC) 2.5 MG tablet TAKE 3 TABLETS BY MOUTH DAILY   • amLODIPine (NORVASC) 2.5 MG tablet Take 3 tablets by mouth daily.   • metoPROLOL succinate (TOPROL-XL) 25 MG 24 hr tablet TAKE 1 TABLET BY MOUTH DAILY   • loratadine (CLARITIN) 10 MG tablet TAKE 1 TABLET BY MOUTH DAILY   • meloxicam (MOBIC) 7.5 MG tablet TAKE 1 TABLET BY MOUTH TWICE DAILY   • meloxicam (MOBIC) 7.5 MG tablet Take 1 tablet by mouth 2 times daily.   • triamcinolone (ARISTOCORT) 0.1 % ointment Comp'd 4% Sulfur in triamcinolone 0.1% oint.  Apply once daily for itchy scalp. (Refill Rx [phone])   • albuterol (PROAIR HFA) 108 (90 Base) MCG/ACT inhaler INHALE 2 PUFFS EVERY 4-6 HOURS AS NEEDED     No current facility-administered medications for this visit.        ALLERGIES:  ALLERGIES:   Allergen Reactions   • Metronidazole RASH       PAST SURGICAL HISTORY:  No past surgical history on file.    FAMILY HISTORY:  No family history on file.    SOCIAL HISTORY:  Social History     Tobacco Use   Smoking Status Never Smoker   Smokeless Tobacco Never Used     Social History     Substance and Sexual Activity   Alcohol Use No   • Frequency: Never       Review of Systems  Constitutional: Per HPI.  HENT: Per HPI.  Respiratory: Per HPI.   Cardiovascular: Per HPI.  Musculoskeletal: Per HPI.  Neurological: Per HPI.  Psychiatric/Behavioral: Per HPI.  All other systems reviewed and are negative.    OBJECTIVE:     Visit Vitals  /70 (BP Location: RUE - Right upper extremity, Patient Position: Sitting)   Pulse 76   Temp 97.5 °F (36.4 °C)   Resp 18   Ht 5' 6\" (1.676 m)   Wt 88.3 kg (194 lb 10.7 oz)   SpO2 99%   BMI 31.42 kg/m²       Physical Exam      Constitutional: In no acute distress. Well developed.  Eyes: The conjunctiva exhibited no abnormalities. The sclera was normal.  Respiratory: Breath sounds clear to  auscultation bilaterally, but no respiratory distress and normal respiratory rate and effort.  Cardiovascular: Normal rate, regular rhythm, normal S1, normal S2 and edema was not present in the lower extremities.   Psychiatric: Oriented to time, place, and person. The mood was normal. The affect was normal. The memory was unimpaired.  Skin: Skin moisture and turgor normal.    DIAGNOSTIC STUDIES:   LAB RESULTS:    No visits with results within 1 Month(s) from this visit.   Latest known visit with results is:   Lab Services on 08/31/2019   Component Date Value Ref Range Status   • Fasting Status 08/31/2019 UNKNOWN  hrs Final   • Sodium 08/31/2019 140  135 - 145 mmol/L Final   • Potassium 08/31/2019 4.4  3.4 - 5.1 mmol/L Final   • Chloride 08/31/2019 107  98 - 107 mmol/L Final   • Carbon Dioxide 08/31/2019 29  21 - 32 mmol/L Final   • Anion Gap 08/31/2019 8* 10 - 20 mmol/L Final   • Glucose 08/31/2019 85  65 - 99 mg/dL Final   • BUN 08/31/2019 22* 6 - 20 mg/dL Final   • Creatinine 08/31/2019 0.80  0.51 - 0.95 mg/dL Final   • GFR Estimate,  08/31/2019 90   Final   • GFR Estimate, Non  08/31/2019 77   Final   • BUN/Creatinine Ratio 08/31/2019 27* 7 - 25 Final   • CALCIUM 08/31/2019 9.6  8.4 - 10.2 mg/dL Final   • PROTIME 08/31/2019 11.2  9.7 - 11.8 sec Final   • INR 08/31/2019 1.0   Final   • COLOR 08/31/2019 YELLOW  YELLOW Final   • APPEARANCE 08/31/2019 CLEAR   Final   • GLUCOSE(URINE) 08/31/2019 NEGATIVE  NEGATIVE mg/dL Final   • BILIRUBIN 08/31/2019 NEGATIVE  NEGATIVE Final   • KETONES 08/31/2019 NEGATIVE  NEGATIVE mg/dL Final   • SPECIFIC GRAVITY 08/31/2019 1.020  1.005 - 1.030 Final   • BLOOD 08/31/2019 NEGATIVE  NEGATIVE Final   • pH 08/31/2019 5.0  5.0 - 7.0 Units Final   • PROTEIN(URINE) 08/31/2019 NEGATIVE  NEGATIVE mg/dL Final   • UROBILINOGEN 08/31/2019 0.2  0.0 - 1.0 mg/dL Final   • NITRITE 08/31/2019 NEGATIVE  NEGATIVE Final   • LEUKOCYTE ESTERASE 08/31/2019 NEGATIVE   NEGATIVE Final   • SPECIMEN TYPE 08/31/2019 URINE, CLEAN CATCH/MIDSTREAM   Final   • WBC 08/31/2019 4.2  4.2 - 11.0 K/mcL Final   • RBC 08/31/2019 4.48  4.00 - 5.20 mil/mcL Final   • HGB 08/31/2019 13.6  12.0 - 15.5 g/dL Final   • HCT 08/31/2019 41.9  36.0 - 46.5 % Final   • MCV 08/31/2019 93.5  78.0 - 100.0 fl Final   • MCH 08/31/2019 30.4  26.0 - 34.0 pg Final   • MCHC 08/31/2019 32.5  32.0 - 36.5 g/dL Final   • RDW-CV 08/31/2019 13.1  11.0 - 15.0 % Final   • PLT 08/31/2019 228  140 - 450 K/mcL Final   • NRBC 08/31/2019 0  0 /100 WBC Final   • DIFF TYPE 08/31/2019 AUTOMATED DIFFERENTIAL   Final   • Neutrophil 08/31/2019 42  % Final   • LYMPH 08/31/2019 44  % Final   • MONO 08/31/2019 12  % Final   • EOSIN 08/31/2019 1  % Final   • BASO 08/31/2019 1  % Final   • Percent Immature Granuloctyes 08/31/2019 0  % Final   • Absolute Neutrophil 08/31/2019 1.8  1.8 - 7.7 K/mcL Final   • Absolute Lymph 08/31/2019 1.8  1.0 - 4.0 K/mcL Final   • Absolute Mono 08/31/2019 0.5  0.3 - 0.9 K/mcL Final   • Absolute Eos 08/31/2019 0.1  0.1 - 0.5 K/mcL Final   • Absolute Baso 08/31/2019 0.0  0.0 - 0.3 K/mcL Final   • Absolute Immature Granulocytes 08/31/2019 0.0  0 - 0.2 K/mcl Final         ASSESSMENT AND PLAN:   This is a 67 year old year-old female who presents with     1. Encounter for Medicare annual wellness exam    2. Essential hypertension    3. Advance directive discussed with patient    4. Full code status    5. H/O knee surgery          Encounter for Medicare annual wellness exam:   Reviewed the revised guidelines- daily baby aspirin is not required to be taken preventatively if family history of cardiac disease is negative.  Discussed patient can visit [facility] if cannot visit this office for sick visit.  Recommended regular exercise, discussed strengthening quadriceps and doing squats will help reduce fall and fracture risk. Demonstration provided.  Advised to obtain labs as scheduled.   Due with  influenza vaccine.     Essential hypertension:   Vital signs reviewed.   Repeat blood pressure reading measured during the visit was 160/70 mmHg.  Refilled Amlodipine, use as directed its action, benefits, side effects and indications explained. Refer to orders.  Advised dietary sugar and starch reduction.  Recommended intermittent fasting and keto diet.  Dietary recommendations provided. Handout provided.   Advised to regularly monitor blood pressure at home and maintain records.  Educated on risk factors for heart diseases.  Recommended reading the book, Fix Your Diet.  Recommended natural supplements like apple cider vinegar (1 tablespoon diluted in water), garlic capsules odorless (2 daily), magnesium citrate 250 mg daily, omega 3 fish oil 2000 mg daily if tolerated, coenzyme Q10 200 mg daily. Handout provided.  Advised to limit stress and get adequate amount of sleep. Handout provided.     Advance directive discussed with patient:   Reviewed and signed the ACP and advanced directive form at office today.    Full code status:   Agrees for resuscitation.    H/O knee surgery:   Recommended natural supplements like collagen, turmeric with pepper, beef and chicken bone broth, glucosamine chondroitin, medicinal mushrooms. Handout provided.  Recommended following an exercise regimen. Handout provided.    Additional plan details:  Ordered labs. Refer to orders.  Refilled Loratadine, use as directed its action, benefits, side effects and indications explained. Refer to orders.    Follow up as needed.    Instructions provided as documented in the AVS.  Medication use,effects and side effects discussed in detail with patient.  The patient indicated understanding of the diagnosis and agreed with the plan of care.  Medical compliance with plan discussed and risks of non-compliance reviewed.    Patient education completed on disease process, etiology & prognosis.    Patient expresses understanding of the plan.    Proper  usage and side effects of medications reviewed & discussed.    Refer to orders.    Return to clinic as clinically indicated as discussed with patient who verbalized understanding of & agreement with the plan.    Entered by Dr. Lizzette Hernandez acting as scribe for MD Lizzette Arias

## 2020-01-27 RX ORDER — LOSARTAN POTASSIUM 50 MG/1
50 TABLET ORAL DAILY
Qty: 30 TABLET | Refills: 3 | Status: SHIPPED | OUTPATIENT
Start: 2020-01-27 | End: 2020-02-12

## 2020-02-04 ENCOUNTER — OFFICE VISIT (OUTPATIENT)
Dept: ENDOCRINOLOGY | Age: 70
End: 2020-02-04
Payer: COMMERCIAL

## 2020-02-04 VITALS
HEIGHT: 65 IN | HEART RATE: 76 BPM | DIASTOLIC BLOOD PRESSURE: 66 MMHG | WEIGHT: 176 LBS | BODY MASS INDEX: 29.32 KG/M2 | SYSTOLIC BLOOD PRESSURE: 146 MMHG

## 2020-02-04 DIAGNOSIS — E11.65 TYPE 2 DIABETES MELLITUS WITH HYPERGLYCEMIA, WITH LONG-TERM CURRENT USE OF INSULIN (HCC): ICD-10-CM

## 2020-02-04 DIAGNOSIS — Z79.4 TYPE 2 DIABETES MELLITUS WITH HYPERGLYCEMIA, WITH LONG-TERM CURRENT USE OF INSULIN (HCC): ICD-10-CM

## 2020-02-04 LAB
ANION GAP SERPL CALCULATED.3IONS-SCNC: 16 MEQ/L (ref 9–15)
BUN BLDV-MCNC: 12 MG/DL (ref 8–23)
CALCIUM SERPL-MCNC: 10.1 MG/DL (ref 8.5–9.9)
CHLORIDE BLD-SCNC: 99 MEQ/L (ref 95–107)
CHP ED QC CHECK: NORMAL
CO2: 26 MEQ/L (ref 20–31)
CREAT SERPL-MCNC: 0.88 MG/DL (ref 0.7–1.2)
GFR AFRICAN AMERICAN: >60
GFR NON-AFRICAN AMERICAN: >60
GLUCOSE BLD-MCNC: 41 MG/DL (ref 70–99)
GLUCOSE BLD-MCNC: 79 MG/DL
HBA1C MFR BLD: 10.3 % (ref 4.8–5.9)
HBA1C MFR BLD: 10.6 %
POTASSIUM SERPL-SCNC: 4 MEQ/L (ref 3.4–4.9)
SODIUM BLD-SCNC: 141 MEQ/L (ref 135–144)

## 2020-02-04 PROCEDURE — G8427 DOCREV CUR MEDS BY ELIG CLIN: HCPCS | Performed by: PHYSICIAN ASSISTANT

## 2020-02-04 PROCEDURE — G8417 CALC BMI ABV UP PARAM F/U: HCPCS | Performed by: PHYSICIAN ASSISTANT

## 2020-02-04 PROCEDURE — 2022F DILAT RTA XM EVC RTNOPTHY: CPT | Performed by: PHYSICIAN ASSISTANT

## 2020-02-04 PROCEDURE — G8484 FLU IMMUNIZE NO ADMIN: HCPCS | Performed by: PHYSICIAN ASSISTANT

## 2020-02-04 PROCEDURE — 82962 GLUCOSE BLOOD TEST: CPT | Performed by: PHYSICIAN ASSISTANT

## 2020-02-04 PROCEDURE — 4040F PNEUMOC VAC/ADMIN/RCVD: CPT | Performed by: PHYSICIAN ASSISTANT

## 2020-02-04 PROCEDURE — 83036 HEMOGLOBIN GLYCOSYLATED A1C: CPT | Performed by: PHYSICIAN ASSISTANT

## 2020-02-04 PROCEDURE — 3017F COLORECTAL CA SCREEN DOC REV: CPT | Performed by: PHYSICIAN ASSISTANT

## 2020-02-04 PROCEDURE — 3046F HEMOGLOBIN A1C LEVEL >9.0%: CPT | Performed by: PHYSICIAN ASSISTANT

## 2020-02-04 PROCEDURE — 1036F TOBACCO NON-USER: CPT | Performed by: PHYSICIAN ASSISTANT

## 2020-02-04 PROCEDURE — 1123F ACP DISCUSS/DSCN MKR DOCD: CPT | Performed by: PHYSICIAN ASSISTANT

## 2020-02-04 PROCEDURE — 99214 OFFICE O/P EST MOD 30 MIN: CPT | Performed by: PHYSICIAN ASSISTANT

## 2020-02-04 ASSESSMENT — ENCOUNTER SYMPTOMS
COUGH: 0
SINUS PRESSURE: 0
ABDOMINAL PAIN: 0
WHEEZING: 0
SORE THROAT: 0
EYE PAIN: 0
EYE REDNESS: 0
NAUSEA: 0
RHINORRHEA: 0
VOMITING: 0
DIARRHEA: 0
SHORTNESS OF BREATH: 0

## 2020-02-04 NOTE — PATIENT INSTRUCTIONS
Endocrinology-diabetes    1. Check your blood sugars 4 times a day, before meals and at night  2. Document these numbers and a blood glucose log and bring them with you to your follow-up appointment. 3. Do not take your mealtime insulin if your blood sugars less than 100  4. Call our office if you have blood sugars less than 80 or greater then 200 on two or more occasions  5. Call our office if you have any questions regarding your blood sugars or insulin dosing regiment  6. Signs of low blood sugar include sweating , heart racing, dizziness and weakness. Check your blood sugar if you have any of these symptoms. Medications  1. Increase Tresiba 30 units at night   2. Novolog 30 units before meals   3.  Continue metformin 1000 mg p.o. twice daily

## 2020-02-12 RX ORDER — LOSARTAN POTASSIUM 50 MG/1
50 TABLET ORAL DAILY
Qty: 30 TABLET | Refills: 3 | Status: SHIPPED
Start: 2020-02-12 | End: 2020-07-08 | Stop reason: SDUPTHER

## 2020-04-30 RX ORDER — INSULIN LISPRO 100 [IU]/ML
INJECTION, SOLUTION INTRAVENOUS; SUBCUTANEOUS
Qty: 10 PEN | Refills: 3 | Status: SHIPPED
Start: 2020-04-30 | End: 2020-07-08 | Stop reason: ALTCHOICE

## 2020-05-01 RX ORDER — FLASH GLUCOSE SENSOR
2 KIT MISCELLANEOUS
Qty: 2 EACH | Refills: 3 | Status: SHIPPED | OUTPATIENT
Start: 2020-05-01 | End: 2020-07-08 | Stop reason: SDUPTHER

## 2020-07-08 ENCOUNTER — OFFICE VISIT (OUTPATIENT)
Dept: ENDOCRINOLOGY | Age: 70
End: 2020-07-08
Payer: COMMERCIAL

## 2020-07-08 VITALS — SYSTOLIC BLOOD PRESSURE: 158 MMHG | DIASTOLIC BLOOD PRESSURE: 82 MMHG | HEART RATE: 77 BPM

## 2020-07-08 LAB
ALBUMIN SERPL-MCNC: 4.2 G/DL (ref 3.5–4.6)
ALP BLD-CCNC: 78 U/L (ref 35–104)
ALT SERPL-CCNC: 14 U/L (ref 0–41)
ANION GAP SERPL CALCULATED.3IONS-SCNC: 9 MEQ/L (ref 9–15)
AST SERPL-CCNC: 11 U/L (ref 0–40)
BILIRUB SERPL-MCNC: 0.3 MG/DL (ref 0.2–0.7)
BUN BLDV-MCNC: 17 MG/DL (ref 8–23)
CALCIUM SERPL-MCNC: 9.7 MG/DL (ref 8.5–9.9)
CHLORIDE BLD-SCNC: 99 MEQ/L (ref 95–107)
CHP ED QC CHECK: NORMAL
CO2: 28 MEQ/L (ref 20–31)
CREAT SERPL-MCNC: 0.9 MG/DL (ref 0.7–1.2)
GFR AFRICAN AMERICAN: >60
GFR NON-AFRICAN AMERICAN: >60
GLOBULIN: 3 G/DL (ref 2.3–3.5)
GLUCOSE BLD-MCNC: 273 MG/DL (ref 70–99)
GLUCOSE BLD-MCNC: 360 MG/DL
HBA1C MFR BLD: 9.7 %
HCT VFR BLD CALC: 42.1 % (ref 42–52)
HEMOGLOBIN: 14 G/DL (ref 14–18)
MCH RBC QN AUTO: 29.5 PG (ref 27–31.3)
MCHC RBC AUTO-ENTMCNC: 33.2 % (ref 33–37)
MCV RBC AUTO: 88.6 FL (ref 80–100)
PDW BLD-RTO: 13.5 % (ref 11.5–14.5)
PLATELET # BLD: 173 K/UL (ref 130–400)
POTASSIUM SERPL-SCNC: 4.5 MEQ/L (ref 3.4–4.9)
RBC # BLD: 4.75 M/UL (ref 4.7–6.1)
SODIUM BLD-SCNC: 136 MEQ/L (ref 135–144)
TOTAL PROTEIN: 7.2 G/DL (ref 6.3–8)
WBC # BLD: 4.4 K/UL (ref 4.8–10.8)

## 2020-07-08 PROCEDURE — 3017F COLORECTAL CA SCREEN DOC REV: CPT | Performed by: PHYSICIAN ASSISTANT

## 2020-07-08 PROCEDURE — 1036F TOBACCO NON-USER: CPT | Performed by: PHYSICIAN ASSISTANT

## 2020-07-08 PROCEDURE — 82962 GLUCOSE BLOOD TEST: CPT | Performed by: PHYSICIAN ASSISTANT

## 2020-07-08 PROCEDURE — 99214 OFFICE O/P EST MOD 30 MIN: CPT | Performed by: PHYSICIAN ASSISTANT

## 2020-07-08 PROCEDURE — G8417 CALC BMI ABV UP PARAM F/U: HCPCS | Performed by: PHYSICIAN ASSISTANT

## 2020-07-08 PROCEDURE — 83036 HEMOGLOBIN GLYCOSYLATED A1C: CPT | Performed by: PHYSICIAN ASSISTANT

## 2020-07-08 PROCEDURE — 4040F PNEUMOC VAC/ADMIN/RCVD: CPT | Performed by: PHYSICIAN ASSISTANT

## 2020-07-08 PROCEDURE — 1123F ACP DISCUSS/DSCN MKR DOCD: CPT | Performed by: PHYSICIAN ASSISTANT

## 2020-07-08 PROCEDURE — 2022F DILAT RTA XM EVC RTNOPTHY: CPT | Performed by: PHYSICIAN ASSISTANT

## 2020-07-08 PROCEDURE — G8427 DOCREV CUR MEDS BY ELIG CLIN: HCPCS | Performed by: PHYSICIAN ASSISTANT

## 2020-07-08 PROCEDURE — 3046F HEMOGLOBIN A1C LEVEL >9.0%: CPT | Performed by: PHYSICIAN ASSISTANT

## 2020-07-08 RX ORDER — INSULIN DEGLUDEC INJECTION 100 U/ML
38 INJECTION, SOLUTION SUBCUTANEOUS NIGHTLY
Qty: 4 PEN | Refills: 3 | Status: SHIPPED | OUTPATIENT
Start: 2020-07-08 | End: 2020-10-05 | Stop reason: ALTCHOICE

## 2020-07-08 RX ORDER — FLASH GLUCOSE SENSOR
2 KIT MISCELLANEOUS
Qty: 2 EACH | Refills: 5 | Status: SHIPPED | OUTPATIENT
Start: 2020-07-08 | End: 2020-07-27

## 2020-07-08 RX ORDER — INSULIN ASPART 100 [IU]/ML
32 INJECTION, SOLUTION INTRAVENOUS; SUBCUTANEOUS
Qty: 5 PEN | Refills: 3 | Status: SHIPPED | OUTPATIENT
Start: 2020-07-08 | End: 2020-10-05 | Stop reason: ALTCHOICE

## 2020-07-08 ASSESSMENT — ENCOUNTER SYMPTOMS
SHORTNESS OF BREATH: 0
ABDOMINAL PAIN: 0
NAUSEA: 0
SINUS PRESSURE: 0
WHEEZING: 0
DIARRHEA: 0
VOMITING: 0
COUGH: 0
EYE REDNESS: 0
EYE PAIN: 0
RHINORRHEA: 0
SORE THROAT: 0

## 2020-07-08 NOTE — PROGRESS NOTES
Assessment:       Diagnosis Orders   1. DM w/ coma type I, uncontrolled (HCC)  POCT Glucose    POCT glycosylated hemoglobin (Hb A1C)    Comprehensive Metabolic Panel    Hemoglobin A1C    Comprehensive Metabolic Panel    CBC     PLAN:     1. Increase Tresiba 38 units at night (30)  2. Increase Novolog 32 units before meals   3. Continue metformin 1000 mg p.o. twice daily  4. Monitor blood glucose 4 times daily document and bring to next visit  5. We will make insulin dosing adjustments based on these numbers  6. Patient uses the freestyle mary carmen  7. Follow-up in 3 months    Orders Placed This Encounter   Procedures    Comprehensive Metabolic Panel     Standing Status:   Future     Standing Expiration Date:   2021    Hemoglobin A1C     Standing Status:   Future     Standing Expiration Date:   2021    Comprehensive Metabolic Panel     Standing Status:   Future     Standing Expiration Date:   2021    CBC     Standing Status:   Future     Standing Expiration Date:   2021    POCT Glucose    POCT glycosylated hemoglobin (Hb A1C)     Orders Placed This Encounter   Medications    Continuous Blood Gluc Sensor (FREESTYLE MARY CARMEN 14 DAY SENSOR) MISC     Si Devices by Does not apply route every 14 days     Dispense:  2 each     Refill:  5    Insulin Degludec (TRESIBA FLEXTOUCH) 100 UNIT/ML SOPN     Sig: Inject 38 Units into the skin nightly     Dispense:  4 pen     Refill:  3    Insulin Pen Needle (NOVOFINE) 32G X 6 MM MISC     Si Device by Does not apply route 4 times daily (before meals and nightly)     Dispense:  300 each     Refill:  3    insulin aspart (NOVOLOG FLEXPEN) 100 UNIT/ML injection pen     Sig: Inject 32 Units into the skin 3 times daily (before meals)     Dispense:  5 pen     Refill:  3     Return in about 3 months (around 10/8/2020) for Diabetes.   Subjective:     Chief Complaint   Patient presents with    Diabetes     Vitals:    20 1351   BP: (!) 158/82   Site: Right Upper Arm   Position: Sitting   Cuff Size: Medium Adult   Pulse: 77     Wt Readings from Last 3 Encounters:   02/04/20 176 lb (79.8 kg)   10/03/19 178 lb (80.7 kg)   07/29/19 168 lb (76.2 kg)     BP Readings from Last 3 Encounters:   07/08/20 (!) 158/82   02/04/20 (!) 146/66   10/03/19 (!) 160/84     This visit was done utilizing the  phone system. Discharge instructions were given verbally and written. The patient did not bring his glucose logs and was unable to identify what his blood glucose levels were averaging in the morning or postprandial.  He was accompanied by his son but did not know any of the answers to my questions. This patient is very difficult to manage despite using the  phone often times does not seem to understand what is being asked of him, he and his son seem to have a better understanding today and his glycemic control has slightly improved with the help of the CGM. I will slowly increase his insulin dosing, send him to the lab today to get an A1c and CMP. Diabetes   He presents for his follow-up diabetic visit. He has type 2 diabetes mellitus. The initial diagnosis of diabetes was made 5 years ago. His disease course has been worsening. Pertinent negatives for hypoglycemia include no dizziness or headaches. Associated symptoms include weakness. Pertinent negatives for diabetes include no chest pain, no fatigue, no polyphagia and no polyuria. There are no hypoglycemic complications. Symptoms are stable. Risk factors for coronary artery disease include male sex, hypertension and diabetes mellitus. Current diabetic treatment includes insulin injections and oral agent (monotherapy). He is compliant with treatment all of the time. His weight is stable. He is following a generally unhealthy diet. Meal planning includes avoidance of concentrated sweets. He has not had a previous visit with a dietitian. He rarely participates in exercise.  His home blood glucose trend is decreasing steadily. An ACE inhibitor/angiotensin II receptor blocker is being taken. He does not see a podiatrist.Eye exam is not current. Past Medical History:   Diagnosis Date    Hyperlipidemia     Hypertension     [facility] ([facility] [address]     Type 2 diabetes mellitus without complication ([facility] [address]      History reviewed. No pertinent surgical history.   Social History     Socioeconomic History    Marital status:      Spouse name: Not on file    Number of children: Not on file    Years of education: Not on file    Highest education level: Not on file   Occupational History    Not on file   Social Needs    Financial resource strain: Not on file    Food insecurity     Worry: Not on file     Inability: Not on file    Transportation needs     Medical: Not on file     Non-medical: Not on file   Tobacco Use    Smoking status: Never Smoker    Smokeless tobacco: Never Used   Substance and Sexual Activity    Alcohol use: Not on file    Drug use: Not on file    Sexual activity: Not on file   Lifestyle    Physical activity     Days per week: 0 days     Minutes per session: 0 min    Stress: Not on file   Relationships    Social connections     Talks on phone: Not on file     Gets together: More than three times a week     Attends Hindu service: Never     Active member of club or organization: No     Attends meetings of clubs or organizations: Never     Relationship status:     Intimate partner violence     Fear of current or ex partner: Not on file     Emotionally abused: Not on file     Physically abused: Not on file     Forced sexual activity: Not on file   Other Topics Concern    Not on file   Social History Narrative         Lives With: Spouse    Recently moved to [place] [facility] from [facility]  late--2018,             son Violette Quinones in the area    Type of Home: House One level    Home Access: Level entry    Bathroom Shower/Tub: Tub/Shower unit    [employer]: Grab bars in shower, Shower chair    Home Equipment: 4 wheeled walker    Receives Help From: Family    ADL Assistance: [address]: (spouse and son able to assist)    Homemaking Responsibilities: No    Ambulation Assistance: Independent(rollator when needed)    Transfer Assistance: Independent    Active : No    Patient's  Info: son    Additional Comments: Pt Venezuelan speaking interpretor Janki Richards present,  Pt recieves help from wife and son @ times    Primary Care Provider: Rosario Garcia, APRN - CNP     Family History   Problem Relation Age of Onset    Diabetes Mother      No Known Allergies    Current Outpatient Medications:     Continuous Blood Gluc Sensor (FREESTYLE MARY CARMEN 14 DAY SENSOR) MISC, 2 Devices by Does not apply route every 14 days, Disp: 2 each, Rfl: 5    Insulin Degludec (TRESIBA FLEXTOUCH) 100 UNIT/ML SOPN, Inject 38 Units into the skin nightly, Disp: 4 pen, Rfl: 3    Insulin Pen Needle (NOVOFINE) 32G X 6 MM MISC, 1 Device by Does not apply route 4 times daily (before meals and nightly), Disp: 300 each, Rfl: 3    insulin aspart (NOVOLOG FLEXPEN) 100 UNIT/ML injection pen, Inject 32 Units into the skin 3 times daily (before meals), Disp: 5 pen, Rfl: 3    metFORMIN (GLUCOPHAGE) 1000 MG tablet, TAKE 1 TABLET BY MOUTH 2 TIMES DAILY (WITH MEALS), Disp: 60 tablet, Rfl: 3    FREESTYLE LITE strip, 1 STRIP BY OTHER ROUTE 4 TIMES DAILY (BEFORE MEALS AND NIGHTLY), Disp: 150 strip, Rfl: 3    blood glucose monitor strips, 1 strip by Other route 4 times daily (before meals and nightly), Disp: 150 strip, Rfl: 3    blood glucose monitor kit and supplies, 1 kit by Other route 4 times daily (before meals and nightly), Disp: 1 kit, Rfl: 0    Lancets MISC, 1 each by Does not apply route 4 times daily (before meals and nightly), Disp: 150 each, Rfl: 3    Continuous Blood Gluc  (FREESTYLE MARY CARMEN 14 DAY READER) YENY, 1 Device by Does not apply route 4 times daily (before meals and nightly), Disp: 1 Device, Rfl: 0    tamsulosin (FLOMAX) 0.4 MG capsule, , Disp: , Rfl: 0    Lancets MISC, 1 each by Does not apply route 4 times daily (before meals and nightly), Disp: 150 each, Rfl: 3    losartan (COZAAR) 50 MG tablet, Take 0.5 tablets by mouth daily, Disp: 30 tablet, Rfl: 3    carbidopa-levodopa (SINEMET)  MG per tablet, Take 1 tablet by mouth 2 times daily, Disp: 90 tablet, Rfl: 3    simvastatin (ZOCOR) 40 MG tablet, Take 40 mg by mouth nightly, Disp: , Rfl:     sertraline (ZOLOFT) 50 MG tablet, Take 50 mg by mouth daily, Disp: , Rfl:   Lab Results   Component Value Date     02/04/2020    K 4.0 02/04/2020    CL 99 02/04/2020    CO2 26 02/04/2020    BUN 12 02/04/2020    CREATININE 0.88 02/04/2020    GLUCOSE 360 07/08/2020    CALCIUM 10.1 (H) 02/04/2020    PROT 7.6 06/16/2019    LABALBU 4.4 06/16/2019    BILITOT 0.5 06/16/2019    ALKPHOS 104 06/16/2019    AST 13 06/16/2019    ALT 16 06/16/2019    LABGLOM >60.0 02/04/2020    GFRAA >60.0 02/04/2020    GLOB 3.2 06/16/2019     Lab Results   Component Value Date    WBC 4.6 (L) 06/21/2019    HGB 13.3 (L) 06/21/2019    HCT 40.0 (L) 06/21/2019    MCV 88.9 06/21/2019     06/21/2019     Lab Results   Component Value Date    LABA1C 9.7 07/08/2020    LABA1C 10.3 (H) 02/04/2020    LABA1C 10.6 02/04/2020   Results for Zari Kat (MRN [id number]) as of 2/4/2020 12:02   Ref. Range 6/18/2019 05:21   C-Peptide Latest Ref Range: 1.1 - 4.4 ng/mL 0.8 (L)     Lab Results   Component Value Date    CHOL 148 10/01/2019     Lab Results   Component Value Date    TRIG 63 10/01/2019     Lab Results   Component Value Date    HDL 62 (H) 10/01/2019     Lab Results   Component Value Date    LDLCALC 73 10/01/2019     No results found for: LABVLDL, VLDL  No results found for: CHOLHDLRATIO  No results found for: TESTOSTERONE  No results found for: TSH, T4FREE, THYROIDAB    Review of Systems   Constitutional: Negative for chills, fatigue and fever. HENT: Negative for congestion, ear pain, postnasal drip, rhinorrhea, sinus pressure and sore throat. Eyes: Negative for pain and redness. Respiratory: Negative for cough, shortness of breath and wheezing. Cardiovascular: Negative for chest pain, palpitations and leg swelling. Gastrointestinal: Negative for abdominal pain, diarrhea, nausea and vomiting. Endocrine: Negative for cold intolerance, heat intolerance, polyphagia and polyuria. Genitourinary: Negative for difficulty urinating. Musculoskeletal: Negative for arthralgias. Skin: Negative for rash. Neurological: Positive for weakness. Negative for dizziness and headaches. Hematological: Negative for adenopathy. Psychiatric/Behavioral: Negative for agitation. Objective:   Physical Exam  Vitals signs reviewed. Constitutional:       Appearance: He is well-developed. HENT:      Head: Normocephalic and atraumatic. Eyes:      Conjunctiva/sclera: Conjunctivae normal.   Neck:      Musculoskeletal: Normal range of motion and neck supple. Thyroid: No thyromegaly. Cardiovascular:      Rate and Rhythm: Normal rate and regular rhythm. Heart sounds: Normal heart sounds. Pulmonary:      Effort: Pulmonary effort is normal.      Breath sounds: Normal breath sounds. Abdominal:      General: Bowel sounds are normal. There is no distension. Palpations: Abdomen is soft. Tenderness: There is no abdominal tenderness. Musculoskeletal: Normal range of motion. Skin:     General: Skin is warm and dry. Neurological:      Mental Status: He is alert and oriented to person, place, and time.

## 2020-07-27 RX ORDER — FLASH GLUCOSE SENSOR
KIT MISCELLANEOUS
Qty: 2 EACH | Refills: 3 | Status: SHIPPED | OUTPATIENT
Start: 2020-07-27 | End: 2020-08-26

## 2020-08-24 RX ORDER — LOSARTAN POTASSIUM 50 MG/1
TABLET ORAL
Qty: 30 TABLET | Refills: 3 | Status: SHIPPED | OUTPATIENT
Start: 2020-08-24 | End: 2020-12-29

## 2020-08-26 RX ORDER — INSULIN LISPRO 100 [IU]/ML
INJECTION, SOLUTION INTRAVENOUS; SUBCUTANEOUS
Qty: 30 ML | Refills: 3 | Status: SHIPPED | OUTPATIENT
Start: 2020-08-26 | End: 2020-10-05 | Stop reason: ALTCHOICE

## 2020-08-26 RX ORDER — FLASH GLUCOSE SENSOR
KIT MISCELLANEOUS
Qty: 2 EACH | Refills: 3 | Status: SHIPPED | OUTPATIENT
Start: 2020-08-26 | End: 2020-09-16

## 2020-09-16 RX ORDER — FLASH GLUCOSE SENSOR
KIT MISCELLANEOUS
Qty: 2 EACH | Refills: 3 | Status: SHIPPED | OUTPATIENT
Start: 2020-09-16 | End: 2020-09-21

## 2020-09-21 RX ORDER — FLASH GLUCOSE SENSOR
KIT MISCELLANEOUS
Qty: 2 EACH | Refills: 3 | Status: SHIPPED | OUTPATIENT
Start: 2020-09-21 | End: 2021-08-16

## 2020-09-21 RX ORDER — INSULIN GLARGINE 100 [IU]/ML
INJECTION, SOLUTION SUBCUTANEOUS
Qty: 15 ML | Refills: 3 | Status: SHIPPED | OUTPATIENT
Start: 2020-09-21 | End: 2020-10-05 | Stop reason: ALTCHOICE

## 2020-10-05 ENCOUNTER — HOSPITAL ENCOUNTER (EMERGENCY)
Age: 70
Discharge: HOME | End: 2020-10-05
Attending: STUDENT IN AN ORGANIZED HEALTH CARE EDUCATION/TRAINING PROGRAM
Payer: COMMERCIAL

## 2020-10-05 VITALS
WEIGHT: 170 LBS | TEMPERATURE: 99.1 F | SYSTOLIC BLOOD PRESSURE: 152 MMHG | BODY MASS INDEX: 28.29 KG/M2 | RESPIRATION RATE: 16 BRPM | HEART RATE: 74 BPM | OXYGEN SATURATION: 100 % | DIASTOLIC BLOOD PRESSURE: 79 MMHG

## 2020-10-05 LAB
ALBUMIN SERPL-MCNC: 4.3 G/DL (ref 3.5–4.6)
ALP BLD-CCNC: 83 U/L (ref 35–104)
ALT SERPL-CCNC: 15 U/L (ref 0–41)
ANION GAP SERPL CALCULATED.3IONS-SCNC: 12 MEQ/L (ref 9–15)
AST SERPL-CCNC: 12 U/L (ref 0–40)
BACTERIA: NEGATIVE /HPF
BASE EXCESS VENOUS: 2 (ref -3–3)
BASOPHILS ABSOLUTE: 0 K/UL (ref 0–0.2)
BASOPHILS RELATIVE PERCENT: 0.4 %
BETA-HYDROXYBUTYRATE: 2.1 MG/DL (ref 0.2–2.8)
BILIRUB SERPL-MCNC: 0.3 MG/DL (ref 0.2–0.7)
BILIRUBIN URINE: NEGATIVE
BLOOD, URINE: ABNORMAL
BUN BLDV-MCNC: 18 MG/DL (ref 8–23)
CALCIUM IONIZED: 1.19 MMOL/L (ref 1.12–1.32)
CALCIUM SERPL-MCNC: 10 MG/DL (ref 8.5–9.9)
CHLORIDE BLD-SCNC: 92 MEQ/L (ref 95–107)
CHP ED QC CHECK: YES
CHP ED QC CHECK: YES
CLARITY: CLEAR
CO2: 28 MEQ/L (ref 20–31)
COLOR: YELLOW
CREAT SERPL-MCNC: 0.98 MG/DL (ref 0.7–1.2)
EOSINOPHILS ABSOLUTE: 0 K/UL (ref 0–0.7)
EOSINOPHILS RELATIVE PERCENT: 0.4 %
EPITHELIAL CELLS, UA: ABNORMAL /HPF (ref 0–5)
GFR AFRICAN AMERICAN: >60
GFR AFRICAN AMERICAN: >60
GFR NON-AFRICAN AMERICAN: >60
GFR NON-AFRICAN AMERICAN: >60
GLOBULIN: 3.2 G/DL (ref 2.3–3.5)
GLUCOSE BLD-MCNC: 183 MG/DL
GLUCOSE BLD-MCNC: 183 MG/DL (ref 60–115)
GLUCOSE BLD-MCNC: 282 MG/DL
GLUCOSE BLD-MCNC: 282 MG/DL (ref 60–115)
GLUCOSE BLD-MCNC: 426 MG/DL (ref 60–115)
GLUCOSE BLD-MCNC: 433 MG/DL (ref 70–99)
GLUCOSE URINE: >=1000 MG/DL
HCO3 VENOUS: 29.3 MMOL/L (ref 23–29)
HCT VFR BLD CALC: 43.1 % (ref 42–52)
HEMOGLOBIN: 14.3 G/DL (ref 14–18)
HEMOGLOBIN: 15 GM/DL (ref 13.5–17.5)
HYALINE CASTS: ABNORMAL /HPF (ref 0–5)
KETONES, URINE: NEGATIVE MG/DL
LACTATE: 4.19 MMOL/L (ref 0.4–2)
LEUKOCYTE ESTERASE, URINE: NEGATIVE
LYMPHOCYTES ABSOLUTE: 1.2 K/UL (ref 1–4.8)
LYMPHOCYTES RELATIVE PERCENT: 19.5 %
MCH RBC QN AUTO: 29.4 PG (ref 27–31.3)
MCHC RBC AUTO-ENTMCNC: 33.2 % (ref 33–37)
MCV RBC AUTO: 88.6 FL (ref 80–100)
MONOCYTES ABSOLUTE: 0.4 K/UL (ref 0.2–0.8)
MONOCYTES RELATIVE PERCENT: 6.9 %
NEUTROPHILS ABSOLUTE: 4.7 K/UL (ref 1.4–6.5)
NEUTROPHILS RELATIVE PERCENT: 72.8 %
NITRITE, URINE: NEGATIVE
O2 SAT, VEN: 57 %
PCO2, VEN: 64.5 MM HG (ref 40–50)
PDW BLD-RTO: 13.5 % (ref 11.5–14.5)
PERFORMED ON: ABNORMAL
PH UA: 7 (ref 5–9)
PH VENOUS: 7.26 (ref 7.35–7.45)
PLATELET # BLD: 175 K/UL (ref 130–400)
PO2, VEN: 35 MM HG
POC CHLORIDE: 104 MEQ/L (ref 99–110)
POC CREATININE: 0.9 MG/DL (ref 0.8–1.3)
POC HEMATOCRIT: 44 % (ref 41–53)
POC POTASSIUM: 4.5 MEQ/L (ref 3.5–5.1)
POC SAMPLE TYPE: ABNORMAL
POC SODIUM: 135 MEQ/L (ref 136–145)
POTASSIUM SERPL-SCNC: 4.5 MEQ/L (ref 3.4–4.9)
PROTEIN UA: 30 MG/DL
RBC # BLD: 4.86 M/UL (ref 4.7–6.1)
RBC UA: ABNORMAL /HPF (ref 0–5)
SODIUM BLD-SCNC: 132 MEQ/L (ref 135–144)
SPECIFIC GRAVITY UA: 1.02 (ref 1–1.03)
TCO2 CALC VENOUS: 31 MMOL/L
TOTAL PROTEIN: 7.5 G/DL (ref 6.3–8)
URINE REFLEX TO CULTURE: ABNORMAL
UROBILINOGEN, URINE: 1 E.U./DL
WBC # BLD: 6.4 K/UL (ref 4.8–10.8)
WBC UA: ABNORMAL /HPF (ref 0–5)

## 2020-10-05 PROCEDURE — 84295 ASSAY OF SERUM SODIUM: CPT

## 2020-10-05 PROCEDURE — 82803 BLOOD GASES ANY COMBINATION: CPT

## 2020-10-05 PROCEDURE — 82948 REAGENT STRIP/BLOOD GLUCOSE: CPT

## 2020-10-05 PROCEDURE — 85025 COMPLETE CBC W/AUTO DIFF WBC: CPT

## 2020-10-05 PROCEDURE — 99284 EMERGENCY DEPT VISIT MOD MDM: CPT

## 2020-10-05 PROCEDURE — 36600 WITHDRAWAL OF ARTERIAL BLOOD: CPT

## 2020-10-05 PROCEDURE — 81001 URINALYSIS AUTO W/SCOPE: CPT

## 2020-10-05 PROCEDURE — 82435 ASSAY OF BLOOD CHLORIDE: CPT

## 2020-10-05 PROCEDURE — 36415 COLL VENOUS BLD VENIPUNCTURE: CPT

## 2020-10-05 PROCEDURE — 84132 ASSAY OF SERUM POTASSIUM: CPT

## 2020-10-05 PROCEDURE — 82010 KETONE BODYS QUAN: CPT

## 2020-10-05 PROCEDURE — 83605 ASSAY OF LACTIC ACID: CPT

## 2020-10-05 PROCEDURE — 96372 THER/PROPH/DIAG INJ SC/IM: CPT

## 2020-10-05 PROCEDURE — 85014 HEMATOCRIT: CPT

## 2020-10-05 PROCEDURE — 99283 EMERGENCY DEPT VISIT LOW MDM: CPT

## 2020-10-05 PROCEDURE — 82330 ASSAY OF CALCIUM: CPT

## 2020-10-05 PROCEDURE — 2580000003 HC RX 258: Performed by: STUDENT IN AN ORGANIZED HEALTH CARE EDUCATION/TRAINING PROGRAM

## 2020-10-05 PROCEDURE — 82565 ASSAY OF CREATININE: CPT

## 2020-10-05 PROCEDURE — 80053 COMPREHEN METABOLIC PANEL: CPT

## 2020-10-05 RX ORDER — 0.9 % SODIUM CHLORIDE 0.9 %
1000 INTRAVENOUS SOLUTION INTRAVENOUS ONCE
Status: COMPLETED | OUTPATIENT
Start: 2020-10-05 | End: 2020-10-05

## 2020-10-05 RX ORDER — INSULIN GLARGINE 100 [IU]/ML
38 INJECTION, SOLUTION SUBCUTANEOUS NIGHTLY
Qty: 5 PEN | Refills: 3 | Status: SHIPPED | OUTPATIENT
Start: 2020-10-05 | End: 2020-10-08 | Stop reason: SDUPTHER

## 2020-10-05 RX ORDER — INSULIN LISPRO 100 [IU]/ML
32 INJECTION, SOLUTION INTRAVENOUS; SUBCUTANEOUS
Qty: 5 PEN | Refills: 3 | Status: SHIPPED | OUTPATIENT
Start: 2020-10-05 | End: 2020-10-08

## 2020-10-05 RX ADMIN — SODIUM CHLORIDE 1000 ML: 9 INJECTION, SOLUTION INTRAVENOUS at 13:53

## 2020-10-05 ASSESSMENT — PAIN DESCRIPTION - DESCRIPTORS: DESCRIPTORS: ACHING

## 2020-10-05 ASSESSMENT — PAIN DESCRIPTION - LOCATION: LOCATION: BACK

## 2020-10-05 ASSESSMENT — ENCOUNTER SYMPTOMS
ABDOMINAL PAIN: 0
BACK PAIN: 0
DIARRHEA: 0
CHEST TIGHTNESS: 0
COUGH: 0
SINUS PRESSURE: 0
NAUSEA: 0
SHORTNESS OF BREATH: 0
VOMITING: 0
TROUBLE SWALLOWING: 0

## 2020-10-05 ASSESSMENT — PAIN DESCRIPTION - ORIENTATION: ORIENTATION: LOWER

## 2020-10-05 ASSESSMENT — PAIN SCALES - GENERAL: PAINLEVEL_OUTOF10: 4

## 2020-10-05 ASSESSMENT — PAIN DESCRIPTION - PAIN TYPE: TYPE: CHRONIC PAIN

## 2020-10-05 NOTE — ED NOTES
Bed: 06  Expected date: 10/5/20  Expected time: 1:11 PM  Means of arrival: [facility]  Comments:  72 M - hyperglycemia. . 202/99,80,20,99% RA.  I8G IV/fluids/labs     Jose Rizo RN  10/05/20 7282

## 2020-10-05 NOTE — ED NOTES
Blood collected and sent to lab. IV fluids infusing without difficulty. Family at bedside.       Hector Adams RN  10/05/20 8871

## 2020-10-05 NOTE — ED PROVIDER NOTES
[address] [facility] ED  eMERGENCY dEPARTMENT eNCOUnter      Pt Name: Gay Montana  MRN: [id number]  [place] 1950  Date of evaluation: 10/5/2020  Provider: Quinn Page DO    CHIEF COMPLAINT       Chief Complaint   Patient presents with    Hyperglycemia     Squad got 433. Urinating alot. HISTORY OF PRESENT ILLNESS   (Location/Symptom, Timing/Onset,Context/Setting, Quality, Duration, Modifying Factors, Severity)  Note limiting factors. Gay Montana is a 72 y.o. male who presents to the emergency department with complaint of high blood sugar. Upon further questioning patient is also having frequency of urination. Patient denies nausea, vomiting or diarrhea. Patient's blood sugar per EMS was 433. Upon more interview the patient states he only injects his insulin in the back of his arms and in the (he points this area) right lower quadrant of the abdomen. Patient also states that 1 of his insulins is not able to be filled by his insurance company, the one that he takes at night. The patient has not contacted Lida Solis who is his provider. The history is provided by the patient and a relative. The history is limited by a language barrier. A  was used. NursingNotes were reviewed. REVIEW OF SYSTEMS    (2-9 systems for level 4, 10 or more for level 5)     Review of Systems   Constitutional: Negative for activity change, appetite change, chills, fever and unexpected weight change. HENT: Negative for drooling, ear pain, nosebleeds, sinus pressure and trouble swallowing. Respiratory: Negative for cough, chest tightness and shortness of breath. Cardiovascular: Negative for chest pain and leg swelling. Gastrointestinal: Negative for abdominal pain, diarrhea, nausea and vomiting. Endocrine: Positive for polyuria. Negative for polydipsia and polyphagia. Genitourinary: Negative for dysuria, flank pain and frequency.    Musculoskeletal: Negative for back pain and myalgias. Skin: Negative for pallor and rash. Neurological: Negative for syncope, weakness and headaches. Hematological: Does not bruise/bleed easily. All other systems reviewed and are negative. Except as noted above the remainder of the review of systems was reviewed and negative. PAST MEDICAL HISTORY     Past Medical History:   Diagnosis Date    Hyperlipidemia     Hypertension     Parkinson's disease ([facility] [address]     Type 2 diabetes mellitus without complication ([facility] [address]          SURGICALHISTORY     History reviewed. No pertinent surgical history.       CURRENT MEDICATIONS       Previous Medications    BASAGLAR KWIKPEN 100 UNIT/ML INJECTION PEN    INJECT 10 UNITS NOCHE    BLOOD GLUCOSE MONITOR KIT AND SUPPLIES    1 kit by Other route 4 times daily (before meals and nightly)    BLOOD GLUCOSE MONITOR STRIPS    1 strip by Other route 4 times daily (before meals and nightly)    CARBIDOPA-LEVODOPA (SINEMET)  MG PER TABLET    Take 1 tablet by mouth 2 times daily    CONTINUOUS BLOOD GLUC  (FREESTYLE MARY CARMEN 14 DAY READER) YENY    1 Device by Does not apply route 4 times daily (before meals and nightly)    CONTINUOUS BLOOD GLUC SENSOR (FREESTYLE MARY CARMEN 14 DAY SENSOR) MISC    2 DEVICES EVERY 14 DAYS    FREESTYLE LITE STRIP    1 STRIP BY OTHER ROUTE 4 TIMES DAILY (BEFORE MEALS AND NIGHTLY)    INSULIN ASPART (NOVOLOG FLEXPEN) 100 UNIT/ML INJECTION PEN    Inject 32 Units into the skin 3 times daily (before meals)    INSULIN DEGLUDEC (TRESIBA FLEXTOUCH) 100 UNIT/ML SOPN    Inject 38 Units into the skin nightly    INSULIN LISPRO, 1 UNIT DIAL, 100 UNIT/ML SOPN    INJECT 30 UNITS NICOLE VECES AL PHYLLIS WITH MEALS    INSULIN PEN NEEDLE (NOVOFINE) 32G X 6 MM MISC    1 Device by Does not apply route 4 times daily (before meals and nightly)    LANCETS MISC    1 each by Does not apply route 4 times daily (before meals and nightly)    LANCETS MISC    1 each by Does not apply route 4 times Bathroom Equipment: Grab bars in shower, Shower chair    Home Equipment: 4 wheeled walker    Receives Help From: Family    ADL Assistance: [address]: (spouse and son able to assist)    Homemaking Responsibilities: No    Ambulation Assistance: Independent(rollator when needed)    Transfer Assistance: Independent    Active : No    Patient's  Info: son    Additional Comments: Pt Setswana speaking interpretor Orval Ponds present,  Pt recieves help from wife and son @ times    Primary Care Provider: PINEDA Paez CNP       SCREENINGS    [place] Coma Scale  Eye Opening: Spontaneous  Best Verbal Response: Oriented  Best Motor Response: Obeys commands  [place] Coma Scale Score: 15 @FLOW(20188056)@      PHYSICAL EXAM    (up to 7 for level 4, 8 or more for level 5)     ED Triage Vitals [10/05/20 1326]   BP Temp Temp Source Pulse Resp SpO2 Height Weight   (!) 159/80 99.1 °F (37.3 °C) Oral 74 16 97 % -- 170 lb (77.1 kg)       Physical Exam  Vitals signs and nursing note reviewed. Constitutional:       General: He is awake. He is not in acute distress. Appearance: Normal appearance. He is well-developed and normal weight. He is not ill-appearing, toxic-appearing or diaphoretic. Comments: No photophobia. No phonophobia. HENT:      Head: Normocephalic and atraumatic. No Tripp's sign. Right Ear: External ear normal.      Left Ear: External ear normal.      Nose: Nose normal. No congestion or rhinorrhea. Mouth/Throat:      Mouth: Mucous membranes are dry. Pharynx: Oropharynx is clear. No oropharyngeal exudate or posterior oropharyngeal erythema. Eyes:      General: No scleral icterus. Right eye: No foreign body or discharge. Left eye: No discharge. Extraocular Movements: Extraocular movements intact. Conjunctiva/sclera: Conjunctivae normal.      Left eye: No exudate. Pupils: Pupils are equal, round, and reactive to light. Neck:      Musculoskeletal: Normal range of motion and neck supple. No neck rigidity. Vascular: No JVD. Trachea: No tracheal deviation. Comments: No meningismus. Cardiovascular:      Rate and Rhythm: Normal rate and regular rhythm. Heart sounds: Normal heart sounds. Heart sounds not distant. No murmur. No friction rub. No gallop. Pulmonary:      Effort: Pulmonary effort is normal. No respiratory distress. Breath sounds: Normal breath sounds. No stridor. No wheezing, rhonchi or rales. Chest:      Chest wall: No tenderness. Abdominal:      General: Abdomen is flat. Bowel sounds are normal. There is no distension. Palpations: Abdomen is soft. There is no mass. Tenderness: There is no abdominal tenderness. There is no right CVA tenderness, left CVA tenderness, guarding or rebound. Hernia: No hernia is present. Musculoskeletal: Normal range of motion. General: No swelling, tenderness, deformity or signs of injury. Lymphadenopathy:      Head:      Right side of head: No submental adenopathy. Left side of head: No submental adenopathy. Skin:     General: Skin is warm and dry. Capillary Refill: Capillary refill takes less than 2 seconds. Coloration: Skin is not jaundiced or pale. Findings: No bruising, erythema, lesion or rash. Neurological:      General: No focal deficit present. Mental Status: He is alert and oriented to person, place, and time. Mental status is at baseline. Cranial Nerves: No cranial nerve deficit. Sensory: No sensory deficit. Motor: No weakness. Coordination: Coordination normal.      Deep Tendon Reflexes: Reflexes are normal and symmetric. Psychiatric:         Mood and Affect: Mood normal.         Behavior: Behavior normal. Behavior is cooperative. Thought Content:  Thought content normal.         Judgment: Judgment normal.         DIAGNOSTIC RESULTS     EKG: All EKG's are interpreted by the Emergency Department Physician who either signs or Co-signsthis chart in the absence of a cardiologist.        RADIOLOGY:   [employer] [employer] such as CT, Ultrasound and MRI are read by the radiologist. Plain radiographic images are visualized and preliminarily interpreted by the emergency physician with the below findings:        Interpretation per the Radiologist below, if available at the time ofthis note:    No orders to display         ED BEDSIDE ULTRASOUND:   Performed by ED Physician - none    LABS:  Labs Reviewed   COMPREHENSIVE METABOLIC PANEL - Abnormal; Notable for the following components:       Result Value    Sodium 132 (*)     Chloride 92 (*)     Glucose 433 (*)     Calcium 10.0 (*)     All other components within normal limits    Narrative:     Eugenio Neville  [facility] tel. [phone],  glu results called to and read back by waqas yañez, 10/05/2020 14:38,  by QLR   URINE RT REFLEX TO CULTURE - Abnormal; Notable for the following components:    Glucose, Ur >=1000 (*)     Blood, Urine TRACE (*)     Protein, UA 30 (*)     All other components within normal limits   MICROSCOPIC URINALYSIS - Abnormal; Notable for the following components:    RBC, UA 3-5 (*)     All other components within normal limits   POCT GLUCOSE - Abnormal; Notable for the following components:    POC Glucose 282 (*)     All other components within normal limits   POCT VENOUS - Abnormal; Notable for the following components:    POC Sodium 135 (*)     POC Glucose 426 (*)     pH, Branden 7.265 (*)     pCO2, Branden 64.5 (*)     HCO3, Venous 29.3 (*)     Lactate 4.19 (*)     All other components within normal limits   POCT GLUCOSE - Abnormal; Notable for the following components:    POC Glucose 183 (*)     All other components within normal limits   POCT GLUCOSE - Normal   POCT GLUCOSE - Normal   BETA-HYDROXYBUTYRATE   CBC WITH AUTO DIFFERENTIAL   POCT GLUCOSE       All other labs were within normal range or not returned as of this dictation. EMERGENCY DEPARTMENT COURSE and DIFFERENTIAL DIAGNOSIS/MDM:   Vitals:    Vitals:    10/05/20 1326 10/05/20 1400 10/05/20 1431 10/05/20 1500   BP: (!) 159/80 (!) 155/77 (!) 163/103 (!) 152/79   Pulse: 74      Resp: 16      Temp: 99.1 °F (37.3 °C)      TempSrc: Oral      SpO2: 97% 99% 98% 100%   Weight: 170 lb (77.1 kg)              MDM  ED attending consulted with Tai Nobles endocrinology physician assistant. He will call in a prescription for the patient. The patient was given subcu Humalog as well as IV fluids. The patient is not in DKA. Venous blood gas analysis was analyzed and with correction factor he is not ascitic. There are no ketones of the patient's urine. The patient is stable. Instructions have been provided in Malay the patient verbalized understanding the care and has no further questions. Insulin site rotation was discussed with the patient. He is to follow-up with endocrinology tomorrow. CONSULTS:  None    PROCEDURES:  Unless otherwise noted below, none     Procedures    FINAL IMPRESSION      1.  Type 2 diabetes mellitus with hyperglycemia, without long-term current use of insulin [facility])          DISPOSITION/PLAN   DISPOSITION Decision To Discharge 10/05/2020 03:56:37 PM      PATIENT REFERRED TO:  SOCORRO Romero  [address] [place] [address]    Schedule an appointment as soon as possible for a visit in 1 day        DISCHARGE MEDICATIONS:  New Prescriptions    No medications on file          (Please note that portions of this note were completed with a voice recognition program.  Efforts were made to edit the dictations but occasionally words are mis-transcribed.)    Pastor Oneill DO (electronically signed)  Attending Emergency Physician          Pastor Oneill DO  10/05/20 8911

## 2020-10-05 NOTE — ED NOTES
Message left for Amada Lundberg, patients son, notifying him that patient has been discharged.       Ajay Cole RN  10/05/20 8524

## 2020-10-08 ENCOUNTER — OFFICE VISIT (OUTPATIENT)
Dept: ENDOCRINOLOGY | Age: 70
End: 2020-10-08
Payer: COMMERCIAL

## 2020-10-08 VITALS — OXYGEN SATURATION: 98 % | HEART RATE: 73 BPM | DIASTOLIC BLOOD PRESSURE: 78 MMHG | SYSTOLIC BLOOD PRESSURE: 140 MMHG

## 2020-10-08 LAB
CHP ED QC CHECK: NORMAL
GLUCOSE BLD-MCNC: 173 MG/DL
HBA1C MFR BLD: 9.5 %

## 2020-10-08 PROCEDURE — G8417 CALC BMI ABV UP PARAM F/U: HCPCS | Performed by: PHYSICIAN ASSISTANT

## 2020-10-08 PROCEDURE — 82962 GLUCOSE BLOOD TEST: CPT | Performed by: PHYSICIAN ASSISTANT

## 2020-10-08 PROCEDURE — 99214 OFFICE O/P EST MOD 30 MIN: CPT | Performed by: PHYSICIAN ASSISTANT

## 2020-10-08 PROCEDURE — 1036F TOBACCO NON-USER: CPT | Performed by: PHYSICIAN ASSISTANT

## 2020-10-08 PROCEDURE — 3046F HEMOGLOBIN A1C LEVEL >9.0%: CPT | Performed by: PHYSICIAN ASSISTANT

## 2020-10-08 PROCEDURE — G8484 FLU IMMUNIZE NO ADMIN: HCPCS | Performed by: PHYSICIAN ASSISTANT

## 2020-10-08 PROCEDURE — 3017F COLORECTAL CA SCREEN DOC REV: CPT | Performed by: PHYSICIAN ASSISTANT

## 2020-10-08 PROCEDURE — 4040F PNEUMOC VAC/ADMIN/RCVD: CPT | Performed by: PHYSICIAN ASSISTANT

## 2020-10-08 PROCEDURE — 2022F DILAT RTA XM EVC RTNOPTHY: CPT | Performed by: PHYSICIAN ASSISTANT

## 2020-10-08 PROCEDURE — 1123F ACP DISCUSS/DSCN MKR DOCD: CPT | Performed by: PHYSICIAN ASSISTANT

## 2020-10-08 PROCEDURE — G8427 DOCREV CUR MEDS BY ELIG CLIN: HCPCS | Performed by: PHYSICIAN ASSISTANT

## 2020-10-08 PROCEDURE — 83036 HEMOGLOBIN GLYCOSYLATED A1C: CPT | Performed by: PHYSICIAN ASSISTANT

## 2020-10-08 RX ORDER — INSULIN GLARGINE 100 [IU]/ML
42 INJECTION, SOLUTION SUBCUTANEOUS NIGHTLY
Qty: 5 PEN | Refills: 3 | Status: SHIPPED | OUTPATIENT
Start: 2020-10-08 | End: 2021-04-12

## 2020-10-08 RX ORDER — INSULIN LISPRO 100 [IU]/ML
INJECTION, SOLUTION INTRAVENOUS; SUBCUTANEOUS
Qty: 5 PEN | Refills: 3 | Status: SHIPPED | OUTPATIENT
Start: 2020-10-08 | End: 2021-03-01

## 2020-10-08 ASSESSMENT — ENCOUNTER SYMPTOMS
SINUS PRESSURE: 0
NAUSEA: 0
DIARRHEA: 0
EYE PAIN: 0
VOMITING: 0
COUGH: 0
EYE REDNESS: 0
ABDOMINAL PAIN: 0
WHEEZING: 0
SORE THROAT: 0
RHINORRHEA: 0
SHORTNESS OF BREATH: 0

## 2020-10-08 NOTE — PROGRESS NOTES
Assessment:       Diagnosis Orders   1. Type 2 diabetes mellitus with hyperglycemia, with long-term current use of insulin ([facility])  POCT Glucose    POCT glycosylated hemoglobin (Hb A1C)     PLAN:     1. Increase Lantus 42 units mornings    2. Increase Humalog 35 units before breakfast, 25 units before lunch, 35 units before meals   3. Continue metformin 1000 mg p.o. twice daily  4. Monitor blood glucose 4 times daily document and bring to next visit  5. We will make insulin dosing adjustments based on these numbers  6. Patient uses the freestyle negra  7. Follow-up in 3 months    Orders Placed This Encounter   Procedures    POCT Glucose    POCT glycosylated hemoglobin (Hb A1C)     Orders Placed This Encounter   Medications    insulin glargine (LANTUS SOLOSTAR) 100 UNIT/ML injection pen     Sig: Inject 42 Units into the skin nightly     Dispense:  5 pen     Refill:  3    insulin lispro, 1 Unit Dial, (HUMALOG KWIKPEN) 100 UNIT/ML SOPN     Si units before breakfast, 25 units before lunch, 35 units before dinner     Dispense:  5 pen     Refill:  3    Insulin Pen Needle (NOVOFINE) 32G X 6 MM MISC     Si Device by Does not apply route 4 times daily (before meals and nightly)     Dispense:  300 each     Refill:  3     Return in about 3 months (around 2021) for Diabetes. Subjective:     Chief Complaint   Patient presents with    Diabetes     Vitals:    10/08/20 1414   BP: (!) 140/78   Pulse: 73   SpO2: 98%     Wt Readings from Last 3 Encounters:   10/05/20 170 lb (77.1 kg)   20 176 lb (79.8 kg)   10/03/19 178 lb (80.7 kg)     BP Readings from Last 3 Encounters:   10/08/20 (!) 140/78   10/05/20 (!) 152/79   20 (!) 158/82     This visit was done utilizing the  phone system. Discharge instructions were given verbally and written.   The patient did not bring his glucose logs and was unable to identify what his blood glucose levels were averaging in the morning or postprandial.  He was accompanied by his son but did not know any of the answers to my questions. This patient is very difficult to manage despite using the  phone often times does not seem to understand what is being asked of him, he and his son seem to have a better understanding today and his glycemic control has slightly improved with the help of the CGM. I will slowly increase his insulin dosing, send him to the lab today to get an A1c and CMP. Diabetes   He presents for his follow-up diabetic visit. He has type 2 diabetes mellitus. The initial diagnosis of diabetes was made 5 years ago. His disease course has been worsening. Pertinent negatives for hypoglycemia include no dizziness or headaches. Associated symptoms include weakness. Pertinent negatives for diabetes include no chest pain, no fatigue, no polyphagia and no polyuria. There are no hypoglycemic complications. Symptoms are stable. Risk factors for coronary artery disease include male sex, hypertension and diabetes mellitus. Current diabetic treatment includes insulin injections and oral agent (monotherapy). He is compliant with treatment all of the time. His weight is stable. He is following a generally unhealthy diet. Meal planning includes avoidance of concentrated sweets. He has not had a previous visit with a dietitian. He rarely participates in exercise. His home blood glucose trend is decreasing steadily. An ACE inhibitor/angiotensin II receptor blocker is being taken. He does not see a podiatrist.Eye exam is not current. Past Medical History:   Diagnosis Date    Hyperlipidemia     Hypertension     Parkinson's disease ([facility] [address]     Type 2 diabetes mellitus without complication ([facility] [address]      No past surgical history on file.   Social History     Socioeconomic History    Marital status:      Spouse name: Not on file    Number of children: Not on file    Years of education: Not on file    Highest education level: Not on file   Occupational History    Not on file   Social Needs    Financial resource strain: Not on file    Food insecurity     Worry: Not on file     Inability: Not on file    Transportation needs     Medical: Not on file     Non-medical: Not on file   Tobacco Use    Smoking status: Never Smoker    Smokeless tobacco: Never Used   Substance and Sexual Activity    Alcohol use: Not on file    Drug use: Not on file    Sexual activity: Not on file   Lifestyle    Physical activity     Days per week: 0 days     Minutes per session: 0 min    Stress: Not on file   Relationships    Social connections     Talks on phone: Not on file     Gets together: More than three times a week     Attends Jehovah's witness service: Never     Active member of club or organization: No     Attends meetings of clubs or organizations: Never     Relationship status:     Intimate partner violence     Fear of current or ex partner: Not on file     Emotionally abused: Not on file     Physically abused: Not on file     Forced sexual activity: Not on file   Other Topics Concern    Not on file   Social History Narrative         Lives With: Spouse    Recently moved to [facility] from [facility]  late--2018,             son Leola Setting in the area    Type of Home: House One level    Home Access: Level entry    Bathroom Shower/Tub: Tub/Shower unit    [employer]: Grab bars in shower, Shower chair    Home Equipment: 4 wheeled walker    Receives Help From: Family    ADL Assistance: Independent    Homemaking Assistance: (spouse and son able to assist)    Homemaking Responsibilities: No    Ambulation Assistance: Independent(rollator when needed)    Transfer Assistance: Independent    Active : No    Patient's  Info: son    Additional Comments: Pt Bangladeshi speaking interpretor Subha Oliva present,  Pt recieves help from wife and son @ times    Primary Care Provider: Donna Skelton APRN - CNP     Family History   Problem Relation Age of Onset    Diabetes Mother      No Known Allergies    Current Outpatient Medications:     insulin glargine (LANTUS SOLOSTAR) 100 UNIT/ML injection pen, Inject 42 Units into the skin nightly, Disp: 5 pen, Rfl: 3    insulin lispro, 1 Unit Dial, (HUMALOG KWIKPEN) 100 UNIT/ML SOPN, 35 units before breakfast, 25 units before lunch, 35 units before dinner, Disp: 5 pen, Rfl: 3    Insulin Pen Needle (NOVOFINE) 32G X 6 MM MISC, 1 Device by Does not apply route 4 times daily (before meals and nightly), Disp: 300 each, Rfl: 3    Continuous Blood Gluc Sensor (FREESTYLE MARY CARMEN 14 DAY SENSOR) MISC, 2 DEVICES EVERY 14 DAYS, Disp: 2 each, Rfl: 3    losartan (COZAAR) 50 MG tablet, TAKE 1 TABLET BY MOUTH DAILY, Disp: 30 tablet, Rfl: 3    metFORMIN (GLUCOPHAGE) 1000 MG tablet, TAKE 1 TABLET BY MOUTH 2 TIMES DAILY (WITH MEALS), Disp: 60 tablet, Rfl: 3    FREESTYLE LITE strip, 1 STRIP BY OTHER ROUTE 4 TIMES DAILY (BEFORE MEALS AND NIGHTLY), Disp: 150 strip, Rfl: 3    blood glucose monitor strips, 1 strip by Other route 4 times daily (before meals and nightly), Disp: 150 strip, Rfl: 3    blood glucose monitor kit and supplies, 1 kit by Other route 4 times daily (before meals and nightly), Disp: 1 kit, Rfl: 0    Lancets MISC, 1 each by Does not apply route 4 times daily (before meals and nightly), Disp: 150 each, Rfl: 3    Continuous Blood Gluc  (FREESTYLE MARY CARMEN 14 DAY READER) YENY, 1 Device by Does not apply route 4 times daily (before meals and nightly), Disp: 1 Device, Rfl: 0    tamsulosin (FLOMAX) 0.4 MG capsule, , Disp: , Rfl: 0    Lancets MISC, 1 each by Does not apply route 4 times daily (before meals and nightly), Disp: 150 each, Rfl: 3    carbidopa-levodopa (SINEMET)  MG per tablet, Take 1 tablet by mouth 2 times daily, Disp: 90 tablet, Rfl: 3    simvastatin (ZOCOR) 40 MG tablet, Take 40 mg by mouth nightly, Disp: , Rfl:     sertraline (ZOLOFT) 50 MG tablet, Take 50 mg by mouth daily, Disp: , Rfl:   Lab Results Component Value Date     (L) 10/05/2020    K 4.5 10/05/2020    CL 92 (L) 10/05/2020    CO2 28 10/05/2020    BUN 18 10/05/2020    CREATININE 0.9 10/05/2020    GLUCOSE 173 10/08/2020    CALCIUM 10.0 (H) 10/05/2020    PROT 7.5 10/05/2020    LABALBU 4.3 10/05/2020    BILITOT 0.3 10/05/2020    ALKPHOS 83 10/05/2020    AST 12 10/05/2020    ALT 15 10/05/2020    LABGLOM >60 10/05/2020    GFRAA >60 10/05/2020    GLOB 3.2 10/05/2020     Lab Results   Component Value Date    WBC 6.4 10/05/2020    HGB 15.0 10/05/2020    HCT 43.1 10/05/2020    MCV 88.6 10/05/2020     10/05/2020     Lab Results   Component Value Date    LABA1C 9.5 10/08/2020    LABA1C 9.7 07/08/2020    LABA1C 10.3 (H) 02/04/2020   Results for Aníbal Rosales Mars Roman (MRN [id number]) as of 2/4/2020 12:02   Ref. Range 6/18/2019 05:21   C-Peptide Latest Ref Range: 1.1 - 4.4 ng/mL 0.8 (L)     Lab Results   Component Value Date    CHOL 148 10/01/2019     Lab Results   Component Value Date    TRIG 63 10/01/2019     Lab Results   Component Value Date    HDL 62 (H) 10/01/2019     Lab Results   Component Value Date    LDLCALC 73 10/01/2019     No results found for: LABVLDL, VLDL  No results found for: CHOLHDLRATIO  No results found for: TESTOSTERONE  No results found for: TSH, T4FREE, THYROIDAB    Review of Systems   Constitutional: Negative for chills, fatigue and fever. HENT: Negative for congestion, ear pain, postnasal drip, rhinorrhea, sinus pressure and sore throat. Eyes: Negative for pain and redness. Respiratory: Negative for cough, shortness of breath and wheezing. Cardiovascular: Negative for chest pain, palpitations and leg swelling. Gastrointestinal: Negative for abdominal pain, diarrhea, nausea and vomiting. Endocrine: Negative for cold intolerance, heat intolerance, polyphagia and polyuria. Genitourinary: Negative for difficulty urinating. Musculoskeletal: Positive for gait problem (weak legs, chronic). Negative for arthralgias. Skin: Negative for rash. Allergic/Immunologic: Negative for food allergies. Neurological: Positive for weakness. Negative for dizziness and headaches. Hematological: Negative for adenopathy. Psychiatric/Behavioral: Negative for agitation. Objective:   Physical Exam  Vitals signs reviewed. Constitutional:       Appearance: He is well-developed. HENT:      Head: Normocephalic and atraumatic. Nose: No congestion. Mouth/Throat:      Mouth: Mucous membranes are moist.   Eyes:      Conjunctiva/sclera: Conjunctivae normal.   Neck:      Musculoskeletal: Normal range of motion and neck supple. Thyroid: No thyromegaly. Cardiovascular:      Rate and Rhythm: Normal rate and regular rhythm. Heart sounds: Normal heart sounds. Pulmonary:      Effort: Pulmonary effort is normal.      Breath sounds: Normal breath sounds. Abdominal:      General: Bowel sounds are normal. There is no distension. Palpations: Abdomen is soft. Tenderness: There is no abdominal tenderness. Musculoskeletal: Normal range of motion. Skin:     General: Skin is warm and dry. Neurological:      Mental Status: He is alert and oriented to person, place, and time.    Psychiatric:         Mood and Affect: Mood normal.

## 2020-10-08 NOTE — PATIENT INSTRUCTIONS
Endocrinology-diabetes    1. Check your blood sugars 4 times a day, before meals and at night  2. Document these numbers and a blood glucose log and bring them with you to your follow-up appointment. 3. Do not take your mealtime insulin if your blood sugars less than 130  4. Call our office if you have blood sugars less than 80 or greater then 300 on two or more occasions  5. Call our office if you have any questions regarding your blood sugars or insulin dosing regiment  6. Signs of low blood sugar include sweating , heart racing, dizziness and weakness. Check your blood sugar if you have any of these symptoms. Medications  1. Increase Lantus 42 units mornings    2. Increase Humalog 35 units before breakfast, 25 units before lunch, 35 units before meals   3. Continue metformin 1000 mg p.o. twice daily  4.  Monitor blood glucose 4 times daily document and bring to next visit

## 2020-10-27 RX ORDER — FLASH GLUCOSE SCANNING READER
1 EACH MISCELLANEOUS
Qty: 1 DEVICE | Refills: 0 | Status: SHIPPED | OUTPATIENT
Start: 2020-10-27 | End: 2022-01-01 | Stop reason: SDUPTHER

## 2020-11-03 ENCOUNTER — TELEPHONE (OUTPATIENT)
Dept: ENDOCRINOLOGY | Age: 70
End: 2020-11-03

## 2020-11-03 NOTE — TELEPHONE ENCOUNTER
Patient's son Karla Brod calling to inform you that patient's glucose level drops daily between 7p-8p where level is not readable on glucometer. He wanted to know if you could decrease morning insulin dose. Please advise.

## 2020-11-03 NOTE — TELEPHONE ENCOUNTER
Patient called stating that his father's blood glucose levels are very low between 7-8 PM every night. I called with the help of Monse Wilkinson our medical assistant interpreting and instructed the patient to only take 25 units of insulin before he eats dinner. Instructed him to call if he has any further low blood sugars or has any other questions regarding insulin dosing regiment.   The son verbalized understanding

## 2020-11-09 RX ORDER — FLASH GLUCOSE SCANNING READER
1 EACH MISCELLANEOUS
Qty: 1 DEVICE | Refills: 0 | OUTPATIENT
Start: 2020-11-09

## 2020-11-24 ENCOUNTER — TELEPHONE (OUTPATIENT)
Dept: ENDOCRINOLOGY | Age: 70
End: 2020-11-24

## 2020-11-24 NOTE — TELEPHONE ENCOUNTER
Visiting nurse is calling  because patient's blood sugar is dropping at night and in the morning. On Sunday it was a low reading. From 1am until 1:15pm it was just reading low. then after pop and breakfast it went up to 212 and stayed that way. Nurse said that during the night it seems to drop really low and she is concerned about this. She would like advice on what to do about this. Caterina, nurse also states that she does not answer her phone while at work so please leave a detailed message with directions.

## 2020-11-25 RX ORDER — GLUCAGON 3 MG/1
1 POWDER NASAL EVERY 4 HOURS PRN
Qty: 1 EACH | Refills: 1 | Status: SHIPPED | OUTPATIENT
Start: 2020-11-25 | End: 2021-02-22

## 2020-11-25 NOTE — TELEPHONE ENCOUNTER
I spoke to Mr. Dumont's home health care RN Brittany Renteria. Apparently the patient had a severe hypoglycemic event on Sunday night. He was unresponsive. The wife kept giving him glucose tablets and juices, he finally had an increase in his blood glucose levels and was responsive. The nurse discussed with him and I asked her to reinforce with the patient again to call 911 if that should ever happen again in the future. I instructed her to inform the patient to decrease his Lantus to 30 units and to change his injection time to the mornings. I have also sent a prescription for Baqsimi and glucagon in case the previous is not covered.

## 2020-12-29 RX ORDER — LOSARTAN POTASSIUM 50 MG/1
TABLET ORAL
Qty: 30 TABLET | Refills: 3 | Status: SHIPPED | OUTPATIENT
Start: 2020-12-29 | End: 2021-01-18

## 2021-01-01 RX ORDER — IBUPROFEN 600 MG/1
TABLET ORAL
Qty: 1 KIT | Refills: 3 | Status: SHIPPED | OUTPATIENT
Start: 2021-01-01

## 2021-01-01 RX ORDER — LOSARTAN POTASSIUM 50 MG/1
TABLET ORAL
Qty: 30 TABLET | Refills: 3 | Status: SHIPPED | OUTPATIENT
Start: 2021-01-01 | End: 2022-01-01 | Stop reason: SDUPTHER

## 2021-01-01 RX ORDER — INSULIN GLARGINE 100 [IU]/ML
42 INJECTION, SOLUTION SUBCUTANEOUS NIGHTLY
Qty: 15 ML | Refills: 3 | Status: SHIPPED | OUTPATIENT
Start: 2021-01-01 | End: 2022-01-01

## 2021-01-01 RX ORDER — GLUCAGON 3 MG/1
POWDER NASAL
Qty: 1 EACH | Refills: 1 | Status: SHIPPED | OUTPATIENT
Start: 2021-01-01 | End: 2022-01-01

## 2021-01-01 RX ORDER — GLUCAGON 3 MG/1
POWDER NASAL
Qty: 1 EACH | Refills: 1 | Status: SHIPPED | OUTPATIENT
Start: 2021-01-01 | End: 2021-01-01

## 2021-01-01 RX ORDER — INSULIN LISPRO 100 [IU]/ML
INJECTION, SOLUTION INTRAVENOUS; SUBCUTANEOUS
Qty: 15 ML | Refills: 3 | Status: ON HOLD | OUTPATIENT
Start: 2021-01-01 | End: 2022-01-01 | Stop reason: SDUPTHER

## 2021-01-04 RX ORDER — CALCIUM CITRATE/VITAMIN D3 200MG-6.25
TABLET ORAL
Qty: 150 STRIP | Refills: 3 | Status: SHIPPED | OUTPATIENT
Start: 2021-01-04 | End: 2021-04-29

## 2021-01-07 ENCOUNTER — OFFICE VISIT (OUTPATIENT)
Dept: ENDOCRINOLOGY | Age: 71
End: 2021-01-07
Payer: COMMERCIAL

## 2021-01-07 VITALS
DIASTOLIC BLOOD PRESSURE: 76 MMHG | HEART RATE: 74 BPM | SYSTOLIC BLOOD PRESSURE: 153 MMHG | HEIGHT: 65 IN | BODY MASS INDEX: 28.29 KG/M2

## 2021-01-07 LAB
CHP ED QC CHECK: NORMAL
GLUCOSE BLD-MCNC: 178 MG/DL
HBA1C MFR BLD: 9.2 %

## 2021-01-07 PROCEDURE — 4040F PNEUMOC VAC/ADMIN/RCVD: CPT | Performed by: PHYSICIAN ASSISTANT

## 2021-01-07 PROCEDURE — 1036F TOBACCO NON-USER: CPT | Performed by: PHYSICIAN ASSISTANT

## 2021-01-07 PROCEDURE — 1123F ACP DISCUSS/DSCN MKR DOCD: CPT | Performed by: PHYSICIAN ASSISTANT

## 2021-01-07 PROCEDURE — 83036 HEMOGLOBIN GLYCOSYLATED A1C: CPT | Performed by: PHYSICIAN ASSISTANT

## 2021-01-07 PROCEDURE — 99213 OFFICE O/P EST LOW 20 MIN: CPT | Performed by: PHYSICIAN ASSISTANT

## 2021-01-07 PROCEDURE — 82962 GLUCOSE BLOOD TEST: CPT | Performed by: PHYSICIAN ASSISTANT

## 2021-01-07 PROCEDURE — 2022F DILAT RTA XM EVC RTNOPTHY: CPT | Performed by: PHYSICIAN ASSISTANT

## 2021-01-07 PROCEDURE — G8484 FLU IMMUNIZE NO ADMIN: HCPCS | Performed by: PHYSICIAN ASSISTANT

## 2021-01-07 PROCEDURE — 3017F COLORECTAL CA SCREEN DOC REV: CPT | Performed by: PHYSICIAN ASSISTANT

## 2021-01-07 PROCEDURE — G8417 CALC BMI ABV UP PARAM F/U: HCPCS | Performed by: PHYSICIAN ASSISTANT

## 2021-01-07 PROCEDURE — 3046F HEMOGLOBIN A1C LEVEL >9.0%: CPT | Performed by: PHYSICIAN ASSISTANT

## 2021-01-07 PROCEDURE — G8427 DOCREV CUR MEDS BY ELIG CLIN: HCPCS | Performed by: PHYSICIAN ASSISTANT

## 2021-01-07 RX ORDER — BLOOD SUGAR DIAGNOSTIC
STRIP MISCELLANEOUS
Qty: 100 EACH | Refills: 3 | OUTPATIENT
Start: 2021-01-07

## 2021-01-07 ASSESSMENT — ENCOUNTER SYMPTOMS
SINUS PRESSURE: 0
COUGH: 0
WHEEZING: 0
ABDOMINAL PAIN: 0
NAUSEA: 0
EYE REDNESS: 0
DIARRHEA: 0
VOMITING: 0
RHINORRHEA: 0
SORE THROAT: 0
SHORTNESS OF BREATH: 0
EYE PAIN: 0

## 2021-01-07 NOTE — PROGRESS NOTES
Assessment:       Diagnosis Orders   1. DM w/ coma type I, uncontrolled ([facility])  POCT Glucose    POCT glycosylated hemoglobin (Hb A1C)    Hemoglobin A1C    Lipid Panel    Comprehensive Metabolic Panel    Microalbumin / Creatinine Urine Ratio    HM DIABETES FOOT EXAM     PLAN:     1. Continue Lantus 42 units mornings    2. Continue Humalog 30 units before breakfast, 25 units before lunch, 30 units before meals   3. Continue metformin 1000 mg p.o. twice daily  4. Monitor blood glucose 4 times daily document and bring to next visit  5. Patient uses the freestyle negra  6. Sees Podiatry for foot care  7. Follow-up in 3 months    Orders Placed This Encounter   Procedures    POCT Glucose    POCT glycosylated hemoglobin (Hb A1C)     No orders of the defined types were placed in this encounter. No follow-ups on file. Subjective:     Chief Complaint   Patient presents with    Diabetes     Vitals:    01/07/21 1358   BP: (!) 153/76   Site: Right Upper Arm   Position: Sitting   Cuff Size: Large Adult   Pulse: 74   Height: 5' 5\" (1.651 m)     Wt Readings from Last 3 Encounters:   10/05/20 170 lb (77.1 kg)   02/04/20 176 lb (79.8 kg)   10/03/19 178 lb (80.7 kg)     BP Readings from Last 3 Encounters:   01/07/21 (!) 153/76   10/08/20 (!) 140/78   10/05/20 (!) 152/79     This visit was done utilizing the  phone system. Discharge instructions were given verbally and written. The patient did not bring his glucose logs and was unable to identify what his blood glucose levels were averaging in the morning or postprandial.  He was accompanied by his son but did not know any of the answers to my questions. This patient is very difficult to manage despite using the  phone often times does not seem to understand what is being asked of him, he and his son seem to have a better understanding today and his glycemic control has slightly improved with the help of the CGM.   I will slowly increase his insulin dosing, send him to the lab today to get an A1c and CMP. Diabetes  He presents for his follow-up diabetic visit. He has type 2 diabetes mellitus. The initial diagnosis of diabetes was made 5 years ago. His disease course has been worsening. Pertinent negatives for hypoglycemia include no dizziness or headaches. Associated symptoms include weakness. Pertinent negatives for diabetes include no chest pain, no fatigue, no polyphagia and no polyuria. There are no hypoglycemic complications. Symptoms are stable. Risk factors for coronary artery disease include male sex, hypertension and diabetes mellitus. Current diabetic treatment includes insulin injections and oral agent (monotherapy). He is compliant with treatment all of the time. His weight is stable. He is following a generally unhealthy diet. Meal planning includes avoidance of concentrated sweets. He has not had a previous visit with a dietitian. He rarely participates in exercise. His home blood glucose trend is decreasing steadily. An ACE inhibitor/angiotensin II receptor blocker is being taken. He does not see a podiatrist.Eye exam is not current. Past Medical History:   Diagnosis Date    Hyperlipidemia     Hypertension     Parkinson's disease ([facility] [address]     Type 2 diabetes mellitus without complication ([facility] [address]      History reviewed. No pertinent surgical history.   Social History     Socioeconomic History    Marital status:      Spouse name: Not on file    Number of children: Not on file    Years of education: Not on file    Highest education level: Not on file   Occupational History    Not on file   Social Needs    Financial resource strain: Not on file    Food insecurity     Worry: Not on file     Inability: Not on file    Transportation needs     Medical: Not on file     Non-medical: Not on file   Tobacco Use    Smoking status: Never Smoker    Smokeless tobacco: Never Used   Substance and Sexual Activity    Alcohol use: Not on file    Drug needed (Blood glucose less than 70), Disp: 1 each, Rfl: 1    glucagon 1 MG injection, Inject 1 mg into the muscle See Admin Instructions Follow package directions for low blood sugar., Disp: 1 kit, Rfl: 3    Continuous Blood Gluc  (FREESTYLE MARY CARMEN 14 DAY READER) YENY, 1 Device by Does not apply route 4 times daily (before meals and nightly), Disp: 1 Device, Rfl: 0    insulin glargine (LANTUS SOLOSTAR) 100 UNIT/ML injection pen, Inject 42 Units into the skin nightly, Disp: 5 pen, Rfl: 3    insulin lispro, 1 Unit Dial, (HUMALOG KWIKPEN) 100 UNIT/ML SOPN, 35 units before breakfast, 25 units before lunch, 35 units before dinner, Disp: 5 pen, Rfl: 3    Insulin Pen Needle (NOVOFINE) 32G X 6 MM MISC, 1 Device by Does not apply route 4 times daily (before meals and nightly), Disp: 300 each, Rfl: 3    Continuous Blood Gluc Sensor (FREESTYLE MARY CARMEN 14 DAY SENSOR) MISC, 2 DEVICES EVERY 14 DAYS, Disp: 2 each, Rfl: 3    metFORMIN (GLUCOPHAGE) 1000 MG tablet, TAKE 1 TABLET BY MOUTH 2 TIMES DAILY (WITH MEALS), Disp: 60 tablet, Rfl: 3    blood glucose monitor strips, 1 strip by Other route 4 times daily (before meals and nightly), Disp: 150 strip, Rfl: 3    blood glucose monitor kit and supplies, 1 kit by Other route 4 times daily (before meals and nightly), Disp: 1 kit, Rfl: 0    Lancets MISC, 1 each by Does not apply route 4 times daily (before meals and nightly), Disp: 150 each, Rfl: 3    tamsulosin (FLOMAX) 0.4 MG capsule, , Disp: , Rfl: 0    Lancets MISC, 1 each by Does not apply route 4 times daily (before meals and nightly), Disp: 150 each, Rfl: 3    carbidopa-levodopa (SINEMET)  MG per tablet, Take 1 tablet by mouth 2 times daily, Disp: 90 tablet, Rfl: 3    simvastatin (ZOCOR) 40 MG tablet, Take 40 mg by mouth nightly, Disp: , Rfl:     sertraline (ZOLOFT) 50 MG tablet, Take 50 mg by mouth daily, Disp: , Rfl:   Lab Results   Component Value Date     (L) 10/05/2020    K 4.5 10/05/2020    CL 92 (L) 10/05/2020    CO2 28 10/05/2020    BUN 18 10/05/2020    CREATININE 0.9 10/05/2020    GLUCOSE 178 01/07/2021    CALCIUM 10.0 (H) 10/05/2020    PROT 7.5 10/05/2020    LABALBU 4.3 10/05/2020    BILITOT 0.3 10/05/2020    ALKPHOS 83 10/05/2020    AST 12 10/05/2020    ALT 15 10/05/2020    LABGLOM >60 10/05/2020    GFRAA >60 10/05/2020    GLOB 3.2 10/05/2020     Lab Results   Component Value Date    WBC 6.4 10/05/2020    HGB 15.0 10/05/2020    HCT 43.1 10/05/2020    MCV 88.6 10/05/2020     10/05/2020     Lab Results   Component Value Date    LABA1C 9.2 01/07/2021    LABA1C 9.5 10/08/2020    LABA1C 9.7 07/08/2020   Results for Héctor Francois [facility] (MRN [id number]) as of 2/4/2020 12:02   Ref. Range 6/18/2019 05:21   C-Peptide Latest Ref Range: 1.1 - 4.4 ng/mL 0.8 (L)     Lab Results   Component Value Date    CHOL 148 10/01/2019     Lab Results   Component Value Date    TRIG 63 10/01/2019     Lab Results   Component Value Date    HDL 62 (H) 10/01/2019     Lab Results   Component Value Date    LDLCALC 73 10/01/2019     No results found for: LABVLDL, VLDL  No results found for: CHOLHDLRATIO  No results found for: TESTOSTERONE  No results found for: TSH, T4FREE, THYROIDAB    Review of Systems   Constitutional: Negative for chills, fatigue and fever. HENT: Negative for congestion, ear pain, postnasal drip, rhinorrhea, sinus pressure and sore throat. Eyes: Negative for pain and redness. Respiratory: Negative for cough, shortness of breath and wheezing. Cardiovascular: Negative for chest pain, palpitations and leg swelling. Gastrointestinal: Negative for abdominal pain, diarrhea, nausea and vomiting. Endocrine: Negative for cold intolerance, heat intolerance, polyphagia and polyuria. Genitourinary: Negative for difficulty urinating. Musculoskeletal: Positive for gait problem (weak legs, chronic). Negative for arthralgias. Skin: Negative for rash. Allergic/Immunologic: Negative for food allergies.

## 2021-01-08 LAB
ALBUMIN SERPL-MCNC: 3.9 G/DL (ref 3.5–4.6)
ALP BLD-CCNC: 66 U/L (ref 35–104)
ALT SERPL-CCNC: 6 U/L (ref 0–41)
ANION GAP SERPL CALCULATED.3IONS-SCNC: 11 MEQ/L (ref 9–15)
AST SERPL-CCNC: 10 U/L (ref 0–40)
BILIRUB SERPL-MCNC: 0.3 MG/DL (ref 0.2–0.7)
BUN BLDV-MCNC: 20 MG/DL (ref 8–23)
CALCIUM SERPL-MCNC: 9.6 MG/DL (ref 8.5–9.9)
CHLORIDE BLD-SCNC: 101 MEQ/L (ref 95–107)
CHOLESTEROL, TOTAL: 121 MG/DL (ref 0–199)
CO2: 29 MEQ/L (ref 20–31)
CREAT SERPL-MCNC: 0.94 MG/DL (ref 0.7–1.2)
GFR AFRICAN AMERICAN: >60
GFR NON-AFRICAN AMERICAN: >60
GLOBULIN: 2.8 G/DL (ref 2.3–3.5)
GLUCOSE BLD-MCNC: 102 MG/DL (ref 70–99)
HDLC SERPL-MCNC: 55 MG/DL (ref 40–59)
LDL CHOLESTEROL CALCULATED: 56 MG/DL (ref 0–129)
POTASSIUM SERPL-SCNC: 4.8 MEQ/L (ref 3.4–4.9)
SODIUM BLD-SCNC: 141 MEQ/L (ref 135–144)
TOTAL PROTEIN: 6.7 G/DL (ref 6.3–8)
TRIGL SERPL-MCNC: 51 MG/DL (ref 0–150)

## 2021-01-18 RX ORDER — LOSARTAN POTASSIUM 50 MG/1
TABLET ORAL
Qty: 30 TABLET | Refills: 3 | Status: SHIPPED | OUTPATIENT
Start: 2021-01-18 | End: 2021-01-26

## 2021-01-26 RX ORDER — LOSARTAN POTASSIUM 50 MG/1
TABLET ORAL
Qty: 30 TABLET | Refills: 3 | Status: SHIPPED | OUTPATIENT
Start: 2021-01-26 | End: 2021-02-22

## 2021-02-22 RX ORDER — LOSARTAN POTASSIUM 50 MG/1
TABLET ORAL
Qty: 30 TABLET | Refills: 3 | Status: SHIPPED | OUTPATIENT
Start: 2021-02-22 | End: 2021-07-29

## 2021-02-22 RX ORDER — GLUCAGON 3 MG/1
1 POWDER NASAL EVERY 4 HOURS PRN
Qty: 1 EACH | Refills: 3 | Status: SHIPPED | OUTPATIENT
Start: 2021-02-22 | End: 2021-05-17

## 2021-03-01 RX ORDER — INSULIN LISPRO 100 [IU]/ML
INJECTION, SOLUTION INTRAVENOUS; SUBCUTANEOUS
Qty: 15 ML | Refills: 3 | Status: SHIPPED | OUTPATIENT
Start: 2021-03-01 | End: 2021-05-17

## 2021-04-12 RX ORDER — INSULIN GLARGINE 100 [IU]/ML
42 INJECTION, SOLUTION SUBCUTANEOUS NIGHTLY
Qty: 15 ML | Refills: 3 | Status: SHIPPED | OUTPATIENT
Start: 2021-04-12 | End: 2021-09-13

## 2021-04-29 RX ORDER — BLOOD-GLUCOSE METER
KIT MISCELLANEOUS
Qty: 150 STRIP | Refills: 3 | Status: SHIPPED | OUTPATIENT
Start: 2021-04-29 | End: 2021-09-27

## 2021-05-17 RX ORDER — INSULIN LISPRO 100 [IU]/ML
INJECTION, SOLUTION INTRAVENOUS; SUBCUTANEOUS
Qty: 15 ML | Refills: 3 | Status: SHIPPED | OUTPATIENT
Start: 2021-05-17 | End: 2021-08-16

## 2021-05-17 RX ORDER — GLUCAGON 3 MG/1
1 POWDER NASAL EVERY 4 HOURS PRN
Qty: 1 EACH | Refills: 1 | Status: SHIPPED | OUTPATIENT
Start: 2021-05-17 | End: 2021-05-24

## 2021-05-25 RX ORDER — GLUCAGON 3 MG/1
1 POWDER NASAL EVERY 4 HOURS PRN
Qty: 1 EACH | Refills: 1 | Status: SHIPPED | OUTPATIENT
Start: 2021-05-25 | End: 2021-08-16

## 2021-07-29 RX ORDER — LOSARTAN POTASSIUM 50 MG/1
TABLET ORAL
Qty: 30 TABLET | Refills: 3 | Status: SHIPPED | OUTPATIENT
Start: 2021-07-29 | End: 2021-01-01

## 2021-08-16 RX ORDER — FLASH GLUCOSE SENSOR
KIT MISCELLANEOUS
Qty: 2 EACH | Refills: 3 | Status: SHIPPED | OUTPATIENT
Start: 2021-08-16 | End: 2022-01-01 | Stop reason: SDUPTHER

## 2021-08-16 RX ORDER — GLUCAGON 3 MG/1
POWDER NASAL
Qty: 1 EACH | Refills: 1 | Status: SHIPPED | OUTPATIENT
Start: 2021-08-16 | End: 2021-01-01

## 2021-08-16 RX ORDER — INSULIN LISPRO 100 [IU]/ML
INJECTION, SOLUTION INTRAVENOUS; SUBCUTANEOUS
Qty: 15 ML | Refills: 3 | Status: SHIPPED | OUTPATIENT
Start: 2021-08-16 | End: 2021-10-11 | Stop reason: SDUPTHER

## 2021-08-19 RX ORDER — IBUPROFEN 600 MG/1
TABLET ORAL
Qty: 1 KIT | Refills: 3 | Status: SHIPPED | OUTPATIENT
Start: 2021-08-19 | End: 2021-01-01

## 2021-09-13 RX ORDER — INSULIN GLARGINE 100 [IU]/ML
42 INJECTION, SOLUTION SUBCUTANEOUS NIGHTLY
Qty: 15 ML | Refills: 3 | Status: SHIPPED | OUTPATIENT
Start: 2021-09-13 | End: 2021-01-01

## 2021-09-27 RX ORDER — BLOOD-GLUCOSE METER
KIT MISCELLANEOUS
Qty: 150 STRIP | Refills: 3 | Status: SHIPPED | OUTPATIENT
Start: 2021-09-27 | End: 2022-01-01

## 2021-10-11 ENCOUNTER — OFFICE VISIT (OUTPATIENT)
Dept: ENDOCRINOLOGY | Age: 71
End: 2021-10-11
Payer: COMMERCIAL

## 2021-10-11 VITALS
HEART RATE: 73 BPM | DIASTOLIC BLOOD PRESSURE: 81 MMHG | OXYGEN SATURATION: 97 % | HEIGHT: 65 IN | SYSTOLIC BLOOD PRESSURE: 159 MMHG | BODY MASS INDEX: 28.29 KG/M2

## 2021-10-11 DIAGNOSIS — Z79.4 TYPE 2 DIABETES MELLITUS WITH HYPERGLYCEMIA, WITH LONG-TERM CURRENT USE OF INSULIN (HCC): Primary | ICD-10-CM

## 2021-10-11 DIAGNOSIS — E11.65 TYPE 2 DIABETES MELLITUS WITH HYPERGLYCEMIA, WITH LONG-TERM CURRENT USE OF INSULIN (HCC): Primary | ICD-10-CM

## 2021-10-11 LAB
CHP ED QC CHECK: NORMAL
GLUCOSE BLD-MCNC: 322 MG/DL

## 2021-10-11 PROCEDURE — 1123F ACP DISCUSS/DSCN MKR DOCD: CPT | Performed by: PHYSICIAN ASSISTANT

## 2021-10-11 PROCEDURE — 1036F TOBACCO NON-USER: CPT | Performed by: PHYSICIAN ASSISTANT

## 2021-10-11 PROCEDURE — G8484 FLU IMMUNIZE NO ADMIN: HCPCS | Performed by: PHYSICIAN ASSISTANT

## 2021-10-11 PROCEDURE — 2022F DILAT RTA XM EVC RTNOPTHY: CPT | Performed by: PHYSICIAN ASSISTANT

## 2021-10-11 PROCEDURE — G8417 CALC BMI ABV UP PARAM F/U: HCPCS | Performed by: PHYSICIAN ASSISTANT

## 2021-10-11 PROCEDURE — 4040F PNEUMOC VAC/ADMIN/RCVD: CPT | Performed by: PHYSICIAN ASSISTANT

## 2021-10-11 PROCEDURE — 3017F COLORECTAL CA SCREEN DOC REV: CPT | Performed by: PHYSICIAN ASSISTANT

## 2021-10-11 PROCEDURE — 99213 OFFICE O/P EST LOW 20 MIN: CPT | Performed by: PHYSICIAN ASSISTANT

## 2021-10-11 PROCEDURE — 82962 GLUCOSE BLOOD TEST: CPT | Performed by: PHYSICIAN ASSISTANT

## 2021-10-11 PROCEDURE — 3046F HEMOGLOBIN A1C LEVEL >9.0%: CPT | Performed by: PHYSICIAN ASSISTANT

## 2021-10-11 PROCEDURE — G8427 DOCREV CUR MEDS BY ELIG CLIN: HCPCS | Performed by: PHYSICIAN ASSISTANT

## 2021-10-11 RX ORDER — INSULIN LISPRO 100 [IU]/ML
INJECTION, SOLUTION INTRAVENOUS; SUBCUTANEOUS
Qty: 15 ML | Refills: 3 | Status: SHIPPED | OUTPATIENT
Start: 2021-10-11 | End: 2021-01-01

## 2021-10-11 RX ORDER — FLASH GLUCOSE SENSOR
1 KIT MISCELLANEOUS
Qty: 2 EACH | Refills: 3 | Status: SHIPPED | OUTPATIENT
Start: 2021-10-11 | End: 2022-01-01

## 2021-10-11 ASSESSMENT — ENCOUNTER SYMPTOMS
SORE THROAT: 0
WHEEZING: 0
RHINORRHEA: 0
VOMITING: 0
SINUS PRESSURE: 0
DIARRHEA: 0
EYE REDNESS: 0
NAUSEA: 0
COUGH: 0
SHORTNESS OF BREATH: 0
ABDOMINAL PAIN: 0
EYE PAIN: 0

## 2021-10-11 NOTE — PATIENT INSTRUCTIONS
Endocrinology-diabetes    1. Check your blood sugars 4 times a day, before meals and at night  2. Document these numbers and a blood glucose log and bring them with you to your follow-up appointment. 3. Do not take your mealtime insulin if your blood sugars less than 130  4. Call our office if you have blood sugars less than 80 or greater then 300 on two or more occasions  5. Call our office if you have any questions regarding your blood sugars or insulin dosing regiment  6. Signs of low blood sugar include sweating , heart racing, dizziness and weakness. Check your blood sugar if you have any of these symptoms. Medications-  7. Target glucose range 100-190   8. Decrease Lantus 38 units mornings    9. Continue Humalog inject three times daily before meals 130-160 4 units, 161-190 5 units, 191-220 6 units, 221-250 7 units, 251-280 8 units 281-310 9 units, greater than 310 10 units    10. Do not take if glucose is less than 130   11. Continue metformin 1000 mg p.o. twice daily  12. Monitor blood glucose 4 times daily document and bring to next visit  13. Patient uses the freestyle negra  14. Seeing Podiatry for foot care  15.  Follow-up in 3 months

## 2021-10-11 NOTE — PROGRESS NOTES
Assessment:       Diagnosis Orders   1. Type 2 diabetes mellitus with hyperglycemia, with long-term current use of insulin ([facility])  Comprehensive Metabolic Panel    Hemoglobin A1C    POCT Glucose     AVG am glucose - unknown, did not bring meter and cannot rememeber    PLAN:     1. Target glucose range 100-190   2. Decrease Lantus 38 units mornings    3. Continue Humalog inject three times daily before meals 130-160 4 units, 161-190 5 units, 191-220 6 units, 221-250 7 units, 251-280 8 units 281-310 9 units, greater than 310 10 units    4. Do not take if glucose is less than 130   5. Continue metformin 1000 mg p.o. twice daily  6. Monitor blood glucose 4 times daily document and bring to next visit  7. Patient uses the freestyle mary carmen  8. Seeing Podiatry for foot care  9. Follow-up in 3 months    Orders Placed This Encounter   Procedures    Comprehensive Metabolic Panel     Standing Status:   Future     Standing Expiration Date:   10/11/2022    Hemoglobin A1C     Standing Status:   Future     Standing Expiration Date:   10/11/2022    POCT Glucose     Orders Placed This Encounter   Medications    Continuous Blood Gluc Sensor (FREESTYLE MARY CARMEN 2 SENSOR) MISC     Si Device by Does not apply route every 14 days     Dispense:  2 each     Refill:  3    HUMALOG KWIKPEN 100 UNIT/ML SOPN     Sig: inject three times daily before meals 130-160 4 units, 161-190 5 units, 191-220 6 units, 221-250 7 units, 251-280 8 units 281-310 9 units, greater than 310 10 units     Dispense:  15 mL     Refill:  3     Return in about 3 months (around 2022) for Diabetes.   Subjective:     Chief Complaint   Patient presents with    Diabetes     Vitals:    10/11/21 1141 10/11/21 1143   BP: (!) 162/82 (!) 159/81   Pulse: 73    SpO2: 97%    Height: 5' 5\" (1.651 m)      Wt Readings from Last 3 Encounters:   10/05/20 170 lb (77.1 kg)   20 176 lb (79.8 kg)   10/03/19 178 lb (80.7 kg)     BP Readings from Last 3 Encounters:   10/11/21 (!) 159/81   01/07/21 (!) 153/76   10/08/20 (!) 140/78     This visit was done utilizing the  phone system. Discharge instructions were given verbally and written. The patient did not bring his glucose logs and was unable to identify what his blood glucose levels were averaging in the morning or postprandial.  He was accompanied by his son but did not know any of the answers to my questions. This patient is very difficult to manage despite using the  phone often times does not seem to understand what is being asked of him, he and his son seem to have a better understanding today and his glycemic control has slightly improved with the help of the CGM. Pt did no bring the CGM meter today, states his appetite is good, eating well, having lows in the morning. Instructed to eat a snack before he goes to bed. Takes Lantus in the morning. Diabetes  He presents for his follow-up diabetic visit. He has type 2 diabetes mellitus. The initial diagnosis of diabetes was made 5 years ago. His disease course has been worsening. Pertinent negatives for hypoglycemia include no confusion, dizziness or headaches. Associated symptoms include weakness. Pertinent negatives for diabetes include no chest pain, no fatigue, no polyphagia and no polyuria. There are no hypoglycemic complications. Symptoms are stable. Risk factors for coronary artery disease include male sex, hypertension and diabetes mellitus. Current diabetic treatment includes insulin injections and oral agent (monotherapy). He is compliant with treatment all of the time. His weight is stable. He is following a generally unhealthy diet. Meal planning includes avoidance of concentrated sweets. He has not had a previous visit with a dietitian. He rarely participates in exercise. His home blood glucose trend is decreasing steadily. An ACE inhibitor/angiotensin II receptor blocker is being taken. He does not see a podiatrist.Eye exam is not current. Past Medical History:   Diagnosis Date    Hyperlipidemia     Hypertension     Parkinson's disease ([facility]     Type 2 diabetes mellitus without complication ([facility] [address]      No past surgical history on file. Social History     Socioeconomic History    Marital status:      Spouse name: Not on file    Number of children: Not on file    Years of education: Not on file    Highest education level: Not on file   Occupational History    Not on file   Tobacco Use    Smoking status: Never Smoker    Smokeless tobacco: Never Used   Vaping Use    Vaping Use: Never used   Substance and Sexual Activity    Alcohol use: Not on file    Drug use: Not on file    Sexual activity: Not on file   Other Topics Concern    Not on file   Social History Narrative         Lives With: Spouse    Recently moved to [facility] from [facility]  late--2018,             son [facility] in the area    Type of Home: House One level    Home Access: Level entry    Bathroom Shower/Tub: Tub/Shower unit    [employer]: Grab bars in shower, Shower chair    Home Equipment: 4 wheeled walker    Receives Help From: Family    ADL Assistance: Independent    Homemaking Assistance: (spouse and son able to assist)    Homemaking Responsibilities: No    Ambulation Assistance: Independent(rollator when needed)    Transfer Assistance: Independent    Active : No    Patient's  Info: son    Additional Comments: Pt [place] speaking interpretor Lora Costa present,  Pt recieves help from wife and son @ times    Primary Care Provider: PINEDA Castro CNP     Social Determinants of Health     Financial Resource Strain:     Difficulty of Paying Living Expenses:    Food Insecurity:     Worried About [address] in the Last Year:     [address] in the Last Year:    Transportation Needs:     Lack of Transportation (Medical):      Lack of Transportation (Non-Medical):    Physical Activity:     Days of Exercise per Week:     Minutes of Exercise per Session:    Stress:     Feeling of Stress :    Social Connections:     Frequency of Communication with Friends and Family:     Frequency of Social Gatherings with Friends and Family:     Attends Zoroastrianism Services:     Active Member of Clubs or Organizations:     Attends Club or Organization Meetings:     Marital Status:    Intimate Partner Violence:     Fear of Current or Ex-Partner:     Emotionally Abused:     Physically Abused:     Sexually Abused:      Family History   Problem Relation Age of Onset    Diabetes Mother      No Known Allergies    Current Outpatient Medications:     Continuous Blood Gluc Sensor (FREESTYLE MARY CARMEN 2 SENSOR) MISC, 1 Device by Does not apply route every 14 days, Disp: 2 each, Rfl: 3    HUMALOG KWIKPEN 100 UNIT/ML SOPN, inject three times daily before meals 130-160 4 units, 161-190 5 units, 191-220 6 units, 221-250 7 units, 251-280 8 units 281-310 9 units, greater than 310 10 units, Disp: 15 mL, Rfl: 3    FREESTYLE LITE strip, TEST 4 VECES AL PHYLLIS, Disp: 150 strip, Rfl: 3    LANTUS SOLOSTAR 100 UNIT/ML injection pen, INJECT 42 UNITS INTO THE SKIN NIGHTLY, Disp: 15 mL, Rfl: 3    Glucagon, rDNA, (GLUCAGON EMERGENCY) 1 MG KIT, INJECT 1 MG INTO THE MUSCLE SEE ADMIN INSTRUCTIONS FOLLOW PACKAGE DIRECTIONS FOR LOW BLOOD SUGAR., Disp: 1 kit, Rfl: 3    Continuous Blood Gluc Sensor (FREESTYLE MARY CARMEN 14 DAY SENSOR) MISC, USE AS DIRECTED, Disp: 2 each, Rfl: 3    BAQSIMI ONE PACK 3 MG/DOSE POWD, 1 APPLICATOR BY NASAL ROUTE EVERY 4 HOURS AS NEEDED (BLOOD GLUCOSE LESS THAN 70), Disp: 1 each, Rfl: 1    losartan (COZAAR) 50 MG tablet, TAKE 1 TABLET BY MOUTH DAILY, Disp: 30 tablet, Rfl: 3    metFORMIN (GLUCOPHAGE) 1000 MG tablet, MARICARMEN 1 TABLETA POR BOCA DOS VECES AL PHYLLIS ANTES DE COMER., Disp: 60 tablet, Rfl: 3    Continuous Blood Gluc  (FREESTYLE MARY CARMEN 14 DAY READER) YENY, 1 Device by Does not apply route 4 times daily (before meals and nightly), Disp: 1 Device, Rfl: 0    Insulin Pen Needle (NOVOFINE) 32G X 6 MM MISC, 1 Device by Does not apply route 4 times daily (before meals and nightly), Disp: 300 each, Rfl: 3    blood glucose monitor strips, 1 strip by Other route 4 times daily (before meals and nightly), Disp: 150 strip, Rfl: 3    blood glucose monitor kit and supplies, 1 kit by Other route 4 times daily (before meals and nightly), Disp: 1 kit, Rfl: 0    Lancets MISC, 1 each by Does not apply route 4 times daily (before meals and nightly), Disp: 150 each, Rfl: 3    tamsulosin (FLOMAX) 0.4 MG capsule, , Disp: , Rfl: 0    Lancets MISC, 1 each by Does not apply route 4 times daily (before meals and nightly), Disp: 150 each, Rfl: 3    carbidopa-levodopa (SINEMET)  MG per tablet, Take 1 tablet by mouth 2 times daily, Disp: 90 tablet, Rfl: 3    simvastatin (ZOCOR) 40 MG tablet, Take 40 mg by mouth nightly, Disp: , Rfl:     sertraline (ZOLOFT) 50 MG tablet, Take 50 mg by mouth daily, Disp: , Rfl:   Lab Results   Component Value Date     10/09/2021    K 4.6 10/09/2021     10/09/2021    CO2 25 10/09/2021    BUN 20 10/09/2021    CREATININE 0.92 10/09/2021    GLUCOSE 322 10/11/2021    CALCIUM 9.9 10/09/2021    PROT 7.0 10/09/2021    LABALBU 4.3 10/09/2021    BILITOT 0.4 10/09/2021    ALKPHOS 67 10/09/2021    AST 10 10/09/2021    ALT 8 10/09/2021    LABGLOM >60.0 10/09/2021    GFRAA >60.0 10/09/2021    GLOB 2.7 10/09/2021     Lab Results   Component Value Date    WBC 6.4 10/05/2020    HGB 15.0 10/05/2020    HCT 43.1 10/05/2020    MCV 88.6 10/05/2020     10/05/2020     Lab Results   Component Value Date    LABA1C 9.1 (H) 10/09/2021    LABA1C 9.2 01/07/2021    LABA1C 9.5 10/08/2020   Results for Letha Posadas (MRN [id number]) as of 2/4/2020 12:02   Ref.  Range 6/18/2019 05:21   C-Peptide Latest Ref Range: 1.1 - 4.4 ng/mL 0.8 (L)     Lab Results   Component Value Date    CHOL 121 01/08/2021    CHOL 148 10/01/2019     Lab Results Component Value Date    TRIG 51 01/08/2021    TRIG 63 10/01/2019     Lab Results   Component Value Date    HDL 55 01/08/2021    HDL 62 (H) 10/01/2019     Lab Results   Component Value Date    LDLCALC 56 01/08/2021    [address] 10/01/2019     No results found for: LABVLDL, VLDL  No results found for: CHOLHDLRATIO  No results found for: TESTOSTERONE  No results found for: TSH, T4FREE, THYROIDAB    Review of Systems   Constitutional: Positive for unexpected weight change (wt loss ). Negative for chills, fatigue and fever. HENT: Negative for congestion, ear pain, postnasal drip, rhinorrhea, sinus pressure and sore throat. Eyes: Negative for pain and redness. Respiratory: Negative for cough, shortness of breath and wheezing. Cardiovascular: Negative for chest pain, palpitations and leg swelling. Gastrointestinal: Negative for abdominal pain, diarrhea, nausea and vomiting. Endocrine: Positive for cold intolerance. Negative for polyphagia and polyuria. Genitourinary: Negative for difficulty urinating. Musculoskeletal: Positive for gait problem (weak legs, chronic). Negative for arthralgias. Skin: Negative for rash. Allergic/Immunologic: Negative for environmental allergies. Neurological: Positive for weakness. Negative for dizziness and headaches. Hematological: Negative for adenopathy. Psychiatric/Behavioral: Negative for confusion. Objective:   Physical Exam  Vitals reviewed. Constitutional:       Appearance: He is well-developed. HENT:      Head: Normocephalic and atraumatic. Nose: No congestion. Mouth/Throat:      Mouth: Mucous membranes are moist.   Eyes:      Conjunctiva/sclera: Conjunctivae normal.   Neck:      Thyroid: No thyromegaly. Cardiovascular:      Rate and Rhythm: Normal rate and regular rhythm. Heart sounds: Normal heart sounds. Pulmonary:      Effort: Pulmonary effort is normal.      Breath sounds: Normal breath sounds.    Abdominal:      General: Bowel sounds are normal. There is no distension. Palpations: Abdomen is soft. Tenderness: There is no abdominal tenderness. Musculoskeletal:         General: Normal range of motion. Cervical back: Normal range of motion and neck supple. Comments: 1+ DP/PT pulses bilaterally. No wounds, lesions, or ulcerations. Thickened long toenails. Mild nonpitting edema. Skin:     General: Skin is warm and dry. Neurological:      Mental Status: He is alert and oriented to person, place, and time.    Psychiatric:         Mood and Affect: Mood normal.

## 2021-11-08 NOTE — TELEPHONE ENCOUNTER
Pharmacy  requesting medication refill.  Please approve or deny this request.    Rx requested:  Requested Prescriptions     Pending Prescriptions Disp Refills    BAQSIMI ONE PACK 3 MG/DOSE POWD [Pharmacy Med Name: BAQSIMI ONE PACK 3 MG/DOSE 3 Powder] 1 each 1     Si APPLICATOR BY NASAL ROUTE EVERY 4 HOURS AS NEEDED (BLOOD GLUCOSE LESS THAN 70)         Last Office Visit:   10/11/2021      Next Visit Date:  Future Appointments   Date Time Provider Ashly Garcia   1/10/2022 10:00 AM Contreras Duff, [address]

## 2022-01-01 ENCOUNTER — HOSPITAL ENCOUNTER (INPATIENT)
Age: 72
LOS: 7 days | Discharge: HOSPICE | DRG: 640 | End: 2022-10-12
Attending: INTERNAL MEDICINE | Admitting: INTERNAL MEDICINE
Payer: COMMERCIAL

## 2022-01-01 ENCOUNTER — TELEPHONE (OUTPATIENT)
Dept: PRIMARY CARE CLINIC | Age: 72
End: 2022-01-01

## 2022-01-01 ENCOUNTER — HOSPITAL ENCOUNTER (EMERGENCY)
Age: 72
Discharge: HOME | End: 2022-08-17
Payer: COMMERCIAL

## 2022-01-01 ENCOUNTER — APPOINTMENT (OUTPATIENT)
Dept: CT IMAGING | Age: 72
End: 2022-01-01
Payer: COMMERCIAL

## 2022-01-01 ENCOUNTER — OFFICE VISIT (OUTPATIENT)
Dept: FAMILY MEDICINE CLINIC | Age: 72
End: 2022-01-01
Payer: COMMERCIAL

## 2022-01-01 ENCOUNTER — APPOINTMENT (OUTPATIENT)
Dept: GENERAL RADIOLOGY | Age: 72
End: 2022-01-01
Payer: COMMERCIAL

## 2022-01-01 ENCOUNTER — APPOINTMENT (OUTPATIENT)
Dept: MRI IMAGING | Age: 72
DRG: 640 | End: 2022-01-01
Payer: COMMERCIAL

## 2022-01-01 ENCOUNTER — APPOINTMENT (OUTPATIENT)
Dept: CT IMAGING | Age: 72
DRG: 640 | End: 2022-01-01
Payer: COMMERCIAL

## 2022-01-01 ENCOUNTER — OFFICE VISIT (OUTPATIENT)
Dept: ENDOCRINOLOGY | Age: 72
End: 2022-01-01
Payer: COMMERCIAL

## 2022-01-01 ENCOUNTER — TELEPHONE (OUTPATIENT)
Dept: FAMILY MEDICINE CLINIC | Age: 72
End: 2022-01-01

## 2022-01-01 ENCOUNTER — APPOINTMENT (OUTPATIENT)
Dept: GENERAL RADIOLOGY | Age: 72
DRG: 640 | End: 2022-01-01
Payer: COMMERCIAL

## 2022-01-01 ENCOUNTER — HOSPITAL ENCOUNTER (EMERGENCY)
Age: 72
Discharge: HOME | End: 2022-09-04
Attending: FAMILY MEDICINE
Payer: COMMERCIAL

## 2022-01-01 ENCOUNTER — ANESTHESIA EVENT (OUTPATIENT)
Dept: OPERATING ROOM | Age: 72
DRG: 640 | End: 2022-01-01
Payer: COMMERCIAL

## 2022-01-01 ENCOUNTER — OFFICE VISIT (OUTPATIENT)
Dept: PRIMARY CARE CLINIC | Age: 72
End: 2022-01-01
Payer: COMMERCIAL

## 2022-01-01 ENCOUNTER — HOSPITAL ENCOUNTER (OUTPATIENT)
Dept: GENERAL RADIOLOGY | Age: 72
Discharge: HOME | DRG: 640 | End: 2022-10-07
Payer: COMMERCIAL

## 2022-01-01 ENCOUNTER — ANESTHESIA (OUTPATIENT)
Dept: OPERATING ROOM | Age: 72
DRG: 640 | End: 2022-01-01
Payer: COMMERCIAL

## 2022-01-01 ENCOUNTER — CARE COORDINATION (OUTPATIENT)
Dept: CARE COORDINATION | Age: 72
End: 2022-01-01

## 2022-01-01 VITALS
DIASTOLIC BLOOD PRESSURE: 90 MMHG | HEIGHT: 65 IN | TEMPERATURE: 96.8 F | HEART RATE: 88 BPM | OXYGEN SATURATION: 98 % | WEIGHT: 170 LBS | BODY MASS INDEX: 28.32 KG/M2 | SYSTOLIC BLOOD PRESSURE: 148 MMHG

## 2022-01-01 VITALS
OXYGEN SATURATION: 95 % | RESPIRATION RATE: 16 BRPM | HEART RATE: 81 BPM | DIASTOLIC BLOOD PRESSURE: 67 MMHG | TEMPERATURE: 99.4 F | WEIGHT: 119.71 LBS | HEIGHT: 68 IN | SYSTOLIC BLOOD PRESSURE: 158 MMHG | BODY MASS INDEX: 18.14 KG/M2

## 2022-01-01 VITALS
DIASTOLIC BLOOD PRESSURE: 123 MMHG | TEMPERATURE: 98.2 F | RESPIRATION RATE: 15 BRPM | HEIGHT: 66 IN | OXYGEN SATURATION: 99 % | WEIGHT: 170 LBS | SYSTOLIC BLOOD PRESSURE: 170 MMHG | HEART RATE: 82 BPM | BODY MASS INDEX: 27.32 KG/M2

## 2022-01-01 VITALS
DIASTOLIC BLOOD PRESSURE: 89 MMHG | TEMPERATURE: 97.5 F | SYSTOLIC BLOOD PRESSURE: 160 MMHG | OXYGEN SATURATION: 100 % | HEART RATE: 91 BPM | HEIGHT: 68 IN | RESPIRATION RATE: 20 BRPM | WEIGHT: 170 LBS | BODY MASS INDEX: 25.76 KG/M2

## 2022-01-01 VITALS
BODY MASS INDEX: 25.85 KG/M2 | DIASTOLIC BLOOD PRESSURE: 80 MMHG | TEMPERATURE: 98.9 F | HEIGHT: 68 IN | SYSTOLIC BLOOD PRESSURE: 130 MMHG | HEART RATE: 102 BPM | OXYGEN SATURATION: 97 %

## 2022-01-01 VITALS — HEIGHT: 65 IN | WEIGHT: 170 LBS | BODY MASS INDEX: 28.32 KG/M2

## 2022-01-01 VITALS
SYSTOLIC BLOOD PRESSURE: 171 MMHG | BODY MASS INDEX: 28.29 KG/M2 | HEART RATE: 81 BPM | DIASTOLIC BLOOD PRESSURE: 90 MMHG | HEIGHT: 65 IN | OXYGEN SATURATION: 96 %

## 2022-01-01 VITALS — DIASTOLIC BLOOD PRESSURE: 68 MMHG | TEMPERATURE: 97.5 F | SYSTOLIC BLOOD PRESSURE: 110 MMHG

## 2022-01-01 DIAGNOSIS — R15.9 BOWEL AND BLADDER INCONTINENCE: ICD-10-CM

## 2022-01-01 DIAGNOSIS — E11.40 TYPE 2 DIABETES MELLITUS WITH DIABETIC NEUROPATHY, WITH LONG-TERM CURRENT USE OF INSULIN (HCC): ICD-10-CM

## 2022-01-01 DIAGNOSIS — Z79.4 TYPE 2 DIABETES MELLITUS WITH HYPERGLYCEMIA, WITH LONG-TERM CURRENT USE OF INSULIN (HCC): ICD-10-CM

## 2022-01-01 DIAGNOSIS — E78.5 HYPERLIPIDEMIA, UNSPECIFIED HYPERLIPIDEMIA TYPE: ICD-10-CM

## 2022-01-01 DIAGNOSIS — R13.10 DYSPHAGIA, UNSPECIFIED TYPE: ICD-10-CM

## 2022-01-01 DIAGNOSIS — E11.65 TYPE 2 DIABETES MELLITUS WITH HYPERGLYCEMIA, WITH LONG-TERM CURRENT USE OF INSULIN (HCC): Primary | ICD-10-CM

## 2022-01-01 DIAGNOSIS — E78.01 FAMILIAL HYPERCHOLESTEROLEMIA: ICD-10-CM

## 2022-01-01 DIAGNOSIS — Z12.11 SCREENING FOR COLORECTAL CANCER: ICD-10-CM

## 2022-01-01 DIAGNOSIS — Z79.4 CONTROLLED TYPE 2 DIABETES MELLITUS WITHOUT COMPLICATION, WITH LONG-TERM CURRENT USE OF INSULIN (HCC): ICD-10-CM

## 2022-01-01 DIAGNOSIS — E86.0 DEHYDRATION: ICD-10-CM

## 2022-01-01 DIAGNOSIS — Z91.81 AT RISK FOR FALLS: ICD-10-CM

## 2022-01-01 DIAGNOSIS — Z12.12 SCREENING FOR COLORECTAL CANCER: ICD-10-CM

## 2022-01-01 DIAGNOSIS — I10 ESSENTIAL HYPERTENSION: ICD-10-CM

## 2022-01-01 DIAGNOSIS — E11.65 TYPE 2 DIABETES MELLITUS WITH HYPERGLYCEMIA, WITH LONG-TERM CURRENT USE OF INSULIN (HCC): ICD-10-CM

## 2022-01-01 DIAGNOSIS — G20 PARKINSON'S DISEASE (HCC): ICD-10-CM

## 2022-01-01 DIAGNOSIS — F03.90 DEMENTIA WITHOUT BEHAVIORAL DISTURBANCE, UNSPECIFIED DEMENTIA TYPE: ICD-10-CM

## 2022-01-01 DIAGNOSIS — E03.8 OTHER SPECIFIED HYPOTHYROIDISM: ICD-10-CM

## 2022-01-01 DIAGNOSIS — Z76.89 ENCOUNTER TO ESTABLISH CARE: ICD-10-CM

## 2022-01-01 DIAGNOSIS — E11.9 CONTROLLED TYPE 2 DIABETES MELLITUS WITHOUT COMPLICATION, WITH LONG-TERM CURRENT USE OF INSULIN (HCC): ICD-10-CM
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
## 2022-01-01 DIAGNOSIS — E86.0 DEHYDRATION: Primary | ICD-10-CM

## 2022-01-01 DIAGNOSIS — N17.9 ACUTE RENAL FAILURE, UNSPECIFIED ACUTE RENAL FAILURE TYPE (HCC): ICD-10-CM

## 2022-01-01 DIAGNOSIS — S00.81XA ABRASION OF FOREHEAD, INITIAL ENCOUNTER: ICD-10-CM

## 2022-01-01 DIAGNOSIS — Z74.2 NEED FOR HOME HEALTH CARE: ICD-10-CM

## 2022-01-01 DIAGNOSIS — E87.0 HYPERNATREMIA: Primary | ICD-10-CM

## 2022-01-01 DIAGNOSIS — Z74.09 IMPAIRED MOBILITY: ICD-10-CM

## 2022-01-01 DIAGNOSIS — Z79.4 TYPE 2 DIABETES MELLITUS WITH HYPERGLYCEMIA, WITH LONG-TERM CURRENT USE OF INSULIN (HCC): Primary | ICD-10-CM

## 2022-01-01 DIAGNOSIS — R32 BOWEL AND BLADDER INCONTINENCE: ICD-10-CM

## 2022-01-01 DIAGNOSIS — Z11.59 NEED FOR HEPATITIS C SCREENING TEST: ICD-10-CM

## 2022-01-01 DIAGNOSIS — S22.000A COMPRESSION FRACTURE OF BODY OF THORACIC VERTEBRA (HCC): ICD-10-CM

## 2022-01-01 DIAGNOSIS — Z00.00 ENCOUNTER FOR WELL ADULT EXAM WITHOUT ABNORMAL FINDINGS: Primary | ICD-10-CM

## 2022-01-01 DIAGNOSIS — R13.10 DYSPHAGIA, UNSPECIFIED TYPE: Primary | ICD-10-CM

## 2022-01-01 DIAGNOSIS — Z02.89 ENCOUNTER FOR COMPLETION OF FORM WITH PATIENT: ICD-10-CM

## 2022-01-01 DIAGNOSIS — R29.6 FREQUENT FALLS: ICD-10-CM

## 2022-01-01 DIAGNOSIS — G20 PARKINSON'S DISEASE (HCC): Primary | ICD-10-CM

## 2022-01-01 DIAGNOSIS — Z79.4 TYPE 2 DIABETES MELLITUS WITH DIABETIC NEUROPATHY, WITH LONG-TERM CURRENT USE OF INSULIN (HCC): ICD-10-CM

## 2022-01-01 DIAGNOSIS — E63.9 NUTRITIONAL PROBLEMS: ICD-10-CM

## 2022-01-01 DIAGNOSIS — Z23 NEED FOR PROPHYLACTIC VACCINATION AND INOCULATION AGAINST VARICELLA: ICD-10-CM

## 2022-01-01 DIAGNOSIS — S09.90XA CLOSED HEAD INJURY, INITIAL ENCOUNTER: ICD-10-CM

## 2022-01-01 LAB
ABO/RH: NORMAL
ALBUMIN SERPL-MCNC: 2.6 G/DL (ref 3.5–4.6)
ALBUMIN SERPL-MCNC: 3.4 G/DL (ref 3.5–4.6)
ALBUMIN SERPL-MCNC: 3.8 G/DL (ref 3.5–4.6)
ALBUMIN SERPL-MCNC: 3.8 G/DL (ref 3.5–4.6)
ALBUMIN SERPL-MCNC: 3.9 G/DL (ref 3.5–4.6)
ALBUMIN SERPL-MCNC: 4.1 G/DL (ref 3.5–4.6)
ALBUMIN SERPL-MCNC: 4.2 G/DL (ref 3.5–4.6)
ALP BLD-CCNC: 153 U/L (ref 35–104)
ALP BLD-CCNC: 159 U/L (ref 35–104)
ALP BLD-CCNC: 222 U/L (ref 35–104)
ALP BLD-CCNC: 64 U/L (ref 35–104)
ALP BLD-CCNC: 71 U/L (ref 35–104)
ALP BLD-CCNC: 79 U/L (ref 35–104)
ALP BLD-CCNC: 94 U/L (ref 35–104)
ALT SERPL-CCNC: 13 U/L (ref 0–41)
ALT SERPL-CCNC: 14 U/L (ref 0–41)
ALT SERPL-CCNC: 15 U/L (ref 0–41)
ALT SERPL-CCNC: <5 U/L (ref 0–41)
ANION GAP SERPL CALCULATED.3IONS-SCNC: 10 MEQ/L (ref 9–15)
ANION GAP SERPL CALCULATED.3IONS-SCNC: 11 MEQ/L (ref 9–15)
ANION GAP SERPL CALCULATED.3IONS-SCNC: 12 MEQ/L (ref 9–15)
ANION GAP SERPL CALCULATED.3IONS-SCNC: 13 MEQ/L (ref 9–15)
ANION GAP SERPL CALCULATED.3IONS-SCNC: 14 MEQ/L (ref 9–15)
ANION GAP SERPL CALCULATED.3IONS-SCNC: 14 MEQ/L (ref 9–15)
ANION GAP SERPL CALCULATED.3IONS-SCNC: 16 MEQ/L (ref 9–15)
ANION GAP SERPL CALCULATED.3IONS-SCNC: 16 MEQ/L (ref 9–15)
ANION GAP SERPL CALCULATED.3IONS-SCNC: 17 MEQ/L (ref 9–15)
ANION GAP SERPL CALCULATED.3IONS-SCNC: 18 MEQ/L (ref 9–15)
ANION GAP SERPL CALCULATED.3IONS-SCNC: 6 MEQ/L (ref 9–15)
ANION GAP SERPL CALCULATED.3IONS-SCNC: 6 MEQ/L (ref 9–15)
ANION GAP SERPL CALCULATED.3IONS-SCNC: 7 MEQ/L (ref 9–15)
ANION GAP SERPL CALCULATED.3IONS-SCNC: 7 MEQ/L (ref 9–15)
ANION GAP SERPL CALCULATED.3IONS-SCNC: 8 MEQ/L (ref 9–15)
ANION GAP SERPL CALCULATED.3IONS-SCNC: 8 MEQ/L (ref 9–15)
ANION GAP SERPL CALCULATED.3IONS-SCNC: 9 MEQ/L (ref 9–15)
ANISOCYTOSIS: ABNORMAL
ANTIBODY SCREEN: NORMAL
APTT: 33.4 SEC (ref 24.4–36.8)
AST SERPL-CCNC: 11 U/L (ref 0–40)
AST SERPL-CCNC: 14 U/L (ref 0–40)
AST SERPL-CCNC: 14 U/L (ref 0–40)
AST SERPL-CCNC: 15 U/L (ref 0–40)
AST SERPL-CCNC: 55 U/L (ref 0–40)
AST SERPL-CCNC: 57 U/L (ref 0–40)
AST SERPL-CCNC: 9 U/L (ref 0–40)
BANDED NEUTROPHILS RELATIVE PERCENT: 2 %
BASE EXCESS VENOUS: 2 (ref -3–3)
BASOPHILS ABSOLUTE: 0 K/UL (ref 0–0.2)
BASOPHILS ABSOLUTE: 0.1 K/UL (ref 0–0.2)
BASOPHILS RELATIVE PERCENT: 0.2 %
BASOPHILS RELATIVE PERCENT: 0.3 %
BASOPHILS RELATIVE PERCENT: 0.4 %
BASOPHILS RELATIVE PERCENT: 0.5 %
BASOPHILS RELATIVE PERCENT: 0.8 %
BILIRUB SERPL-MCNC: 0.3 MG/DL (ref 0.2–0.7)
BILIRUB SERPL-MCNC: 0.4 MG/DL (ref 0.2–0.7)
BILIRUB SERPL-MCNC: 0.4 MG/DL (ref 0.2–0.7)
BILIRUB SERPL-MCNC: 0.5 MG/DL (ref 0.2–0.7)
BILIRUBIN DIRECT: <0.2 MG/DL (ref 0–0.4)
BILIRUBIN, INDIRECT: ABNORMAL MG/DL (ref 0–0.6)
BLOOD CULTURE, ROUTINE: NORMAL
BUN BLDV-MCNC: 15 MG/DL (ref 8–23)
BUN BLDV-MCNC: 16 MG/DL (ref 8–23)
BUN BLDV-MCNC: 17 MG/DL (ref 8–23)
BUN BLDV-MCNC: 17 MG/DL (ref 8–23)
BUN BLDV-MCNC: 18 MG/DL (ref 8–23)
BUN BLDV-MCNC: 18 MG/DL (ref 8–23)
BUN BLDV-MCNC: 19 MG/DL (ref 8–23)
BUN BLDV-MCNC: 20 MG/DL (ref 8–23)
BUN BLDV-MCNC: 21 MG/DL (ref 8–23)
BUN BLDV-MCNC: 23 MG/DL (ref 8–23)
BUN BLDV-MCNC: 24 MG/DL (ref 8–23)
BUN BLDV-MCNC: 24 MG/DL (ref 8–23)
BUN BLDV-MCNC: 26 MG/DL (ref 8–23)
BUN BLDV-MCNC: 27 MG/DL (ref 8–23)
BUN BLDV-MCNC: 30 MG/DL (ref 8–23)
BUN BLDV-MCNC: 31 MG/DL (ref 8–23)
BUN BLDV-MCNC: 31 MG/DL (ref 8–23)
BUN BLDV-MCNC: 34 MG/DL (ref 8–23)
BUN BLDV-MCNC: 37 MG/DL (ref 8–23)
BUN BLDV-MCNC: 38 MG/DL (ref 8–23)
BUN BLDV-MCNC: 40 MG/DL (ref 8–23)
BUN BLDV-MCNC: 45 MG/DL (ref 8–23)
BUN BLDV-MCNC: 48 MG/DL (ref 8–23)
BUN BLDV-MCNC: 52 MG/DL (ref 8–23)
BUN BLDV-MCNC: 54 MG/DL (ref 8–23)
BUN BLDV-MCNC: 57 MG/DL (ref 8–23)
CALCIUM IONIZED: 1.1 MMOL/L (ref 1.12–1.32)
CALCIUM SERPL-MCNC: 10.2 MG/DL (ref 8.5–9.9)
CALCIUM SERPL-MCNC: 7.9 MG/DL (ref 8.5–9.9)
CALCIUM SERPL-MCNC: 8 MG/DL (ref 8.5–9.9)
CALCIUM SERPL-MCNC: 8 MG/DL (ref 8.5–9.9)
CALCIUM SERPL-MCNC: 8.1 MG/DL (ref 8.5–9.9)
CALCIUM SERPL-MCNC: 8.2 MG/DL (ref 8.5–9.9)
CALCIUM SERPL-MCNC: 8.3 MG/DL (ref 8.5–9.9)
CALCIUM SERPL-MCNC: 8.3 MG/DL (ref 8.5–9.9)
CALCIUM SERPL-MCNC: 8.4 MG/DL (ref 8.5–9.9)
CALCIUM SERPL-MCNC: 8.5 MG/DL (ref 8.5–9.9)
CALCIUM SERPL-MCNC: 8.5 MG/DL (ref 8.5–9.9)
CALCIUM SERPL-MCNC: 8.6 MG/DL (ref 8.5–9.9)
CALCIUM SERPL-MCNC: 9 MG/DL (ref 8.5–9.9)
CALCIUM SERPL-MCNC: 9.1 MG/DL (ref 8.5–9.9)
CALCIUM SERPL-MCNC: 9.3 MG/DL (ref 8.5–9.9)
CALCIUM SERPL-MCNC: 9.4 MG/DL (ref 8.5–9.9)
CALCIUM SERPL-MCNC: 9.5 MG/DL (ref 8.5–9.9)
CHLORIDE BLD-SCNC: 104 MEQ/L (ref 95–107)
CHLORIDE BLD-SCNC: 105 MEQ/L (ref 95–107)
CHLORIDE BLD-SCNC: 106 MEQ/L (ref 95–107)
CHLORIDE BLD-SCNC: 107 MEQ/L (ref 95–107)
CHLORIDE BLD-SCNC: 111 MEQ/L (ref 95–107)
CHLORIDE BLD-SCNC: 112 MEQ/L (ref 95–107)
CHLORIDE BLD-SCNC: 115 MEQ/L (ref 95–107)
CHLORIDE BLD-SCNC: 116 MEQ/L (ref 95–107)
CHLORIDE BLD-SCNC: 117 MEQ/L (ref 95–107)
CHLORIDE BLD-SCNC: 117 MEQ/L (ref 95–107)
CHLORIDE BLD-SCNC: 119 MEQ/L (ref 95–107)
CHLORIDE BLD-SCNC: 119 MEQ/L (ref 95–107)
CHLORIDE BLD-SCNC: 120 MEQ/L (ref 95–107)
CHLORIDE BLD-SCNC: 121 MEQ/L (ref 95–107)
CHLORIDE BLD-SCNC: 123 MEQ/L (ref 95–107)
CHLORIDE BLD-SCNC: 126 MEQ/L (ref 95–107)
CHLORIDE BLD-SCNC: 128 MEQ/L (ref 95–107)
CHLORIDE BLD-SCNC: 128 MEQ/L (ref 95–107)
CHLORIDE BLD-SCNC: 131 MEQ/L (ref 95–107)
CHLORIDE BLD-SCNC: 131 MEQ/L (ref 95–107)
CHLORIDE BLD-SCNC: 94 MEQ/L (ref 95–107)
CHLORIDE BLD-SCNC: 97 MEQ/L (ref 95–107)
CHLORIDE BLD-SCNC: 99 MEQ/L (ref 95–107)
CHLORIDE BLD-SCNC: 99 MEQ/L (ref 95–107)
CHOLESTEROL, TOTAL: 127 MG/DL (ref 0–199)
CHP ED QC CHECK: NORMAL
CHP ED QC CHECK: NORMAL
CO2: 20 MEQ/L (ref 20–31)
CO2: 20 MEQ/L (ref 20–31)
CO2: 21 MEQ/L (ref 20–31)
CO2: 22 MEQ/L (ref 20–31)
CO2: 23 MEQ/L (ref 20–31)
CO2: 24 MEQ/L (ref 20–31)
CO2: 25 MEQ/L (ref 20–31)
CO2: 26 MEQ/L (ref 20–31)
CO2: 26 MEQ/L (ref 20–31)
CO2: 27 MEQ/L (ref 20–31)
CO2: 27 MEQ/L (ref 20–31)
CREAT SERPL-MCNC: 0.88 MG/DL (ref 0.7–1.2)
CREAT SERPL-MCNC: 0.93 MG/DL (ref 0.7–1.2)
CREAT SERPL-MCNC: 0.98 MG/DL (ref 0.7–1.2)
CREAT SERPL-MCNC: 1.16 MG/DL (ref 0.7–1.2)
CREAT SERPL-MCNC: 1.21 MG/DL (ref 0.7–1.2)
CREAT SERPL-MCNC: 1.23 MG/DL (ref 0.7–1.2)
CREAT SERPL-MCNC: 1.23 MG/DL (ref 0.7–1.2)
CREAT SERPL-MCNC: 1.24 MG/DL (ref 0.7–1.2)
CREAT SERPL-MCNC: 1.32 MG/DL (ref 0.7–1.2)
CREAT SERPL-MCNC: 1.32 MG/DL (ref 0.7–1.2)
CREAT SERPL-MCNC: 1.35 MG/DL (ref 0.7–1.2)
CREAT SERPL-MCNC: 1.37 MG/DL (ref 0.7–1.2)
CREAT SERPL-MCNC: 1.39 MG/DL (ref 0.7–1.2)
CREAT SERPL-MCNC: 1.41 MG/DL (ref 0.7–1.2)
CREAT SERPL-MCNC: 1.43 MG/DL (ref 0.7–1.2)
CREAT SERPL-MCNC: 1.48 MG/DL (ref 0.7–1.2)
CREAT SERPL-MCNC: 1.5 MG/DL (ref 0.7–1.2)
CREAT SERPL-MCNC: 1.54 MG/DL (ref 0.7–1.2)
CREAT SERPL-MCNC: 1.65 MG/DL (ref 0.7–1.2)
CREAT SERPL-MCNC: 1.74 MG/DL (ref 0.7–1.2)
CREAT SERPL-MCNC: 1.74 MG/DL (ref 0.7–1.2)
CREAT SERPL-MCNC: 1.91 MG/DL (ref 0.7–1.2)
CREAT SERPL-MCNC: 1.95 MG/DL (ref 0.7–1.2)
CREAT SERPL-MCNC: 2.01 MG/DL (ref 0.7–1.2)
CREAT SERPL-MCNC: 2.12 MG/DL (ref 0.7–1.2)
CREAT SERPL-MCNC: 2.25 MG/DL (ref 0.7–1.2)
CREAT SERPL-MCNC: 2.38 MG/DL (ref 0.7–1.2)
CREAT SERPL-MCNC: 2.67 MG/DL (ref 0.7–1.2)
CULTURE, BLOOD 2: NORMAL
EKG ATRIAL RATE: 100 BPM
EKG ATRIAL RATE: 83 BPM
EKG P AXIS: 51 DEGREES
EKG P AXIS: 51 DEGREES
EKG P-R INTERVAL: 188 MS
EKG P-R INTERVAL: 202 MS
EKG Q-T INTERVAL: 362 MS
EKG Q-T INTERVAL: 388 MS
EKG QRS DURATION: 90 MS
EKG QRS DURATION: 96 MS
EKG QTC CALCULATION (BAZETT): 455 MS
EKG QTC CALCULATION (BAZETT): 466 MS
EKG R AXIS: -14 DEGREES
EKG R AXIS: -8 DEGREES
EKG T AXIS: 12 DEGREES
EKG T AXIS: 35 DEGREES
EKG VENTRICULAR RATE: 100 BPM
EKG VENTRICULAR RATE: 83 BPM
EOSINOPHILS ABSOLUTE: 0 K/UL (ref 0–0.7)
EOSINOPHILS ABSOLUTE: 0.1 K/UL (ref 0–0.7)
EOSINOPHILS ABSOLUTE: 0.2 K/UL (ref 0–0.7)
EOSINOPHILS RELATIVE PERCENT: 0.2 %
EOSINOPHILS RELATIVE PERCENT: 0.5 %
EOSINOPHILS RELATIVE PERCENT: 0.5 %
EOSINOPHILS RELATIVE PERCENT: 0.6 %
EOSINOPHILS RELATIVE PERCENT: 0.6 %
EOSINOPHILS RELATIVE PERCENT: 0.8 %
EOSINOPHILS RELATIVE PERCENT: 0.9 %
EOSINOPHILS RELATIVE PERCENT: 1.1 %
EOSINOPHILS RELATIVE PERCENT: 1.9 %
ETHANOL PERCENT: 0.01 G/DL
ETHANOL: 13 MG/DL (ref 0–0.08)
GFR AFRICAN AMERICAN: 28
GFR AFRICAN AMERICAN: 28.6
GFR AFRICAN AMERICAN: 32.7
GFR AFRICAN AMERICAN: 34.9
GFR AFRICAN AMERICAN: 37.4
GFR AFRICAN AMERICAN: 39.7
GFR AFRICAN AMERICAN: 41.2
GFR AFRICAN AMERICAN: 42.1
GFR AFRICAN AMERICAN: 46.9
GFR AFRICAN AMERICAN: 46.9
GFR AFRICAN AMERICAN: 49.9
GFR AFRICAN AMERICAN: 54
GFR AFRICAN AMERICAN: 55.7
GFR AFRICAN AMERICAN: 56.6
GFR AFRICAN AMERICAN: 58.9
GFR AFRICAN AMERICAN: 59.8
GFR AFRICAN AMERICAN: >60
GFR NON-AFRICAN AMERICAN: 23
GFR NON-AFRICAN AMERICAN: 38.8
GFR NON-AFRICAN AMERICAN: >60
GFR SERPL CREATININE-BSD FRML MDRD: 24.6 ML/MIN/{1.73_M2}
GFR SERPL CREATININE-BSD FRML MDRD: 28.3 ML/MIN/{1.73_M2}
GFR SERPL CREATININE-BSD FRML MDRD: 30.3 ML/MIN/{1.73_M2}
GFR SERPL CREATININE-BSD FRML MDRD: 32.5 ML/MIN/{1.73_M2}
GFR SERPL CREATININE-BSD FRML MDRD: 34.7 ML/MIN/{1.73_M2}
GFR SERPL CREATININE-BSD FRML MDRD: 35.9 ML/MIN/{1.73_M2}
GFR SERPL CREATININE-BSD FRML MDRD: 36.8 ML/MIN/{1.73_M2}
GFR SERPL CREATININE-BSD FRML MDRD: 41.2 ML/MIN/{1.73_M2}
GFR SERPL CREATININE-BSD FRML MDRD: 43.9 ML/MIN/{1.73_M2}
GFR SERPL CREATININE-BSD FRML MDRD: 47.7 ML/MIN/{1.73_M2}
GFR SERPL CREATININE-BSD FRML MDRD: 49.2 ML/MIN/{1.73_M2}
GFR SERPL CREATININE-BSD FRML MDRD: 50 ML/MIN/{1.73_M2}
GFR SERPL CREATININE-BSD FRML MDRD: 52.1 ML/MIN/{1.73_M2}
GFR SERPL CREATININE-BSD FRML MDRD: 53 ML/MIN/{1.73_M2}
GFR SERPL CREATININE-BSD FRML MDRD: 53.9 ML/MIN/{1.73_M2}
GFR SERPL CREATININE-BSD FRML MDRD: 54.9 ML/MIN/{1.73_M2}
GFR SERPL CREATININE-BSD FRML MDRD: 55.9 ML/MIN/{1.73_M2}
GFR SERPL CREATININE-BSD FRML MDRD: 57.4 ML/MIN/{1.73_M2}
GFR SERPL CREATININE-BSD FRML MDRD: 57.4 ML/MIN/{1.73_M2}
GFR SERPL CREATININE-BSD FRML MDRD: >60 ML/MIN/{1.73_M2}
GLOBULIN: 2.8 G/DL (ref 2.3–3.5)
GLOBULIN: 3 G/DL (ref 2.3–3.5)
GLOBULIN: 3.1 G/DL (ref 2.3–3.5)
GLOBULIN: 3.1 G/DL (ref 2.3–3.5)
GLOBULIN: 3.8 G/DL (ref 2.3–3.5)
GLOBULIN: 4 G/DL (ref 2.3–3.5)
GLUCOSE BLD-MCNC: 100 MG/DL (ref 70–99)
GLUCOSE BLD-MCNC: 102 MG/DL (ref 70–99)
GLUCOSE BLD-MCNC: 103 MG/DL (ref 70–99)
GLUCOSE BLD-MCNC: 113 MG/DL (ref 70–99)
GLUCOSE BLD-MCNC: 113 MG/DL (ref 70–99)
GLUCOSE BLD-MCNC: 115 MG/DL (ref 70–99)
GLUCOSE BLD-MCNC: 122 MG/DL (ref 70–99)
GLUCOSE BLD-MCNC: 133 MG/DL (ref 70–99)
GLUCOSE BLD-MCNC: 143 MG/DL (ref 70–99)
GLUCOSE BLD-MCNC: 147 MG/DL (ref 70–99)
GLUCOSE BLD-MCNC: 152 MG/DL (ref 70–99)
GLUCOSE BLD-MCNC: 153 MG/DL (ref 70–99)
GLUCOSE BLD-MCNC: 154 MG/DL (ref 70–99)
GLUCOSE BLD-MCNC: 155 MG/DL (ref 70–99)
GLUCOSE BLD-MCNC: 161 MG/DL (ref 70–99)
GLUCOSE BLD-MCNC: 163 MG/DL (ref 70–99)
GLUCOSE BLD-MCNC: 167 MG/DL (ref 70–99)
GLUCOSE BLD-MCNC: 171 MG/DL (ref 70–99)
GLUCOSE BLD-MCNC: 179 MG/DL (ref 70–99)
GLUCOSE BLD-MCNC: 181 MG/DL (ref 70–99)
GLUCOSE BLD-MCNC: 184 MG/DL (ref 70–99)
GLUCOSE BLD-MCNC: 190 MG/DL (ref 70–99)
GLUCOSE BLD-MCNC: 194 MG/DL (ref 70–99)
GLUCOSE BLD-MCNC: 199 MG/DL (ref 70–99)
GLUCOSE BLD-MCNC: 200 MG/DL (ref 70–99)
GLUCOSE BLD-MCNC: 200 MG/DL (ref 70–99)
GLUCOSE BLD-MCNC: 201 MG/DL (ref 70–99)
GLUCOSE BLD-MCNC: 202 MG/DL (ref 70–99)
GLUCOSE BLD-MCNC: 204 MG/DL (ref 70–99)
GLUCOSE BLD-MCNC: 205 MG/DL (ref 70–99)
GLUCOSE BLD-MCNC: 205 MG/DL (ref 70–99)
GLUCOSE BLD-MCNC: 206 MG/DL (ref 70–99)
GLUCOSE BLD-MCNC: 206 MG/DL (ref 70–99)
GLUCOSE BLD-MCNC: 209 MG/DL (ref 70–99)
GLUCOSE BLD-MCNC: 213 MG/DL (ref 70–99)
GLUCOSE BLD-MCNC: 213 MG/DL (ref 70–99)
GLUCOSE BLD-MCNC: 214 MG/DL (ref 70–99)
GLUCOSE BLD-MCNC: 223 MG/DL (ref 70–99)
GLUCOSE BLD-MCNC: 223 MG/DL (ref 70–99)
GLUCOSE BLD-MCNC: 224 MG/DL
GLUCOSE BLD-MCNC: 226 MG/DL (ref 70–99)
GLUCOSE BLD-MCNC: 228 MG/DL (ref 70–99)
GLUCOSE BLD-MCNC: 230 MG/DL (ref 70–99)
GLUCOSE BLD-MCNC: 234 MG/DL (ref 70–99)
GLUCOSE BLD-MCNC: 237 MG/DL (ref 70–99)
GLUCOSE BLD-MCNC: 240 MG/DL (ref 70–99)
GLUCOSE BLD-MCNC: 241 MG/DL (ref 70–99)
GLUCOSE BLD-MCNC: 246 MG/DL (ref 70–99)
GLUCOSE BLD-MCNC: 248 MG/DL (ref 70–99)
GLUCOSE BLD-MCNC: 248 MG/DL (ref 70–99)
GLUCOSE BLD-MCNC: 254 MG/DL (ref 70–99)
GLUCOSE BLD-MCNC: 257 MG/DL (ref 70–99)
GLUCOSE BLD-MCNC: 258 MG/DL (ref 70–99)
GLUCOSE BLD-MCNC: 260 MG/DL (ref 70–99)
GLUCOSE BLD-MCNC: 262 MG/DL (ref 70–99)
GLUCOSE BLD-MCNC: 265 MG/DL (ref 70–99)
GLUCOSE BLD-MCNC: 266 MG/DL
GLUCOSE BLD-MCNC: 270 MG/DL (ref 70–99)
GLUCOSE BLD-MCNC: 279 MG/DL (ref 70–99)
GLUCOSE BLD-MCNC: 290 MG/DL (ref 70–99)
GLUCOSE BLD-MCNC: 315 MG/DL (ref 70–99)
GLUCOSE BLD-MCNC: 403 MG/DL (ref 70–99)
GLUCOSE BLD-MCNC: 445 MG/DL (ref 70–99)
GLUCOSE BLD-MCNC: 49 MG/DL (ref 70–99)
GLUCOSE BLD-MCNC: 50 MG/DL (ref 70–99)
GLUCOSE BLD-MCNC: 54 MG/DL (ref 70–99)
GLUCOSE BLD-MCNC: 56 MG/DL (ref 70–99)
GLUCOSE BLD-MCNC: 59 MG/DL (ref 70–99)
GLUCOSE BLD-MCNC: 74 MG/DL (ref 70–99)
GLUCOSE BLD-MCNC: 77 MG/DL (ref 70–99)
GLUCOSE BLD-MCNC: 85 MG/DL (ref 70–99)
GLUCOSE BLD-MCNC: 87 MG/DL (ref 70–99)
GLUCOSE BLD-MCNC: 91 MG/DL (ref 70–99)
GLUCOSE BLD-MCNC: 93 MG/DL (ref 70–99)
HBA1C MFR BLD: 6.7 %
HBA1C MFR BLD: 7.7 %
HBA1C MFR BLD: 7.9 % (ref 4.8–5.9)
HBA1C MFR BLD: 8 % (ref 4.8–5.9)
HBA1C MFR BLD: 8.3 % (ref 4.8–5.9)
HCO3 VENOUS: 27.1 MMOL/L (ref 23–29)
HCT VFR BLD CALC: 28.6 % (ref 42–52)
HCT VFR BLD CALC: 30.3 % (ref 42–52)
HCT VFR BLD CALC: 30.8 % (ref 42–52)
HCT VFR BLD CALC: 32.2 % (ref 42–52)
HCT VFR BLD CALC: 32.7 % (ref 42–52)
HCT VFR BLD CALC: 33.3 % (ref 42–52)
HCT VFR BLD CALC: 39.8 % (ref 42–52)
HCT VFR BLD CALC: 40.6 % (ref 42–52)
HCT VFR BLD CALC: 42.1 % (ref 42–52)
HCT VFR BLD CALC: 42.1 % (ref 42–52)
HDLC SERPL-MCNC: 53 MG/DL (ref 40–59)
HEMOGLOBIN: 10 G/DL (ref 14–18)
HEMOGLOBIN: 10.2 G/DL (ref 14–18)
HEMOGLOBIN: 10.5 G/DL (ref 14–18)
HEMOGLOBIN: 10.8 G/DL (ref 14–18)
HEMOGLOBIN: 13.3 G/DL (ref 14–18)
HEMOGLOBIN: 13.6 G/DL (ref 14–18)
HEMOGLOBIN: 13.7 G/DL (ref 14–18)
HEMOGLOBIN: 14 G/DL (ref 14–18)
HEMOGLOBIN: 17.6 GM/DL (ref 13.5–17.5)
HEMOGLOBIN: 9.5 G/DL (ref 14–18)
HEMOGLOBIN: 9.9 G/DL (ref 14–18)
HEPATITIS C ANTIBODY: NONREACTIVE
HYPOCHROMIA: ABNORMAL
INFLUENZA A BY PCR: NEGATIVE
INFLUENZA B BY PCR: NEGATIVE
INR BLD: 1
LACTATE: 5.06 MMOL/L (ref 0.4–2)
LACTIC ACID: 1.1 MMOL/L (ref 0.5–2.2)
LACTIC ACID: 1.6 MMOL/L (ref 0.5–2.2)
LACTIC ACID: 2.4 MMOL/L (ref 0.5–2.2)
LACTIC ACID: 3.7 MMOL/L (ref 0.5–2.2)
LACTIC ACID: 6.6 MMOL/L (ref 0.5–2.2)
LDL CHOLESTEROL CALCULATED: 54 MG/DL (ref 0–129)
LYMPHOCYTES ABSOLUTE: 0.3 K/UL (ref 1–4.8)
LYMPHOCYTES ABSOLUTE: 0.5 K/UL (ref 1–4.8)
LYMPHOCYTES ABSOLUTE: 0.6 K/UL (ref 1–4.8)
LYMPHOCYTES ABSOLUTE: 0.6 K/UL (ref 1–4.8)
LYMPHOCYTES ABSOLUTE: 0.9 K/UL (ref 1–4.8)
LYMPHOCYTES ABSOLUTE: 1 K/UL (ref 1–4.8)
LYMPHOCYTES RELATIVE PERCENT: 11.4 %
LYMPHOCYTES RELATIVE PERCENT: 12.1 %
LYMPHOCYTES RELATIVE PERCENT: 12.1 %
LYMPHOCYTES RELATIVE PERCENT: 14.8 %
LYMPHOCYTES RELATIVE PERCENT: 4.7 %
LYMPHOCYTES RELATIVE PERCENT: 5 %
LYMPHOCYTES RELATIVE PERCENT: 5.7 %
LYMPHOCYTES RELATIVE PERCENT: 6.7 %
LYMPHOCYTES RELATIVE PERCENT: 8.4 %
MAGNESIUM: 1.9 MG/DL (ref 1.7–2.4)
MAGNESIUM: 1.9 MG/DL (ref 1.7–2.4)
MAGNESIUM: 2 MG/DL (ref 1.7–2.4)
MAGNESIUM: 2.1 MG/DL (ref 1.7–2.4)
MAGNESIUM: 2.2 MG/DL (ref 1.7–2.4)
MAGNESIUM: 2.2 MG/DL (ref 1.7–2.4)
MAGNESIUM: 2.3 MG/DL (ref 1.7–2.4)
MAGNESIUM: 2.3 MG/DL (ref 1.7–2.4)
MAGNESIUM: 2.6 MG/DL (ref 1.7–2.4)
MCH RBC QN AUTO: 28.5 PG (ref 27–31.3)
MCH RBC QN AUTO: 28.6 PG (ref 27–31.3)
MCH RBC QN AUTO: 28.7 PG (ref 27–31.3)
MCH RBC QN AUTO: 29.1 PG (ref 27–31.3)
MCH RBC QN AUTO: 29.1 PG (ref 27–31.3)
MCH RBC QN AUTO: 29.2 PG (ref 27–31.3)
MCH RBC QN AUTO: 29.3 PG (ref 27–31.3)
MCH RBC QN AUTO: 29.3 PG (ref 27–31.3)
MCH RBC QN AUTO: 29.6 PG (ref 27–31.3)
MCH RBC QN AUTO: 29.7 PG (ref 27–31.3)
MCHC RBC AUTO-ENTMCNC: 31.5 % (ref 33–37)
MCHC RBC AUTO-ENTMCNC: 31.6 % (ref 33–37)
MCHC RBC AUTO-ENTMCNC: 32.3 % (ref 33–37)
MCHC RBC AUTO-ENTMCNC: 32.4 % (ref 33–37)
MCHC RBC AUTO-ENTMCNC: 33.1 % (ref 33–37)
MCHC RBC AUTO-ENTMCNC: 33.1 % (ref 33–37)
MCHC RBC AUTO-ENTMCNC: 33.2 % (ref 33–37)
MCHC RBC AUTO-ENTMCNC: 33.2 % (ref 33–37)
MCHC RBC AUTO-ENTMCNC: 33.4 % (ref 33–37)
MCHC RBC AUTO-ENTMCNC: 33.7 % (ref 33–37)
MCV RBC AUTO: 87.3 FL (ref 80–100)
MCV RBC AUTO: 87.7 FL (ref 80–100)
MCV RBC AUTO: 87.8 FL (ref 80–100)
MCV RBC AUTO: 88 FL (ref 80–100)
MCV RBC AUTO: 88.1 FL (ref 80–100)
MCV RBC AUTO: 88.5 FL (ref 80–100)
MCV RBC AUTO: 88.6 FL (ref 80–100)
MCV RBC AUTO: 90.6 FL (ref 80–100)
MCV RBC AUTO: 90.8 FL (ref 80–100)
MCV RBC AUTO: 91.8 FL (ref 80–100)
MONOCYTES ABSOLUTE: 0.3 K/UL (ref 0.2–0.8)
MONOCYTES ABSOLUTE: 0.4 K/UL (ref 0.2–0.8)
MONOCYTES ABSOLUTE: 0.5 K/UL (ref 0.2–0.8)
MONOCYTES ABSOLUTE: 0.6 K/UL (ref 0.2–0.8)
MONOCYTES ABSOLUTE: 0.6 K/UL (ref 0.2–0.8)
MONOCYTES RELATIVE PERCENT: 3.6 %
MONOCYTES RELATIVE PERCENT: 3.8 %
MONOCYTES RELATIVE PERCENT: 3.9 %
MONOCYTES RELATIVE PERCENT: 5.3 %
MONOCYTES RELATIVE PERCENT: 5.9 %
MONOCYTES RELATIVE PERCENT: 6.4 %
MONOCYTES RELATIVE PERCENT: 8.1 %
NEUTROPHILS ABSOLUTE: 4.9 K/UL (ref 1.4–6.5)
NEUTROPHILS ABSOLUTE: 6.1 K/UL (ref 1.4–6.5)
NEUTROPHILS ABSOLUTE: 6.6 K/UL (ref 1.4–6.5)
NEUTROPHILS ABSOLUTE: 6.8 K/UL (ref 1.4–6.5)
NEUTROPHILS ABSOLUTE: 7.3 K/UL (ref 1.4–6.5)
NEUTROPHILS ABSOLUTE: 8.1 K/UL (ref 1.4–6.5)
NEUTROPHILS ABSOLUTE: 8.8 K/UL (ref 1.4–6.5)
NEUTROPHILS ABSOLUTE: 9.1 K/UL (ref 1.4–6.5)
NEUTROPHILS ABSOLUTE: 9.2 K/UL (ref 1.4–6.5)
NEUTROPHILS RELATIVE PERCENT: 75.8 %
NEUTROPHILS RELATIVE PERCENT: 79.6 %
NEUTROPHILS RELATIVE PERCENT: 81.3 %
NEUTROPHILS RELATIVE PERCENT: 81.9 %
NEUTROPHILS RELATIVE PERCENT: 86.2 %
NEUTROPHILS RELATIVE PERCENT: 86.9 %
NEUTROPHILS RELATIVE PERCENT: 87.6 %
NEUTROPHILS RELATIVE PERCENT: 90 %
NEUTROPHILS RELATIVE PERCENT: 90.6 %
O2 SAT, VEN: 69 %
PCO2, VEN: 48.5 MM HG (ref 40–50)
PDW BLD-RTO: 13.5 % (ref 11.5–14.5)
PDW BLD-RTO: 13.8 % (ref 11.5–14.5)
PDW BLD-RTO: 14.2 % (ref 11.5–14.5)
PDW BLD-RTO: 14.3 % (ref 11.5–14.5)
PDW BLD-RTO: 14.3 % (ref 11.5–14.5)
PDW BLD-RTO: 14.4 % (ref 11.5–14.5)
PDW BLD-RTO: 14.4 % (ref 11.5–14.5)
PDW BLD-RTO: 14.5 % (ref 11.5–14.5)
PDW BLD-RTO: 14.7 % (ref 11.5–14.5)
PDW BLD-RTO: 15.3 % (ref 11.5–14.5)
PERFORMED ON: ABNORMAL
PERFORMED ON: NORMAL
PH VENOUS: 7.35 (ref 7.32–7.42)
PHOSPHORUS: 2.6 MG/DL (ref 2.3–4.8)
PHOSPHORUS: 2.8 MG/DL (ref 2.3–4.8)
PLATELET # BLD: 134 K/UL (ref 130–400)
PLATELET # BLD: 142 K/UL (ref 130–400)
PLATELET # BLD: 146 K/UL (ref 130–400)
PLATELET # BLD: 152 K/UL (ref 130–400)
PLATELET # BLD: 167 K/UL (ref 130–400)
PLATELET # BLD: 171 K/UL (ref 130–400)
PLATELET # BLD: 189 K/UL (ref 130–400)
PLATELET # BLD: 202 K/UL (ref 130–400)
PLATELET # BLD: 254 K/UL (ref 130–400)
PLATELET # BLD: 279 K/UL (ref 130–400)
PLATELET SLIDE REVIEW: NORMAL
PO2, VEN: 38 MM HG
POC CHLORIDE: 133 MEQ/L (ref 99–110)
POC CREATININE: 2.7 MG/DL (ref 0.8–1.3)
POC HEMATOCRIT: 52 % (ref 41–53)
POC POTASSIUM: 6.8 MEQ/L (ref 3.5–5.1)
POC SAMPLE TYPE: ABNORMAL
POC SODIUM: 174 MEQ/L (ref 136–145)
POIKILOCYTES: ABNORMAL
POTASSIUM REFLEX MAGNESIUM: 3.2 MEQ/L (ref 3.4–4.9)
POTASSIUM REFLEX MAGNESIUM: 3.2 MEQ/L (ref 3.4–4.9)
POTASSIUM REFLEX MAGNESIUM: 3.4 MEQ/L (ref 3.4–4.9)
POTASSIUM REFLEX MAGNESIUM: 3.4 MEQ/L (ref 3.4–4.9)
POTASSIUM REFLEX MAGNESIUM: 3.5 MEQ/L (ref 3.4–4.9)
POTASSIUM REFLEX MAGNESIUM: 3.6 MEQ/L (ref 3.4–4.9)
POTASSIUM REFLEX MAGNESIUM: 3.6 MEQ/L (ref 3.4–4.9)
POTASSIUM REFLEX MAGNESIUM: 3.7 MEQ/L (ref 3.4–4.9)
POTASSIUM REFLEX MAGNESIUM: 3.8 MEQ/L (ref 3.4–4.9)
POTASSIUM REFLEX MAGNESIUM: 4.2 MEQ/L (ref 3.4–4.9)
POTASSIUM SERPL-SCNC: 3.1 MEQ/L (ref 3.4–4.9)
POTASSIUM SERPL-SCNC: 3.5 MEQ/L (ref 3.4–4.9)
POTASSIUM SERPL-SCNC: 3.8 MEQ/L (ref 3.4–4.9)
POTASSIUM SERPL-SCNC: 4.1 MEQ/L (ref 3.4–4.9)
POTASSIUM SERPL-SCNC: 4.2 MEQ/L (ref 3.4–4.9)
POTASSIUM SERPL-SCNC: 4.4 MEQ/L (ref 3.4–4.9)
POTASSIUM SERPL-SCNC: 4.4 MEQ/L (ref 3.4–4.9)
POTASSIUM SERPL-SCNC: 4.5 MEQ/L (ref 3.4–4.9)
POTASSIUM SERPL-SCNC: 4.6 MEQ/L (ref 3.4–4.9)
POTASSIUM SERPL-SCNC: 5.6 MEQ/L (ref 3.4–4.9)
PROCALCITONIN: 0.07 NG/ML (ref 0–0.15)
PROCALCITONIN: 0.21 NG/ML (ref 0–0.15)
PROCALCITONIN: 0.26 NG/ML (ref 0–0.15)
PROTHROMBIN TIME: 13.4 SEC (ref 12.3–14.9)
RBC # BLD: 3.25 M/UL (ref 4.7–6.1)
RBC # BLD: 3.45 M/UL (ref 4.7–6.1)
RBC # BLD: 3.48 M/UL (ref 4.7–6.1)
RBC # BLD: 3.54 M/UL (ref 4.7–6.1)
RBC # BLD: 3.67 M/UL (ref 4.7–6.1)
RBC # BLD: 3.7 M/UL (ref 4.7–6.1)
RBC # BLD: 4.55 M/UL (ref 4.7–6.1)
RBC # BLD: 4.58 M/UL (ref 4.7–6.1)
RBC # BLD: 4.62 M/UL (ref 4.7–6.1)
RBC # BLD: 4.8 M/UL (ref 4.7–6.1)
REASON FOR REJECTION: NORMAL
REJECTED TEST: NORMAL
SARS-COV-2, NAAT: NOT DETECTED
SODIUM BLD-SCNC: 133 MEQ/L (ref 135–144)
SODIUM BLD-SCNC: 137 MEQ/L (ref 135–144)
SODIUM BLD-SCNC: 137 MEQ/L (ref 135–144)
SODIUM BLD-SCNC: 138 MEQ/L (ref 135–144)
SODIUM BLD-SCNC: 139 MEQ/L (ref 135–144)
SODIUM BLD-SCNC: 139 MEQ/L (ref 135–144)
SODIUM BLD-SCNC: 140 MEQ/L (ref 135–144)
SODIUM BLD-SCNC: 142 MEQ/L (ref 135–144)
SODIUM BLD-SCNC: 146 MEQ/L (ref 135–144)
SODIUM BLD-SCNC: 148 MEQ/L (ref 135–144)
SODIUM BLD-SCNC: 148 MEQ/L (ref 135–144)
SODIUM BLD-SCNC: 149 MEQ/L (ref 135–144)
SODIUM BLD-SCNC: 151 MEQ/L (ref 135–144)
SODIUM BLD-SCNC: 151 MEQ/L (ref 135–144)
SODIUM BLD-SCNC: 152 MEQ/L (ref 135–144)
SODIUM BLD-SCNC: 154 MEQ/L (ref 135–144)
SODIUM BLD-SCNC: 154 MEQ/L (ref 135–144)
SODIUM BLD-SCNC: 155 MEQ/L (ref 135–144)
SODIUM BLD-SCNC: 158 MEQ/L (ref 135–144)
SODIUM BLD-SCNC: 163 MEQ/L (ref 135–144)
SODIUM BLD-SCNC: 164 MEQ/L (ref 135–144)
SODIUM BLD-SCNC: 165 MEQ/L (ref 135–144)
SODIUM BLD-SCNC: 166 MEQ/L (ref 135–144)
SODIUM BLD-SCNC: 167 MEQ/L (ref 135–144)
T4 FREE: 1.14 NG/DL (ref 0.84–1.68)
TCO2 CALC VENOUS: 29 MMOL/L
TOTAL PROTEIN: 5.8 G/DL (ref 6.3–8)
TOTAL PROTEIN: 6.7 G/DL (ref 6.3–8)
TOTAL PROTEIN: 6.8 G/DL (ref 6.3–8)
TOTAL PROTEIN: 6.9 G/DL (ref 6.3–8)
TOTAL PROTEIN: 7.2 G/DL (ref 6.3–8)
TOTAL PROTEIN: 7.4 G/DL (ref 6.3–8)
TOTAL PROTEIN: 8 G/DL (ref 6.3–8)
TRIGL SERPL-MCNC: 102 MG/DL (ref 0–150)
TROPONIN: 0.01 NG/ML (ref 0–0.01)
TROPONIN: 0.03 NG/ML (ref 0–0.01)
TROPONIN: 0.03 NG/ML (ref 0–0.01)
TSH REFLEX: 2.05 UIU/ML (ref 0.44–3.86)
TSH SERPL DL<=0.05 MIU/L-ACNC: 1.7 UIU/ML (ref 0.44–3.86)
WBC # BLD: 10.4 K/UL (ref 4.8–10.8)
WBC # BLD: 10.5 K/UL (ref 4.8–10.8)
WBC # BLD: 6.5 K/UL (ref 4.8–10.8)
WBC # BLD: 6.6 K/UL (ref 4.8–10.8)
WBC # BLD: 7.2 K/UL (ref 4.8–10.8)
WBC # BLD: 8.3 K/UL (ref 4.8–10.8)
WBC # BLD: 8.3 K/UL (ref 4.8–10.8)
WBC # BLD: 9 K/UL (ref 4.8–10.8)
WBC # BLD: 9.4 K/UL (ref 4.8–10.8)
WBC # BLD: 9.7 K/UL (ref 4.8–10.8)

## 2022-01-01 PROCEDURE — 99204 OFFICE O/P NEW MOD 45 MIN: CPT | Performed by: FAMILY MEDICINE

## 2022-01-01 PROCEDURE — 6360000002 HC RX W HCPCS: Performed by: NURSE PRACTITIONER

## 2022-01-01 PROCEDURE — 2709999900 HC NON-CHARGEABLE SUPPLY: Performed by: SURGERY

## 2022-01-01 PROCEDURE — 1123F ACP DISCUSS/DSCN MKR DOCD: CPT | Performed by: PHYSICIAN ASSISTANT

## 2022-01-01 PROCEDURE — 2500000003 HC RX 250 WO HCPCS: Performed by: PERSONAL EMERGENCY RESPONSE ATTENDANT

## 2022-01-01 PROCEDURE — 6370000000 HC RX 637 (ALT 250 FOR IP)

## 2022-01-01 PROCEDURE — 80076 HEPATIC FUNCTION PANEL: CPT

## 2022-01-01 PROCEDURE — 6370000000 HC RX 637 (ALT 250 FOR IP): Performed by: NURSE PRACTITIONER

## 2022-01-01 PROCEDURE — 83735 ASSAY OF MAGNESIUM: CPT

## 2022-01-01 PROCEDURE — 96374 THER/PROPH/DIAG INJ IV PUSH: CPT

## 2022-01-01 PROCEDURE — 84484 ASSAY OF TROPONIN QUANT: CPT

## 2022-01-01 PROCEDURE — 80053 COMPREHEN METABOLIC PANEL: CPT

## 2022-01-01 PROCEDURE — 3600000003 HC SURGERY LEVEL 3 BASE: Performed by: SURGERY

## 2022-01-01 PROCEDURE — 3600000013 HC SURGERY LEVEL 3 ADDTL 15MIN: Performed by: SURGERY

## 2022-01-01 PROCEDURE — 85025 COMPLETE CBC W/AUTO DIFF WBC: CPT

## 2022-01-01 PROCEDURE — 99285 EMERGENCY DEPT VISIT HI MDM: CPT

## 2022-01-01 PROCEDURE — 2580000003 HC RX 258: Performed by: CLINICAL NURSE SPECIALIST

## 2022-01-01 PROCEDURE — APPSS15 APP SPLIT SHARED TIME 0-15 MINUTES: Performed by: NURSE PRACTITIONER

## 2022-01-01 PROCEDURE — G8484 FLU IMMUNIZE NO ADMIN: HCPCS | Performed by: PHYSICIAN ASSISTANT

## 2022-01-01 PROCEDURE — 1210000000 HC MED SURG R&B

## 2022-01-01 PROCEDURE — 6360000002 HC RX W HCPCS: Performed by: FAMILY MEDICINE

## 2022-01-01 PROCEDURE — G8428 CUR MEDS NOT DOCUMENT: HCPCS | Performed by: INTERNAL MEDICINE

## 2022-01-01 PROCEDURE — 99291 CRITICAL CARE FIRST HOUR: CPT | Performed by: INTERNAL MEDICINE

## 2022-01-01 PROCEDURE — 2000000000 HC ICU R&B

## 2022-01-01 PROCEDURE — 93010 ELECTROCARDIOGRAM REPORT: CPT | Performed by: INTERNAL MEDICINE

## 2022-01-01 PROCEDURE — 2700000000 HC OXYGEN THERAPY PER DAY

## 2022-01-01 PROCEDURE — 87040 BLOOD CULTURE FOR BACTERIA: CPT

## 2022-01-01 PROCEDURE — 86850 RBC ANTIBODY SCREEN: CPT

## 2022-01-01 PROCEDURE — 96372 THER/PROPH/DIAG INJ SC/IM: CPT

## 2022-01-01 PROCEDURE — 43246 EGD PLACE GASTROSTOMY TUBE: CPT | Performed by: SURGERY

## 2022-01-01 PROCEDURE — 70551 MRI BRAIN STEM W/O DYE: CPT

## 2022-01-01 PROCEDURE — 3E0G76Z INTRODUCTION OF NUTRITIONAL SUBSTANCE INTO UPPER GI, VIA NATURAL OR ARTIFICIAL OPENING: ICD-10-PCS | Performed by: SURGERY

## 2022-01-01 PROCEDURE — 6370000000 HC RX 637 (ALT 250 FOR IP): Performed by: INTERNAL MEDICINE

## 2022-01-01 PROCEDURE — 6370000000 HC RX 637 (ALT 250 FOR IP): Performed by: CLINICAL NURSE SPECIALIST

## 2022-01-01 PROCEDURE — 71045 X-RAY EXAM CHEST 1 VIEW: CPT

## 2022-01-01 PROCEDURE — 6360000002 HC RX W HCPCS: Performed by: INTERNAL MEDICINE

## 2022-01-01 PROCEDURE — 3017F COLORECTAL CA SCREEN DOC REV: CPT | Performed by: INTERNAL MEDICINE

## 2022-01-01 PROCEDURE — 74230 X-RAY XM SWLNG FUNCJ C+: CPT

## 2022-01-01 PROCEDURE — 6830039000 HC L3 TRAUMA ALERT

## 2022-01-01 PROCEDURE — 86900 BLOOD TYPING SEROLOGIC ABO: CPT

## 2022-01-01 PROCEDURE — 83036 HEMOGLOBIN GLYCOSYLATED A1C: CPT | Performed by: INTERNAL MEDICINE

## 2022-01-01 PROCEDURE — 80048 BASIC METABOLIC PNL TOTAL CA: CPT

## 2022-01-01 PROCEDURE — 3052F HG A1C>EQUAL 8.0%<EQUAL 9.0%: CPT | Performed by: PHYSICIAN ASSISTANT

## 2022-01-01 PROCEDURE — 92611 MOTION FLUOROSCOPY/SWALLOW: CPT

## 2022-01-01 PROCEDURE — 82077 ASSAY SPEC XCP UR&BREATH IA: CPT

## 2022-01-01 PROCEDURE — 6370000000 HC RX 637 (ALT 250 FOR IP): Performed by: PERSONAL EMERGENCY RESPONSE ATTENDANT

## 2022-01-01 PROCEDURE — A4216 STERILE WATER/SALINE, 10 ML: HCPCS | Performed by: NURSE PRACTITIONER

## 2022-01-01 PROCEDURE — 95816 EEG AWAKE AND DROWSY: CPT

## 2022-01-01 PROCEDURE — 1036F TOBACCO NON-USER: CPT | Performed by: PHYSICIAN ASSISTANT

## 2022-01-01 PROCEDURE — C9113 INJ PANTOPRAZOLE SODIUM, VIA: HCPCS | Performed by: NURSE PRACTITIONER

## 2022-01-01 PROCEDURE — 36415 COLL VENOUS BLD VENIPUNCTURE: CPT

## 2022-01-01 PROCEDURE — 71046 X-RAY EXAM CHEST 2 VIEWS: CPT

## 2022-01-01 PROCEDURE — 99214 OFFICE O/P EST MOD 30 MIN: CPT | Performed by: NURSE PRACTITIONER

## 2022-01-01 PROCEDURE — 1036F TOBACCO NON-USER: CPT | Performed by: FAMILY MEDICINE

## 2022-01-01 PROCEDURE — 83605 ASSAY OF LACTIC ACID: CPT

## 2022-01-01 PROCEDURE — 83036 HEMOGLOBIN GLYCOSYLATED A1C: CPT | Performed by: PHYSICIAN ASSISTANT

## 2022-01-01 PROCEDURE — 2580000003 HC RX 258: Performed by: INTERNAL MEDICINE

## 2022-01-01 PROCEDURE — 84145 PROCALCITONIN (PCT): CPT

## 2022-01-01 PROCEDURE — 84100 ASSAY OF PHOSPHORUS: CPT

## 2022-01-01 PROCEDURE — 70450 CT HEAD/BRAIN W/O DYE: CPT

## 2022-01-01 PROCEDURE — 90715 TDAP VACCINE 7 YRS/> IM: CPT | Performed by: FAMILY MEDICINE

## 2022-01-01 PROCEDURE — 6360000002 HC RX W HCPCS: Performed by: CLINICAL NURSE SPECIALIST

## 2022-01-01 PROCEDURE — 2580000003 HC RX 258: Performed by: SURGERY

## 2022-01-01 PROCEDURE — 1123F ACP DISCUSS/DSCN MKR DOCD: CPT | Performed by: INTERNAL MEDICINE

## 2022-01-01 PROCEDURE — 87502 INFLUENZA DNA AMP PROBE: CPT

## 2022-01-01 PROCEDURE — 86901 BLOOD TYPING SEROLOGIC RH(D): CPT

## 2022-01-01 PROCEDURE — 99233 SBSQ HOSP IP/OBS HIGH 50: CPT | Performed by: PSYCHIATRY & NEUROLOGY

## 2022-01-01 PROCEDURE — 2022F DILAT RTA XM EVC RTNOPTHY: CPT | Performed by: PHYSICIAN ASSISTANT

## 2022-01-01 PROCEDURE — G8417 CALC BMI ABV UP PARAM F/U: HCPCS | Performed by: PHYSICIAN ASSISTANT

## 2022-01-01 PROCEDURE — 84443 ASSAY THYROID STIM HORMONE: CPT

## 2022-01-01 PROCEDURE — 99213 OFFICE O/P EST LOW 20 MIN: CPT | Performed by: PHYSICIAN ASSISTANT

## 2022-01-01 PROCEDURE — 2500000003 HC RX 250 WO HCPCS: Performed by: ANESTHESIOLOGY

## 2022-01-01 PROCEDURE — G8417 CALC BMI ABV UP PARAM F/U: HCPCS | Performed by: FAMILY MEDICINE

## 2022-01-01 PROCEDURE — G8427 DOCREV CUR MEDS BY ELIG CLIN: HCPCS | Performed by: FAMILY MEDICINE

## 2022-01-01 PROCEDURE — 3017F COLORECTAL CA SCREEN DOC REV: CPT | Performed by: PHYSICIAN ASSISTANT

## 2022-01-01 PROCEDURE — 82803 BLOOD GASES ANY COMBINATION: CPT

## 2022-01-01 PROCEDURE — G8427 DOCREV CUR MEDS BY ELIG CLIN: HCPCS | Performed by: PHYSICIAN ASSISTANT

## 2022-01-01 PROCEDURE — 3051F HG A1C>EQUAL 7.0%<8.0%: CPT | Performed by: INTERNAL MEDICINE

## 2022-01-01 PROCEDURE — 82330 ASSAY OF CALCIUM: CPT

## 2022-01-01 PROCEDURE — 83036 HEMOGLOBIN GLYCOSYLATED A1C: CPT

## 2022-01-01 PROCEDURE — 82962 GLUCOSE BLOOD TEST: CPT | Performed by: PHYSICIAN ASSISTANT

## 2022-01-01 PROCEDURE — 6360000002 HC RX W HCPCS: Performed by: ANESTHESIOLOGY

## 2022-01-01 PROCEDURE — 99284 EMERGENCY DEPT VISIT MOD MDM: CPT

## 2022-01-01 PROCEDURE — 99387 INIT PM E/M NEW PAT 65+ YRS: CPT | Performed by: FAMILY MEDICINE

## 2022-01-01 PROCEDURE — 82565 ASSAY OF CREATININE: CPT

## 2022-01-01 PROCEDURE — 2580000003 HC RX 258: Performed by: NURSE PRACTITIONER

## 2022-01-01 PROCEDURE — 3017F COLORECTAL CA SCREEN DOC REV: CPT | Performed by: FAMILY MEDICINE

## 2022-01-01 PROCEDURE — 74176 CT ABD & PELVIS W/O CONTRAST: CPT

## 2022-01-01 PROCEDURE — 93005 ELECTROCARDIOGRAM TRACING: CPT | Performed by: FAMILY MEDICINE

## 2022-01-01 PROCEDURE — 3700000000 HC ANESTHESIA ATTENDED CARE: Performed by: SURGERY

## 2022-01-01 PROCEDURE — 2500000003 HC RX 250 WO HCPCS: Performed by: NURSE PRACTITIONER

## 2022-01-01 PROCEDURE — 7100000000 HC PACU RECOVERY - FIRST 15 MIN: Performed by: SURGERY

## 2022-01-01 PROCEDURE — 85610 PROTHROMBIN TIME: CPT

## 2022-01-01 PROCEDURE — 72125 CT NECK SPINE W/O DYE: CPT

## 2022-01-01 PROCEDURE — 99232 SBSQ HOSP IP/OBS MODERATE 35: CPT | Performed by: INTERNAL MEDICINE

## 2022-01-01 PROCEDURE — 2022F DILAT RTA XM EVC RTNOPTHY: CPT | Performed by: FAMILY MEDICINE

## 2022-01-01 PROCEDURE — 99291 CRITICAL CARE FIRST HOUR: CPT | Performed by: PSYCHIATRY & NEUROLOGY

## 2022-01-01 PROCEDURE — 99232 SBSQ HOSP IP/OBS MODERATE 35: CPT | Performed by: PSYCHIATRY & NEUROLOGY

## 2022-01-01 PROCEDURE — G8417 CALC BMI ABV UP PARAM F/U: HCPCS | Performed by: INTERNAL MEDICINE

## 2022-01-01 PROCEDURE — 3700000001 HC ADD 15 MINUTES (ANESTHESIA): Performed by: SURGERY

## 2022-01-01 PROCEDURE — 84295 ASSAY OF SERUM SODIUM: CPT

## 2022-01-01 PROCEDURE — 1036F TOBACCO NON-USER: CPT | Performed by: INTERNAL MEDICINE

## 2022-01-01 PROCEDURE — 6360000002 HC RX W HCPCS: Performed by: SURGERY

## 2022-01-01 PROCEDURE — 85014 HEMATOCRIT: CPT

## 2022-01-01 PROCEDURE — 90471 IMMUNIZATION ADMIN: CPT | Performed by: FAMILY MEDICINE

## 2022-01-01 PROCEDURE — 72170 X-RAY EXAM OF PELVIS: CPT

## 2022-01-01 PROCEDURE — 92526 ORAL FUNCTION THERAPY: CPT

## 2022-01-01 PROCEDURE — 4040F PNEUMOC VAC/ADMIN/RCVD: CPT | Performed by: PHYSICIAN ASSISTANT

## 2022-01-01 PROCEDURE — 2022F DILAT RTA XM EVC RTNOPTHY: CPT | Performed by: INTERNAL MEDICINE

## 2022-01-01 PROCEDURE — 93005 ELECTROCARDIOGRAM TRACING: CPT | Performed by: NURSE PRACTITIONER

## 2022-01-01 PROCEDURE — 82435 ASSAY OF BLOOD CHLORIDE: CPT

## 2022-01-01 PROCEDURE — 74018 RADEX ABDOMEN 1 VIEW: CPT

## 2022-01-01 PROCEDURE — 36600 WITHDRAWAL OF ARTERIAL BLOOD: CPT

## 2022-01-01 PROCEDURE — 2580000003 HC RX 258: Performed by: FAMILY MEDICINE

## 2022-01-01 PROCEDURE — 3044F HG A1C LEVEL LT 7.0%: CPT | Performed by: PHYSICIAN ASSISTANT

## 2022-01-01 PROCEDURE — 92610 EVALUATE SWALLOWING FUNCTION: CPT

## 2022-01-01 PROCEDURE — 7100000001 HC PACU RECOVERY - ADDTL 15 MIN: Performed by: SURGERY

## 2022-01-01 PROCEDURE — 96361 HYDRATE IV INFUSION ADD-ON: CPT

## 2022-01-01 PROCEDURE — APPSS30 APP SPLIT SHARED TIME 16-30 MINUTES: Performed by: NURSE PRACTITIONER

## 2022-01-01 PROCEDURE — 84132 ASSAY OF SERUM POTASSIUM: CPT

## 2022-01-01 PROCEDURE — 1123F ACP DISCUSS/DSCN MKR DOCD: CPT | Performed by: NURSE PRACTITIONER

## 2022-01-01 PROCEDURE — 0DH63UZ INSERTION OF FEEDING DEVICE INTO STOMACH, PERCUTANEOUS APPROACH: ICD-10-PCS | Performed by: SURGERY

## 2022-01-01 PROCEDURE — 99215 OFFICE O/P EST HI 40 MIN: CPT | Performed by: INTERNAL MEDICINE

## 2022-01-01 PROCEDURE — 2580000003 HC RX 258: Performed by: PHYSICIAN ASSISTANT

## 2022-01-01 PROCEDURE — 3044F HG A1C LEVEL LT 7.0%: CPT | Performed by: FAMILY MEDICINE

## 2022-01-01 PROCEDURE — 85730 THROMBOPLASTIN TIME PARTIAL: CPT

## 2022-01-01 PROCEDURE — 87635 SARS-COV-2 COVID-19 AMP PRB: CPT

## 2022-01-01 RX ORDER — FLASH GLUCOSE SENSOR
KIT MISCELLANEOUS
Qty: 2 EACH | Refills: 3 | Status: SHIPPED | OUTPATIENT
Start: 2022-01-01 | End: 2022-01-01

## 2022-01-01 RX ORDER — KETAMINE HYDROCHLORIDE 100 MG/ML
INJECTION, SOLUTION INTRAMUSCULAR; INTRAVENOUS PRN
Status: DISCONTINUED | OUTPATIENT
Start: 2022-01-01 | End: 2022-01-01 | Stop reason: SDUPTHER

## 2022-01-01 RX ORDER — ATORVASTATIN CALCIUM 20 MG/1
20 TABLET, FILM COATED ORAL DAILY
Status: DISCONTINUED | OUTPATIENT
Start: 2022-01-01 | End: 2022-01-01

## 2022-01-01 RX ORDER — DIAPER,BRIEF,ADULT, DISPOSABLE
1 EACH MISCELLANEOUS
Qty: 240 EACH | Refills: 12 | Status: SHIPPED | OUTPATIENT
Start: 2022-01-01

## 2022-01-01 RX ORDER — DEXTROSE AND SODIUM CHLORIDE 5; .45 G/100ML; G/100ML
INJECTION, SOLUTION INTRAVENOUS CONTINUOUS
Status: DISCONTINUED | OUTPATIENT
Start: 2022-01-01 | End: 2022-01-01

## 2022-01-01 RX ORDER — INSULIN LISPRO 100 [IU]/ML
0-16 INJECTION, SOLUTION INTRAVENOUS; SUBCUTANEOUS
Status: DISCONTINUED | OUTPATIENT
Start: 2022-01-01 | End: 2022-01-01

## 2022-01-01 RX ORDER — ACETAMINOPHEN 325 MG/1
650 TABLET ORAL EVERY 6 HOURS PRN
Status: DISCONTINUED | OUTPATIENT
Start: 2022-01-01 | End: 2022-01-01 | Stop reason: HOSPADM

## 2022-01-01 RX ORDER — SODIUM CHLORIDE 0.9 % (FLUSH) 0.9 %
5-40 SYRINGE (ML) INJECTION PRN
Status: DISCONTINUED | OUTPATIENT
Start: 2022-01-01 | End: 2022-01-01 | Stop reason: HOSPADM

## 2022-01-01 RX ORDER — ACETAMINOPHEN 500 MG
1000 TABLET ORAL ONCE
Status: COMPLETED | OUTPATIENT
Start: 2022-01-01 | End: 2022-01-01

## 2022-01-01 RX ORDER — BLOOD-GLUCOSE METER
KIT MISCELLANEOUS
Qty: 150 STRIP | Refills: 3 | OUTPATIENT
Start: 2022-01-01

## 2022-01-01 RX ORDER — POTASSIUM CHLORIDE 7.45 MG/ML
10 INJECTION INTRAVENOUS ONCE
Status: COMPLETED | OUTPATIENT
Start: 2022-01-01 | End: 2022-01-01

## 2022-01-01 RX ORDER — SIMVASTATIN 40 MG
40 TABLET ORAL NIGHTLY
Qty: 30 TABLET | Refills: 5 | COMMUNITY
Start: 2022-01-01

## 2022-01-01 RX ORDER — OXYCODONE HYDROCHLORIDE 5 MG/1
5 TABLET ORAL PRN
Status: DISCONTINUED | OUTPATIENT
Start: 2022-01-01 | End: 2022-01-01 | Stop reason: HOSPADM

## 2022-01-01 RX ORDER — POTASSIUM BICARBONATE 25 MEQ/1
50 TABLET, EFFERVESCENT ORAL ONCE
Status: COMPLETED | OUTPATIENT
Start: 2022-01-01 | End: 2022-01-01

## 2022-01-01 RX ORDER — OXYCODONE HYDROCHLORIDE 5 MG/1
10 TABLET ORAL PRN
Status: DISCONTINUED | OUTPATIENT
Start: 2022-01-01 | End: 2022-01-01 | Stop reason: HOSPADM

## 2022-01-01 RX ORDER — SODIUM CHLORIDE 0.9 % (FLUSH) 0.9 %
5-40 SYRINGE (ML) INJECTION EVERY 12 HOURS SCHEDULED
Status: DISCONTINUED | OUTPATIENT
Start: 2022-01-01 | End: 2022-01-01

## 2022-01-01 RX ORDER — GLUCAGON 3 MG/1
POWDER NASAL
Qty: 1 EACH | Refills: 5 | Status: SHIPPED | OUTPATIENT
Start: 2022-01-01 | End: 2022-01-01

## 2022-01-01 RX ORDER — INSULIN LISPRO 100 [IU]/ML
0-16 INJECTION, SOLUTION INTRAVENOUS; SUBCUTANEOUS EVERY 6 HOURS
Status: DISCONTINUED | OUTPATIENT
Start: 2022-01-01 | End: 2022-01-01 | Stop reason: HOSPADM

## 2022-01-01 RX ORDER — SODIUM CHLORIDE 450 MG/100ML
INJECTION, SOLUTION INTRAVENOUS CONTINUOUS
Status: DISCONTINUED | OUTPATIENT
Start: 2022-01-01 | End: 2022-01-01

## 2022-01-01 RX ORDER — 0.9 % SODIUM CHLORIDE 0.9 %
1000 INTRAVENOUS SOLUTION INTRAVENOUS ONCE
Status: COMPLETED | OUTPATIENT
Start: 2022-01-01 | End: 2022-01-01

## 2022-01-01 RX ORDER — BLOOD-GLUCOSE METER
KIT MISCELLANEOUS
Qty: 150 STRIP | Refills: 3 | Status: SHIPPED | OUTPATIENT
Start: 2022-01-01 | End: 2022-01-01

## 2022-01-01 RX ORDER — GLUCAGON 3 MG/1
POWDER NASAL
Qty: 1 EACH | Refills: 3 | Status: SHIPPED | OUTPATIENT
Start: 2022-01-01 | End: 2022-01-01

## 2022-01-01 RX ORDER — HYDRALAZINE HYDROCHLORIDE 20 MG/ML
10 INJECTION INTRAMUSCULAR; INTRAVENOUS ONCE
Status: COMPLETED | OUTPATIENT
Start: 2022-01-01 | End: 2022-01-01

## 2022-01-01 RX ORDER — BLOOD PRESSURE TEST KIT
1 KIT MISCELLANEOUS DAILY
Qty: 1 KIT | Refills: 0 | Status: SHIPPED | OUTPATIENT
Start: 2022-01-01

## 2022-01-01 RX ORDER — TAMSULOSIN HYDROCHLORIDE 0.4 MG/1
0.4 CAPSULE ORAL DAILY
Status: DISCONTINUED | OUTPATIENT
Start: 2022-01-01 | End: 2022-01-01 | Stop reason: HOSPADM

## 2022-01-01 RX ORDER — POTASSIUM CHLORIDE 7.45 MG/ML
10 INJECTION INTRAVENOUS
Status: ACTIVE | OUTPATIENT
Start: 2022-01-01 | End: 2022-01-01

## 2022-01-01 RX ORDER — INSULIN LISPRO 100 [IU]/ML
0.05 INJECTION, SOLUTION INTRAVENOUS; SUBCUTANEOUS
Status: DISCONTINUED | OUTPATIENT
Start: 2022-01-01 | End: 2022-01-01

## 2022-01-01 RX ORDER — QUETIAPINE FUMARATE 25 MG/1
25 TABLET, FILM COATED ORAL 2 TIMES DAILY
Status: DISCONTINUED | OUTPATIENT
Start: 2022-01-01 | End: 2022-01-01 | Stop reason: HOSPADM

## 2022-01-01 RX ORDER — INSULIN GLARGINE 100 [IU]/ML
20 INJECTION, SOLUTION SUBCUTANEOUS NIGHTLY
Qty: 15 ML | Refills: 3 | Status: ON HOLD | OUTPATIENT
Start: 2022-01-01 | End: 2022-01-01 | Stop reason: SDUPTHER

## 2022-01-01 RX ORDER — LOSARTAN POTASSIUM 50 MG/1
50 TABLET ORAL DAILY
Status: DISCONTINUED | OUTPATIENT
Start: 2022-01-01 | End: 2022-01-01 | Stop reason: HOSPADM

## 2022-01-01 RX ORDER — INSULIN GLARGINE 100 [IU]/ML
42 INJECTION, SOLUTION SUBCUTANEOUS NIGHTLY
Qty: 15 ML | Refills: 3 | OUTPATIENT
Start: 2022-01-01

## 2022-01-01 RX ORDER — INSULIN LISPRO 100 [IU]/ML
0-16 INJECTION, SOLUTION INTRAVENOUS; SUBCUTANEOUS EVERY 6 HOURS SCHEDULED
Status: DISCONTINUED | OUTPATIENT
Start: 2022-01-01 | End: 2022-01-01

## 2022-01-01 RX ORDER — FLASH GLUCOSE SCANNING READER
1 EACH MISCELLANEOUS
Qty: 1 EACH | Refills: 0 | Status: SHIPPED | OUTPATIENT
Start: 2022-01-01 | End: 2022-01-01 | Stop reason: SDUPTHER

## 2022-01-01 RX ORDER — POTASSIUM CHLORIDE 7.45 MG/ML
10 INJECTION INTRAVENOUS ONCE
Status: DISCONTINUED | OUTPATIENT
Start: 2022-01-01 | End: 2022-01-01 | Stop reason: SDUPTHER

## 2022-01-01 RX ORDER — ONDANSETRON 2 MG/ML
4 INJECTION INTRAMUSCULAR; INTRAVENOUS EVERY 6 HOURS PRN
Status: DISCONTINUED | OUTPATIENT
Start: 2022-01-01 | End: 2022-01-01 | Stop reason: HOSPADM

## 2022-01-01 RX ORDER — GLUCAGON 3 MG/1
POWDER NASAL
Qty: 1 EACH | Refills: 3 | Status: SHIPPED | OUTPATIENT
Start: 2022-01-01

## 2022-01-01 RX ORDER — FLASH GLUCOSE SENSOR
1 KIT MISCELLANEOUS
Qty: 6 EACH | Refills: 3 | Status: SHIPPED | OUTPATIENT
Start: 2022-01-01 | End: 2022-01-01

## 2022-01-01 RX ORDER — FLASH GLUCOSE SENSOR
KIT MISCELLANEOUS
Qty: 2 EACH | Refills: 5 | Status: SHIPPED | OUTPATIENT
Start: 2022-01-01

## 2022-01-01 RX ORDER — SODIUM CHLORIDE 9 MG/ML
INJECTION, SOLUTION INTRAVENOUS PRN
Status: DISCONTINUED | OUTPATIENT
Start: 2022-01-01 | End: 2022-01-01 | Stop reason: HOSPADM

## 2022-01-01 RX ORDER — ACETAMINOPHEN 160 MG
2000 TABLET,DISINTEGRATING ORAL DAILY
Qty: 30 CAPSULE | Refills: 11 | COMMUNITY
Start: 2022-01-01

## 2022-01-01 RX ORDER — GLUCAGON 3 MG/1
POWDER NASAL
Qty: 1 EACH | Refills: 1 | Status: SHIPPED | OUTPATIENT
Start: 2022-01-01 | End: 2022-01-01

## 2022-01-01 RX ORDER — ENOXAPARIN SODIUM 100 MG/ML
40 INJECTION SUBCUTANEOUS DAILY
Status: DISCONTINUED | OUTPATIENT
Start: 2022-01-01 | End: 2022-01-01 | Stop reason: HOSPADM

## 2022-01-01 RX ORDER — INSULIN LISPRO 100 [IU]/ML
INJECTION, SOLUTION INTRAVENOUS; SUBCUTANEOUS
Qty: 15 ML | Refills: 3 | COMMUNITY
Start: 2022-01-01

## 2022-01-01 RX ORDER — GINSENG 100 MG
CAPSULE ORAL ONCE
Status: COMPLETED | OUTPATIENT
Start: 2022-01-01 | End: 2022-01-01

## 2022-01-01 RX ORDER — ONDANSETRON 4 MG/1
4 TABLET, ORALLY DISINTEGRATING ORAL EVERY 8 HOURS PRN
Status: DISCONTINUED | OUTPATIENT
Start: 2022-01-01 | End: 2022-01-01 | Stop reason: HOSPADM

## 2022-01-01 RX ORDER — INSULIN GLARGINE 100 [IU]/ML
42 INJECTION, SOLUTION SUBCUTANEOUS NIGHTLY
Qty: 15 ML | Refills: 3 | Status: SHIPPED | OUTPATIENT
Start: 2022-01-01 | End: 2022-01-01 | Stop reason: SDUPTHER

## 2022-01-01 RX ORDER — DIPHENHYDRAMINE HYDROCHLORIDE 50 MG/ML
12.5 INJECTION INTRAMUSCULAR; INTRAVENOUS
Status: DISCONTINUED | OUTPATIENT
Start: 2022-01-01 | End: 2022-01-01 | Stop reason: HOSPADM

## 2022-01-01 RX ORDER — ONDANSETRON 2 MG/ML
4 INJECTION INTRAMUSCULAR; INTRAVENOUS
Status: DISCONTINUED | OUTPATIENT
Start: 2022-01-01 | End: 2022-01-01 | Stop reason: HOSPADM

## 2022-01-01 RX ORDER — ONDANSETRON 2 MG/ML
4 INJECTION INTRAMUSCULAR; INTRAVENOUS ONCE
Status: COMPLETED | OUTPATIENT
Start: 2022-01-01 | End: 2022-01-01

## 2022-01-01 RX ORDER — METOCLOPRAMIDE HYDROCHLORIDE 5 MG/ML
10 INJECTION INTRAMUSCULAR; INTRAVENOUS
Status: DISCONTINUED | OUTPATIENT
Start: 2022-01-01 | End: 2022-01-01 | Stop reason: HOSPADM

## 2022-01-01 RX ORDER — SODIUM CHLORIDE 9 MG/ML
INJECTION, SOLUTION INTRAVENOUS CONTINUOUS
Status: DISCONTINUED | OUTPATIENT
Start: 2022-01-01 | End: 2022-01-01

## 2022-01-01 RX ORDER — LOSARTAN POTASSIUM 50 MG/1
TABLET ORAL
Qty: 30 TABLET | Refills: 3 | Status: ON HOLD | OUTPATIENT
Start: 2022-01-01 | End: 2022-01-01 | Stop reason: SDUPTHER

## 2022-01-01 RX ORDER — LOSARTAN POTASSIUM 50 MG/1
TABLET ORAL
Qty: 30 TABLET | Refills: 12 | Status: SHIPPED | OUTPATIENT
Start: 2022-01-01 | End: 2022-01-01

## 2022-01-01 RX ORDER — ACETAMINOPHEN 650 MG/1
650 SUPPOSITORY RECTAL EVERY 6 HOURS PRN
Status: DISCONTINUED | OUTPATIENT
Start: 2022-01-01 | End: 2022-01-01 | Stop reason: HOSPADM

## 2022-01-01 RX ORDER — FENTANYL CITRATE 50 UG/ML
50 INJECTION, SOLUTION INTRAMUSCULAR; INTRAVENOUS EVERY 10 MIN PRN
Status: DISCONTINUED | OUTPATIENT
Start: 2022-01-01 | End: 2022-01-01 | Stop reason: HOSPADM

## 2022-01-01 RX ORDER — ACETAMINOPHEN 160 MG
TABLET,DISINTEGRATING ORAL
Qty: 30 CAPSULE | Refills: 11 | Status: ON HOLD | OUTPATIENT
Start: 2022-01-01 | End: 2022-01-01 | Stop reason: SDUPTHER

## 2022-01-01 RX ORDER — ZOSTER VACCINE RECOMBINANT, ADJUVANTED 50 MCG/0.5
0.5 KIT INTRAMUSCULAR ONCE
Qty: 1 EACH | Refills: 1 | Status: SHIPPED | OUTPATIENT
Start: 2022-01-01 | End: 2022-01-01

## 2022-01-01 RX ORDER — POTASSIUM BICARBONATE 25 MEQ/1
50 TABLET, EFFERVESCENT ORAL ONCE
Status: DISCONTINUED | OUTPATIENT
Start: 2022-01-01 | End: 2022-01-01

## 2022-01-01 RX ORDER — LOSARTAN POTASSIUM 50 MG/1
TABLET ORAL
Qty: 30 TABLET | Refills: 3 | Status: SHIPPED | OUTPATIENT
Start: 2022-01-01 | End: 2022-01-01

## 2022-01-01 RX ORDER — FLASH GLUCOSE SCANNING READER
EACH MISCELLANEOUS
Qty: 1 EACH | Refills: 0 | Status: SHIPPED | OUTPATIENT
Start: 2022-01-01

## 2022-01-01 RX ORDER — SIMVASTATIN 40 MG
40 TABLET ORAL NIGHTLY
Qty: 30 TABLET | Refills: 5 | Status: ON HOLD | OUTPATIENT
Start: 2022-01-01 | End: 2022-01-01 | Stop reason: SDUPTHER

## 2022-01-01 RX ORDER — LOSARTAN POTASSIUM 50 MG/1
50 TABLET ORAL DAILY
Qty: 30 TABLET | Refills: 3 | COMMUNITY
Start: 2022-01-01

## 2022-01-01 RX ORDER — INSULIN GLARGINE 100 [IU]/ML
10 INJECTION, SOLUTION SUBCUTANEOUS NIGHTLY
Qty: 15 ML | Refills: 3 | COMMUNITY
Start: 2022-01-01

## 2022-01-01 RX ORDER — ENOXAPARIN SODIUM 100 MG/ML
30 INJECTION SUBCUTANEOUS DAILY
Status: DISCONTINUED | OUTPATIENT
Start: 2022-01-01 | End: 2022-01-01

## 2022-01-01 RX ORDER — PROPOFOL 10 MG/ML
INJECTION, EMULSION INTRAVENOUS PRN
Status: DISCONTINUED | OUTPATIENT
Start: 2022-01-01 | End: 2022-01-01 | Stop reason: SDUPTHER

## 2022-01-01 RX ORDER — PRAMIPEXOLE DIHYDROCHLORIDE 0.12 MG/1
TABLET ORAL
COMMUNITY
Start: 2022-01-01

## 2022-01-01 RX ORDER — QUETIAPINE FUMARATE 25 MG/1
25 TABLET, FILM COATED ORAL 2 TIMES DAILY
Qty: 60 TABLET | Refills: 3 | COMMUNITY
Start: 2022-01-01

## 2022-01-01 RX ORDER — FLASH GLUCOSE SCANNING READER
1 EACH MISCELLANEOUS
Qty: 1 EACH | Refills: 0 | Status: SHIPPED | OUTPATIENT
Start: 2022-01-01 | End: 2022-01-01

## 2022-01-01 RX ORDER — LABETALOL HYDROCHLORIDE 5 MG/ML
10 INJECTION, SOLUTION INTRAVENOUS ONCE
Status: COMPLETED | OUTPATIENT
Start: 2022-01-01 | End: 2022-01-01

## 2022-01-01 RX ORDER — CEFAZOLIN SODIUM 1 G/3ML
INJECTION, POWDER, FOR SOLUTION INTRAMUSCULAR; INTRAVENOUS PRN
Status: DISCONTINUED | OUTPATIENT
Start: 2022-01-01 | End: 2022-01-01 | Stop reason: SDUPTHER

## 2022-01-01 RX ORDER — POTASSIUM CHLORIDE 7.45 MG/ML
10 INJECTION INTRAVENOUS
Status: DISPENSED | OUTPATIENT
Start: 2022-01-01 | End: 2022-01-01

## 2022-01-01 RX ORDER — FEEDER CONTAINER WITH PUMP SET
1 EACH MISCELLANEOUS
Qty: 21 EACH | Refills: 5 | Status: SHIPPED | OUTPATIENT
Start: 2022-01-01

## 2022-01-01 RX ORDER — INSULIN GLARGINE 100 [IU]/ML
0.15 INJECTION, SOLUTION SUBCUTANEOUS NIGHTLY
Status: DISCONTINUED | OUTPATIENT
Start: 2022-01-01 | End: 2022-01-01 | Stop reason: HOSPADM

## 2022-01-01 RX ORDER — ATORVASTATIN CALCIUM 20 MG/1
20 TABLET, FILM COATED ORAL NIGHTLY
Status: DISCONTINUED | OUTPATIENT
Start: 2022-01-01 | End: 2022-01-01 | Stop reason: HOSPADM

## 2022-01-01 RX ORDER — MAGNESIUM HYDROXIDE 1200 MG/15ML
LIQUID ORAL PRN
Status: DISCONTINUED | OUTPATIENT
Start: 2022-01-01 | End: 2022-01-01 | Stop reason: ALTCHOICE

## 2022-01-01 RX ORDER — DEXTROSE MONOHYDRATE 50 MG/ML
INJECTION, SOLUTION INTRAVENOUS CONTINUOUS
Status: DISCONTINUED | OUTPATIENT
Start: 2022-01-01 | End: 2022-01-01

## 2022-01-01 RX ORDER — DEXTROSE MONOHYDRATE 100 MG/ML
INJECTION, SOLUTION INTRAVENOUS CONTINUOUS PRN
Status: DISCONTINUED | OUTPATIENT
Start: 2022-01-01 | End: 2022-01-01 | Stop reason: HOSPADM

## 2022-01-01 RX ORDER — QUETIAPINE FUMARATE 25 MG/1
TABLET, FILM COATED ORAL
Status: ON HOLD | COMMUNITY
Start: 2022-01-01 | End: 2022-01-01 | Stop reason: SDUPTHER

## 2022-01-01 RX ORDER — FLASH GLUCOSE SENSOR
1 KIT MISCELLANEOUS
Qty: 2 EACH | Refills: 3 | Status: SHIPPED | OUTPATIENT
Start: 2022-01-01 | End: 2022-01-01

## 2022-01-01 RX ORDER — SODIUM CHLORIDE, SODIUM LACTATE, POTASSIUM CHLORIDE, CALCIUM CHLORIDE 600; 310; 30; 20 MG/100ML; MG/100ML; MG/100ML; MG/100ML
INJECTION, SOLUTION INTRAVENOUS CONTINUOUS
Status: DISCONTINUED | OUTPATIENT
Start: 2022-01-01 | End: 2022-01-01

## 2022-01-01 RX ORDER — INSULIN LISPRO 100 [IU]/ML
0-16 INJECTION, SOLUTION INTRAVENOUS; SUBCUTANEOUS EVERY 4 HOURS
Status: DISCONTINUED | OUTPATIENT
Start: 2022-01-01 | End: 2022-01-01

## 2022-01-01 RX ORDER — LIDOCAINE HYDROCHLORIDE 10 MG/ML
1 INJECTION, SOLUTION EPIDURAL; INFILTRATION; INTRACAUDAL; PERINEURAL
Status: DISCONTINUED | OUTPATIENT
Start: 2022-01-01 | End: 2022-01-01 | Stop reason: HOSPADM

## 2022-01-01 RX ORDER — SODIUM CHLORIDE, SODIUM LACTATE, POTASSIUM CHLORIDE, AND CALCIUM CHLORIDE .6; .31; .03; .02 G/100ML; G/100ML; G/100ML; G/100ML
500 INJECTION, SOLUTION INTRAVENOUS ONCE
Status: DISCONTINUED | OUTPATIENT
Start: 2022-01-01 | End: 2022-01-01

## 2022-01-01 RX ORDER — INSULIN LISPRO 100 [IU]/ML
0-4 INJECTION, SOLUTION INTRAVENOUS; SUBCUTANEOUS NIGHTLY
Status: DISCONTINUED | OUTPATIENT
Start: 2022-01-01 | End: 2022-01-01

## 2022-01-01 RX ADMIN — LOSARTAN POTASSIUM 50 MG: 50 TABLET, FILM COATED ORAL at 07:39

## 2022-01-01 RX ADMIN — INSULIN LISPRO 4 UNITS: 100 INJECTION, SOLUTION INTRAVENOUS; SUBCUTANEOUS at 21:23

## 2022-01-01 RX ADMIN — LOSARTAN POTASSIUM 50 MG: 50 TABLET, FILM COATED ORAL at 14:25

## 2022-01-01 RX ADMIN — INSULIN LISPRO 4 UNITS: 100 INJECTION, SOLUTION INTRAVENOUS; SUBCUTANEOUS at 04:03

## 2022-01-01 RX ADMIN — QUETIAPINE FUMARATE 25 MG: 25 TABLET ORAL at 21:09

## 2022-01-01 RX ADMIN — INSULIN GLARGINE 9 UNITS: 100 INJECTION, SOLUTION SUBCUTANEOUS at 21:52

## 2022-01-01 RX ADMIN — ENOXAPARIN SODIUM 30 MG: 100 INJECTION SUBCUTANEOUS at 07:44

## 2022-01-01 RX ADMIN — ONDANSETRON 4 MG: 2 INJECTION INTRAMUSCULAR; INTRAVENOUS at 14:15

## 2022-01-01 RX ADMIN — INSULIN LISPRO 8 UNITS: 100 INJECTION, SOLUTION INTRAVENOUS; SUBCUTANEOUS at 16:09

## 2022-01-01 RX ADMIN — QUETIAPINE FUMARATE 25 MG: 25 TABLET ORAL at 08:23

## 2022-01-01 RX ADMIN — POTASSIUM BICARBONATE 50 MEQ: 978 TABLET, EFFERVESCENT ORAL at 14:51

## 2022-01-01 RX ADMIN — POTASSIUM CHLORIDE 10 MEQ: 7.45 INJECTION INTRAVENOUS at 09:39

## 2022-01-01 RX ADMIN — DEXTROSE MONOHYDRATE 500 ML: 50 INJECTION, SOLUTION INTRAVENOUS at 09:39

## 2022-01-01 RX ADMIN — CARBIDOPA AND LEVODOPA 1 TABLET: 25; 100 TABLET ORAL at 21:47

## 2022-01-01 RX ADMIN — SODIUM CHLORIDE: 9 INJECTION, SOLUTION INTRAVENOUS at 04:56

## 2022-01-01 RX ADMIN — DEXTROSE MONOHYDRATE 125 ML: 100 INJECTION, SOLUTION INTRAVENOUS at 07:41

## 2022-01-01 RX ADMIN — SODIUM CHLORIDE: 4.5 INJECTION, SOLUTION INTRAVENOUS at 14:45

## 2022-01-01 RX ADMIN — INSULIN LISPRO 4 UNITS: 100 INJECTION, SOLUTION INTRAVENOUS; SUBCUTANEOUS at 00:40

## 2022-01-01 RX ADMIN — TAMSULOSIN HYDROCHLORIDE 0.4 MG: 0.4 CAPSULE ORAL at 12:39

## 2022-01-01 RX ADMIN — SODIUM CHLORIDE: 9 INJECTION, SOLUTION INTRAVENOUS at 19:31

## 2022-01-01 RX ADMIN — DEXTROSE AND SODIUM CHLORIDE: 5; 450 INJECTION, SOLUTION INTRAVENOUS at 14:48

## 2022-01-01 RX ADMIN — CARBIDOPA AND LEVODOPA 1 TABLET: 25; 100 TABLET ORAL at 21:23

## 2022-01-01 RX ADMIN — ENOXAPARIN SODIUM 40 MG: 100 INJECTION SUBCUTANEOUS at 14:52

## 2022-01-01 RX ADMIN — INSULIN GLARGINE 9 UNITS: 100 INJECTION, SOLUTION SUBCUTANEOUS at 23:39

## 2022-01-01 RX ADMIN — CARBIDOPA AND LEVODOPA 1 TABLET: 25; 100 TABLET ORAL at 14:52

## 2022-01-01 RX ADMIN — POTASSIUM CHLORIDE 10 MEQ: 7.45 INJECTION INTRAVENOUS at 17:35

## 2022-01-01 RX ADMIN — POTASSIUM CHLORIDE 10 MEQ: 7.45 INJECTION INTRAVENOUS at 11:53

## 2022-01-01 RX ADMIN — QUETIAPINE FUMARATE 25 MG: 25 TABLET ORAL at 10:59

## 2022-01-01 RX ADMIN — DEXTROSE MONOHYDRATE: 50 INJECTION, SOLUTION INTRAVENOUS at 11:42

## 2022-01-01 RX ADMIN — LABETALOL HYDROCHLORIDE 10 MG: 5 INJECTION, SOLUTION INTRAVENOUS at 00:06

## 2022-01-01 RX ADMIN — SODIUM CHLORIDE, POTASSIUM CHLORIDE, SODIUM LACTATE AND CALCIUM CHLORIDE: 600; 310; 30; 20 INJECTION, SOLUTION INTRAVENOUS at 18:32

## 2022-01-01 RX ADMIN — QUETIAPINE FUMARATE 25 MG: 25 TABLET ORAL at 07:44

## 2022-01-01 RX ADMIN — Medication 10 ML: at 09:39

## 2022-01-01 RX ADMIN — QUETIAPINE FUMARATE 25 MG: 25 TABLET ORAL at 21:47

## 2022-01-01 RX ADMIN — INSULIN LISPRO 4 UNITS: 100 INJECTION, SOLUTION INTRAVENOUS; SUBCUTANEOUS at 17:11

## 2022-01-01 RX ADMIN — SERTRALINE HYDROCHLORIDE 50 MG: 50 TABLET ORAL at 12:39

## 2022-01-01 RX ADMIN — INSULIN GLARGINE 9 UNITS: 100 INJECTION, SOLUTION SUBCUTANEOUS at 21:00

## 2022-01-01 RX ADMIN — QUETIAPINE FUMARATE 25 MG: 25 TABLET ORAL at 21:23

## 2022-01-01 RX ADMIN — DEXTROSE MONOHYDRATE 500 ML: 50 INJECTION, SOLUTION INTRAVENOUS at 10:35

## 2022-01-01 RX ADMIN — SERTRALINE HYDROCHLORIDE 50 MG: 50 TABLET ORAL at 14:52

## 2022-01-01 RX ADMIN — DEXTROSE MONOHYDRATE 125 ML: 100 INJECTION, SOLUTION INTRAVENOUS at 06:13

## 2022-01-01 RX ADMIN — INSULIN LISPRO 8 UNITS: 100 INJECTION, SOLUTION INTRAVENOUS; SUBCUTANEOUS at 17:23

## 2022-01-01 RX ADMIN — Medication 10 ML: at 20:26

## 2022-01-01 RX ADMIN — TAMSULOSIN HYDROCHLORIDE 0.4 MG: 0.4 CAPSULE ORAL at 14:51

## 2022-01-01 RX ADMIN — TAMSULOSIN HYDROCHLORIDE 0.4 MG: 0.4 CAPSULE ORAL at 10:59

## 2022-01-01 RX ADMIN — ATORVASTATIN CALCIUM 20 MG: 20 TABLET, FILM COATED ORAL at 21:47

## 2022-01-01 RX ADMIN — SODIUM CHLORIDE 40 MG: 9 INJECTION, SOLUTION INTRAMUSCULAR; INTRAVENOUS; SUBCUTANEOUS at 07:39

## 2022-01-01 RX ADMIN — SODIUM CHLORIDE 40 MG: 9 INJECTION, SOLUTION INTRAMUSCULAR; INTRAVENOUS; SUBCUTANEOUS at 09:19

## 2022-01-01 RX ADMIN — Medication 10 ML: at 08:47

## 2022-01-01 RX ADMIN — Medication 10 ML: at 20:45

## 2022-01-01 RX ADMIN — CEFAZOLIN 2 G: 1 INJECTION, POWDER, FOR SOLUTION INTRAMUSCULAR; INTRAVENOUS at 11:12

## 2022-01-01 RX ADMIN — LOSARTAN POTASSIUM 50 MG: 50 TABLET, FILM COATED ORAL at 08:47

## 2022-01-01 RX ADMIN — ENOXAPARIN SODIUM 40 MG: 100 INJECTION SUBCUTANEOUS at 12:38

## 2022-01-01 RX ADMIN — ATORVASTATIN CALCIUM 20 MG: 20 TABLET, FILM COATED ORAL at 20:44

## 2022-01-01 RX ADMIN — DEXTROSE AND SODIUM CHLORIDE: 5; 450 INJECTION, SOLUTION INTRAVENOUS at 22:22

## 2022-01-01 RX ADMIN — ENOXAPARIN SODIUM 40 MG: 100 INJECTION SUBCUTANEOUS at 10:59

## 2022-01-01 RX ADMIN — INSULIN LISPRO 4 UNITS: 100 INJECTION, SOLUTION INTRAVENOUS; SUBCUTANEOUS at 00:06

## 2022-01-01 RX ADMIN — CARBIDOPA AND LEVODOPA 1 TABLET: 25; 100 TABLET ORAL at 07:39

## 2022-01-01 RX ADMIN — CARBIDOPA AND LEVODOPA 1 TABLET: 25; 100 TABLET ORAL at 21:09

## 2022-01-01 RX ADMIN — ENOXAPARIN SODIUM 30 MG: 100 INJECTION SUBCUTANEOUS at 20:25

## 2022-01-01 RX ADMIN — HYDRALAZINE HYDROCHLORIDE 10 MG: 20 INJECTION INTRAMUSCULAR; INTRAVENOUS at 08:47

## 2022-01-01 RX ADMIN — DEXTROSE AND SODIUM CHLORIDE: 5; 450 INJECTION, SOLUTION INTRAVENOUS at 11:46

## 2022-01-01 RX ADMIN — INSULIN LISPRO 4 UNITS: 100 INJECTION, SOLUTION INTRAVENOUS; SUBCUTANEOUS at 21:00

## 2022-01-01 RX ADMIN — ATORVASTATIN CALCIUM 20 MG: 20 TABLET, FILM COATED ORAL at 21:09

## 2022-01-01 RX ADMIN — CARBIDOPA AND LEVODOPA 1 TABLET: 25; 100 TABLET ORAL at 07:44

## 2022-01-01 RX ADMIN — SODIUM CHLORIDE 40 MG: 9 INJECTION, SOLUTION INTRAMUSCULAR; INTRAVENOUS; SUBCUTANEOUS at 09:40

## 2022-01-01 RX ADMIN — Medication 5 ML: at 21:00

## 2022-01-01 RX ADMIN — LOSARTAN POTASSIUM 50 MG: 50 TABLET, FILM COATED ORAL at 08:23

## 2022-01-01 RX ADMIN — BARIUM SULFATE 80 ML: 400 SUSPENSION ORAL at 14:38

## 2022-01-01 RX ADMIN — ENOXAPARIN SODIUM 40 MG: 100 INJECTION SUBCUTANEOUS at 08:22

## 2022-01-01 RX ADMIN — INSULIN LISPRO 8 UNITS: 100 INJECTION, SOLUTION INTRAVENOUS; SUBCUTANEOUS at 16:47

## 2022-01-01 RX ADMIN — INSULIN LISPRO 12 UNITS: 100 INJECTION, SOLUTION INTRAVENOUS; SUBCUTANEOUS at 17:37

## 2022-01-01 RX ADMIN — ACETAMINOPHEN 1000 MG: 500 TABLET ORAL at 23:39

## 2022-01-01 RX ADMIN — SERTRALINE HYDROCHLORIDE 50 MG: 50 TABLET ORAL at 10:59

## 2022-01-01 RX ADMIN — ATORVASTATIN CALCIUM 20 MG: 20 TABLET, FILM COATED ORAL at 21:23

## 2022-01-01 RX ADMIN — KETAMINE HYDROCHLORIDE 75 MG: 100 INJECTION INTRAMUSCULAR; INTRAVENOUS at 11:23

## 2022-01-01 RX ADMIN — CARBIDOPA AND LEVODOPA 1 TABLET: 25; 100 TABLET ORAL at 00:03

## 2022-01-01 RX ADMIN — Medication 10 ML: at 07:42

## 2022-01-01 RX ADMIN — INSULIN LISPRO 4 UNITS: 100 INJECTION, SOLUTION INTRAVENOUS; SUBCUTANEOUS at 12:21

## 2022-01-01 RX ADMIN — CARBIDOPA AND LEVODOPA 1 TABLET: 25; 100 TABLET ORAL at 20:44

## 2022-01-01 RX ADMIN — SERTRALINE HYDROCHLORIDE 50 MG: 50 TABLET ORAL at 07:39

## 2022-01-01 RX ADMIN — SODIUM CHLORIDE 1000 ML: 9 INJECTION, SOLUTION INTRAVENOUS at 14:14

## 2022-01-01 RX ADMIN — Medication 10 ML: at 07:45

## 2022-01-01 RX ADMIN — QUETIAPINE FUMARATE 25 MG: 25 TABLET ORAL at 14:52

## 2022-01-01 RX ADMIN — POTASSIUM CHLORIDE 10 MEQ: 7.45 INJECTION INTRAVENOUS at 14:07

## 2022-01-01 RX ADMIN — DEXTROSE AND SODIUM CHLORIDE: 5; 450 INJECTION, SOLUTION INTRAVENOUS at 07:54

## 2022-01-01 RX ADMIN — INSULIN GLARGINE 9 UNITS: 100 INJECTION, SOLUTION SUBCUTANEOUS at 00:09

## 2022-01-01 RX ADMIN — QUETIAPINE FUMARATE 25 MG: 25 TABLET ORAL at 20:44

## 2022-01-01 RX ADMIN — QUETIAPINE FUMARATE 25 MG: 25 TABLET ORAL at 12:39

## 2022-01-01 RX ADMIN — CARBIDOPA AND LEVODOPA 1 TABLET: 25; 100 TABLET ORAL at 12:39

## 2022-01-01 RX ADMIN — SERTRALINE HYDROCHLORIDE 50 MG: 50 TABLET ORAL at 08:23

## 2022-01-01 RX ADMIN — SODIUM CHLORIDE 1000 ML: 9 INJECTION, SOLUTION INTRAVENOUS at 16:00

## 2022-01-01 RX ADMIN — TAMSULOSIN HYDROCHLORIDE 0.4 MG: 0.4 CAPSULE ORAL at 08:23

## 2022-01-01 RX ADMIN — SERTRALINE HYDROCHLORIDE 50 MG: 50 TABLET ORAL at 07:44

## 2022-01-01 RX ADMIN — SODIUM CHLORIDE: 4.5 INJECTION, SOLUTION INTRAVENOUS at 05:44

## 2022-01-01 RX ADMIN — POTASSIUM CHLORIDE 10 MEQ: 7.45 INJECTION INTRAVENOUS at 10:54

## 2022-01-01 RX ADMIN — QUETIAPINE FUMARATE 25 MG: 25 TABLET ORAL at 07:39

## 2022-01-01 RX ADMIN — INSULIN LISPRO 8 UNITS: 100 INJECTION, SOLUTION INTRAVENOUS; SUBCUTANEOUS at 06:45

## 2022-01-01 RX ADMIN — SODIUM CHLORIDE 40 MG: 9 INJECTION, SOLUTION INTRAMUSCULAR; INTRAVENOUS; SUBCUTANEOUS at 08:47

## 2022-01-01 RX ADMIN — Medication 10 ML: at 10:30

## 2022-01-01 RX ADMIN — CARBIDOPA AND LEVODOPA 1 TABLET: 25; 100 TABLET ORAL at 08:22

## 2022-01-01 RX ADMIN — INSULIN LISPRO 4 UNITS: 100 INJECTION, SOLUTION INTRAVENOUS; SUBCUTANEOUS at 08:48

## 2022-01-01 RX ADMIN — INSULIN GLARGINE 9 UNITS: 100 INJECTION, SOLUTION SUBCUTANEOUS at 21:23

## 2022-01-01 RX ADMIN — BACITRACIN: 500 OINTMENT TOPICAL at 01:00

## 2022-01-01 RX ADMIN — PROPOFOL 100 MG: 10 INJECTION, EMULSION INTRAVENOUS at 11:10

## 2022-01-01 RX ADMIN — DEXTROSE MONOHYDRATE 125 ML: 100 INJECTION, SOLUTION INTRAVENOUS at 04:07

## 2022-01-01 RX ADMIN — INSULIN LISPRO 4 UNITS: 100 INJECTION, SOLUTION INTRAVENOUS; SUBCUTANEOUS at 12:39

## 2022-01-01 RX ADMIN — INSULIN LISPRO 4 UNITS: 100 INJECTION, SOLUTION INTRAVENOUS; SUBCUTANEOUS at 07:47

## 2022-01-01 RX ADMIN — LOSARTAN POTASSIUM 50 MG: 50 TABLET, FILM COATED ORAL at 07:44

## 2022-01-01 RX ADMIN — CARBIDOPA AND LEVODOPA 1 TABLET: 25; 100 TABLET ORAL at 10:59

## 2022-01-01 RX ADMIN — HYALURONIDASE (HUMAN RECOMBINANT) 200 UNITS: 150 INJECTION, SOLUTION SUBCUTANEOUS at 12:49

## 2022-01-01 RX ADMIN — DEXTROSE AND SODIUM CHLORIDE: 5; 450 INJECTION, SOLUTION INTRAVENOUS at 07:03

## 2022-01-01 RX ADMIN — INSULIN LISPRO 2 UNITS: 100 INJECTION, SOLUTION INTRAVENOUS; SUBCUTANEOUS at 13:17

## 2022-01-01 RX ADMIN — LOSARTAN POTASSIUM 50 MG: 50 TABLET, FILM COATED ORAL at 10:59

## 2022-01-01 RX ADMIN — ENOXAPARIN SODIUM 30 MG: 100 INJECTION SUBCUTANEOUS at 07:38

## 2022-01-01 SDOH — ECONOMIC STABILITY: FOOD INSECURITY: WITHIN THE PAST 12 MONTHS, THE FOOD YOU BOUGHT JUST DIDN'T LAST AND YOU DIDN'T HAVE MONEY TO GET MORE.: NEVER TRUE

## 2022-01-01 SDOH — ECONOMIC STABILITY: FOOD INSECURITY: WITHIN THE PAST 12 MONTHS, YOU WORRIED THAT YOUR FOOD WOULD RUN OUT BEFORE YOU GOT MONEY TO BUY MORE.: NEVER TRUE

## 2022-01-01 ASSESSMENT — PAIN SCALES - WONG BAKER

## 2022-01-01 ASSESSMENT — PAIN SCALES - GENERAL
PAINLEVEL_OUTOF10: 0
PAINLEVEL_OUTOF10: 1
PAINLEVEL_OUTOF10: 0
PAINLEVEL_OUTOF10: 1
PAINLEVEL_OUTOF10: 0

## 2022-01-01 ASSESSMENT — PAIN - FUNCTIONAL ASSESSMENT
PAIN_FUNCTIONAL_ASSESSMENT: NONE - DENIES PAIN

## 2022-01-01 ASSESSMENT — ENCOUNTER SYMPTOMS
CHOKING: 0
SHORTNESS OF BREATH: 0
VOMITING: 0
SINUS PRESSURE: 0
SHORTNESS OF BREATH: 0
CHOKING: 0
COLOR CHANGE: 0
TROUBLE SWALLOWING: 1
EYE REDNESS: 0
COLOR CHANGE: 0
ABDOMINAL PAIN: 0
TROUBLE SWALLOWING: 0
BACK PAIN: 0
PHOTOPHOBIA: 0
EYE PAIN: 0
BACK PAIN: 0
VOMITING: 0
WHEEZING: 0
COLOR CHANGE: 0
ABDOMINAL PAIN: 0
COUGH: 0
BACK PAIN: 0
SORE THROAT: 0
SHORTNESS OF BREATH: 0
VOMITING: 0
TROUBLE SWALLOWING: 1
VOMITING: 0
COUGH: 0
CHOKING: 0
SHORTNESS OF BREATH: 0
PHOTOPHOBIA: 0
RHINORRHEA: 0
NAUSEA: 0
COLOR CHANGE: 0
COUGH: 1
SORE THROAT: 0
SINUS PRESSURE: 0
RHINORRHEA: 0
NAUSEA: 0
VOMITING: 0
DIARRHEA: 0
EYE PAIN: 0
DIARRHEA: 0
NAUSEA: 0
TROUBLE SWALLOWING: 0
DIARRHEA: 0
COLOR CHANGE: 0
VOMITING: 0
NAUSEA: 0
SHORTNESS OF BREATH: 0
WHEEZING: 0
TROUBLE SWALLOWING: 1
TROUBLE SWALLOWING: 0
EYE REDNESS: 0
RHINORRHEA: 0
SORE THROAT: 0
WHEEZING: 0
NAUSEA: 0
VOMITING: 0
SHORTNESS OF BREATH: 0
NAUSEA: 0
VOMITING: 0
ABDOMINAL PAIN: 0
BLOOD IN STOOL: 0
COLOR CHANGE: 0
WHEEZING: 0
PHOTOPHOBIA: 0
COUGH: 0
COUGH: 1

## 2022-01-01 ASSESSMENT — PATIENT HEALTH QUESTIONNAIRE - PHQ9
SUM OF ALL RESPONSES TO PHQ9 QUESTIONS 1 & 2: 0
SUM OF ALL RESPONSES TO PHQ QUESTIONS 1-9: 0
2. FEELING DOWN, DEPRESSED OR HOPELESS: 0
SUM OF ALL RESPONSES TO PHQ QUESTIONS 1-9: 0
1. LITTLE INTEREST OR PLEASURE IN DOING THINGS: 0
SUM OF ALL RESPONSES TO PHQ QUESTIONS 1-9: 0
SUM OF ALL RESPONSES TO PHQ QUESTIONS 1-9: 0

## 2022-01-01 ASSESSMENT — SOCIAL DETERMINANTS OF HEALTH (SDOH): HOW HARD IS IT FOR YOU TO PAY FOR THE VERY BASICS LIKE FOOD, HOUSING, MEDICAL CARE, AND HEATING?: NOT VERY HARD

## 2022-01-27 NOTE — PROGRESS NOTES
Assessment:       Diagnosis Orders   1. Type 2 diabetes mellitus with hyperglycemia, with long-term current use of insulin ([facility])  POCT Glucose    Comprehensive Metabolic Panel    Hemoglobin A1C    Lipid Panel     DIABETES FOOT EXAM       PLAN:     1. Target glucose range 100-190   2. Decrease Lantus 32 units mornings    3. Continue Humalog inject three times daily after meals 180-220 4 units, 221-260 5 units, 261-300 6 units, 301 or higher     4. Do not take if glucose is less than 130   5. Continue metformin 1000 mg p.o. twice daily  6. Monitor blood glucose 4 times daily document and bring to next visit  7. Patient uses the freestyle mary carmen- reordered  8. Seeing Podiatry for foot care  9. Follow-up in 3 months    Orders Placed This Encounter   Procedures    Comprehensive Metabolic Panel     Standing Status:   Future     Standing Expiration Date:   2023    Hemoglobin A1C     Standing Status:   Future     Standing Expiration Date:   2023    Lipid Panel     Standing Status:   Future     Standing Expiration Date:   2023     Order Specific Question:   Is Patient Fasting?/# of Hours     Answer:   10-12    POCT Glucose     DIABETES FOOT EXAM     Orders Placed This Encounter   Medications    Continuous Blood Gluc  (FREESTYLE MARY CARMEN 14 DAY READER) YENY     Si Device by Does not apply route 4 times daily (before meals and nightly)     Dispense:  1 each     Refill:  0    Continuous Blood Gluc Sensor (FREESTYLE MARY CARMEN 14 DAY SENSOR) MISC     Sig: USE AS DIRECTED     Dispense:  2 each     Refill:  3     Return in about 3 months (around 2022) for Diabetes.   Subjective:     Chief Complaint   Patient presents with    Follow-up    Diabetes     Vitals:    22 1533 22 1539   BP: (!) 171/91 (!) 171/90   Site: Right Upper Arm    Pulse: 81    SpO2: 96%    Height: 5' 5\" (1.651 m)      Wt Readings from Last 3 Encounters:   10/05/20 170 lb (77.1 kg)   20 176 lb (79.8 kg) 10/03/19 178 lb (80.7 kg)     BP Readings from Last 3 Encounters:   01/27/22 (!) 171/90   10/11/21 (!) 159/81   01/07/21 (!) 153/76     This visit was done utilizing the  phone system. Discharge instructions were given verbally and written. Diabetes  He presents for his follow-up diabetic visit. He has type 2 diabetes mellitus. The initial diagnosis of diabetes was made 5 years ago. His disease course has been worsening. Pertinent negatives for hypoglycemia include no confusion, dizziness or headaches. Associated symptoms include weakness. Pertinent negatives for diabetes include no chest pain, no fatigue, no polyphagia and no polyuria. There are no hypoglycemic complications. Symptoms are stable. Risk factors for coronary artery disease include male sex, hypertension and diabetes mellitus. Current diabetic treatment includes insulin injections and oral agent (monotherapy). He is compliant with treatment all of the time. His weight is stable. He is following a generally unhealthy diet. Meal planning includes avoidance of concentrated sweets. He has not had a previous visit with a dietitian. He rarely participates in exercise. His home blood glucose trend is decreasing steadily. An ACE inhibitor/angiotensin II receptor blocker is being taken. He does not see a podiatrist.Eye exam is not current. Past Medical History:   Diagnosis Date    Hyperlipidemia     Hypertension     Parkinson's disease ([address]     Type 2 diabetes mellitus without complication ([facility] [address]      No past surgical history on file.   Social History     Socioeconomic History    Marital status:      Spouse name: Not on file    Number of children: Not on file    Years of education: Not on file    Highest education level: Not on file   Occupational History    Not on file   Tobacco Use    Smoking status: Never Smoker    Smokeless tobacco: Never Used   Vaping Use    Vaping Use: Never used   Substance and Sexual Activity    route 4 times daily (before meals and nightly), Disp: 150 each, Rfl: 3    tamsulosin (FLOMAX) 0.4 MG capsule, , Disp: , Rfl: 0    carbidopa-levodopa (SINEMET)  MG per tablet, Take 1 tablet by mouth 2 times daily, Disp: 90 tablet, Rfl: 3    simvastatin (ZOCOR) 40 MG tablet, Take 40 mg by mouth nightly, Disp: , Rfl:     sertraline (ZOLOFT) 50 MG tablet, Take 50 mg by mouth daily, Disp: , Rfl:     Continuous Blood Gluc Sensor (FREESTYLE MARY CARMEN 2 SENSOR) [facility], 1 DEVICE BY DOES NOT APPLY ROUTE EVERY 14 DAYS (Patient not taking: Reported on 1/27/2022), Disp: 6 each, Rfl: 3  Lab Results   Component Value Date     01/21/2022    K 4.6 01/21/2022    CL 99 01/21/2022    CO2 22 01/21/2022    BUN 15 01/21/2022    CREATININE 0.93 01/21/2022    GLUCOSE 266 01/27/2022    CALCIUM 9.5 01/21/2022    PROT 6.9 01/21/2022    LABALBU 3.8 01/21/2022    BILITOT 0.3 01/21/2022    ALKPHOS 71 01/21/2022    AST 11 01/21/2022    ALT <5 01/21/2022    LABGLOM >60.0 01/21/2022    GFRAA >60.0 01/21/2022    GLOB 3.1 01/21/2022     Lab Results   Component Value Date    WBC 6.4 10/05/2020    HGB 15.0 10/05/2020    HCT 43.1 10/05/2020    MCV 88.6 10/05/2020     10/05/2020     Lab Results   Component Value Date    LABA1C 8.3 (H) 01/21/2022    LABA1C 9.1 (H) 10/09/2021    LABA1C 9.2 01/07/2021   Results for Omi Mata (MRN [id number]) as of 2/4/2020 12:02   Ref.  Range 6/18/2019 05:21   C-Peptide Latest Ref Range: 1.1 - 4.4 ng/mL 0.8 (L)     Lab Results   Component Value Date    CHOL 121 01/08/2021    CHOL 148 10/01/2019     Lab Results   Component Value Date    TRIG 51 01/08/2021    TRIG 63 10/01/2019     Lab Results   Component Value Date    HDL 55 01/08/2021    HDL 62 (H) 10/01/2019     Lab Results   Component Value Date    LDLCALC 56 01/08/2021    [address] 10/01/2019     No results found for: LABVLDL, VLDL  No results found for: CHOLHDLRATIO  No results found for: TESTOSTERONE  No results found for: TSH, T4FREE, THYROIDAB    Review of Systems   Constitutional: Positive for unexpected weight change (wt loss ). Negative for chills, fatigue and fever. HENT: Negative for congestion, ear pain, postnasal drip, rhinorrhea, sinus pressure and sore throat. Eyes: Negative for pain and redness. Respiratory: Negative for cough, shortness of breath and wheezing. Cardiovascular: Negative for chest pain, palpitations and leg swelling. Gastrointestinal: Negative for abdominal pain, diarrhea, nausea and vomiting. Endocrine: Positive for cold intolerance. Negative for polyphagia and polyuria. Genitourinary: Negative for difficulty urinating. Musculoskeletal: Positive for gait problem (weak legs, chronic). Negative for arthralgias. Skin: Negative for rash. Allergic/Immunologic: Negative for environmental allergies. Neurological: Positive for weakness. Negative for dizziness and headaches. Hematological: Negative for adenopathy. Psychiatric/Behavioral: Negative for confusion. Objective:   Physical Exam  Vitals reviewed. Constitutional:       Appearance: He is well-developed. HENT:      Head: Normocephalic and atraumatic. Nose: No congestion. Mouth/Throat:      Mouth: Mucous membranes are moist.   Eyes:      Conjunctiva/sclera: Conjunctivae normal.   Neck:      Thyroid: No thyromegaly. Cardiovascular:      Rate and Rhythm: Normal rate and regular rhythm. Heart sounds: Normal heart sounds. Pulmonary:      Effort: Pulmonary effort is normal.      Breath sounds: Normal breath sounds. Abdominal:      General: Bowel sounds are normal. There is no distension. Palpations: Abdomen is soft. Tenderness: There is no abdominal tenderness. Musculoskeletal:         General: Normal range of motion. Cervical back: Normal range of motion and neck supple. Comments: 1+ DP/PT pulses bilaterally. No wounds, lesions, or ulcerations. Thickened long toenails. Mild nonpitting edema. Skin:     General: Skin is warm and dry. Neurological:      Mental Status: He is alert and oriented to person, place, and time.    Psychiatric:         Mood and Affect: Mood normal.

## 2022-01-27 NOTE — PATIENT INSTRUCTIONS
Endocrinology-diabetes    1. Check your blood sugars 4 times a day, before meals and at night  2. Document these numbers and a blood glucose log and bring them with you to your follow-up appointment. 3. Do not take your mealtime insulin if your blood sugars less than 130  4. Call our office if you have blood sugars less than 80 or greater then 300 on two or more occasions  5. Call our office if you have any questions regarding your blood sugars or insulin dosing regiment  6. Signs of low blood sugar include sweating , heart racing, dizziness and weakness. Check your blood sugar if you have any of these symptoms.      Medications-

## 2022-04-25 NOTE — TELEPHONE ENCOUNTER
Pharmacy requesting medication refill.  Please approve or deny this request.    Rx requested:  Requested Prescriptions     Pending Prescriptions Disp Refills    BAQSIMI ONE PACK 3 MG/DOSE POWD [Pharmacy Med Name: BAQSIMI ONE PACK 3 MG/DOSE 3 Powder]  1     Si APPLICATOR BY NASAL ROUTE EVERY 4 HOURS AS NEEDED (BLOOD GLUCOSE LESS THAN 70)         Last Office Visit:   2022      Next Visit Date:  Future Appointments   Date Time Provider Ashly Radha   2022  3:15 PM Jamir Quan [address] [facility]   6/3/2022  2:00 PM Niurka Pittman MD [address] [place] [address]

## 2022-05-25 PROBLEM — E03.8 OTHER SPECIFIED HYPOTHYROIDISM: Status: ACTIVE | Noted: 2022-01-01

## 2022-05-25 NOTE — PROGRESS NOTES
Assessment:       Diagnosis Orders   1. Type 2 diabetes mellitus with hyperglycemia, with long-term current use of insulin ([facility])  POCT Glucose    POCT glycosylated hemoglobin (Hb A1C)   2. Type 2 diabetes mellitus with diabetic neuropathy, with long-term current use of insulin ([facility])  Comprehensive Metabolic Panel    CBC   3. Other specified hypothyroidism  TSH    T4, Free       PLAN:     1. Target glucose range 100-190   2. Decrease Lantus 20 units mornings    3. Stop Humalog inject three times daily after meals 180-220 4 units, 221-260 5 units, 261-300 6 units, 301 or higher     4. Continue metformin 1000 mg p.o. twice daily  5. Monitor blood glucose 4 times daily document and bring to next visit  6. Patient uses the [email or web address]- reordered a new meter  7. Seeing Podiatry for foot care  8. Follow-up in 4 months    Orders Placed This Encounter   Procedures    Comprehensive Metabolic Panel     Standing Status:   Future     Standing Expiration Date:   2023    CBC     Standing Status:   Future     Standing Expiration Date:   2023    TSH     Standing Status:   Future     Standing Expiration Date:   2023    T4, Free     Standing Status:   Future     Standing Expiration Date:   2023    POCT Glucose    POCT glycosylated hemoglobin (Hb A1C)     Orders Placed This Encounter   Medications    Continuous Blood Gluc  (FREESTYLE MARY CARMEN 14 DAY READER) YENY     Si Device by Does not apply route 4 times daily (before meals and nightly)     Dispense:  1 each     Refill:  0     Old reader is broken and will not change    insulin glargine (LANTUS SOLOSTAR) 100 UNIT/ML injection pen     Sig: Inject 20 Units into the skin nightly     Dispense:  15 mL     Refill:  3    simvastatin (ZOCOR) 40 MG tablet     Sig: Take 1 tablet by mouth nightly     Dispense:  30 tablet     Refill:  5     Return in about 4 months (around 2022) for Diabetes.   Subjective:     Chief Complaint   Patient presents with  Diabetes     Vitals:    05/25/22 1402   Weight: 170 lb (77.1 kg)   Height: 5' 5\" (1.651 m)     Wt Readings from Last 3 Encounters:   05/25/22 170 lb (77.1 kg)   10/05/20 170 lb (77.1 kg)   02/04/20 176 lb (79.8 kg)     BP Readings from Last 3 Encounters:   01/27/22 (!) 171/90   10/11/21 (!) 159/81   01/07/21 (!) 153/76     This visit was done utilizing the  phone system. Discharge instructions were given verbally and written. Patient's glycemic control is good, he has had multiple hypoglycemic events per the family, I reiterated again to them through the  the importance of contacting me if he has blood glucose levels less than 80. They verbalized understanding. His freestyle negra meter will not hold a charge, I reordered a new one through their pharmacy. Patient is unable to stand up due to significant lack of leg strength, he is also completely nonverbal at this point. I asked him multiple yes or no questions through the  and he did not respond. Per the family he has an appointment with his neurologist and primary care in the next 1 to 2 weeks. I significantly lowered his Lantus, discontinued his Humalog for now. Diabetes  He presents for his follow-up diabetic visit. He has type 2 diabetes mellitus. The initial diagnosis of diabetes was made 5 years ago. His disease course has been worsening. Pertinent negatives for hypoglycemia include no confusion, dizziness or headaches. Associated symptoms include weakness. Pertinent negatives for diabetes include no chest pain, no fatigue, no polyphagia and no polyuria. There are no hypoglycemic complications. Symptoms are stable. Risk factors for coronary artery disease include male sex, hypertension and diabetes mellitus. Current diabetic treatment includes insulin injections and oral agent (monotherapy). He is compliant with treatment all of the time. His weight is stable. He is following a generally unhealthy diet.  Meal planning includes avoidance of concentrated sweets. He has not had a previous visit with a dietitian. He rarely participates in exercise. His home blood glucose trend is decreasing steadily. An ACE inhibitor/angiotensin II receptor blocker is being taken. He does not see a podiatrist.Eye exam is not current. Past Medical History:   Diagnosis Date    Hyperlipidemia     Hypertension     Parkinson's disease ([facility]     Type 2 diabetes mellitus without complication ([facility]      History reviewed. No pertinent surgical history.   Social History     Socioeconomic History    Marital status:      Spouse name: Not on file    Number of children: Not on file    Years of education: Not on file    Highest education level: Not on file   Occupational History    Not on file   Tobacco Use    Smoking status: Never Smoker    Smokeless tobacco: Never Used   Vaping Use    Vaping Use: Never used   Substance and Sexual Activity    Alcohol use: Not on file    Drug use: Not on file    Sexual activity: Not on file   Other Topics Concern    Not on file   Social History Narrative         Lives With: Spouse    Recently moved to [facility] from [facility]  late--2018,             son Paola Peres in the area    Type of Home: House One level    Home Access: Level entry    Bathroom Shower/Tub: Tub/Shower unit    [employer]: Grab bars in shower, Shower chair    Home Equipment: 4 wheeled walker    Receives Help From: Family    ADL Assistance: Independent    Homemaking Assistance: (spouse and son able to assist)    Homemaking Responsibilities: No    Ambulation Assistance: Independent(rollator when needed)    Transfer Assistance: Independent    Active : No    Patient's  Info: son    Additional Comments: Pt Kinyarwanda speaking interpretor Pierce Romeo present,  Pt recieves help from wife and son @ times    Primary Care Provider: PINEDA Archibald CNP     Social Determinants of Health     Financial Resource Strain:  Difficulty of Paying Living Expenses: Not on file   Food Insecurity:     Worried About Running Out of Food in the Last Year: Not on file    Ran Out of Food in the Last Year: Not on file   Transportation Needs:     Lack of Transportation (Medical): Not on file    Lack of Transportation (Non-Medical):  Not on file   Physical Activity:     Days of Exercise per Week: Not on file    Minutes of Exercise per Session: Not on file   Stress:     Feeling of Stress : Not on file   Social Connections:     Frequency of Communication with Friends and Family: Not on file    Frequency of Social Gatherings with Friends and Family: Not on file    Attends Anabaptism Services: Not on file    Active Member of [address] [place] [address] [employer] or Organizations: Not on file    Attends Club or Organization Meetings: Not on file    Marital Status: Not on file   Intimate Partner Violence:     Fear of Current or Ex-Partner: Not on file    Emotionally Abused: Not on file    Physically Abused: Not on file    Sexually Abused: Not on file   Housing Stability:     Unable to Pay for Housing in the Last Year: Not on file    Number of [place] in the Last Year: Not on file    Unstable Housing in the Last Year: Not on file     Family History   Problem Relation Age of Onset    Diabetes Mother      No Known Allergies    Current Outpatient Medications:     Continuous Blood Gluc  (FREESTYLE MARY CARMEN 14 DAY READER) YENY, 1 Device by Does not apply route 4 times daily (before meals and nightly), Disp: 1 each, Rfl: 0    insulin glargine (LANTUS SOLOSTAR) 100 UNIT/ML injection pen, Inject 20 Units into the skin nightly, Disp: 15 mL, Rfl: 3    simvastatin (ZOCOR) 40 MG tablet, Take 1 tablet by mouth nightly, Disp: 30 tablet, Rfl: 5    BAQSIMI ONE PACK 3 MG/DOSE POWD, 1 APPLICATOR BY NASAL ROUTE EVERY 4 HOURS AS NEEDED (BLOOD GLUCOSE LESS THAN 70), Disp: 1 each, Rfl: 1    FREESTYLE LITE strip, TEST 4 VECES AL PHYLLIS, Disp: 150 strip, Rfl: 3    metFORMIN (GLUCOPHAGE) 1000 MG tablet, MARICARMEN 1 TABLETA POR BOCA DOS VECES AL PHYLLIS ANTES DE COMER., Disp: 60 tablet, Rfl: 3    Continuous Blood Gluc Sensor (FREESTYLE MARY CARMEN 2 SENSOR) MISC, 1 DEVICE BY DOES NOT APPLY ROUTE EVERY 14 DAYS, Disp: 2 each, Rfl: 3    Continuous Blood Gluc Sensor (FREESTYLE MARY CARMEN 14 DAY SENSOR) MISC, USE AS DIRECTED, Disp: 2 each, Rfl: 3    losartan (COZAAR) 50 MG tablet, TAKE 1 TABLET BY MOUTH DAILY, Disp: 30 tablet, Rfl: 3    Glucagon, rDNA, (GLUCAGON EMERGENCY) 1 MG KIT, INJECT 1 MG INTO THE MUSCLE SEE ADMIN INSTRUCTIONS FOLLOW PACKAGE DIRECTIONS FOR LOW BLOOD SUGAR., Disp: 1 kit, Rfl: 3    HUMALOG KWIKPEN 100 UNIT/ML SOPN, 35 UNITS BEFORE BREAKFAST, 25 UNITS BEFORE LUNCH, 35 UNITS BEFORE DINNER, Disp: 15 mL, Rfl: 3    Insulin Pen Needle (NOVOFINE) 32G X 6 MM MISC, 1 Device by Does not apply route 4 times daily (before meals and nightly), Disp: 300 each, Rfl: 3    blood glucose monitor strips, 1 strip by Other route 4 times daily (before meals and nightly), Disp: 150 strip, Rfl: 3    blood glucose monitor kit and supplies, 1 kit by Other route 4 times daily (before meals and nightly), Disp: 1 kit, Rfl: 0    Lancets MISC, 1 each by Does not apply route 4 times daily (before meals and nightly), Disp: 150 each, Rfl: 3    tamsulosin (FLOMAX) 0.4 MG capsule, , Disp: , Rfl: 0    carbidopa-levodopa (SINEMET)  MG per tablet, Take 1 tablet by mouth 2 times daily, Disp: 90 tablet, Rfl: 3    sertraline (ZOLOFT) 50 MG tablet, Take 50 mg by mouth daily, Disp: , Rfl:   Lab Results   Component Value Date     01/21/2022    K 4.6 01/21/2022    CL 99 01/21/2022    CO2 22 01/21/2022    BUN 15 01/21/2022    CREATININE 0.93 01/21/2022    GLUCOSE 224 05/25/2022    CALCIUM 9.5 01/21/2022    PROT 6.9 01/21/2022    LABALBU 3.8 01/21/2022    BILITOT 0.3 01/21/2022    ALKPHOS 71 01/21/2022    AST 11 01/21/2022    ALT <5 01/21/2022    LABGLOM >60.0 01/21/2022    GFRAA >60.0 01/21/2022    GLOB 3.1 01/21/2022     Lab Results   Component Value Date    WBC 6.4 10/05/2020    HGB 15.0 10/05/2020    HCT 43.1 10/05/2020    MCV 88.6 10/05/2020     10/05/2020     Lab Results   Component Value Date    LABA1C 6.7 05/25/2022    LABA1C 8.3 (H) 01/21/2022    LABA1C 9.1 (H) 10/09/2021   Results for Nitza Fenton (MRN [id number]) as of 2/4/2020 12:02   Ref. Range 6/18/2019 05:21   C-Peptide Latest Ref Range: 1.1 - 4.4 ng/mL 0.8 (L)     Lab Results   Component Value Date    CHOL 121 01/08/2021    CHOL 148 10/01/2019     Lab Results   Component Value Date    TRIG 51 01/08/2021    TRIG 63 10/01/2019     Lab Results   Component Value Date    HDL 55 01/08/2021    HDL 62 (H) 10/01/2019     Lab Results   Component Value Date    LDLCALC 56 01/08/2021    [address] 10/01/2019     No results found for: LABVLDL, VLDL  No results found for: CHOLHDLRATIO  No results found for: TESTOSTERONE  No results found for: TSH, T3FREE, T4FREE, THYROIDAB    Review of Systems   Constitutional: Positive for unexpected weight change (wt loss ). Negative for chills, fatigue and fever. HENT: Negative for congestion, ear pain, postnasal drip, rhinorrhea, sinus pressure and sore throat. Eyes: Negative for pain and redness. Respiratory: Negative for cough, shortness of breath and wheezing. Cardiovascular: Negative for chest pain, palpitations and leg swelling. Gastrointestinal: Negative for abdominal pain, diarrhea, nausea and vomiting. Endocrine: Positive for cold intolerance. Negative for polyphagia and polyuria. Genitourinary: Negative for difficulty urinating. Musculoskeletal: Positive for gait problem (weak legs, chronic). Negative for arthralgias. Skin: Negative for rash. Allergic/Immunologic: Negative for environmental allergies. Neurological: Positive for weakness. Negative for dizziness and headaches. Hematological: Negative for adenopathy. Psychiatric/Behavioral: Negative for confusion.        Objective: Physical Exam  Vitals reviewed. Constitutional:       Appearance: He is well-developed. HENT:      Head: Normocephalic and atraumatic. Nose: No congestion. Mouth/Throat:      Mouth: Mucous membranes are moist.   Eyes:      Conjunctiva/sclera: Conjunctivae normal.   Neck:      Thyroid: No thyromegaly. Cardiovascular:      Rate and Rhythm: Normal rate and regular rhythm. Heart sounds: Normal heart sounds. Pulmonary:      Effort: Pulmonary effort is normal.      Breath sounds: Normal breath sounds. Abdominal:      General: Bowel sounds are normal. There is no distension. Palpations: Abdomen is soft. Tenderness: There is no abdominal tenderness. Musculoskeletal:         General: Normal range of motion. Cervical back: Normal range of motion and neck supple. Comments: 1+ DP/PT pulses bilaterally. No wounds, lesions, or ulcerations. Thickened long toenails. Mild nonpitting edema. Skin:     General: Skin is warm and dry. Neurological:      Mental Status: He is alert and oriented to person, place, and time.    Psychiatric:         Mood and Affect: Mood normal.

## 2022-05-25 NOTE — PATIENT INSTRUCTIONS
Endocrinology-diabetes    1. Check your blood sugars 4 times a day, before meals and at night  2. Document these numbers and a blood glucose log and bring them with you to your follow-up appointment. 3. If you are prescribed insulin, Do not take your mealtime insulin if your blood sugars less than 120   4. Call our office if you have blood sugars less than 80 or greater then 300 on two or more occasions  5. Call our office if you have any questions regarding your blood sugars or insulin dosing regiment  6. Signs of low blood sugar include sweating , heart racing, dizziness and weakness. Check your blood sugar if you have any of these symptoms. Medications-    7. Target glucose range 100-190   8. Decrease Lantus 20 units mornings    9. Stop Humalog inject three times daily after meals 180-220 4 units, 221-260 5 units, 261-300 6 units, 301 or higher     10. Continue metformin 1000 mg p.o. twice daily  11. Monitor blood glucose 4 times daily document and bring to next visit  12. Patient uses the freestyle negra- reordered a new meter  13. Seeing Podiatry for foot care  14.  Follow-up in 4 months

## 2022-06-01 NOTE — TELEPHONE ENCOUNTER
Pharmacy requesting medication refill. Please approve or deny this request.    Rx requested:  Requested Prescriptions     Pending Prescriptions Disp Refills    metFORMIN (GLUCOPHAGE) 1000 MG tablet [Pharmacy Med Name: METFORMIN HCL 1000 MG TAB 1000 Tablet] 60 tablet 3     Sig: MARICARMEN 1 TABLETA POR BOCA DOS VECES AL PHYLLIS ANTES DE COMER.          Last Office Visit:   5/25/2022      Next Visit Date:  Future Appointments   Date Time Provider Ashly Ashi   6/3/2022  2:00 PM Angel Ash MD [facility]   9/28/2022  2:00 PM Delano Fuchs, [address]

## 2022-06-03 PROBLEM — S22.000A COMPRESSION FRACTURE OF BODY OF THORACIC VERTEBRA (HCC): Status: RESOLVED | Noted: 2019-06-23 | Resolved: 2022-01-01

## 2022-06-03 PROBLEM — Z79.4 TYPE 2 DIABETES MELLITUS WITH DIABETIC NEUROPATHY, WITH LONG-TERM CURRENT USE OF INSULIN (HCC): Status: RESOLVED | Noted: 2019-06-19 | Resolved: 2022-01-01

## 2022-06-03 PROBLEM — R26.9 GAIT ABNORMALITY: Status: RESOLVED | Noted: 2019-06-20 | Resolved: 2022-01-01

## 2022-06-03 PROBLEM — E11.40 TYPE 2 DIABETES MELLITUS WITH DIABETIC NEUROPATHY, WITH LONG-TERM CURRENT USE OF INSULIN (HCC): Status: RESOLVED | Noted: 2019-06-19 | Resolved: 2022-01-01

## 2022-06-03 NOTE — PATIENT INSTRUCTIONS
Advance Directives: Care Instructions  Overview  An advance directive is a legal way to state your wishes at the end of your life. It tells your family and your doctor what to do if you can't say what youwant. There are two main types of advance directives. You can change them any timeyour wishes change. Living will. This form tells your family and your doctor your wishes about life support and other treatment. The form is also called a declaration. Medical power of . This form lets you name a person to make treatment decisions for you when you can't speak for yourself. This person is called a health care agent (health care proxy, health care surrogate). The form is also called a durable power of  for health care. If you do not have an advance directive, decisions about your medical care maybe made by a family member, or by a doctor or a  who doesn't know you. It may help to think of an advance directive as a gift to the people who carefor you. If you have one, they won't have to make tough decisions by themselves. Follow-up care is a key part of your treatment and safety. Be sure to make and go to all appointments, and call your doctor if you are having problems. It's also a good idea to know your test results and keep alist of the medicines you take. What should you include in an advance directive? Many states have a unique advance directive form. (It may ask you to address specific issues.) Or you might use a universal form that's approved by manystates. If your form doesn't tell you what to address, it may be hard to know what to include in your advance directive. Use the questions below to help you getstarted.  Who do you want to make decisions about your medical care if you are not able to?  What life-support measures do you want if you have a serious illness that gets worse over time or can't be cured?  What are you most afraid of that might happen?  (Maybe you're afraid of having pain, losing your independence, or being kept alive by machines.)   Where would you prefer to die? (Your home? A hospital? A nursing home?)   Do you want to donate your organs when you die?  Do you want certain Spiritism practices performed before you die? When should you call for help? Be sure to contact your doctor if you have any questions. Where can you learn more? Go to [email or web address] org and sign in to your Atempo account. Enter R264 in the MMIT box to learn more about \"Advance Directives: Care Instructions. \"     If you do not have an account, please click on the \"Sign Up Now\" link. Current as of: October 18, 2021               Content Version: 13.2  © 2006-2022 [employer]. Care instructions adapted under license by [facility] ([facility]). If you have questions about a medical condition or this instruction, always ask your healthcare professional. [place] [address] any warranty or liability for your use of this information. Learning About Medical Power of   What is a medical power of ? A medical power of , also called a durable power of  for health care, is one type of the legal forms called advance directives. It lets you name the person you want to make treatment decisions for you if you can't speak or decide for yourself. The person you choose is called your health care agent. This person is also called a health care proxy or health care surrogate. A medical power of  may be called something else in your state. How do you choose a health care agent? Choose your health care agent carefully. This person may or may not be a familymember. Talk to the person before you make your final decision. Make sure he or she iscomfortable with this responsibility. It's a good idea to choose someone who:   Is at least 25years old.    Knows you well and understands what makes life meaningful for you.  Understands your Hoahaoism and moral values.  Will do what you want, not what he or she wants.  Will be able to make difficult choices at a stressful time.  Will be able to refuse or stop treatment, if that is what you would want, even if you could die.  Will be firm and confident with health professionals if needed.  Will ask questions to get needed information.  Lives near you or agrees to travel to you if needed. Your family may help you make medical decisions while you can still be part of that process. But it's important to choose one person to be your health careagent in case you aren't able to make decisions for yourself. If you don't fill out the legal form and name a health care agent, thedecisions your family can make may be limited. A health care agent may be called something else in your state. Who will make decisions for you if you don't have a health care agent? If you don't have a health care agent or a living will, you may not get the care you want. Decisions may be made by family members who disagree about your medical care. Or decisions may be made by a medical professional who doesn'tknow you well. In some cases, a  makes the decisions. When you name a health care agent, it is very clear who has the power to [place] decisions for you. How do you name a health care agent? You name your health care agent on a legal form. This form is usually called a medical power of . Ask your hospital, state bar association, or officeon aging where to find these forms. You must sign the form to make it legal. Some states require you to get the form notarized. This means that a person called a  watches you sign the form and then he or she signs the form. Some states also require thattwo or more witnesses sign the form. Be sure to tell your family members and doctors who your health care agent is. Where can you learn more?   Go to [email or web address] org and sign in to your Butter Systems account. Enter [id number] in the [facility] box to learn more about \"Learning About [address]. \"     If you do not have an account, please click on the \"Sign Up Now\" link. Current as of: October 18, 2021               Content Version: 13.2  © 2006-2022 Healthwise, [employer]. Care instructions adapted under license by [facility] ([facility]). If you have questions about a medical condition or this instruction, always ask your healthcare professional. [address] any warranty or liability for your use of this information. Learning About Living [place]  What is a living will? A living will, also called a declaration, is a legal form. It tells your family and your doctor your wishes when you can't speak for yourself. It's used by the health professionals who will treat you as you near the end of your life or ifyou get seriously hurt or ill. If you put your wishes in writing, your loved ones and others will know what kind of care you want. They won't need to guess. This can ease your mind and behelpful to others. And you can change or cancel your living will at any time. A living will is not the same as an estate or property will. An estate willexplains what you want to happen with your money and property after you die. How do you use it? Keep these facts in mind about how a living will is used.  Your living will is used only if you can't speak or make decisions for yourself. Most often, one or more doctors must certify that you can't speak or decide for yourself before your living will takes effect.  If you get better and can speak for yourself again, you can accept or refuse any treatment. It doesn't matter what you said in your living will.  Some states may limit your right to refuse treatment in certain cases.  For example, you may need to clearly state in your living will that you don't want artificial hydration and nutrition, such as being fed through a tube. Is a living will a legal document? A living will is a legal document. Each state has its own laws about livingwills. And a living will may be called something else in your state. Here are some things to know about living [place].  You don't need an  to complete a living will. But legal advice can be helpful if your state's laws are unclear. It can also help if your health history is complicated or your family can't agree on what should be in your living will.  You can change your living will at any time. Some people find that their wishes about end-of-life care change as their health changes. If you make big changes to your living will, complete a new form.  If you move to another state, make sure that your living will is legal in the state where you now live. In most cases, doctors will respect your wishes even if you have a form from a different state.  You might use a universal form that has been approved by many states. This kind of form can sometimes be filled out and stored online. Your digital copy will then be available wherever you have a connection to the internet. The doctors and nurses who need to treat you can find it right away.  Your state may offer an online registry. This is another place where you can store your living will online.  It's a good idea to get your living will notarized. This means using a person called a  to watch two people sign, or witness, your living will. What should you know when you create a living will? Here are some questions to ask yourself as you make your living will.  Do you know enough about life support methods that might be used? If not, talk to your doctor so you know what might be done if you can't breathe on your own, your heart stops, or you can't swallow.  What things would you still want to be able to do after you receive life-support methods?  Would you want to be able to walk? To speak? To eat on your own? To live without the help of machines?  Do you want certain Catholic practices performed if you become very ill?  If you have a choice, where do you want to be cared for? In your home? At a hospital or nursing home?  If you have a choice at the end of your life, where would you prefer to die? At home? In a hospital or nursing home? Somewhere else?  Would you prefer to be buried or cremated?  Do you want your organs to be donated after you die? What should you do with your living will?  Make sure that your family members and your health care agent have copies of your living will (also called a declaration).  Give your doctor a copy of your living will. Ask to have it kept as part of your medical record. If you have more than one doctor, make sure that each one has a copy.  Put a copy of your living will where it can be easily found. For example, some people may put a copy on their refrigerator door. If you are using a digital copy, be sure your doctor, family members, and health care agent know how to find and access it. Where can you learn more? Go to [email or web address] org and sign in to your Chegongfang account. Enter O065 in the OSIsoft box to learn more about \"Learning About Living [place]. \"     If you do not have an account, please click on the \"Sign Up Now\" link. Current as of: October 18, 2021               Content Version: 13.2  © 2006-2022 Healthwise, Incorporated. Care instructions adapted under license by [facility] [place] ([facility]). If you have questions about a medical condition or this instruction, always ask your healthcare professional. [place] [address] any warranty or liability for your use of this information.

## 2022-06-03 NOTE — PROGRESS NOTES
Well Adult Note  Name: Neville Mendoza Date: 2022   MRN: [id number] Sex: Male   Age: 71 y.o. Ethnicity:  /    : 1950 Race:  / Deloras Neighbours Colon is here for well adult exam.  History: This is a new patient to me. I have reviewed the past medical and surgical history, social history and family history provided. I have reviewed  medication, previous testing and working diagnoses. I have reviewed the allergies and health maintenance information and correlated it into my decision making for this patient for care, diagnostics, consultations and treatment for today's visit. Patient is here for DM f/u. Sugars have been  well-controlled and patient has  been experiencing hypoglycemic symptoms. Given substantial and unexpected decrease in A1C from January to May, Humalog was stopped and Lantus was decreased to 20 units. Parkinson's with Dementia - W/C dependent; no falls but at risk; oriented to self; incontinent of stool and urine and uses adult diapers (8/day) and abdulaziz (QHS)    Patient is here for f/u HTN. Is compliant with meds and has no side effects from them. Avoids added salt. Tries to eat healthy. Exercises occasionally. Has no chest pain, shortness of breath, palpitations or edema. .Review of Systems   Unable to perform ROS: Dementia        No Known Allergies      Prior to Visit Medications    Medication Sig Taking?  Authorizing Provider   Cholecalciferol (VITAMIN D3) 50 MCG (2000) CAPS 1 po daily with meal Yes Aretha Lucero MD   Blood Pressure KIT 1 each by Does not apply route daily Yes Aretha Lucero MD   Incontinence Supply Disposable (DEPEND UNDERWEAR LARGE) MISC 1 each by Does not apply route 8 times daily Yes Aretha Lucero MD   Soap & Cleansers (CLEAN & CLEAR ABSORBING SHEETS) PADS Apply 1 each topically in the morning, at noon, and at bedtime Yes Aretha Lucero MD   Disposable Gloves (NITRILE GLOVES LARGE) MISC 2 each by Does not apply route 8 times daily Yes Kami Anand MD   losartan (COZAAR) 50 mg tablet TAKE 1 TABLET BY MOUTH DAILY Yes Kami Anand MD   metFORMIN (GLUCOPHAGE) 1000 MG tablet MARICARMEN 1 TABLETA POR BOCA DOS VECES AL PHYLLIS ANTES DE COMER. Yes SOCORRO Rojas   Continuous Blood Gluc  (FREESTYLE MARY CARMEN 14 DAY READER) YENY 1 Device by Does not apply route 4 times daily (before meals and nightly) Yes SOCORRO Rojas   insulin glargine (LANTUS SOLOSTAR) 100 UNIT/ML injection pen Inject 20 Units into the skin nightly Yes SOCORRO Rojas   simvastatin (ZOCOR) 40 MG tablet Take 1 tablet by mouth nightly Yes SOCORRO Rojas   BAQSIMI ONE PACK 3 MG/DOSE POWD 1 APPLICATOR BY NASAL ROUTE EVERY 4 HOURS AS NEEDED (BLOOD GLUCOSE LESS THAN 70) Yes SOCORRO Rojas   FREESTYLE LITE strip TEST 4 VECES AL PHYLLIS Yes SOCORRO Rojas   Continuous Blood Gluc Sensor (FREESTYLE MARY CARMEN 2 SENSOR) MISC 1 DEVICE BY DOES NOT APPLY ROUTE EVERY 14 DAYS Yes SOCORRO Rojas   Continuous Blood Gluc Sensor (FREESTYLE MARY CARMEN 14 DAY SENSOR) MISC USE AS DIRECTED Yes SOCORRO Rojas   Glucagon, rDNA, (GLUCAGON EMERGENCY) 1 MG KIT INJECT 1 MG INTO THE MUSCLE SEE ADMIN INSTRUCTIONS FOLLOW PACKAGE DIRECTIONS FOR LOW BLOOD SUGAR.  Yes SOCORRO Rojas   HUMALOG KWIKPEN 100 UNIT/ML SOPN 35 UNITS BEFORE BREAKFAST, 25 UNITS BEFORE LUNCH, 35 UNITS BEFORE DINNER Yes SOCORRO Rojas   Insulin Pen Needle (NOVOFINE) 32G X 6 MM MISC 1 Device by Does not apply route 4 times daily (before meals and nightly) Yes SOCORRO Rojas   blood glucose monitor strips 1 strip by Other route 4 times daily (before meals and nightly) Yes Rosalia Stoll MD   blood glucose monitor kit and supplies 1 kit by Other route 4 times daily (before meals and nightly) Yes Chris Bowman MD   Lancets MISC 1 each by Does not apply route 4 times daily (before meals and nightly) Yes Chris Krysten Mccarthy MD   tamsulosin (FLOMAX) 0.4 MG capsule  Yes Historical Provider, MD   carbidopa-levodopa (SINEMET)  MG per tablet Take 1 tablet by mouth 2 times daily Yes Jyoti Ribeiro DO   sertraline (ZOLOFT) 50 MG tablet Take 50 mg by mouth daily Yes Historical Provider, MD         Past Medical History:   Diagnosis Date    Compression fracture of body of thoracic vertebra ([facility] [address] 6/23/2019    T3, T4, T5 AND T6 COMPRESSION FX OF INDETERMINATE AGE AND CLINICAL CORRELATION WARRANTED.  Gait abnormality due to exacerbation of Parkinson's disease with acute rehab admission on 6/19/2019 6/20/2019    77year-old male who was brought to the emergency room after syncopal episode in 6/16/2019 he was especially diagnosed with exacerbation of Parkinson's disease and electrolyte abnormality as well as hyperosmolar state secondary to elevated blood sugar and uncontrolled diabetes. Cardiopulmonary work-up was negative including chest x-ray EKG and MRI of the brain.   CT the brain showed no acute findi    Hyperlipidemia     Hypertension     Parkinson's disease ([facility] [address]     Syncope and collapse     Type 2 diabetes mellitus without complication (HCC)        Past Surgical History:   Procedure Laterality Date    APPENDECTOMY           Family History   Problem Relation Age of Onset    Diabetes Mother     Heart Disease Mother     Diabetes Father        Social History     Tobacco Use    Smoking status: Never Smoker    Smokeless tobacco: Never Used   Vaping Use    Vaping Use: Never used   Substance Use Topics    Alcohol use: Never    Drug use: Never       Objective   BP (!) 148/90 (Site: Right Upper Arm, Position: Sitting, Cuff Size: Medium Adult)   Pulse 88   Temp 96.8 °F (36 °C) (Temporal)   Ht 5' 5\" (1.651 m)   Wt 170 lb (77.1 kg) Comment: per patient  SpO2 98%   BMI 28.29 kg/m²   Wt Readings from Last 3 Encounters:   06/03/22 170 lb (77.1 kg)   05/25/22 170 lb (77.1 kg)   10/05/20 170 lb (77.1 kg)     There were no vitals filed for this visit. Physical Exam  Vitals and nursing note reviewed. Constitutional:       General: He is not in acute distress. Appearance: He is well-developed. HENT:      Head: Normocephalic and atraumatic. Eyes:      Conjunctiva/sclera: Conjunctivae normal.   Cardiovascular:      Rate and Rhythm: Normal rate and regular rhythm. Heart sounds: Normal heart sounds. Pulmonary:      Effort: No respiratory distress. Breath sounds: No wheezing or rales. Musculoskeletal:      Cervical back: Normal range of motion and neck supple. Lymphadenopathy:      Cervical: No cervical adenopathy. Skin:     General: Skin is warm and dry. Neurological:      Mental Status: He is alert. Comments: Confined to w/c. Essentially nonverbal. Cogwheeling rigidity present in BUE  Masked facies   Psychiatric:         Speech: He is noncommunicative. Cognition and Memory: Cognition is impaired. Memory is impaired. Lab Results   Component Value Date    WBC 7.2 05/25/2022    HGB 14.0 05/25/2022    HCT 42.1 05/25/2022     05/25/2022    CHOL 121 01/08/2021    TRIG 51 01/08/2021    HDL 55 01/08/2021    ALT <5 05/25/2022    AST 9 05/25/2022     05/25/2022    K 4.4 05/25/2022    CL 99 05/25/2022    CREATININE 0.88 05/25/2022    BUN 21 05/25/2022    CO2 21 05/25/2022    TSH 1.700 05/25/2022    LABA1C 6.7 05/25/2022    LABMICR 139.70 (H) 10/09/2021         Assessment   Plan   Kate Alvarez was seen today for establish care. Diagnoses and all orders for this visit:    Encounter for well adult exam without abnormal findings    Type 2 diabetes mellitus with hyperglycemia, with long-term current use of insulin ([address]  Comments:  Stable and well-controlled on current medications. Confirmed that patient does eat 3 times daily. Has occasional coughing choking    Parkinson's disease ([address]  Comments:  Stable, fair control. Observe for aspiration.   Observe for skin breakdown patient's family is no skin breakdown presently    Essential hypertension  Comments:  Not well controlled. Has not taken one of his medications today. Will follow. Orders:  -     Blood Pressure KIT; 1 each by Does not apply route daily  -     losartan (COZAAR) 50 mg tablet; TAKE 1 TABLET BY MOUTH DAILY    Compression fracture of body of thoracic vertebra (HCC)  Comments:  Stable, well controlled. No demonstration discomfort/pain at this time. Familial hypercholesterolemia  Comments: Follow labs. Impaired mobility  Comments:  Due to Parkinson's. Patient resistant to physical therapy and disregards encouragement to walk or exercise    Bowel and bladder incontinence  Comments: Will need incontinence supplies  Orders:  -     Incontinence Supply Disposable (DEPEND UNDERWEAR LARGE) MISC; 1 each by Does not apply route 8 times daily  -     Soap & Cleansers (CLEAN & CLEAR ABSORBING SHEETS) PADS; Apply 1 each topically in the morning, at noon, and at bedtime  -     Disposable Gloves (NITRILE GLOVES LARGE) MISC; 2 each by Does not apply route 8 times daily    At risk for falls  Comments:  Vitamin D with meals. Encouraged family to attend at all times. Family declines home health care  Orders:  -     Cholecalciferol (VITAMIN D3) 50 MCG (2000 UT) CAPS; 1 po daily with meal    Other specified hypothyroidism  Comments: Follow labs    Need for prophylactic vaccination and inoculation against varicella  -     zoster recombinant adjuvanted vaccine [facility]) 50 MCG/0.5ML SUSR injection; Inject 0.5 mLs into the muscle once for 1 dose    Need for hepatitis C screening test  -     Hepatitis C Antibody; Future    Screening for colorectal cancer  -     Cancel: POCT FECAL IMMUNOCHEMICAL TEST (FIT);  Future    Other orders  -     Tdap, BOOSTRIX, (age 8 yrs+), IM          Personalized Preventive Plan   Current Health Maintenance Status  Immunization History   Administered Date(s) Administered    COVID-19, Pfizer Purple top, DILUTE for use, 12+ yrs, 30mcg/0.3mL dose 04/23/2021, 05/14/2021    Pneumococcal Conjugate 13-valent ([id number]) 04/11/2019    Pneumococcal Polysaccharide ([id number]) 09/08/2020    Tdap (Boostrix, Adacel) 06/03/2022        Health Maintenance   Topic Date Due    Annual Wellness Visit (AWV)  Never done    Diabetic retinal exam  Never done    Hepatitis C screen  Never done    Shingles vaccine (1 of 2) Never done    COVID-19 Vaccine (3 - Booster for Pfizer series) 10/14/2021    Lipids  01/08/2022    Flu vaccine (Season Ended) 09/01/2022    Diabetic microalbuminuria test  10/09/2022    Diabetic foot exam  01/27/2023    A1C test (Diabetic or Prediabetic)  05/25/2023    Depression Screen  06/03/2023    Colorectal Cancer Screen  10/20/2023    DTaP/Tdap/Td vaccine (2 - Td or Tdap) 06/03/2032    Pneumococcal 65+ years Vaccine  Completed    Hepatitis A vaccine  Aged Out    Hib vaccine  Aged Out    Meningococcal (ACWY) vaccine  Aged Out     Recommendations for [employer] Due: see orders and patient instructions/AVS.    Return in about 6 weeks (around 7/15/2022) for f/u multi - OV. On the basis of positive falls risk screening, assessment and plan is as follows: home safety tips provided, vitamin D supplementation started at 2000 IU/day.

## 2022-08-17 NOTE — ED TRIAGE NOTES
Patient to ED via squad for fall from bed. Was sitting at bedside and slid down and hit head on tile floor. Large hematoma above Lt. Eyebrow. Denies LOC. Son at bedside. Both Belarusian speaking/interpretor I-pad used for triage. Patient has history of Parkinson's. Baseline is confused per son.

## 2022-08-17 NOTE — ED NOTES
D/C instructions explained to patient and patients son who both voiced understanding. Patient left ED to lobby via wheelchair pushed by son.      Efrain Corcoran RN  08/17/22 [id number]

## 2022-08-17 NOTE — ED PROVIDER NOTES
[address] [facility] ED  eMERGENCY dEPARTMENT eNCOUnter      Pt Name: Macrina Foy  MRN: [id number]  [place] 1950  Date of evaluation: 8/16/2022  Provider: SOCORRO Garcia      HISTORY OF PRESENT ILLNESS    Macrina Foy is a 71 y.o. male with PMHx of DM2, hypothyroidism, Parkinson's disease, hyperlipidemia, hypertension presents to the emergency department with fall. Per ambulance, patient was sitting on the edge of the bed when he slid off hitting his head on the floor. Pt Lao speaking and poor historian d/t parkinsons, baseline confusion without new confusion. Family member at bedside. Denies loss of consciousness, no blood thinner use. Patient nonambulatory afterwards, patient is bedbound and nonambulatory. Patient does have abrasion & hematoma to left forehead. He denies any pain. No neck pain, back pain, chest pain, cough, shortness of breath, recent fevers, weakness, nausea, vomiting, diarrhea, abdominal pain. HPI    Nursing Notes were reviewed. REVIEW OF SYSTEMS       Review of Systems   Constitutional:  Negative for appetite change, chills and fever. HENT:  Negative for congestion, rhinorrhea and sore throat. Respiratory:  Negative for cough and shortness of breath. Cardiovascular:  Negative for chest pain. Gastrointestinal:  Negative for abdominal pain, blood in stool, diarrhea, nausea and vomiting. Genitourinary:  Negative for difficulty urinating. Musculoskeletal:  Negative for neck stiffness. Skin:  Positive for wound. Negative for color change and rash. Neurological:  Negative for dizziness, syncope, weakness, light-headedness, numbness and headaches. All other systems reviewed and are negative. PAST MEDICAL HISTORY     Past Medical History:   Diagnosis Date    Compression fracture of body of thoracic vertebra ([address] 6/23/2019    T3, T4, T5 AND T6 COMPRESSION FX OF INDETERMINATE AGE AND CLINICAL CORRELATION WARRANTED.     Gait abnormality due to exacerbation of Parkinson's disease with acute rehab admission on 6/19/2019 6/20/2019    62-year-old male who was brought to the emergency room after syncopal episode in 6/16/2019 he was especially diagnosed with exacerbation of Parkinson's disease and electrolyte abnormality as well as hyperosmolar state secondary to elevated blood sugar and uncontrolled diabetes. Cardiopulmonary work-up was negative including chest x-ray EKG and MRI of the brain.   CT the brain showed no acute findi    Hyperlipidemia     Hypertension     Parkinson's disease ([address]     Syncope and collapse     Type 2 diabetes mellitus without complication (HCC)          SURGICAL HISTORY       Past Surgical History:   Procedure Laterality Date    APPENDECTOMY           CURRENT MEDICATIONS       Previous Medications    BAQSIMI ONE PACK 3 MG/DOSE POWD    1 APPLICATOR BY NASAL ROUTE EVERY 4 HOURS AS NEEDED (BLOOD GLUCOSE LESS THAN 70)    BLOOD GLUCOSE MONITOR KIT AND SUPPLIES    1 kit by Other route 4 times daily (before meals and nightly)    BLOOD GLUCOSE MONITOR STRIPS    1 strip by Other route 4 times daily (before meals and nightly)    BLOOD PRESSURE KIT    1 each by Does not apply route daily    CARBIDOPA-LEVODOPA (SINEMET)  MG PER TABLET    Take 1 tablet by mouth 2 times daily    CHOLECALCIFEROL (VITAMIN D3) 50 MCG (2000 UT) CAPS    1 po daily with meal    CONTINUOUS BLOOD GLUC  (FREESTYLE MARY CARMEN 14 DAY READER) YENY    1 Device by Does not apply route 4 times daily (before meals and nightly)    CONTINUOUS BLOOD GLUC SENSOR (FREESTYLE MARY CARMEN 14 DAY SENSOR) MISC    USE AS DIRECTED    CONTINUOUS BLOOD GLUC SENSOR (FREESTYLE MARY CARMEN 2 SENSOR) MISC    1 DEVICE BY DOES NOT APPLY ROUTE EVERY 14 DAYS    DISPOSABLE GLOVES (NITRILE GLOVES LARGE) MISC    2 each by Does not apply route 8 times daily    FREESTYLE LITE STRIP    TEST 4 VECES AL PHYLLIS    GLUCAGON, RDNA, (GLUCAGON EMERGENCY) 1 MG KIT    INJECT 1 MG INTO THE MUSCLE SEE ADMIN INSTRUCTIONS FOLLOW PACKAGE DIRECTIONS FOR LOW BLOOD SUGAR. HUMALOG KWIKPEN 100 UNIT/ML SOPN    35 UNITS BEFORE BREAKFAST, 25 UNITS BEFORE LUNCH, 35 UNITS BEFORE DINNER    INCONTINENCE SUPPLY DISPOSABLE (DEPEND UNDERWEAR LARGE) MISC    1 each by Does not apply route 8 times daily    INSULIN GLARGINE (LANTUS SOLOSTAR) 100 UNIT/ML INJECTION PEN    Inject 20 Units into the skin nightly    INSULIN PEN NEEDLE (NOVOFINE) 32G X 6 MM MISC    1 Device by Does not apply route 4 times daily (before meals and nightly)    LANCETS MISC    1 each by Does not apply route 4 times daily (before meals and nightly)    LOSARTAN (COZAAR) 50 MG TABLET    TAKE 1 TABLET BY MOUTH DAILY    METFORMIN (GLUCOPHAGE) 1000 MG TABLET    MARICARMEN 1 TABLETA POR BOCA DOS VECES AL PHYLLIS ANTES DE COMER. SERTRALINE (ZOLOFT) 50 MG TABLET    Take 50 mg by mouth daily    SIMVASTATIN (ZOCOR) 40 MG TABLET    Take 1 tablet by mouth nightly    SOAP & CLEANSERS (CLEAN & CLEAR ABSORBING SHEETS) PADS    Apply 1 each topically in the morning, at noon, and at bedtime    TAMSULOSIN (FLOMAX) 0.4 MG CAPSULE           ALLERGIES     Patient has no allergy information on record.     FAMILY HISTORY       Family History   Problem Relation Age of Onset    Diabetes Mother     Heart Disease Mother     Diabetes Father           SOCIAL HISTORY       Social History     Socioeconomic History    Marital status:      Spouse name: None    Number of children: None    Years of education: None    Highest education level: None   Tobacco Use    Smoking status: Never    Smokeless tobacco: Never   Vaping Use    Vaping Use: Never used   Substance and Sexual Activity    Alcohol use: Never    Drug use: Never    Sexual activity: Never   Social History Narrative         Lives With: Spouse    [facility]  late--2018,             son Melissa Desai in the area    Type of Home: House One level    Home Access: Level entry    Bathroom Shower/Tub: Tub/Shower unit Bathroom Equipment: Grab bars in shower, Shower chair    Home Equipment: 4 wheeled walker    Receives Help From: Family    ADL Assistance: [address]: (spouse and son able to assist)    Homemaking Responsibilities: No    Ambulation Assistance: Independent(rollator when needed)    Transfer Assistance: Independent    Active : No    Patient's  Info: son    Additional Comments: Pt [place]an speaking interpretor Xeniajuan c Roach present,  Pt recieves help from wife and son @ times    Primary Care Provider: PINEDA Baldwin CNP     Social Determinants of Health     Financial Resource Strain: Low Risk     Difficulty of Paying Living Expenses: Not very hard   Food Insecurity: No Food Insecurity    Worried About [employer] in the Last Year: Never true    Ran Out of Food in the Last Year: Never true         PHYSICAL EXAM         ED Triage Vitals [08/16/22 2231]   BP Temp Temp Source Heart Rate Resp SpO2 Height Weight   (!) 158/107 98.2 °F (36.8 °C) Oral 90 18 96 % 5' 6\" (1.676 m) 170 lb (77.1 kg)       Physical Exam  Constitutional:       Appearance: He is well-developed. HENT:      Head: Normocephalic. Comments: Abrasion of mild hematoma to left forehead. No raccoon eyes or cline sign, no hemotympanums     Right Ear: Tympanic membrane normal.      Left Ear: Tympanic membrane normal.   Eyes:      Conjunctiva/sclera: Conjunctivae normal.      Pupils: Pupils are equal, round, and reactive to light. Neck:      Trachea: No tracheal deviation. Comments: No midline C, T, L spinous process tenderness, no paraspinal muscle tenderness, no signs of trauma  Cardiovascular:      Heart sounds: Normal heart sounds. Pulmonary:      Effort: Pulmonary effort is normal. No respiratory distress. Breath sounds: Normal breath sounds. No stridor. Abdominal:      General: Bowel sounds are normal. There is no distension. Palpations: Abdomen is soft. There is no mass. Tenderness: There is no abdominal tenderness. There is no guarding or rebound. Musculoskeletal:         General: Normal range of motion. Cervical back: Normal range of motion and neck supple. No tenderness. Skin:     General: Skin is warm and dry. Capillary Refill: Capillary refill takes less than 2 seconds. Findings: No rash. Neurological:      Mental Status: He is alert and oriented to person, place, and time. Deep Tendon Reflexes: Reflexes are normal and symmetric. Psychiatric:         Behavior: Behavior normal.         Thought Content: Thought content normal.         Judgment: Judgment normal.       DIAGNOSTIC RESULTS     EKG:All EKG's are interpreted by the Emergency Department Physician who either signs or Co-signs this chart in the absence of a cardiologist.        RADIOLOGY:   Non-plain film images such as CT, Ultrasound and MRI are read by theradiologist. Plain radiographic images are visualized and preliminarily interpreted by the emergency physician with the below findings:    Interpretation per theRadiologist below, if available at the time of this note:    CT Head WO Contrast    (Results Pending)   CT CERVICAL SPINE WO CONTRAST    (Results Pending)   XR CHEST PORTABLE    (Results Pending)   XR PELVIS (1-2 VIEWS)    (Results Pending)           LABS:  Labs Reviewed   COMPREHENSIVE METABOLIC PANEL - Abnormal; Notable for the following components:       Result Value    Glucose 122 (*)     All other components within normal limits   CBC WITH AUTO DIFFERENTIAL - Abnormal; Notable for the following components:    RBC 4.55 (*)     Hemoglobin 13.3 (*)     Hematocrit 39.8 (*)     All other components within normal limits       All other labs were within normal range or not returned as of this dictation.     EMERGENCY DEPARTMENT COURSE and DIFFERENTIAL DIAGNOSIS/MDM:   Vitals:    Vitals:    08/16/22 2231 08/16/22 2345 08/17/22 0000 08/17/22 0015   BP: (!) 158/107 (!) 187/115 (!) 183/108 [facility] ) 172/104   Pulse: 90 97 100 79   Resp: 18 12 16 27   Temp: 98.2 °F (36.8 °C)      TempSrc: Oral      SpO2: 96% 98% 98% 97%   Weight: 170 lb (77.1 kg)      Height: 5' 6\" (1.676 m)            MDM      CT head shows no acute intracranial process. Scalp hematoma present. CT cervical spine shows no acute fractures. CXR and pelvic XR show no fractures. Blood work unremarkable. Patient's forehead abrasion cleaned with bacitracin applied. Standard anticipatory guidance given to patient upon discharge. Have given them a specific time frame in which to follow-up and who to follow-up with. I have also advised them that they should return to the emergency department if they get worse, or not getting better or develop any new or concerning symptoms. Patient demonstrates understanding. CRITICAL CARE TIME   Total Critical Caretime was 0 minutes, excluding separately reportable procedures. There was a high probability of clinically significant/life threatening deterioration in the patient's condition which required my urgent intervention. Procedures    FINAL IMPRESSION      1. Fall, initial encounter    2. Closed head injury, initial encounter    3. Abrasion of forehead, initial encounter          DISPOSITION/PLAN   DISPOSITION Decision To Discharge 08/17/2022 12:45:37 AM      PATIENT REFERRED TO:  Shawn Arredondo, APRN - CNP  [address] [facility] [place]  [phone]          DISCHARGE MEDICATIONS:  New Prescriptions    No medications on file          (Please notethat portions of this note were completed with a voice recognition program.  Efforts were made to edit the dictations but occasionally words are mis-transcribed. )    SOCORRO Bob (electronically signed)  Emergency Physician [place], [place]  08/17/22 2914

## 2022-09-01 NOTE — TELEPHONE ENCOUNTER
Worker for [facility] call the office stating  they send paper that have to be sing by Dr. Andres Lundberg a few days ago ant they didn't receive anything,  they state need this paper sign as soon as possible,  he can start having help .   Fax [phone]

## 2022-09-04 NOTE — ED TRIAGE NOTES
Pt arrived with co altered mental last known well was Friday 9/2/2022. Pt responds to voice confused with inappropriate speech. Dr Parson at bedside.

## 2022-09-04 NOTE — ED PROVIDER NOTES
[address] [facility] ED  eMERGENCY dEPARTMENT eNCOUnter      Pt Name: Otf Reed  MRN: [id number]  [place] 1950  Date of evaluation: 9/4/2022  Provider: Shiva Fajardo MD    CHIEF COMPLAINT       Chief Complaint   Patient presents with    Altered Mental Status     Per family          HISTORY OF PRESENT ILLNESS   (Location/Symptom, Timing/Onset,Context/Setting, Quality, Duration, Modifying Factors, Severity)  Note limiting factors. Otf Reed is a 71 y.o. male who presents to the emergency department confusion      71years old male patient with known history of dementia, diabetes, Parkinson and impaired mobility. Presented to the ER via EMS after family called the squad per family who is a family member and his caregiver states he have an episode with increased confusion and acted like he was choking on his food this morning when wife was feeding him sitting up. Called EMS and sent him to the ER for evaluation although he has not have known dementia and already oriented to self. On arrival to the ER patient was alert but confused which is his baseline have 1 episode of emesis on arrival    The history is provided by a caregiver and a relative. NursingNotes were reviewed. REVIEW OF SYSTEMS    (2-9 systems for level 4, 10 or more for level 5)     Review of Systems   Unable to perform ROS: Dementia     Except as noted above the remainder of the review of systems was reviewed and negative. PAST MEDICAL HISTORY     Past Medical History:   Diagnosis Date    Compression fracture of body of thoracic vertebra ([address] 6/23/2019    T3, T4, T5 AND T6 COMPRESSION FX OF INDETERMINATE AGE AND CLINICAL CORRELATION WARRANTED.     Gait abnormality due to exacerbation of Parkinson's disease with acute rehab admission on 6/19/2019 6/20/2019    81-year-old male who was brought to the emergency room after syncopal episode in 6/16/2019 he was especially diagnosed with exacerbation of Parkinson's disease and electrolyte abnormality as well as hyperosmolar state secondary to elevated blood sugar and uncontrolled diabetes. Cardiopulmonary work-up was negative including chest x-ray EKG and MRI of the brain.   CT the brain showed no acute findi    Hyperlipidemia     Hypertension     Parkinson's disease ([facility] [address]     Syncope and collapse     Type 2 diabetes mellitus without complication (HCC)          SURGICALHISTORY       Past Surgical History:   Procedure Laterality Date    APPENDECTOMY           CURRENT MEDICATIONS       Discharge Medication List as of 9/4/2022  6:04 PM        CONTINUE these medications which have NOT CHANGED    Details   BAQSIMI ONE PACK 3 MG/DOSE POWD 1 APPLICATOR BY NASAL ROUTE EVERY 4 HOURS AS NEEDED (BLOOD GLUCOSE LESS THAN 70), Disp-1 each, R-3Normal      metFORMIN (GLUCOPHAGE) 1000 MG tablet MARICARMEN 1 TABLETA POR BOCA DOS VECES AL PHYLLIS ANTES DE COMER., Disp-60 tablet, R-3Normal      Cholecalciferol (VITAMIN D3) 50 MCG (2000 UT) CAPS 1 po daily with meal, Disp-30 capsule, R-11Normal      Blood Pressure KIT DAILY Starting Fri 6/3/2022, Disp-1 kit, R-0, Normal      Incontinence Supply Disposable (DEPEND UNDERWEAR LARGE) MISC 8 TIMES DAILY Starting Fri 6/3/2022, Disp-240 each, R-12, Print      Soap & Cleansers (CLEAN & CLEAR ABSORBING SHEETS) PADS Apply 1 each topically in the morning, at noon, and at bedtime, Disp-90 each, R-12Print      Disposable Gloves (NITRILE GLOVES LARGE) MISC 2 each by Does not apply route 8 times daily, Disp-480 each, R-12Normal      losartan (COZAAR) 50 mg tablet TAKE 1 TABLET BY MOUTH DAILY, Disp-30 tablet, R-12Normal      Continuous Blood Gluc  (FREESTYLE MARY CARMEN 14 DAY READER) YENY 1 Device by Does not apply route 4 times daily (before meals and nightly), Disp-1 each, R-0Old reader is broken and will not changeNormal      insulin glargine (LANTUS SOLOSTAR) 100 UNIT/ML injection pen Inject 20 Units into the skin nightly, Disp-15 mL, R-3Normal      simvastatin (ZOCOR) 40 MG tablet Take 1 tablet by mouth nightly, Disp-30 tablet, R-5Normal      !! FREESTYLE LITE strip TEST 4 VECES AL PHYLLIS, Disp-150 strip, R-3Normal      !! Continuous Blood Gluc Sensor (FREESTYLE MARY CARMEN 2 SENSOR) [facility] 1 DEVICE BY DOES NOT APPLY ROUTE EVERY 14 DAYS, Disp-2 each, R-3Normal      !! Continuous Blood Gluc Sensor (FREESTYLE MARY CARMEN 14 DAY SENSOR) [facility] USE AS DIRECTED, Disp-2 each, R-3Normal      Glucagon, rDNA, (GLUCAGON EMERGENCY) 1 MG KIT INJECT 1 MG INTO THE MUSCLE SEE ADMIN INSTRUCTIONS FOLLOW PACKAGE DIRECTIONS FOR LOW BLOOD SUGAR., Disp-1 kit, R-3Normal      HUMALOG KWIKPEN 100 UNIT/ML SOPN 35 UNITS BEFORE BREAKFAST, 25 UNITS BEFORE LUNCH, 35 UNITS BEFORE DINNER, Disp-15 mL, R-3, DAWNormal      Insulin Pen Needle (NOVOFINE) 32G X 6 MM MISC 4 TIMES DAILY BEFORE MEALS & NIGHTLY Starting Thu 10/8/2020, Disp-300 each,R-3, Normal      !! blood glucose monitor strips 1 strip by Other route 4 times daily (before meals and nightly), Other, 4 TIMES DAILY BEFORE MEALS & NIGHTLY Starting Thu 10/3/2019, Disp-150 strip, R-3, Normal      blood glucose monitor kit and supplies 1 kit by Other route 4 times daily (before meals and nightly), Other, 4 TIMES DAILY BEFORE MEALS & NIGHTLY Starting Thu 10/3/2019, Disp-1 kit, R-0, Normal      Lancets MISC 4 TIMES DAILY BEFORE MEALS & NIGHTLY Starting Thu 10/3/2019, Disp-150 each, R-3, Normal      tamsulosin (FLOMAX) 0.4 MG capsule R-0Historical Med      carbidopa-levodopa (SINEMET)  MG per tablet Take 1 tablet by mouth 2 times daily, Disp-90 tablet, R-3Normal      sertraline (ZOLOFT) 50 MG tablet Take 50 mg by mouth dailyHistorical Med       !! - Potential duplicate medications found. Please discuss with provider. ALLERGIES     Patient has no known allergies.     FAMILY HISTORY       Family History   Problem Relation Age of Onset    Diabetes Mother     Heart Disease Mother     Diabetes Father           SOCIAL HISTORY       Social History     Socioeconomic History    Marital status:      Spouse name: None    Number of children: None    Years of education: None    Highest education level: None   Tobacco Use    Smoking status: Never    Smokeless tobacco: Never   Vaping Use    Vaping Use: Never used   Substance and Sexual Activity    Alcohol use: Never    Drug use: Never    Sexual activity: Never   Social History Narrative         Lives With: Spouse    Recently moved to [facility] from [facility]  late--2018,             son Edgar Rolling in the area    Type of Home: House One level    Home Access: Level entry    Bathroom Shower/Tub: Tub/Shower unit    [employer]: Grab bars in shower, Shower chair    Home Equipment: 4 wheeled walker    Receives Help From: Family    ADL Assistance: Independent    Homemaking Assistance: (spouse and son able to assist)    Homemaking Responsibilities: No    Ambulation Assistance: Independent(rollator when needed)    Transfer Assistance: Independent    Active : No    Patient's  Info: son    Additional Comments: Pt Beninese speaking interpretor Marco A Mann present,  Pt recieves help from wife and son @ times    Primary Care Provider: PINEDA Briceno CNP     Social Determinants of Health     Financial Resource Strain: Low Risk     Difficulty of Paying Living Expenses: Not very hard   Food Insecurity: No Food Insecurity    Worried About [address] in the Last Year: Never true    [facility] in the Last Year: Never true       SCREENINGS    [place] Coma Scale  Eye Opening:  To speech  Best Verbal Response: Confused  Best Motor Response: Localizes pain  Rian Coma Scale Score: 12 @FLOW([id number])@      PHYSICAL EXAM    (up to 7 for level 4, 8 or more for level 5)     ED Triage Vitals [09/04/22 1356]   BP Temp Temp Source Heart Rate Resp SpO2 Height Weight   (!) 180/92 97.5 °F (36.4 °C) Temporal (!) 103 18 100 % 5' 8\" (1.727 m) 170 lb (77.1 kg)       Physical Exam  Constitutional:       Appearance: He is not toxic-appearing. HENT:      Head: Normocephalic and atraumatic. Cardiovascular:      Rate and Rhythm: Normal rate and regular rhythm. Pulmonary:      Effort: Pulmonary effort is normal.      Breath sounds: Normal breath sounds. Abdominal:      General: Bowel sounds are normal.      Palpations: Abdomen is soft. Musculoskeletal:         General: Normal range of motion. Skin:     General: Skin is warm. Neurological:      Mental Status: He is alert. Mental status is at baseline. He is confused. Gait: Gait abnormal.      Comments: Patient nonverbal confused but awake and alert   Psychiatric:         Cognition and Memory: Cognition is impaired. DIAGNOSTIC RESULTS     EKG: All EKG's are interpreted by the Emergency Department Physician who either signs or Co-signsthis chart in the absence of a cardiologist.    EKG: normal EKG, normal sinus rhythm, 100 no acute ST or T wave changes . RADIOLOGY:   Non-plain filmimages such as CT, Ultrasound and MRI are read by the radiologist. Plain radiographic images are visualized and preliminarily interpreted by the emergency physician with the below findings:        Interpretation per the Radiologist below, if available at the time ofthis note:    CT ABDOMEN PELVIS WO CONTRAST Additional Contrast? None   Final Result      NO DISPLACED FRACTURE OR SIGNIFICANT POSTTRAUMATIC COMPLICATION IDENTIFIED. CT HEAD WO CONTRAST   Final Result      NO ACUTE INTRACRANIAL PROCESS OR SIGNIFICANT CHANGE FROM 8/16/2022 IDENTIFIED.                   XR CHEST PORTABLE    (Results Pending)         ED BEDSIDE ULTRASOUND:   Performed by ED Physician - none    LABS:  Labs Reviewed   COMPREHENSIVE METABOLIC PANEL - Abnormal; Notable for the following components:       Result Value    Sodium 133 (*)     Chloride 94 (*)     Anion Gap 16 (*)     Glucose 200 (*)     Creatinine 1.35 (*)     GFR Non- 52.0 (*)     All other components within normal limits CBC WITH AUTO DIFFERENTIAL - Abnormal; Notable for the following components:    RBC 4.62 (*)     Hemoglobin 13.7 (*)     Hematocrit 40.6 (*)     Neutrophils Absolute 6.8 (*)     All other components within normal limits   LACTIC ACID - Abnormal; Notable for the following components:    Lactic Acid 6.6 (*)     All other components within normal limits    Narrative:     CALL  Lopez  [facility] tel. [phone],  Lactic results called to and read back by Jo Flores, 09/04/2022 14:54,  by Lacey Contreras   POCT GLUCOSE - Abnormal; Notable for the following components:    POC Glucose 155 (*)     All other components within normal limits   COVID-19, RAPID   RAPID INFLUENZA A/B ANTIGENS   CULTURE, BLOOD 2   CULTURE, BLOOD 1   PROCALCITONIN   ETHANOL    Narrative:     CALL  Lopez  [facility] tel. [phone],  Lactic results called to and read back by Jo Flores, 09/04/2022 14:54,  by Sabina Yip TO CULTURE       All other labs were within normal range or not returned as of this dictation.     EMERGENCY DEPARTMENT COURSE and DIFFERENTIAL DIAGNOSIS/MDM:   Vitals:    Vitals:    09/04/22 1600 09/04/22 1630 09/04/22 1700 09/04/22 1800   BP: (!) 173/81 (!) 165/80 (!) 169/87 (!) 160/89   Pulse: 92 92 91 91   Resp: 17 17 19 20   Temp:       TempSrc:       SpO2: 100%   100%   Weight:       Height:                    MDM  Number of Diagnoses or Management Options  Dehydration  Dementia without behavioral disturbance, unspecified dementia type (HCC)  Diagnosis management comments: 71years old known history of dementia type 2 diabetes hyperlipidemia hypertension presented to the ER today after family called the squad due to increased confusion since this morning choked with he was eating his food on arrival to the ER the patient had 1 episode this a.m. and this became more and alert after remained hemodynamically stable CT of the head was negative blood work stable patient was given 1 L of fluid and Zofran felt significantly better was back to his baseline was discharged home with family who is his son and a caregiver who is a family member. Who is his 24-hour care giver        Amount and/or Complexity of Data Reviewed  Clinical lab tests: ordered and reviewed  Tests in the radiology section of CPT®: ordered and reviewed        CONSULTS:  None    PROCEDURES:  Unless otherwise noted below, none     Procedures    FINAL IMPRESSION      1. Dehydration    2.  Dementia without behavioral disturbance, unspecified dementia type [facility])          DISPOSITION/PLAN   DISPOSITION Decision To Discharge 09/04/2022 06:04:15 PM      PATIENT REFERRED TO:  PINEDA Tang - [facility]  [address] [id number] [phone]    In 3 days      DISCHARGE MEDICATIONS:  Discharge Medication List as of 9/4/2022  6:04 PM             (Please note thatportions of this note were completed with a voice recognition program.  Efforts were made to edit the dictations but occasionally words are mis-transcribed.)    Aide Ceja MD (electronically signed)  Attending Emergency Physician          Sherren Mayans Dardir, MD  09/04/22 5964

## 2022-09-08 NOTE — TELEPHONE ENCOUNTER
I have received paperwork, asking for home health care. Patient has only been seen once and has no showed two times in the span of 1 1/2 months.

## 2022-09-08 NOTE — TELEPHONE ENCOUNTER
Pt son came to office bringing the paper from [facility] stating they send this paper multiples time and they still waiting for the fax paper and did not receive, need this paper sign as soon as possible also state if can someone call in [address] to let now he has a appt because he show up today but a the wrong time because he does not speak english.      Please advised and thank you

## 2022-09-14 NOTE — PROGRESS NOTES
Subjective:      Patient ID: Bettina Yoder is a 71 y.o. male who presents today for:  Chief Complaint   Patient presents with    [facility]     Patient's family concern for his nutrition        HPI  Patient is new to me; presenting today to [facility]. Here with his Son as well as his daughter in-law's cousin who serves as his primary caregiver. Patient is mostly non-verbal, history obtained from caregiver. Use of  declined. PMH significant for HTN, HLD, Type 2 DM, Hypothyroidism, Parkinson's with Dementia with resultant impaired mobility. Risk of frequent falls. Parkinson's with Dementia and impaired mobility- Patient oriented only to person. Wheelchair-dependent; family reports frequent falls from bed. Recently fell 2 weeks ago with sustained head trauma (on 8/16/22). Presented to the ED; CT brain ruled out acute process. India Martinez again last night and hit his right arm; noted bruising but no pain or deformity, ROM intact. Head trauma denied. Family requesting hospital bed. Incontinent of stool and urine. Follows with Dr. Clemente Diaz; compliant on Sinemet. Type 2 DM- controlled on current regimen; on metformin and insulin lantus 20IU at night. Most recent HbA1c 6.7. FBG readings reported to be consistently < 100. Max post-prandial readings in 160s. Follows with Endocrinology. Hypertension- Home BP readings reviewed; typically well controlled with SBP mostly in 120s. Elevated today d/t agitation; noted during clinic visits. Compliant on Losartan 50mg qd. Patient eating a balanced, low salt diet. No associated complaints. HLD- Compliant on statin therapy. Nutrition- Patient's caregiver reports difficulties with achieving adequate nutrition. Patient does not always swallow his food on request and requires encouraging. Introduction of Ensure has been helpful.  Solid food is pureed along with crushed medications and put in Ensure, yoghurt etc and patient has been able to tolerate better. Requesting prescription of Ensure for nutritional support. Current weight 170lb, appears to be at baseline per family report. Past Medical History:   Diagnosis Date    Compression fracture of body of thoracic vertebra ([facility] [address] 6/23/2019    T3, T4, T5 AND T6 COMPRESSION FX OF INDETERMINATE AGE AND CLINICAL CORRELATION WARRANTED. Gait abnormality due to exacerbation of Parkinson's disease with acute rehab admission on 6/19/2019 6/20/2019    77year-old male who was brought to the emergency room after syncopal episode in 6/16/2019 he was especially diagnosed with exacerbation of Parkinson's disease and electrolyte abnormality as well as hyperosmolar state secondary to elevated blood sugar and uncontrolled diabetes. Cardiopulmonary work-up was negative including chest x-ray EKG and MRI of the brain. CT the brain showed no acute findi    Hyperlipidemia     Hypertension     Parkinson's disease ([facility] [address]     Syncope and collapse     Type 2 diabetes mellitus without complication (HCC)      Past Surgical History:   Procedure Laterality Date    APPENDECTOMY       Family History   Problem Relation Age of Onset    Diabetes Mother     Heart Disease Mother     Diabetes Father      No Known Allergies  Current Outpatient Medications on File Prior to Visit   Medication Sig Dispense Refill    losartan (COZAAR) 50 MG tablet TAKE 1 TABLET BY MOUTH DAILY 30 tablet 3    BAQSIMI ONE PACK 3 MG/DOSE POWD 1 APPLICATOR BY NASAL ROUTE EVERY 4 HOURS AS NEEDED (BLOOD GLUCOSE LESS THAN 70) 1 each 3    metFORMIN (GLUCOPHAGE) 1000 MG tablet MARICARMEN 1 TABLETA POR BOCA DOS VECES AL PHYLLIS ANTES DE COMER.  60 tablet 3    Cholecalciferol (VITAMIN D3) 50 MCG (2000 UT) CAPS 1 po daily with meal 30 capsule 11    Blood Pressure KIT 1 each by Does not apply route daily 1 kit 0    Incontinence Supply Disposable (DEPEND UNDERWEAR LARGE) MISC 1 each by Does not apply route 8 times daily 240 each 12    Soap & Cleansers (CLEAN & CLEAR ABSORBING SHEETS) PADS Apply 1 each topically in the morning, at noon, and at bedtime 90 each 12    Disposable Gloves (NITRILE GLOVES LARGE) MISC 2 each by Does not apply route 8 times daily 480 each 12    insulin glargine (LANTUS SOLOSTAR) 100 UNIT/ML injection pen Inject 20 Units into the skin nightly 15 mL 3    simvastatin (ZOCOR) 40 MG tablet Take 1 tablet by mouth nightly 30 tablet 5    Glucagon, rDNA, (GLUCAGON EMERGENCY) 1 MG KIT INJECT 1 MG INTO THE MUSCLE SEE ADMIN INSTRUCTIONS FOLLOW PACKAGE DIRECTIONS FOR LOW BLOOD SUGAR. 1 kit 3    HUMALOG KWIKPEN 100 UNIT/ML SOPN 35 UNITS BEFORE BREAKFAST, 25 UNITS BEFORE LUNCH, 35 UNITS BEFORE DINNER 15 mL 3    Insulin Pen Needle (NOVOFINE) 32G X 6 MM MISC 1 Device by Does not apply route 4 times daily (before meals and nightly) 300 each 3    blood glucose monitor strips 1 strip by Other route 4 times daily (before meals and nightly) 150 strip 3    blood glucose monitor kit and supplies 1 kit by Other route 4 times daily (before meals and nightly) 1 kit 0    Lancets MISC 1 each by Does not apply route 4 times daily (before meals and nightly) 150 each 3    tamsulosin (FLOMAX) 0.4 MG capsule   0    carbidopa-levodopa (SINEMET)  MG per tablet Take 1 tablet by mouth 2 times daily 90 tablet 3    sertraline (ZOLOFT) 50 MG tablet Take 50 mg by mouth daily       No current facility-administered medications on file prior to visit. Review of Systems   Constitutional:  Negative for activity change. ROS limited due to patient's clinical state. Objective:   /80 (Site: Right Upper Arm, Position: Sitting, Cuff Size: Medium Adult)   Pulse (!) 102   Temp 98.9 °F (37.2 °C) (Temporal)   Ht 5' 8\" (1.727 m)   SpO2 97%   BMI 25.85 kg/m²     Physical Exam  Constitutional:       General: He is not in acute distress. Appearance: He is not ill-appearing or diaphoretic. Comments: Patient in wheelchair; observable hand tremors.    HENT:      Head: Normocephalic and atraumatic. Mouth/Throat:      Mouth: Mucous membranes are dry. Cardiovascular:      Rate and Rhythm: Normal rate and regular rhythm. Pulses: Normal pulses. Heart sounds: Normal heart sounds. No murmur heard. No friction rub. Pulmonary:      Effort: Pulmonary effort is normal. No respiratory distress. Breath sounds: Normal breath sounds. No wheezing or rhonchi. Chest:      Chest wall: No tenderness. Abdominal:      General: Abdomen is flat. Bowel sounds are normal. There is no distension. Palpations: Abdomen is soft. Tenderness: There is no abdominal tenderness. Musculoskeletal:         General: No tenderness. Normal range of motion. Right lower leg: No edema. Left lower leg: No edema. Neurological:      Mental Status: He is alert. Mental status is at baseline. Comments: Oriented only to self. Neurologic assessment limited due to clinical state. Assessment:      Giovanni Cedeño was seen today to establish care. Diagnoses and all orders for this visit:    Parkinson's disease ([facility] [address]  Impaired mobility  Frequent falls        -     Parkinson's Disease with Dementia and impaired mobility; resultant frequent falls  -     DME Order for Hospital Bed as OP        -     External referral for [facility]; patient's caregiver associated with an external agency. Requesting completion of paperwork. Essential hypertension          -    Controlled. Home BP readings reviewed. -    Low salt diet. Hyperlipidemia, unspecified hyperlipidemia type  -     Lipid Panel; Future  -     Continue on Simvastatin. Controlled type 2 diabetes mellitus without complication, with long-term current use of insulin (HCC)  -     POCT glycosylated hemoglobin (Hb A1C)  -     Following with Endocrinology. Continue on current regimen.     Nutritional problems  -     Nutritional Supplements (ENSURE HIGH PROTEIN) LIQD; Take 1 Bottle by mouth 3 times daily (with meals)  -     Continue with pureed diet    Encounter to establish care       -     Medical history reviewed. -     Medication list reconciled. Encounter for completion of form with patient      -     Completed. Faxed to [facility]. Need for home health care       -     Requires assistance with ADLs and medication administration. Supervision needed in setting of recurrent falls. -    External referral for Payal Peña      Health Maintenance   Topic Date Due    Annual Wellness Visit (AWV)  Never done    Diabetic retinal exam  Never done    Hepatitis C screen  Never done    Shingles vaccine (1 of 2) Never done    Lipids  01/08/2022    COVID-19 Vaccine (4 - Booster for Pfizer series) 05/21/2022    Flu vaccine (1) 09/01/2022    Diabetic microalbuminuria test  10/09/2022    Diabetic foot exam  01/27/2023    Depression Screen  06/03/2023    A1C test (Diabetic or Prediabetic)  09/14/2023    Colorectal Cancer Screen  10/20/2023    DTaP/Tdap/Td vaccine (2 - Td or Tdap) 06/03/2032    Pneumococcal 65+ years Vaccine  Completed    Hepatitis A vaccine  Aged Out    Hib vaccine  Aged Out    Meningococcal (ACWY) vaccine  Aged Out            Plan:    As above. Orders Placed This Encounter   Procedures    Lipid Panel     Standing Status:   Future     Standing Expiration Date:   9/14/2023     Order Specific Question:   Is Patient Fasting?/# of Hours     Answer:   10    POCT glycosylated hemoglobin (Hb A1C)    DME Order for Hospital Bed as OP     You must complete the order parameters below and add the medical necessity documentation for this DME in a separate note.     Variable height non-electric hospital bed with mattress and any type side rails    Current patient weight:    Current patient height: Height: 5' 8\" (172.7 cm)  Diagnosis: Parkinson's Disease with impaired mobility and frequent falls (G20, Z91.81)  Length of need: Lifetime     Orders Placed This Encounter   Medications    Nutritional Supplements (ENSURE HIGH PROTEIN) LIQD     Sig: Take 1 Bottle by mouth 3 times daily (with meals)     Dispense:  21 each     Refill:  5       Return for F/u in 3 months. A total of 60 mins was spent reviewing patient's previous medical records, lab results, conducting a patient encounter, providing counseling, placing indicated orders and completion of requested paperwork.     Luis Felipe Watts MD

## 2022-09-20 NOTE — TELEPHONE ENCOUNTER
Patient's caregiver called to request completed documents for Payal Peña faxed to [facility]. She states she left it at the last appointment and should also include care for Kim (MRN [id number]).  Patient is asking the Payal Peña to fax copies of the forms to [phone]

## 2022-10-05 PROBLEM — E87.0 HYPERNATREMIA: Status: ACTIVE | Noted: 2022-01-01

## 2022-10-05 NOTE — ED TRIAGE NOTES
Patient arrives from xray, was sent for outpatient MBS and xrays. SLP was concerned about patient's ability to swallow. Patient's son reports patient has Parkinson's disease and is typically non-verbal and does not ambulate. Patient drowsy on arrival, but arouses easily to touch. No acute distress noted at this time.

## 2022-10-05 NOTE — PROGRESS NOTES
DVT / VTE PROPHYLAXIS EVALUATION    CrCl cannot be calculated (Unknown ideal weight. ). Recent Labs     10/05/22  1530   BUN 57*   CREATININE 2.67*      HGB 13.6*   HCT 42.1     ADMITTING DX OR CHIEF COMPLAINT?  Hypernatremia  WARFARIN? DOAC'S? no  ANY APPARENT BLEEDING? no  SCHEDULED SURGERY? no     Current order:  Enoxaparin 40 mg SUBQ once daily      Plan:  Pharmacologic VTE prophylaxis modified based on patient renal function per [facility]/P&T approved protocol     Patient Weight (kg)      50.9 and below .9 101-150.9 151-174.9 175 or greater   Estimated   CrCl  (ml/min) 30 or greater []   30 mg   SUBQ daily   []   40 mg   SUBQ daily []  30 mg SUBQ   BID  []  40 mg   SUBQ   BID []  60mg SUBQ BID    15-29.9 []  UFH 5000   units SUBQ BID [x]  30 mg   SUBQ daily [] 30 mg SUBQ   daily []  40 mg SUBQ   daily [] 60 mg SUBQ   daily    Less than 15 or dialysis []  UFH 5000   units SUBQ BID [] UFH 5000 units SUBQ TID []  UFH 7500   units   SUBQ TID

## 2022-10-05 NOTE — H&P
HISTORY AND PHYSICAL             Date: 10/5/2022        Patient Name: Fay Dumont     YOB: 1950      Age:  71 y.o. Chief Complaint     Chief Complaint   Patient presents with    Dysphagia     Difficulty swallowing this morning          History Obtained From   family member -daughter at bedside with  on the phone, EMR, Edkimo translate    History of Present Illness   76-year-old male with significant past medical history of advanced Parkinson's disease dementia, type 2 diabetes mellitus, hyperlipidemia, compression fracture of T3, T4-T5-T6 who was brought into the emergency department for altered mental status. Patient is bedbound at baseline. He is from [facility].  Prior to coming to the [place] he was functionable and family does things for him and since then he has had an overall rapid decline in the past few months. Over the past few days family has noticed that he is choking when he is eating. He has been fatigued for the past 2 days and has not had any oral intake. Patient himself is somnolent-he is nonverbal at baseline. His daughter has noticed a cough but there is no expectoration of sputum. There is been no reported fever or chills. Work-up in the emergency department revealed a sodium level of 165 with a baseline in the 150s, potassium 5.6, chloride 126, did 57, creatinine 2.67, calcium 1.10, lactic acid initially 5.06, glucose elevated 445 procalcitonin 0.21 troponin times two 0.031. Hemoglobin A1c as of September 24 8.0. Most recent fingerstick glucose as of 511 403. CT head without contrast no acute intracranial abnormality. Past Medical History     Past Medical History:   Diagnosis Date    Compression fracture of body of thoracic vertebra ([address] 6/23/2019    T3, T4, T5 AND T6 COMPRESSION FX OF INDETERMINATE AGE AND CLINICAL CORRELATION WARRANTED.     Gait abnormality due to exacerbation of Parkinson's disease with acute rehab admission on 6/19/2019 6/20/2019    81-year-old male who was brought to the emergency room after syncopal episode in 6/16/2019 he was especially diagnosed with exacerbation of Parkinson's disease and electrolyte abnormality as well as hyperosmolar state secondary to elevated blood sugar and uncontrolled diabetes. Cardiopulmonary work-up was negative including chest x-ray EKG and MRI of the brain. CT the brain showed no acute findi    Hyperlipidemia     Hypertension     Parkinson's disease ([facility] [address]     Syncope and collapse     Type 2 diabetes mellitus without complication [facility])         Past Surgical History     Past Surgical History:   Procedure Laterality Date    APPENDECTOMY          Medications Prior to Admission     Prior to Admission medications    Medication Sig Start Date End Date Taking? Authorizing Provider   Vitamin D-Vitamin K (VITAMIN K2-VITAMIN D3)  MCG-UNIT CAPS  9/24/22   Historical Provider, MD   pramipexole (MIRAPEX) 0.125 MG tablet  9/29/22   Historical Provider, MD   QUEtiapine (SEROQUEL) 25 MG tablet  9/24/22   Historical Provider, MD   nystatin (MYCOSTATIN) 220765 UNIT/ML suspension Take 5 mLs by mouth 4 times daily for 14 days Swish and swallow over 20 minutes. 10/5/22 10/19/22  PINEDA Stephen - CNP   losartan (COZAAR) 50 MG tablet TAKE 1 TABLET BY MOUTH DAILY 10/3/22   Devora Madison MD   Continuous Blood Gluc Sensor (FREESTYLE MARY CARMEN 2 SENSOR) MISC Change every 14 days 9/30/22   Devora Madison MD   Continuous Blood Gluc  (FREESTYLE MARY CARMEN 2 READER) YENY Give 1 meter 9/30/22   Devora Madison MD   BAQSIMI ONE PACK 3 MG/DOSE POWD 1 APPLICATOR BY NASAL ROUTE EVERY 4 HOURS AS NEEDED (BLOOD GLUCOSE LESS THAN 70) 9/21/22   SOCORRO Lyles   Nutritional Supplements (ENSURE HIGH PROTEIN) LIQD Take 1 Bottle by mouth 3 times daily (with meals) 9/14/22   Sonia Gomez MD   metFORMIN (GLUCOPHAGE) 1000 MG tablet MARICARMEN 1 TABLETA POR BOCA DOS VECES AL PHYLLIS ANTES DE COMER.   Patient not taking: Reported on 10/5/2022 6/27/22   SOCORRO Wagner   Cholecalciferol (VITAMIN D3) 50 MCG (2000 UT) CAPS 1 po daily with meal 6/3/22   Haley Jimenez MD   Blood Pressure KIT 1 each by Does not apply route daily 6/3/22   Haley Jimenez MD   Incontinence Supply Disposable (DEPEND UNDERWEAR LARGE) MISC 1 each by Does not apply route 8 times daily 6/3/22   Haley Jimenez MD   Soap & Cleansers [facility] & CLEAR ABSORBING SHEETS) PADS Apply 1 each topically in the morning, at noon, and at bedtime 6/3/22   Haley Jimenez MD   Disposable Gloves (NITRILE GLOVES LARGE) MISC 2 each by Does not apply route 8 times daily 6/3/22   Haley Jimenez MD   insulin glargine (LANTUS SOLOSTAR) 100 UNIT/ML injection pen Inject 20 Units into the skin nightly 5/25/22   SOCORRO Wagner   simvastatin (ZOCOR) 40 MG tablet Take 1 tablet by mouth nightly 5/25/22   SOCORRO Wagner   Glucagon, rDNA, (GLUCAGON EMERGENCY) 1 MG KIT INJECT 1 MG INTO THE MUSCLE SEE ADMIN INSTRUCTIONS FOLLOW PACKAGE DIRECTIONS FOR LOW BLOOD SUGAR. 11/15/21   SOCORRO Wagner   HUMALOG KWIKPEN 100 UNIT/ML SOPN 35 UNITS BEFORE BREAKFAST, 25 UNITS BEFORE LUNCH, 35 UNITS BEFORE DINNER 10/19/21   SOCORRO Wagner   Insulin Pen Needle (NOVOFINE) 32G X 6 MM MISC 1 Device by Does not apply route 4 times daily (before meals and nightly) 10/8/20   SOCORRO Wagner   blood glucose monitor strips 1 strip by Other route 4 times daily (before meals and nightly) 10/3/19   Xavier Martinez MD   blood glucose monitor kit and supplies 1 kit by Other route 4 times daily (before meals and nightly) 10/3/19   Xavier Martinez MD   Lancets MISC 1 each by Does not apply route 4 times daily (before meals and nightly) 10/3/19   Xavier Martinez MD   tamsulosin (FLOMAX) 0.4 MG capsule  7/11/19   Historical MD Sony   carbidopa-levodopa (SINEMET)  MG per tablet Take 1 tablet by mouth 2 times daily 7/2/19   Domenico Gonzalez DO sertraline (ZOLOFT) 50 MG tablet Take 50 mg by mouth daily    Historical Provider, MD        Allergies   Patient has no known allergies. Social History     Social History       Tobacco History       Smoking Status  Never      Smokeless Tobacco Use  Never              Alcohol History       Alcohol Use Status  Never              Drug Use       Drug Use Status  Never              Sexual Activity       Sexually Active  Never                    Family History     Family History   Problem Relation Age of Onset    Diabetes Mother     Heart Disease Mother     Diabetes Father        Review of Systems   Review of systems obtained from patient's daughter.   Patient is nonverbal    Physical Exam   BP (!) 145/66   Pulse 88   Temp 99.1 °F (37.3 °C) (Rectal)   Resp 17   Wt 125 lb 3.5 oz (56.8 kg)   SpO2 95%   BMI 19.04 kg/m²     CONSTITUTIONAL: Cachectic somnolent and no apparent distress  EYES: Sclera and icteric, PERRL  ENT: Dry oral mucosa, concave cheekbones, bilateral temporal wasting  NECK:  supple, symmetrical, trachea midline  BACK:  symmetric  LUNGS:  tachypneic, good air exchange, and clear to auscultation  CARDIOVASCULAR:  Normal apical impulse, regular rate and rhythm, normal S1 and S2, no S3 or S4, and no murmur noted  ABDOMEN:  normal bowel sounds and non-tender  MUSCULOSKELETAL:  there is no redness, warmth, or swelling of the joints  NEUROLOGIC: Nonverbal, patient is somnolent does not follow any commands  SKIN: Warm dry, pink, lower extremities with brownish discoloration bilaterally, no edema    Labs      Recent Results (from the past 24 hour(s))   Comprehensive Metabolic Panel    Collection Time: 10/05/22  3:30 PM   Result Value Ref Range    Sodium 165 (HH) 135 - 144 mEq/L    Potassium 5.6 (H) 3.4 - 4.9 mEq/L    Chloride 126 (H) 95 - 107 mEq/L    CO2 27 20 - 31 mEq/L    Anion Gap 12 9 - 15 mEq/L    Glucose 445 (HH) 70 - 99 mg/dL    BUN 57 (H) 8 - 23 mg/dL    Creatinine 2.67 (H) 0.70 - 1.20 mg/dL    GFR Non- 23.7 (L) >60    GFR  28.6 (L) >60    Calcium 9.5 8.5 - 9.9 mg/dL    Total Protein 7.4 6.3 - 8.0 g/dL    Albumin 3.4 (L) 3.5 - 4.6 g/dL    Total Bilirubin 0.3 0.2 - 0.7 mg/dL    Alkaline Phosphatase 222 (H) 35 - 104 U/L    ALT 14 0 - 41 U/L    AST 57 (H) 0 - 40 U/L    Globulin 4.0 (H) 2.3 - 3.5 g/dL   CBC with Auto Differential    Collection Time: 10/05/22  3:30 PM   Result Value Ref Range    WBC 10.4 4.8 - 10.8 K/uL    RBC 4.58 (L) 4.70 - 6.10 M/uL    Hemoglobin 13.6 (L) 14.0 - 18.0 g/dL    Hematocrit 42.1 42.0 - 52.0 %    MCV 91.8 80.0 - 100.0 fL    MCH 29.7 27.0 - 31.3 pg    MCHC 32.4 (L) 33.0 - 37.0 %    RDW 15.3 (H) 11.5 - 14.5 %    Platelets 291 543 - 950 K/uL    Neutrophils % 86.9 %    Lymphocytes % 8.4 %    Monocytes % 3.6 %    Eosinophils % 0.8 %    Basophils % 0.3 %    Neutrophils Absolute 9.1 (H) 1.4 - 6.5 K/uL    Lymphocytes Absolute 0.9 (L) 1.0 - 4.8 K/uL    Monocytes Absolute 0.4 0.2 - 0.8 K/uL    Eosinophils Absolute 0.1 0.0 - 0.7 K/uL    Basophils Absolute 0.0 0.0 - 0.2 K/uL   Procalcitonin    Collection Time: 10/05/22  3:30 PM   Result Value Ref Range    Procalcitonin 0.21 (H) 0.00 - 0.15 ng/mL   Lactic Acid    Collection Time: 10/05/22  3:30 PM   Result Value Ref Range    Lactic Acid 3.7 (HH) 0.5 - 2.2 mmol/L   Magnesium    Collection Time: 10/05/22  3:30 PM   Result Value Ref Range    Magnesium 2.6 (H) 1.7 - 2.4 mg/dL   Troponin    Collection Time: 10/05/22  3:30 PM   Result Value Ref Range    Troponin 0.031 (HH) 0.000 - 0.010 ng/mL   POCT Venous    Collection Time: 10/05/22  5:11 PM   Result Value Ref Range    POC Sodium 174 (HH) 136 - 145 mEq/L    POC Potassium 6.8 (HH) 3.5 - 5.1 mEq/L    POC Chloride 133 (H) 99 - 110 mEq/L    POC Glucose 403 (HH) 70 - 99 mg/dl    POC Creatinine 2.7 (H) 0.8 - 1.3 mg/dL    GFR Non-African American 23 (A) >60    GFR  28 (A) >60    Calcium, Ionized 1.10 (L) 1.12 - 1.32 mmol/L    pH, Branden 7.354 7.320 - 7.420 pCO2, Dejan 48.5 40.0 - 50.0 mm Hg    pO2, Dejan 38 Not Established mm Hg    HCO3, Venous 27.1 23.0 - 29.0 mmol/L    Base Excess, Dejan 2 -3 - 3    O2 Sat, Dejan 69 Not Established %    TC02 (Calc), Dejan 29 Not Established mmol/L    Lactate 5.06 (HH) 0.40 - 2.00 mmol/L    Hemoglobin 17.6 (H) 13.5 - 17.5 gm/dL    POC Hematocrit 52 41 - 53 %    Sample Type DEJAN     Performed on SEE BELOW         Imaging/Diagnostics Last 24 Hours   XR CHEST (2 VW)    Result Date: 10/5/2022  EXAMINATION: TWO XRAY VIEWS OF THE CHEST 10/5/2022 1:39 pm COMPARISON: None. HISTORY: ORDERING SYSTEM PROVIDED HISTORY: Dysphagia, unspecified type TECHNOLOGIST PROVIDED HISTORY: Reason for exam:->possible aspiration What reading provider will be dictating this exam?->[facility] FINDINGS: The cardiomediastinal silhouette is without acute process. Biapical promises visualized osseous of COPD changes. The lungs are without acute focal process. There is no effusion or pneumothorax. The osseous structures are without acute process. No acute cardiopulmonary disease. COPD changes. CT HEAD WO CONTRAST    Result Date: 10/5/2022  EXAMINATION: CT OF THE HEAD WITHOUT CONTRAST  10/5/2022 3:52 pm TECHNIQUE: CT of the head was performed without the administration of intravenous contrast. Automated exposure control, iterative reconstruction, and/or weight based adjustment of the mA/kV was utilized to reduce the radiation dose to as low as reasonably achievable. COMPARISON: 09/04/2022 HISTORY: ORDERING SYSTEM PROVIDED HISTORY: dysphagia TECHNOLOGIST PROVIDED HISTORY: Reason for exam:->dysphagia Has a \"code stroke\" or \"stroke alert\" been called? ->No Decision Support Exception - unselect if not a suspected or confirmed emergency medical condition->Emergency Medical Condition (MA) What reading provider will be dictating this exam?->CRC FINDINGS: BRAIN/VENTRICLES: No evidence of parenchymal hemorrhages or contusions.   No evidence of intra or extra-axial fluid collection is seen. Scattered areas of low attenuation are visualized in the periventricular and subcortical white matter demonstrating no change in comparison to the prior study consistent with chronic microvascular disease. No evidence of acute territorial infarct is seen. Prominence of the ventricles and sulci is visualized demonstrating no change consistent with chronic atrophic brain changes. No evidence of intracranial mass or mass effect, no evidence of midline shift is seen. No evidence of sellar or parasellar mass is visualized. ORBITS: The visualized portion of the orbits demonstrate no acute abnormality. SINUSES: The visualized paranasal sinuses and mastoid air cells demonstrate no acute abnormality. SOFT TISSUES/SKULL:  No acute abnormality of the visualized skull or soft tissues. No acute intracranial abnormality. Chronic microvascular disease and chronic atrophic brain changes seen. If there is a high level of suspicion would recommend further evaluation with an MRI of the brain to rule out subtle brainstem lacunar infarcts or acute on chronic pathology.        Assessment      Hospital Problems             Last Modified POA    * (Principal) Hypernatremia 10/5/2022 Yes   Acute hypernatremia secondary to severe dehydration  Acute hyperkalemia  Acute kidney injury secondary to severe dehydration-rule out urinary retention  Dysphagia-could be secondary to advanced dementia/Parkinson's disease versus acute CVA  Rule out acute CVA  Type 2 dm with hyperlglycemia  Elevated troponin-suspect demand ischemia multifactorial in setting of dehydration, possible CVA  Lactic acidemia secondary to renal failure and dehydration-metabolic acidosis non-anion gap  Parkinson's disease/dementia  DVT prophylaxis    Plan   Seen and examined in the emergency department  Admit to intensive care unit  BMP every 4 hours  normal saline and 125 cc an hour  Npo  Insert Dobbhoff for medications  frost catheter for intake and output critical care setting  MRI of head/MRA head and neck  Trend troponin  Repeat lactic acid every 4 hours x 3  If repeat potassium greater than 5.6 we will give 1 dose of Lokelma. Insulin has been ordered  Insulin-Lantus initiated along with basal insulin and sliding scale  Will administer sinemet through [address] with family in the emergency department-patient will remain full code.   Family would want PEG tube if required  Plan of care discussed with Dr. Kevin Lemus    Consultations Ordered:  Ely Strauss    Electronically signed by PINEDA Shaw - CNS on 10/5/22 at 7:04 PM EDT

## 2022-10-05 NOTE — PROGRESS NOTES
18:30pm Received from ER via cart to CCu bed 2. VSS MP_SR. Non verbal and fights care. 4 eyes for skin with michael BAKER. No skin issues. Slight redness of heels. Meplex to sacrum and heels. Rectal temp 37.3.  No complaints

## 2022-10-05 NOTE — PROGRESS NOTES
Subjective:      Patient ID: Zuly Forde is a 71 y.o. male who presents today for:  Chief Complaint   Patient presents with    Other     Patient has trouble swallowing, difficulty eating, palate bleeding        HPI   Patient presents with Son (caretaker) who reports the patient had difficulty eating today, was unable to swallow and palate was bleeding. Per son, patient had had a \"episodes\" of this in the past and they have been feeding him a pureed diet. Patient has history of Parkinson's with Dementia - W/C dependent; non-verbal; incontinent of stool and urine, mental status unchanged per son. Food is noted in Oropharynx. Patient does have a cough (trying to clear throat). No bleeding or trauma noted to palate. MBS and CXR ordered. Due to language barrier, an  was present during the history-taking and subsequent discussion (and for part of the physical exam) with this patient. Past Medical History:   Diagnosis Date    Compression fracture of body of thoracic vertebra ([address] 6/23/2019    T3, T4, T5 AND T6 COMPRESSION FX OF INDETERMINATE AGE AND CLINICAL CORRELATION WARRANTED. Gait abnormality due to exacerbation of Parkinson's disease with acute rehab admission on 6/19/2019 6/20/2019    77year-old male who was brought to the emergency room after syncopal episode in 6/16/2019 he was especially diagnosed with exacerbation of Parkinson's disease and electrolyte abnormality as well as hyperosmolar state secondary to elevated blood sugar and uncontrolled diabetes. Cardiopulmonary work-up was negative including chest x-ray EKG and MRI of the brain.   CT the brain showed no acute findi    Hyperlipidemia     Hypertension     Parkinson's disease ([address]     Syncope and collapse     Type 2 diabetes mellitus without complication [facility])      Past Surgical History:   Procedure Laterality Date    APPENDECTOMY       Social History     Socioeconomic History    Marital status:  Spouse name: Not on file    Number of children: Not on file    Years of education: Not on file    Highest education level: Not on file   Occupational History    Not on file   Tobacco Use    Smoking status: Never    Smokeless tobacco: Never   Vaping Use    Vaping Use: Never used   Substance and Sexual Activity    Alcohol use: Never    Drug use: Never    Sexual activity: Never   Other Topics Concern    Not on file   Social History Narrative         Lives With: Spouse    [facility]  late--2018,             son Stephanie Wood in the area    Type of Home: House One level    Home Access: Level entry    Bathroom Shower/Tub: Tub/Shower unit    [employer]: Grab bars in shower, Shower chair    Home Equipment: 4 wheeled walker    Receives Help From: Family    ADL Assistance: Independent    Homemaking Assistance: (spouse and son able to assist)    Homemaking Responsibilities: No    Ambulation Assistance: Independent(rollator when needed)    Transfer Assistance: Independent    Active : No    Patient's  Info: son    Additional Comments: Pt Citizen of [place] speaking interpretor Sierar Frost present,  Pt recieves help from wife and son @ times    Primary Care Provider: PINEDA Fuchs CNP     Social Determinants of Health     Financial Resource Strain: Low Risk     Difficulty of Paying Living Expenses: Not very hard   Food Insecurity: No Food Insecurity    Worried About Running Out of Food in the Last Year: Never true    Ran Out of Food in the Last Year: Never true   Transportation Needs: Not on file   Physical Activity: Not on file   Stress: Not on file   Social Connections: Not on file   Intimate Partner Violence: Not on file   Housing Stability: Not on file     Family History   Problem Relation Age of Onset    Diabetes Mother     Heart Disease Mother     Diabetes Father      No Known Allergies  No current facility-administered medications for this visit.      Current Outpatient Medications   Medication Sig Dispense Refill    Vitamin D-Vitamin K (VITAMIN K2-VITAMIN D3)  MCG-UNIT CAPS       pramipexole (MIRAPEX) 0.125 MG tablet       QUEtiapine (SEROQUEL) 25 MG tablet       nystatin (MYCOSTATIN) 545404 UNIT/ML suspension Take 5 mLs by mouth 4 times daily for 14 days Swish and swallow over 20 minutes.  280 mL 0    losartan (COZAAR) 50 MG tablet TAKE 1 TABLET BY MOUTH DAILY 30 tablet 3    Continuous Blood Gluc Sensor (FREESTYLE MARY CARMEN 2 SENSOR) MISC Change every 14 days 2 each 5    Continuous Blood Gluc  (FREESTYLE MARY CARMEN 2 READER) YENY Give 1 meter 1 each 0    BAQSIMI ONE PACK 3 MG/DOSE POWD 1 APPLICATOR BY NASAL ROUTE EVERY 4 HOURS AS NEEDED (BLOOD GLUCOSE LESS THAN 70) 1 each 3    Nutritional Supplements (ENSURE HIGH PROTEIN) LIQD Take 1 Bottle by mouth 3 times daily (with meals) 21 each 5    Cholecalciferol (VITAMIN D3) 50 MCG (2000 UT) CAPS 1 po daily with meal 30 capsule 11    Blood Pressure KIT 1 each by Does not apply route daily 1 kit 0    Incontinence Supply Disposable (DEPEND UNDERWEAR LARGE) MISC 1 each by Does not apply route 8 times daily 240 each 12    Soap & Cleansers (CLEAN & CLEAR ABSORBING SHEETS) PADS Apply 1 each topically in the morning, at noon, and at bedtime 90 each 12    Disposable Gloves (NITRILE GLOVES LARGE) MISC 2 each by Does not apply route 8 times daily 480 each 12    insulin glargine (LANTUS SOLOSTAR) 100 UNIT/ML injection pen Inject 20 Units into the skin nightly 15 mL 3    simvastatin (ZOCOR) 40 MG tablet Take 1 tablet by mouth nightly 30 tablet 5    Glucagon, rDNA, (GLUCAGON EMERGENCY) 1 MG KIT INJECT 1 MG INTO THE MUSCLE SEE ADMIN INSTRUCTIONS FOLLOW PACKAGE DIRECTIONS FOR LOW BLOOD SUGAR. 1 kit 3    HUMALOG KWIKPEN 100 UNIT/ML SOPN 35 UNITS BEFORE BREAKFAST, 25 UNITS BEFORE LUNCH, 35 UNITS BEFORE DINNER 15 mL 3    Insulin Pen Needle (NOVOFINE) 32G X 6 MM MISC 1 Device by Does not apply route 4 times daily (before meals and nightly) 300 each 3 blood glucose monitor strips 1 strip by Other route 4 times daily (before meals and nightly) 150 strip 3    blood glucose monitor kit and supplies 1 kit by Other route 4 times daily (before meals and nightly) 1 kit 0    Lancets MISC 1 each by Does not apply route 4 times daily (before meals and nightly) 150 each 3    tamsulosin (FLOMAX) 0.4 MG capsule   0    carbidopa-levodopa (SINEMET)  MG per tablet Take 1 tablet by mouth 2 times daily 90 tablet 3    sertraline (ZOLOFT) 50 MG tablet Take 50 mg by mouth daily      metFORMIN (GLUCOPHAGE) 1000 MG tablet MARICARMEN 1 TABLETA POR BOCA DOS VECES AL PHYLLIS ANTES DE COMER. (Patient not taking: Reported on 10/5/2022) 60 tablet 3     Facility-Administered Medications Ordered in Other Visits   Medication Dose Route Frequency Provider Last Rate Last Admin    0.9 % sodium chloride bolus  1,000 mL IntraVENous Once Bhavani Dang PA-C 983.6 mL/hr at 10/05/22 1600 1,000 mL at 10/05/22 1600          Review of Systems   HENT:  Positive for trouble swallowing. Objective:   /68 (Site: Right Upper Arm, Position: Sitting, Cuff Size: Medium Adult)   Temp 97.5 °F (36.4 °C) (Temporal)     Physical Exam  Constitutional:       Appearance: He is ill-appearing. HENT:      Head: Normocephalic. Nose: Nose normal.      Mouth/Throat:      Comments: Food pocketing. Cardiovascular:      Rate and Rhythm: Normal rate and regular rhythm. Pulses: Normal pulses. Heart sounds: Normal heart sounds. Pulmonary:      Effort: Pulmonary effort is normal.      Breath sounds: Normal breath sounds. Abdominal:      General: Bowel sounds are normal.      Palpations: Abdomen is soft. Musculoskeletal:      Cervical back: Normal range of motion and neck supple. Skin:     General: Skin is warm and dry. Capillary Refill: Capillary refill takes less than 2 seconds. Neurological:      Mental Status: Mental status is at baseline. He is lethargic.        Assessment: Diagnosis Orders   1. Dysphagia, unspecified type  FL MODIFIED BARIUM SWALLOW W VIDEO    XR CHEST STANDARD (2 VW)        No results found for this visit on 10/05/22. Plan:     Assessment & Plan   Patrice Soliz was seen today for other. Diagnoses and all orders for this visit:    Dysphagia, unspecified type  -     FL MODIFIED BARIUM SWALLOW W VIDEO  -     XR CHEST STANDARD (2 VW); Future    Other orders  -     nystatin (MYCOSTATIN) 640740 UNIT/ML suspension; Take 5 mLs by mouth 4 times daily for 14 days Swish and swallow over 20 minutes. Orders Placed This Encounter   Procedures    FL MODIFIED BARIUM SWALLOW W VIDEO     Order Specific Question:   Reason for exam:     Answer:   dysphagia    XR CHEST STANDARD (2 VW)     Standing Status:   Future     Number of Occurrences:   1     Standing Expiration Date:   10/5/2023     Order Specific Question:   Reason for exam:     Answer:   possible aspiration     Orders Placed This Encounter   Medications    nystatin (MYCOSTATIN) 634403 UNIT/ML suspension     Sig: Take 5 mLs by mouth 4 times daily for 14 days Swish and swallow over 20 minutes. Dispense:  280 mL     Refill:  0     There are no discontinued medications. Return for follow up with PCP. Reviewed with the patient/family: current clinical status & medications. Side effects of the medication prescribed today, as well as treatment plan/rationale and result expectations have been discussed with the patient/family who expresses understanding. Patient will be discharged home in stable condition. Follow up with PCP to evaluate treatment results or return if symptoms worsen or fail to improve. Discussed signs and symptoms which require immediate follow-up in ED/call to 911. Understanding verbalized. I have reviewed the patient's medical history in detail and updated the computerized patient record.     Félix Morel, APRN - CNP

## 2022-10-05 NOTE — ED PROVIDER NOTES
[facility] ED  EMERGENCY DEPARTMENT ENCOUNTER      Pt Name: Cesar Dumont  MRN: [id number]  [place] 1950  Date of evaluation: 10/5/2022  Provider: Dominique Butt PA-C    CHIEF COMPLAINT       Chief Complaint   Patient presents with    Dysphagia     Difficulty swallowing this morning         HISTORY OF PRESENT ILLNESS   (Location/Symptom, Timing/Onset, Context/Setting, Quality, Duration, Modifying Factors, Severity)  Note limiting factors. Margarita Chilel is a 71 y.o. male who presents to the emergency department from speech and language after having modified barium swallow that showed inability to swallow per speech and language. Son is providing the history, stating that since yesterday he has noticed that when the patient tries to swallow he seems to be choking and coughing. Patient is nonverbal at baseline and son states that the patient is at his baseline mentation and behavior. Given patient's nonverbal status, he is unable to provide any further insight into the nature of his illness however he shows no shortness of breath. HPI    Nursing Notes were reviewed. REVIEW OF SYSTEMS    (2-9 systems for level 4, 10 or more for level 5)     Review of Systems   Unable to perform ROS: Patient nonverbal     Except as noted above the remainder of the review of systems was reviewed and negative. PAST MEDICAL HISTORY     Past Medical History:   Diagnosis Date    Compression fracture of body of thoracic vertebra ([address] 6/23/2019    T3, T4, T5 AND T6 COMPRESSION FX OF INDETERMINATE AGE AND CLINICAL CORRELATION WARRANTED.     Gait abnormality due to exacerbation of Parkinson's disease with acute rehab admission on 6/19/2019 6/20/2019    77year-old male who was brought to the emergency room after syncopal episode in 6/16/2019 he was especially diagnosed with exacerbation of Parkinson's disease and electrolyte abnormality as well as hyperosmolar state secondary to elevated blood sugar and uncontrolled diabetes. Cardiopulmonary work-up was negative including chest x-ray EKG and MRI of the brain. CT the brain showed no acute findi    Hyperlipidemia     Hypertension     Parkinson's disease ([facility]     Syncope and collapse     Type 2 diabetes mellitus without complication (HCC)          SURGICAL HISTORY       Past Surgical History:   Procedure Laterality Date    APPENDECTOMY           CURRENT MEDICATIONS       Previous Medications    BAQSIMI ONE PACK 3 MG/DOSE POWD    1 APPLICATOR BY NASAL ROUTE EVERY 4 HOURS AS NEEDED (BLOOD GLUCOSE LESS THAN 70)    BLOOD GLUCOSE MONITOR KIT AND SUPPLIES    1 kit by Other route 4 times daily (before meals and nightly)    BLOOD GLUCOSE MONITOR STRIPS    1 strip by Other route 4 times daily (before meals and nightly)    BLOOD PRESSURE KIT    1 each by Does not apply route daily    CARBIDOPA-LEVODOPA (SINEMET)  MG PER TABLET    Take 1 tablet by mouth 2 times daily    CHOLECALCIFEROL (VITAMIN D3) 50 MCG (2000 UT) CAPS    1 po daily with meal    CONTINUOUS BLOOD GLUC  (FREESTYLE MARY CARMEN 2 READER) YENY    Give 1 meter    CONTINUOUS BLOOD GLUC SENSOR (FREESTYLE MARY CARMEN 2 SENSOR) MISC    Change every 14 days    DISPOSABLE GLOVES (NITRILE GLOVES LARGE) MISC    2 each by Does not apply route 8 times daily    GLUCAGON, RDNA, (GLUCAGON EMERGENCY) 1 MG KIT    INJECT 1 MG INTO THE MUSCLE SEE ADMIN INSTRUCTIONS FOLLOW PACKAGE DIRECTIONS FOR LOW BLOOD SUGAR.     HUMALOG KWIKPEN 100 UNIT/ML SOPN    35 UNITS BEFORE BREAKFAST, 25 UNITS BEFORE LUNCH, 35 UNITS BEFORE DINNER    INCONTINENCE SUPPLY DISPOSABLE (DEPEND UNDERWEAR LARGE) MISC    1 each by Does not apply route 8 times daily    INSULIN GLARGINE (LANTUS SOLOSTAR) 100 UNIT/ML INJECTION PEN    Inject 20 Units into the skin nightly    INSULIN PEN NEEDLE (NOVOFINE) 32G X 6 MM MISC    1 Device by Does not apply route 4 times daily (before meals and nightly)    LANCETS MISC    1 each by Does not apply route 4 times daily (before meals and nightly)    LOSARTAN (COZAAR) 50 MG TABLET    TAKE 1 TABLET BY MOUTH DAILY    METFORMIN (GLUCOPHAGE) 1000 MG TABLET    MARICARMEN 1 TABLETA POR BOCA DOS VECES AL PHYLLIS ANTES DE COMER. NUTRITIONAL SUPPLEMENTS (ENSURE HIGH PROTEIN) LIQD    Take 1 Bottle by mouth 3 times daily (with meals)    NYSTATIN (MYCOSTATIN) 337221 UNIT/ML SUSPENSION    Take 5 mLs by mouth 4 times daily for 14 days Swish and swallow over 20 minutes. PRAMIPEXOLE (MIRAPEX) 0.125 MG TABLET        QUETIAPINE (SEROQUEL) 25 MG TABLET        SERTRALINE (ZOLOFT) 50 MG TABLET    Take 50 mg by mouth daily    SIMVASTATIN (ZOCOR) 40 MG TABLET    Take 1 tablet by mouth nightly    SOAP & CLEANSERS (CLEAN & CLEAR ABSORBING SHEETS) PADS    Apply 1 each topically in the morning, at noon, and at bedtime    TAMSULOSIN (FLOMAX) 0.4 MG CAPSULE        VITAMIN D-VITAMIN K (VITAMIN K2-VITAMIN D3)  MCG-UNIT CAPS           ALLERGIES     Patient has no known allergies.     FAMILY HISTORY       Family History   Problem Relation Age of Onset    Diabetes Mother     Heart Disease Mother     Diabetes Father           SOCIAL HISTORY       Social History     Socioeconomic History    Marital status:      Spouse name: None    Number of children: None    Years of education: None    Highest education level: None   Tobacco Use    Smoking status: Never    Smokeless tobacco: Never   Vaping Use    Vaping Use: Never used   Substance and Sexual Activity    Alcohol use: Never    Drug use: Never    Sexual activity: Never   Social History Narrative         Lives With: Spouse    Recently moved to [facility] from [facility]  late--2018,             son Chiki Soto in the area    Type of Home: House One level    Home Access: Level entry    Bathroom Shower/Tub: Tub/Shower unit    [employer]: Grab bars in shower, Shower chair    Home Equipment: 4 wheeled walker    Receives Help From: Family    ADL Assistance: Independent    Homemaking Assistance: (spouse and son able to assist)    Homemaking Responsibilities: No    Ambulation Assistance: Independent(rollator when needed)    Transfer Assistance: Independent    Active : No    Patient's  Info: son    Additional Comments: Pt Kinyarwanda speaking interpretor Joanie Sahu present,  Pt recieves help from wife and son @ times    Primary Care Provider: PINEDA Eden CNP     Social Determinants of Health     Financial Resource Strain: Low Risk     Difficulty of Paying Living Expenses: Not very hard   Food Insecurity: No Food Insecurity    Worried About Running Out of Food in the Last Year: Never true    [address] in the Last Year: Never true       SCREENINGS        Cromwell Coma Scale  Eye Opening: To pain  Best Verbal Response: None  Best Motor Response: Localizes pain  Rian Coma Scale Score: 8               PHYSICAL EXAM    (up to 7 for level 4, 8 or more for level 5)     ED Triage Vitals [10/05/22 1511]   BP Temp Temp Source Heart Rate Resp SpO2 Height Weight   105/65 98.1 °F (36.7 °C) Oral 85 20 93 % -- --       Physical Exam  Vitals and nursing note reviewed. Constitutional:       General: He is not in acute distress. Appearance: He is well-developed. He is not diaphoretic. HENT:      Head: Normocephalic and atraumatic. Mouth/Throat:      Mouth: Mucous membranes are moist.      Pharynx: No oropharyngeal exudate. Eyes:      General: No scleral icterus. Conjunctiva/sclera: Conjunctivae normal.      Pupils: Pupils are equal, round, and reactive to light. Neck:      Trachea: No tracheal deviation. Cardiovascular:      Rate and Rhythm: Normal rate. Heart sounds: Normal heart sounds. Pulmonary:      Effort: Pulmonary effort is normal. No respiratory distress. Breath sounds: Normal breath sounds. Abdominal:      General: Bowel sounds are normal. There is no distension. Palpations: Abdomen is soft. Musculoskeletal:         General: Normal range of motion.       Cervical back: Normal range of motion and neck supple. No rigidity or tenderness. Skin:     General: Skin is warm and dry. Findings: No erythema or rash. Neurological:      Mental Status: He is alert. Mental status is at baseline. Motor: No abnormal muscle tone. Psychiatric:         Speech: He is noncommunicative. DIAGNOSTIC RESULTS     EKG: All EKG's are interpreted by the Emergency Department Physician who either signs or Co-signs this chart in the absence of a cardiologist.    Normal sinus rhythm, rate 84 bpm, no peaked T waves or ST elevation    RADIOLOGY:   Non-plain film images such as CT, Ultrasound and MRI are read by the radiologist. Plain radiographic images are visualized and preliminarily interpreted by the emergency physician with the below findings:      Interpretation per the Radiologist below, if available at the time of this note:    CT HEAD WO CONTRAST   Final Result   No acute intracranial abnormality. Chronic microvascular disease and chronic atrophic brain changes seen. If there is a high level of suspicion would recommend further evaluation with   an MRI of the brain to rule out subtle brainstem lacunar infarcts or acute on   chronic pathology.                ED BEDSIDE ULTRASOUND:   Performed by ED Physician - none    LABS:  Labs Reviewed   COMPREHENSIVE METABOLIC PANEL - Abnormal; Notable for the following components:       Result Value    Sodium 165 (*)     Potassium 5.6 (*)     Chloride 126 (*)     Glucose 445 (*)     BUN 57 (*)     Creatinine 2.67 (*)     GFR Non- 23.7 (*)     GFR  28.6 (*)     Albumin 3.4 (*)     Alkaline Phosphatase 222 (*)     AST 57 (*)     Globulin 4.0 (*)     All other components within normal limits    Narrative:     Dicie Marking tel. [id number],  trop results called to and read back by Dr. Felipe Lam, 10/05/2022 16:39, by  Molly Khan  Glu & NA results called to and read back by Dr. Felipe Lam, 10/05/2022 16:38, by  Molly Khan CBC WITH AUTO DIFFERENTIAL - Abnormal; Notable for the following components:    RBC 4.58 (*)     Hemoglobin 13.6 (*)     MCHC 32.4 (*)     RDW 15.3 (*)     Neutrophils Absolute 9.1 (*)     Lymphocytes Absolute 0.9 (*)     All other components within normal limits   PROCALCITONIN - Abnormal; Notable for the following components:    Procalcitonin 0.21 (*)     All other components within normal limits    Narrative:     Barbara Pinto tel. [phone],  trop results called to and read back by Dr. Subha Raymundo, 10/05/2022 16:39, by  Michael Andrade  Glu & NA results called to and read back by Dr. Subha Raymundo, 10/05/2022 16:38, by  Michael Andrade   LACTIC ACID - Abnormal; Notable for the following components:    Lactic Acid 3.7 (*)     All other components within normal limits    Narrative:     Barbara Pinto tel. [phone],  Tor Bamberger results called to and read back by Edgar, 10/05/2022 16:37, by  Micahel Andrade   MAGNESIUM - Abnormal; Notable for the following components:    Magnesium 2.6 (*)     All other components within normal limits    Narrative:     Barbara Pinto tel. [phone],  trop results called to and read back by Dr. Subha Raymundo, 10/05/2022 16:39, by  Michael Andrade  Glu & NA results called to and read back by Dr. uSbha Raymundo, 10/05/2022 16:38, by  Michael Andrade   TROPONIN - Abnormal; Notable for the following components:    Troponin 0.031 (*)     All other components within normal limits    Narrative:     Barbara Pinto tel. [phone],  trop results called to and read back by Dr. Subha Raymundo, 10/05/2022 16:39, by  Michael Andrade  Glu & NA results called to and read back by Dr. Subha Raymundo, 10/05/2022 16:38, by  Michael Andrade   POCT EPOC BLOOD GAS, LACTIC ACID, ICA       All other labs were within normal range or not returned as of this dictation.     EMERGENCY DEPARTMENT COURSE and DIFFERENTIAL DIAGNOSIS/MDM:   Vitals:    Vitals:    10/05/22 1511 10/05/22 1530   BP: 105/65 114/64   Pulse: 85 83   Resp: 20 20   Temp: 98.1 °F (36.7 °C)    TempSrc: Oral    SpO2: 93% 95% MDM        REASSESSMENT        Patient with a history of dementia and Parkinson's that is nonverbal presents emergency department with son stating that he has been a 2-day history of coughing and choking with swallowing. Patient had a modified barium swallow attempted prior to emergency department and was unable to be completed due to patient not being awake enough to complete. Son states that the patient is at his baseline mentation which is apparently lethargic but responsive to painful stimuli. Laboratory testing reveals worsening MADDI with a creatinine of 2.67 and a severe hypernatremia with a sodium of 165. X-ray of the chest was performed prior to emergency department arrival and shows no acute abnormality. Patient is hemodynamically stable while in the emergency department. Call was placed to hospitalist to discuss admission for further evaluation and care    CONSULTS:  None    PROCEDURES:  Unless otherwise noted below, none     Procedures        FINAL IMPRESSION      1. Hypernatremia    2. Acute renal failure, unspecified acute renal failure type ([address]    3. Dysphagia, unspecified type    4. Dehydration          DISPOSITION/PLAN   DISPOSITION Decision To Admit 10/05/2022 04:39:49 PM      PATIENT REFERRED TO:  No follow-up provider specified. DISCHARGE MEDICATIONS:  New Prescriptions    No medications on file     Controlled Substances Monitoring:     RX Monitoring 6/25/2019   Acute Pain Prescriptions Prescription exceeds daily limit for a specific reason. See comments or note. ;Not required given exclusionary diagnoses. ..;Severe pain not adequately treated with lower dose. Periodic Controlled Substance Monitoring Possible medication side effects, risk of tolerance/dependence & alternative treatments discussed. ;No signs of potential drug abuse or diversion identified. ;Assessed functional status. ;Obtaining appropriate analgesic effect of treatment.    Chronic Pain > 50 MEDD Re-evaluated the status of the patient's underlying condition causing pain. ;Considered consultation with a specialist.       (Please note that portions of this note were completed with a voice recognition program.  Efforts were made to edit the dictations but occasionally words are mis-transcribed.)    Aiden Pereyra PA-C (electronically signed)  Attending Emergency Physician            Aiden Pereyra PA-C  10/05/22 [id number]

## 2022-10-05 NOTE — ED NOTES
Graciela Hickman, family member/aide is english speaking, phone # [phone]    oMisés Ren family member, phone #[phone]     Aury Stiles RN  10/05/22 1424

## 2022-10-05 NOTE — PROCEDURES
[facility]   Facility/Department:  [facility]  Speech Language Pathology  Modified Barium Swallow (MBS)    Octavia Dumont  : 1950 (71 y.o.)   [x]   confirmed    MRN: [id number]  ROOM: Room/bed info not found  ADMISSION DATE: 10/5/2022  PATIENT DIAGNOSIS(ES): Dysphagia, unspecified type [R13.10]  No chief complaint on file. Patient Active Problem List    Diagnosis Date Noted    Other specified hypothyroidism 2022    Parkinson's disease ([facility] [address] 2019    Type 2 diabetes mellitus with hyperglycemia, with long-term current use of insulin ([address] 2019    Essential hypertension 2019    Hyperlipidemia 2019    BMI 28.0-28.9,adult 2019    Uses Mauritanian as primary spoken language 2019    Impaired mobility 2019    Lactic acidosis 2019     Past Medical History:   Diagnosis Date    Compression fracture of body of thoracic vertebra ([address] 2019    T3, T4, T5 AND T6 COMPRESSION FX OF INDETERMINATE AGE AND CLINICAL CORRELATION WARRANTED. Gait abnormality due to exacerbation of Parkinson's disease with acute rehab admission on 2019    77year-old male who was brought to the emergency room after syncopal episode in 2019 he was especially diagnosed with exacerbation of Parkinson's disease and electrolyte abnormality as well as hyperosmolar state secondary to elevated blood sugar and uncontrolled diabetes. Cardiopulmonary work-up was negative including chest x-ray EKG and MRI of the brain.   CT the brain showed no acute findi    Hyperlipidemia     Hypertension     Parkinson's disease ([address]     Syncope and collapse     Type 2 diabetes mellitus without complication [facility])      Past Surgical History:   Procedure Laterality Date    APPENDECTOMY       No Known Allergies    Date of Onset:  10/5/22       Date of Evaluation: 10/5/2022   Evaluating Therapist: CURTIS Bautista      DYSPHAGIA DIAGNOSIS:  Possible Mod-severe dysphagia needs further assessment    DIET RECOMMENDATIONS:  Solid consistency: NPO  Liquid consistency: NPO  Medication administration: Via alternative means     Recommend referral to: Recommend patient be admitted to ED    Reason for Referral  Zuly Forde was referred for a modified barium swallow evaluation to assess the efficiency of his swallow function, assess for aspiration, and make recommendations regarding safe dietary consistencies, effective compensatory strategies, and safe eating environment. GENERAL  Patient complaints/Staff concerns:   Per  Ruth Hodge #[id number]  Son reports that this morning \"some food came down from his palate and out of his mouth and started coughing\". Pt was seen in walk in clinic and recommended for an outpatient MBS. Son reports that patient eats blended food at home. Previous swallow testing:  Pt had rehab admission in 6/2019 and recommended regular/thin. Cognitive status:   Lethargic  Confused  Cognitive Deficits  SLP noted in previous doctor notes that patient is nonverbal, dementia and parkinsons. Pt was unable to arouse more than to moan/groan during study. Communication observation:   Non verbal     Follows Directions:   Requires verbal cueing     Diet Level Prior to this Evaluation:    Pureed  Thin    Current respiratory status:      Room air       Baseline Vocal Quality:  Weak    Oral Hygiene:  Clean    Dentition:  Adequate    Oral mechanism examination:  Unable to complete oral mech exam secondary to patient lethargy    PROCEDURE   Patient Positioning: Seated 70-90°  Viewing Plane(s): Lateral  Contrast: commercially prepared, non-standardized barium viscosities; ranging from thin liquid to pudding consistency  Consistencies Administered and # of Trials:  Mildly thick x 2 trials  How Presented:  SLP fed      ORAL PHASE:  Bolus Acceptance:   Impaired: Mildly thick/[place]    Bolus Formation/Control:    Incomplete lip closure: Mildly thick/Nectar  Anterior bolus loss: Mildly thick/Nectar  Oral holding: Mildly thick/Nectar    Bolus Propulsion:  Absent: Mildly thick/Nectar (spoon)  Delayed initiation: Mildly thick/Nectar by toothette  Lingual pumping: Mildly thick/Nectar by toothette    Oral Residue:   Superior tongue: Mildly thick/Nectar and Right lateral sulcus: Mildly thick/Nectar    Response to Oral Residue:  Unable to completely clear       ORAL STAGE IMPRESSION: Pt p/w severe oral dysphagia characterized by anterior spillage, bolus holding and absent bolus propulsion with nectar by teaspoon. Pt had propulsion of nectar liquid by toothette. Pt had moderate residue that was cleared by SLP with toothette.        PHARYNGEAL STAGE:   Soft Palate Elevation:  No impairment    Initiation of Pharyngeal Swallow:  Valleculae: Mildly thick/Nectar  Absent: Mildly thick/Nectar (spoon)    Tongue Base Retraction:  Reduced: Mildly thick/Nectar  Absent: Mildly thick/Nectar (spoon)    Laryngeal Elevation:  Reduced: Mildly thick/Nectar    Anterior Hyoid Excursion:  Reduced: Mildly thick/Nectar    Epiglottic Inversion:  Delayed: Mildly thick/Nectar    Laryngeal Vestibule Closure:  Complete    Pharyngeal Contraction:  Reduced: Mildly Thick (Nectar) from spoon    Pharyngeal Residue:  Tongue base residue: Mildly Thick-Nectar (mild)    Response to Residue:  Unable to completely clear    Laryngeal Penetration  No occurrences    Aspiration  No occurrences        Penetration-Aspiration Scale  Nectar-toothette 1 Material does not enter the airway    2 Material enters the airway, remains above the vocal folds, and is ejected from the airway    3 Material enters the airway, remains above the vocal folds, and is not ejected from the airway    4 Material enters the airway, contacts the vocal folds, and is ejected from the airway    5 Material enters the airway, contacts the vocal folds, and is not ejected from the airway    6 Material enters the airway, passes below the vocal folds and is ejected into the larynx or out of the airway    7 Material enters the airway, passes below the vocal folds, and is not ejected from the trachea despite effort    8 Material enters the airway, passes below the vocal folds, and no effort is made to eject. PHARYNGEAL STAGE IMPRESSION:  Pt p/w severe pharyngeal dysphagia with delayed initiation, decreased contraction, laryngeal elevation and anterior hyoid movement. Pt didn't have enough trials to get an adequate evaluation of the swallow. No aspiration or penetration seen during study. CERVICAL ESOPHAGEAL STAGE :   No impairment observed            THERAPY RECOMMENDATIONS:   Therapy is recommended to:  Assess tolerance of recommended diet  Improve oral motor strength and range of motion  Improve tongue base retraction  Improve laryngeal strength and range of motion      This evaluation has been faxed or electronically routed to referring physician  SLP recommends admission to ED secondary to possible aspiration and inability to accurately evaluate swallow mechanism secondary to extreme lethargy. SLP communicated recommendations and results of MBS to ED nurse. PLAN OF CARE:   Long Term Goals:    Patient will tolerate least restrictive diet with no overt s/s of difficulty or aspiration. Short Term Goals:   Pt to be seen 2-4x/week during LOS or until goals met    1. Pt will tolerate the recommended diet level with no s/s of aspiration. 2. Pt will participate in an instrumental swallowing procedure within 1-3 days to objectively evaluate the pharyngeal stage of the swallow. 3. Additional goals to be added to POC following the completion of an instrumental swallowing procedure, if indicated per results of procedure. 4.  Pt will tolerate PO trials deemed appropriate by treating SLP with adequate oral clearance of bolus and no overt s/s of aspiration in all opportunities.       Prognosis for improvements is: Fair, Age, Family/Community Support, Severity of Impairments, Medical Diagnosis, Participation Level, and Prior Level of Function      Pain Assessment:  Patient does not appear in pain. Pain Re-assessment:  Patient does not appear in pain. Safety Devices: All fall risk precautions in place      Radiologist:  Available on Consult as needed, Radiology Tech present    Treatment goals were discussed with the:   Family member. , who gives verbal understanding of recommendations. Thank you for your referral.  Please direct any questions or concerns to Speech Pathology. Images are available through [facility]/PACS. Please see radiologist report for additional details.       Therapy Time   Time in: 1418  Time out: 1440  Minutes: 22            Signature:  Electronically signed by CURTIS Burnham on 10/5/2022 at 3:21 PM

## 2022-10-05 NOTE — Clinical Note
Discharge Plan[de-identified] Other/[facility])   Telemetry/Cardiac Monitoring Required?: Yes

## 2022-10-06 NOTE — CONSULTS
Renal consult 4.5 liters calculated behind in fluids    Hypernatremia  Parkinson  Dementia  DM type-2  Hyperlipidemia    Plan needs free water IV  bolus   on .45%with dextrose fine  will also give bolus Dextrose follow bmp Na+ now 168  change IV to dextrose 175 /hour  also water flushes GI

## 2022-10-06 NOTE — CARE COORDINATION
[facility] Case Management Initial Discharge Assessment    Met with Patient to discuss discharge plan. PCP: Cristina Forde MD                                Date of Last Visit: n/a    [facility] Patient: No        [facility] Notified: no    If no PCP, list provided? Yes    Discharge Planning    Living Arrangements: at home dependent on family care and at home dependent on nursing care    Who do you live with? Wife; two sons; and daughter in law    Who helps you with your care:  family or spouse    If lives at home:     Do you have any barriers navigating in your home? yes - have difficult time walking    Patient can perform ADL? Yes    Current Services (outpatient and in home) :  [employer] ([address])    Dialysis: No    Is transportation available to get to your appointments? Yes    DME Equipment:  yes - Walker, cane, wheelchair, shower chair    Respiratory equipment: None    Respiratory provider:  no     [facility]    Consult with Medication Assistance Program?  No      Patient agreeable to [facility]? Declined    Patient agreeable to SNF/Rehab? Declined    Other discharge needs identified? N/A    Does Patient Have a High-Risk for Readmission Diagnosis (CHF, PN, MI, COPD)? No        Initial Discharge Plan? (Note: please see concurrent daily documentation for any updates after initial note). BEATRIZW spoke with pt's Anam Albarado. Discuss DC plan. Anam Valdez agree with Tomi Devi with family and [facility]. Readmission Risk              Risk of Unplanned Readmission:  24        CMI DONE.    Electronically signed by GAMAL Matthews on 10/6/2022 at 8:49 AM

## 2022-10-06 NOTE — PLAN OF CARE
Problem: Discharge Planning  Goal: Discharge to home or other facility with appropriate resources  10/6/2022 1621 by Soumya Watson RN  Outcome: Progressing  Flowsheets (Taken 10/6/2022 0800)  Discharge to home or other facility with appropriate resources: Identify barriers to discharge with patient and caregiver  10/6/2022 0225 by Rashaun Soler RN  Outcome: Progressing     Problem: Pain  Goal: Verbalizes/displays adequate comfort level or baseline comfort level  10/6/2022 1621 by Soumya Watson RN  Outcome: Progressing  10/6/2022 0225 by Rashaun Soler RN  Outcome: Progressing     Problem: Safety - Medical Restraint  Goal: Remains free of injury from restraints (Restraint for Interference with Medical Device)  Description: INTERVENTIONS:  1. Determine that other, less restrictive measures have been tried or would not be effective before applying the restraint  2. Evaluate the patient's condition at the time of restraint application  3. Inform patient/family regarding the reason for restraint  4. Q2H: Monitor safety, psychosocial status, comfort, nutrition and hydration  10/6/2022 1621 by Soumya Watson RN  Outcome: Progressing  Flowsheets  Taken 10/6/2022 1617  Remains free of injury from restraints (restraint for interference with medical device): Every 2 hours: Monitor safety, psychosocial status, comfort, nutrition and hydration  Taken 10/6/2022 1600  Remains free of injury from restraints (restraint for interference with medical device): Every 2 hours: Monitor safety, psychosocial status, comfort, nutrition and hydration  10/6/2022 0225 by Rashaun Soler RN  Outcome: Progressing     Problem: Skin/Tissue Integrity  Goal: Absence of new skin breakdown  Description: 1. Monitor for areas of redness and/or skin breakdown  2. Assess vascular access sites hourly  3. Every 4-6 hours minimum:  Change oxygen saturation probe site  4.   Every 4-6 hours:  If on nasal continuous positive airway pressure, respiratory therapy assess nares and determine need for appliance change or resting period.   10/6/2022 1621 by Luis Fernando Healy RN  Outcome: Progressing  10/6/2022 0225 by Jose Mckeon RN  Outcome: Progressing     Problem: ABCDS Injury Assessment  Goal: Absence of physical injury  10/6/2022 1621 by Luis Fernando Healy RN  Outcome: Progressing  10/6/2022 0225 by Jose Mckeon RN  Outcome: Progressing     Problem: Safety - Adult  Goal: Free from fall injury  10/6/2022 1621 by Luis Fernando Healy RN  Outcome: Progressing  10/6/2022 0225 by Jose Mckeon RN  Outcome: Progressing     Problem: Chronic Conditions and Co-morbidities  Goal: Patient's chronic conditions and co-morbidity symptoms are monitored and maintained or improved  Outcome: Progressing  Flowsheets (Taken 10/6/2022 0800)  Care Plan - Patient's Chronic Conditions and Co-Morbidity Symptoms are Monitored and Maintained or Improved: Monitor and assess patient's chronic conditions and comorbid symptoms for stability, deterioration, or improvement     Problem: Nutrition Deficit:  Goal: Optimize nutritional status  10/6/2022 1621 by Luis Fernando Healy RN  Outcome: Progressing  Flowsheets (Taken 10/6/2022 0952 by Yariel Wilhelm RD, ELIZABETH)  Nutrient intake appropriate for improving, restoring, or maintaining nutritional needs:   Assess nutritional status and recommend course of action   Monitor oral intake, labs, and treatment plans   Recommend, monitor, and adjust tube feedings and TPN/PPN based on assessed needs  10/6/2022 0952 by Yariel Wilhelm RD, ELIZABETH  Flowsheets (Taken 10/6/2022 9836)  Nutrient intake appropriate for improving, restoring, or maintaining nutritional needs:   Assess nutritional status and recommend course of action   Monitor oral intake, labs, and treatment plans   Recommend, monitor, and adjust tube feedings and TPN/PPN based on assessed needs

## 2022-10-06 NOTE — CARE COORDINATION
GAMAL and care management team met with pt at bedside. Spoke with pt's Niece. Per Niece, \" He lives at home with his wife, two sons and a daughter in law. He is normally bed bound and I am his care taker. He have everything at home. Walker, cane, wheel chair, shower chair. He will return home with family at the time of DC. \"   Tomi [phone] with family and [facility]. BEATRIZW will follow.      Electronically signed by GAMAL Estrada on 10/6/2022 at 10:05 AM

## 2022-10-06 NOTE — PROGRESS NOTES
[facility]   [place] [facility]  Speech Language Pathology    Megan Dumont  1950  [id number]/[id number]    Date: 10/6/2022      Speech Therapy attempted to see Megan Dumont on this date for a/an:    Clinical Bedside Swallow Evaluation    Pt was unable to be seen due to:   Patient is too lethargic to participate. Patient opens eyes briefly with sternal run then immediately closes them. SAMUEL Aj aware of attempt. Will re-attempt at the patient is able to participate.        Electronically signed by CURTIS Moore on 10/6/22 at 9:15 AM EDT

## 2022-10-06 NOTE — CONSULTS
Inpatient consult to Critical Care  Consult performed by: You Estrada MD  Consult ordered by: Madison Rueda APRN - CNS          Admit Date: 10/5/2022    PCP:  Kristofer Jackson MD        CHIEF COMPLAINT / HPI:                The patient is a 71 y.o. male with significant past medical history of hypertension, hyperlipidemia, diabetes, dementia and Parkinson's disease who presented with mental status changes and difficulty swallowing. Of note he had a compression fracture of T3, T4, T5 and T6. Family noted that he has been choking on his food. He was noted to be more sleepy and less responsive. Family decided to bring him to the ER. In the emergency room, his sodium was elevated 265. Additionally, her potassium was 5.6. He was in acute renal failure and he had lactic acidosis. His glucose was elevated as well. He was admitted to the ICU for further care. He was started on fluid and currently he is on 0.45 normal saline. Repeated sodium this morning is 167. Past Medical History:      Diagnosis Date    Compression fracture of body of thoracic vertebra ([address] 6/23/2019    T3, T4, T5 AND T6 COMPRESSION FX OF INDETERMINATE AGE AND CLINICAL CORRELATION WARRANTED. Gait abnormality due to exacerbation of Parkinson's disease with acute rehab admission on 6/19/2019 6/20/2019    77year-old male who was brought to the emergency room after syncopal episode in 6/16/2019 he was especially diagnosed with exacerbation of Parkinson's disease and electrolyte abnormality as well as hyperosmolar state secondary to elevated blood sugar and uncontrolled diabetes. Cardiopulmonary work-up was negative including chest x-ray EKG and MRI of the brain.   CT the brain showed no acute findi    Hyperlipidemia     Hypertension     Parkinson's disease ([address]     Syncope and collapse     Type 2 diabetes mellitus without complication [facility])         Past Surgical History:        Procedure Laterality Date    APPENDECTOMY Current Medications:     losartan  50 mg Oral Daily    QUEtiapine  25 mg Oral BID    sertraline  50 mg Oral Daily    tamsulosin  0.4 mg Oral Daily    atorvastatin  20 mg Oral Nightly    sodium chloride flush  5-40 mL IntraVENous 2 times per day    enoxaparin  30 mg SubCUTAneous Daily    insulin glargine  0.15 Units/kg SubCUTAneous Nightly    insulin lispro  0.05 Units/kg SubCUTAneous TID     carbidopa-levodopa  1 tablet Per NG tube BID    insulin lispro  0-16 Units SubCUTAneous 4 times per day     Home Meds:  Prior to Admission medications    Medication Sig Start Date End Date Taking? Authorizing Provider   Vitamin D-Vitamin K (VITAMIN K2-VITAMIN D3)  MCG-UNIT CAPS  9/24/22   Historical Provider, MD   pramipexole (MIRAPEX) 0.125 MG tablet  9/29/22   Historical Provider, MD   QUEtiapine (SEROQUEL) 25 MG tablet  9/24/22   Historical Provider, MD   nystatin (MYCOSTATIN) [id number] UNIT/ML suspension Take 5 mLs by mouth 4 times daily for 14 days Swish and swallow over 20 minutes. 10/5/22 10/19/22  Nani Nyhan, APRN - CNP   losartan (COZAAR) 50 MG tablet TAKE 1 TABLET BY MOUTH DAILY 10/3/22   Ruy Puentes MD   Continuous Blood Gluc Sensor (FREESTYLE MARY CARMEN 2 SENSOR) MISC Change every 14 days 9/30/22   Ruy Puentes MD   Continuous Blood Gluc  (FREESTYLE MARY CARMEN 2 READER) YENY Give 1 meter 9/30/22   Ruy Puentes MD   BAQSIMI ONE PACK 3 MG/DOSE POWD 1 APPLICATOR BY NASAL ROUTE EVERY 4 HOURS AS NEEDED (BLOOD GLUCOSE LESS THAN 70) 9/21/22   SOCORRO Sutton   Nutritional Supplements (ENSURE HIGH PROTEIN) LIQD Take 1 Bottle by mouth 3 times daily (with meals) 9/14/22   Demetria Rizo MD   metFORMIN (GLUCOPHAGE) 1000 MG tablet MARICARMEN 1 TABLETA POR BOCA DOS VECES AL PHYLLIS ANTES DE COMER.   Patient not taking: No sig reported 6/27/22   SOCORRO Sutton   Cholecalciferol (VITAMIN D3) 50 MCG (2000 UT) CAPS 1 po daily with meal 6/3/22   Molly Olson MD   Blood Pressure KIT 1 each by Does not apply used smokeless tobacco. He reports that he does not drink alcohol and does not use drugs. TOBACCO:   reports that he has never smoked. He has never used smokeless tobacco.     ETOH:   reports no history of alcohol use. Family History:   family history includes Diabetes in his father and mother; Heart Disease in his mother. Review of Systems  Unable to perform review of systems. Objective:     PHYSICAL EXAM:      VITALS:  BP (!) 163/79   Pulse 73   Temp 98.1 °F (36.7 °C) (Rectal)   Resp 17   Ht 5' 8\" (1.727 m)   Wt 119 lb 11.4 oz (54.3 kg)   SpO2 96%   BMI 18.20 kg/m²   24HR INTAKE/OUTPUT:    Intake/Output Summary (Last 24 hours) at 10/6/2022 0742  Last data filed at 10/6/2022 0544  Gross per 24 hour   Intake 1154.85 ml   Output 400 ml   Net 754.85 ml     CURRENT PULSE OXIMETRY:  SpO2: 96 %  24HR PULSE OXIMETRY RANGE:  SpO2  Av.7 %  Min: 91 %  Max: 100 %    General appearance -cachectic and ill appearing  Mental status -encephalopathic  Eyes - pupils equal and reactive   Nose - normal and patent, NG tube in place  Neck - supple, no significant adenopathy  Chest -diminished bilaterally to auscultation, no wheezes, rales or rhonchi, symmetric air entry  Heart - normal rate, regular rhythm, normal S1, S2, no murmurs, rubs, clicks or gallops  Abdomen - soft, nontender, nondistended, no masses or organomegaly  Rectal - deferred, not clinically indicated  Neurological -encephalopathic, nonfocal exam.  Withdraws to pain.   Does not follow commands  Musculoskeletal - no joint tenderness, deformity or swelling  Extremities - peripheral pulses normal, no pedal edema, no clubbing or cyanosis  Skin - normal coloration and turgor, no rashes, no suspicious skin lesions noted         DATA:    CBC:   Recent Labs     10/05/22  1530 10/05/22  1711   WBC 10.4  --    HGB 13.6* 17.6*   HCT 42.1  --      --      BMP:    Recent Labs     10/05/22  1530 10/05/22  1711 10/05/22  2322 10/06/22  0253   * --  164* 167*   K 5.6*  --  3.8 4.2   *  --  128* 131*   CO2 27  --  25 23   BUN 57*  --  54* 52*   CREATININE 2.67* 2.7* 2.38* 2.25*   GLUCOSE 445*  --  223* 152*   CALCIUM 9.5  --  9.0 9.1   MG 2.6*  --   --   --      HEPATIC:   Recent Labs     10/05/22  1530   AST 57*   ALT 14   BILITOT 0.3   ALKPHOS 222*     LACTATE:   Recent Labs     10/05/22  2322 10/06/22  0253 10/06/22  0645   LACTA 2.4* 1.6 1.1     PROCALCITONIN:   Recent Labs     10/05/22  1530 10/05/22  2322   PROCAL 0.21* 0.26*     TROPONIN:   Recent Labs     10/05/22  1530 10/05/22  2322   TROPONINI 0.031* 0.033*            Radiology Review:    CXR portable: Results for orders placed during the hospital encounter of 09/04/22    XR CHEST PORTABLE    Narrative  EXAMINATION: XR CHEST PORTABLE    CLINICAL HISTORY: COUGH    COMPARISONS: AUGUST 16, 2022    FINDINGS: Thoracic scoliosis convexity to left apex T2-T3. Cardiopericardial silhouette normal. Aorta calcified. Lungs clear. Impression  NO ACUTE CARDIOPULMONARY DISEASE. Echo:    Assessment/Plan         Impression:    -Encephalopathy. This is likely acute with a baseline dementia. Etiology is likely due to hypernatremia in addition to acute renal failure. -Severe life-threatening hypernatremia. Likely due to dehydration.  -Acute renal failure. -Hyperglycemia.  -Lactic acidosis. -Failure to thrive.  -History of Parkinson disease with advanced dementia. Recommendations:    - Admit to the ICU for hemodynamic and airway monitoring.  - Continue fluids. Changed to D5 half-normal saline. Check sodium every 4 hours. His calculated free water deficit is probably 5 L.  -Check serial labs. -Strict intake and output. Insert Armendariz catheter. Watch urine output.  -NG tube for feeding and start free water.  -Tight glucose control  -DVT and GI prophylaxis. Consider palliative care consultation.             Full Code    Excluding procedures, the total critical care time caring for this patient with life threatening, unstable organ failure, including direct patient contact, review of medical record, management of life support systems, review of data including imaging and labs, discussions with other team members, patient's family and physicians at least 33 minutes so far today.      Electronically signed by Smith Ch MD on 10/6/2022 at 7:42 AM

## 2022-10-06 NOTE — CONSULTS
Subjective:      Patient ID: Lam Peña is a 71 y.o. male who presents today for . Encephalopathy. HPI 61-year-old right-handed gentleman with advanced Parkinson's disease dementia complex. Patient last seen by me in 2019. We have not since seen him. He may have returned back to [facility].  Prior to [facility]. States he was functional and family does things for him. He was started on carbidopa levodopa at that time and he did well. Patient has been fatigued for the last 2 days not taking oral and nonverbal.  Her daughter had noticed a cough but no fever was reported. Is brought to the hospital his sodium was 165. His sodium today also is 166 with a creatinine of 2.01. Somehow the patient has had hyponatremia though I am not quite sure when it was addressed. In any case he is not any seizures and his MRI does not show PRES  Is attempting to follow commands though there may be a language barrier. Review of Systems   Unable to perform ROS: Mental status change     Past Medical History:   Diagnosis Date    Compression fracture of body of thoracic vertebra ([address] 6/23/2019    T3, T4, T5 AND T6 COMPRESSION FX OF INDETERMINATE AGE AND CLINICAL CORRELATION WARRANTED. Gait abnormality due to exacerbation of Parkinson's disease with acute rehab admission on 6/19/2019 6/20/2019    77year-old male who was brought to the emergency room after syncopal episode in 6/16/2019 he was especially diagnosed with exacerbation of Parkinson's disease and electrolyte abnormality as well as hyperosmolar state secondary to elevated blood sugar and uncontrolled diabetes. Cardiopulmonary work-up was negative including chest x-ray EKG and MRI of the brain.   CT the brain showed no acute findi    Hyperlipidemia     Hypertension     Parkinson's disease ([address]     Syncope and collapse     Type 2 diabetes mellitus without complication [facility])      Past Surgical History:   Procedure Laterality Date    APPENDECTOMY Social History     Socioeconomic History    Marital status:      Spouse name: Not on file    Number of children: Not on file    Years of education: Not on file    Highest education level: Not on file   Occupational History    Not on file   Tobacco Use    Smoking status: Never    Smokeless tobacco: Never   Vaping Use    Vaping Use: Never used   Substance and Sexual Activity    Alcohol use: Never    Drug use: Never    Sexual activity: Never   Other Topics Concern    Not on file   Social History Narrative         Lives With: Spouse    Recently moved to [facility] from [facility]  late--2018,             son Stephanie Wood in the area    Type of Home: House One level    Home Access: Level entry    Bathroom Shower/Tub: Tub/Shower unit    [employer]: Grab bars in shower, Shower chair    Home Equipment: 4 wheeled walker    Receives Help From: Family    ADL Assistance: Independent    Homemaking Assistance: (spouse and son able to assist)    Homemaking Responsibilities: No    Ambulation Assistance: Independent(rollator when needed)    Transfer Assistance: Independent    Active : No    Patient's  Info: son    Additional Comments: Pt Urdu speaking interpretor Sierra Frost present,  Pt recieves help from wife and son @ times    Primary Care Provider: PINEDA Fuchs CNP     Social Determinants of Health     Financial Resource Strain: Low Risk     Difficulty of Paying Living Expenses: Not very hard   Food Insecurity: No Food Insecurity    Worried About Running Out of Food in the Last Year: Never true    Ran Out of Food in the Last Year: Never true   Transportation Needs: Not on file   Physical Activity: Not on file   Stress: Not on file   Social Connections: Not on file   Intimate Partner Violence: Not on file   Housing Stability: Not on file     Family History   Problem Relation Age of Onset    Diabetes Mother     Heart Disease Mother     Diabetes Father      No Known Allergies  Current Facility-Administered Medications   Medication Dose Route Frequency Provider Last Rate Last Admin    losartan (COZAAR) tablet 50 mg  50 mg Oral Daily Nicoletto Bolzan-Roche, APRN - CNP   50 mg at 10/06/22 0744    QUEtiapine (SEROQUEL) tablet 25 mg  25 mg Oral BID Nicoletto Bolzan-Roche, APRN - CNP   25 mg at 10/06/22 0744    sertraline (ZOLOFT) tablet 50 mg  50 mg Oral Daily Nicoletto Bolzan-Roche, APRN - CNP   50 mg at 10/06/22 0744    tamsulosin (FLOMAX) capsule 0.4 mg  0.4 mg Oral Daily Nicoletto Bolzan-Roche, APRN - CNP        atorvastatin (LIPITOR) tablet 20 mg  20 mg Oral Nightly Lashell Saini DO        dextrose 5 % and 0.45 % sodium chloride infusion   IntraVENous Continuous Syed Limon  mL/hr at 10/06/22 0754 New Bag at 10/06/22 0754    pantoprazole (PROTONIX) 40 mg in sodium chloride (PF) 10 mL injection  40 mg IntraVENous Daily Theodore Barksdale, APRN - CNP   40 mg at 10/06/22 0940    sodium chloride flush 0.9 % injection 5-40 mL  5-40 mL IntraVENous 2 times per day Harriett Frost, APRN - CNS   10 mL at 10/06/22 0745    sodium chloride flush 0.9 % injection 5-40 mL  5-40 mL IntraVENous PRN Pierre Sciara Dials, APRN - CNS        0.9 % sodium chloride infusion   IntraVENous PRN Pierre Sciara Dials, APRN - CNS        enoxaparin Sodium (LOVENOX) injection 30 mg  30 mg SubCUTAneous Daily Pierre Sciara Dials, APRN - CNS   30 mg at 10/06/22 0744    ondansetron (ZOFRAN-ODT) disintegrating tablet 4 mg  4 mg Oral Q8H PRN Pierre Sciara Dials, APRN - CNS        Or    ondansetron (ZOFRAN) injection 4 mg  4 mg IntraVENous Q6H PRN Pierre Sciara Dials, APRN - CNS        acetaminophen (TYLENOL) tablet 650 mg  650 mg Oral Q6H PRN Pierre Sciara Dials, APRN - CNS        Or    acetaminophen (TYLENOL) suppository 650 mg  650 mg Rectal Q6H PRN Pierre Sciara Anels, APRN - CNS        glucose chewable tablet 16 g  4 tablet Oral PRN Pierre Sciara Anels, APRN - CNS        dextrose bolus 10% 125 mL  125 mL IntraVENous PRN Pierre Sciara Anels, APRN - CNS   Stopped at 10/06/22 0754    Or dextrose bolus 10% 250 mL  250 mL IntraVENous PRN Zeenat Living Dials, APRN - CNS        glucagon (rDNA) injection 1 mg  1 mg SubCUTAneous PRN Zeenat Living Dials, APRN - CNS        dextrose 10 % infusion   IntraVENous Continuous PRN Zeenat Living Dials, APRN - CNS        insulin glargine (LANTUS) injection vial 9 Units  0.15 Units/kg SubCUTAneous Nightly Zeenat Living Dials, APRN - CNS   9 Units at 10/06/22 0009    insulin lispro (HUMALOG) injection vial 3 Units  0.05 Units/kg SubCUTAneous TID WC Zeenat Living Dials, APRN - CNS        carbidopa-levodopa (SINEMET)  MG per tablet 1 tablet  1 tablet Per NG tube BID Zeenat Living Dials, APRN - CNS   1 tablet at 10/06/22 0744    insulin lispro (HUMALOG) injection vial 0-16 Units  0-16 Units SubCUTAneous 4 times per day Marj Casillas MD            Objective:   /73   Pulse 67   Temp 97.5 °F (36.4 °C) (Rectal)   Resp 14   Ht 5' 8\" (1.727 m)   Wt 119 lb 11.4 oz (54.3 kg)   SpO2 97%   BMI 18.20 kg/m²     Physical Exam he awakens easily but is not able to track very well. He is looking around sound only and does not quite follow any commands. Pupils equal reactive garments appear to be conjugate. He withdraws all his extremities good strength. He does not appear to be quite bradykinetic and I do not see any tremors though this cannot be ascertained at this time. Reflexes are 1+. We did not attempt to walk him. XR CHEST (2 VW)    Result Date: 10/5/2022  EXAMINATION: TWO XRAY VIEWS OF THE CHEST 10/5/2022 1:39 pm COMPARISON: None. HISTORY: ORDERING SYSTEM PROVIDED HISTORY: Dysphagia, unspecified type TECHNOLOGIST PROVIDED HISTORY: Reason for exam:->possible aspiration What reading provider will be dictating this exam?->[facility] FINDINGS: The cardiomediastinal silhouette is without acute process. Biapical promises visualized osseous of COPD changes. The lungs are without acute focal process. There is no effusion or pneumothorax. The osseous structures are without acute process.      No acute cardiopulmonary disease. COPD changes. CT HEAD WO CONTRAST    Result Date: 10/5/2022  EXAMINATION: CT OF THE HEAD WITHOUT CONTRAST  10/5/2022 3:52 pm TECHNIQUE: CT of the head was performed without the administration of intravenous contrast. Automated exposure control, iterative reconstruction, and/or weight based adjustment of the mA/kV was utilized to reduce the radiation dose to as low as reasonably achievable. COMPARISON: 09/04/2022 HISTORY: ORDERING SYSTEM PROVIDED HISTORY: dysphagia TECHNOLOGIST PROVIDED HISTORY: Reason for exam:->dysphagia Has a \"code stroke\" or \"stroke alert\" been called? ->No Decision Support Exception - unselect if not a suspected or confirmed emergency medical condition->Emergency Medical Condition (MA) What reading provider will be dictating this exam?->CRC FINDINGS: BRAIN/VENTRICLES: No evidence of parenchymal hemorrhages or contusions. No evidence of intra or extra-axial fluid collection is seen. Scattered areas of low attenuation are visualized in the periventricular and subcortical white matter demonstrating no change in comparison to the prior study consistent with chronic microvascular disease. No evidence of acute territorial infarct is seen. Prominence of the ventricles and sulci is visualized demonstrating no change consistent with chronic atrophic brain changes. No evidence of intracranial mass or mass effect, no evidence of midline shift is seen. No evidence of sellar or parasellar mass is visualized. ORBITS: The visualized portion of the orbits demonstrate no acute abnormality. SINUSES: The visualized paranasal sinuses and mastoid air cells demonstrate no acute abnormality. SOFT TISSUES/SKULL:  No acute abnormality of the visualized skull or soft tissues. No acute intracranial abnormality. Chronic microvascular disease and chronic atrophic brain changes seen.  If there is a high level of suspicion would recommend further evaluation with an MRI of the brain to rule out subtle brainstem lacunar infarcts or acute on chronic pathology. MRI BRAIN WO CONTRAST    Result Date: 10/6/2022  EXAMINATION: MRI OF THE BRAIN WITHOUT CONTRAST  10/6/2022 11:26 am TECHNIQUE: Multiplanar multisequence MRI of the brain was performed without the administration of intravenous contrast. COMPARISON: CT brain performed 10/05/2022. HISTORY: ORDERING SYSTEM PROVIDED HISTORY: rule out cva TECHNOLOGIST PROVIDED HISTORY: Reason for exam:->rule out cva What reading provider will be dictating this exam?->CRC FINDINGS: INTRACRANIAL STRUCTURES/VENTRICLES: The sellar and suprasellar structures, optic chiasm, corpus callosum, pineal gland, tectum, and midline brainstem structures are unremarkable. The craniocervical junction is unremarkable. There is no acute hemorrhage, mass effect, or midline shift. There is satisfactory overall gray-white matter differentiation. There is extensive FLAIR signal abnormality in the subcortical and periventricular white matter that may relate to chronic microvascular disease. The ventricular structures are symmetrically prominent. The infratentorial structures including the cerebellopontine angles and internal auditory canals are unremarkable. There is no abnormal restricted diffusion. There is no abnormal blooming artifact on susceptibility weighted imaging. ORBITS: The visualized portion of the orbits demonstrate no acute abnormality. SINUSES: The visualized paranasal sinuses and mastoid air cells demonstrate no acute abnormality. BONES/SOFT TISSUES: The bone marrow signal intensity appears normal. The soft tissues demonstrate no acute abnormality. Extensive chronic microvascular disease without acute intracranial. Symmetrically prominent ventricular system correlation with symptomatology for normal pressure hydrocephalus is needed.        Lab Results   Component Value Date/Time    WBC 10.4 10/05/2022 03:30 PM    RBC 4.58 10/05/2022 03:30 PM    HGB 17.6 10/05/2022 05:11 PM    HCT 42.1 10/05/2022 03:30 PM    MCV 91.8 10/05/2022 03:30 PM    MCH 29.7 10/05/2022 03:30 PM    MCHC 32.4 10/05/2022 03:30 PM    RDW 15.3 10/05/2022 03:30 PM     10/05/2022 03:30 PM     Lab Results   Component Value Date/Time     10/06/2022 10:35 AM    K 3.4 10/06/2022 10:35 AM     10/06/2022 10:35 AM    CO2 26 10/06/2022 10:35 AM    BUN 45 10/06/2022 10:35 AM    CREATININE 2.01 10/06/2022 10:35 AM    GFRAA 39.7 10/06/2022 10:35 AM    LABGLOM 32.8 10/06/2022 10:35 AM    GLUCOSE 147 10/06/2022 10:35 AM    PROT 7.4 10/05/2022 03:30 PM    LABALBU 3.4 10/05/2022 03:30 PM    CALCIUM 8.6 10/06/2022 10:35 AM    BILITOT 0.3 10/05/2022 03:30 PM    ALKPHOS 222 10/05/2022 03:30 PM    AST 57 10/05/2022 03:30 PM    ALT 14 10/05/2022 03:30 PM     No results found for: PROTIME, INR  Lab Results   Component Value Date/Time    TSH 1.700 05/25/2022 02:49 PM     Lab Results   Component Value Date/Time    TRIG 102 09/24/2022 10:22 AM    HDL 53 09/24/2022 10:22 AM    LDLCALC 54 09/24/2022 10:22 AM     Lab Results   Component Value Date/Time    ETOH 13 09/04/2022 02:13 PM     No results found for: LITHIUM, DILFRTOT, VALPROATE    Assessment:   Parkinson's disease dementia complex. Patient last seen by me in 2019 and diagnosed with Parkinson's disease at that time was started on carbidopa levodopa. He has then not been seen by neurology and is not quite sure if he is followed in [facility] or here I am unable to contact the family due to language issues. At this time patient has been admitted for an encephalopathy consistent with underlying hyponatremia. He has elevated sodium seen for quite some time and I am not quite sure who is following this up. Patient is on carbidopa levodopa and a very low-dose of Mirapex which likely is not helping I am not quite sure when the last changes were made. Is not seen at a tertiary care center. MRI was done and does not show PRES.   It is recommended that his sodium be corrected slowly to avoid big shifts and CPM    We will follow this up with an EEG for now and once is somewhat better we will assess his swallowing and retitrate his medication. Critical care time of 45 minutes      Syd Geiger MD, [address], American Board of Psychiatry & Neurology  Board Certified in Vascular Neurology  Board Certified in Neuromuscular Medicine  Certified in [facility]:

## 2022-10-06 NOTE — PROGRESS NOTES
Spiritual Care Services     Summary of Visit:  Interdisciplinary rounds completed. Patient slept through rounds. Apparently he is non-verbal. No family was present at the time. No AD for this patient. Spiritual Assessment/Intervention/Outcomes:    Encounter Summary  Encounter Overview/Reason : Interdisciplinary rounds  Service Provided For[de-identified] Patient  Referral/Consult From[de-identified] Rounding  Support System: Spouse, Children, Family members  Complexity of Encounter: Low  Begin Time: 1900  End Time : 1915  Total Time Calculated: 15 min  Encounter   Type: Initial Screen/Assessment     Spiritual/Emotional needs  Type: Spiritual Support                    Values / Beliefs  Do You Have Any Ethnic, Cultural, Sacramental, or Spiritual Religion Needs You Would Like Us To Be Aware of While You Are in the Hospital : No    Care Plan:    Continue to support. Spiritual Care Services   Electronically signed by Alona Sit on 10/6/22 at 10:22 AM EDT     To reach a  for emotional and spiritual support, place an [facility] consult request.   If a  is needed immediately, dial 0 and ask to page the on-call .

## 2022-10-06 NOTE — CARE COORDINATION
Quality rounds completed with team. D/C plan is home with family who are care givers and HHC. Nephrology consulted for elevated BUN/Creatinine.  Pt is non verbal.

## 2022-10-06 NOTE — CONSULTS
[address]                      [address], [address] [facility]                                  CONSULTATION    PATIENT NAME: Hank Gerard                :        1950  MED REC NO:   [id number]                            ROOM:       02  ACCOUNT NO:   [de-identified]                           ADMIT DATE: 10/05/2022  PROVIDER:     Bowen Caldwell DO    CONSULT DATE:  10/06/2022    RENAL CONSULTATION    HISTORY OF PRESENT ILLNESS:  A 80-year-old with known history of  Parkinson's disease and dementia. He is being seen due to hypernatremia  with a sodium of 163 mEq. According to the son, he has not been eating  well and has had issues with choking on liquids and solid foods. The  patient is unable to give any history at this time. PAST MEDICAL HISTORY:  Compression fractures of T3-T6, hypertension,  hyperlipidemia, dementia, Parkinson's, diabetes mellitus type 2. PAST SURGICAL HISTORY:  Appendectomy. HABITS:  No smoking. No alcohol. No opioids. REVIEW OF SYSTEMS:  Unable to get review of systems. PHYSICAL EXAMINATION:  VITAL SIGNS:  5 feet and 8 inches, 119 pounds. Blood pressure is  115/60, heart rate 70, respirations 18, afebrile since admission. HEENT:  Normocephalic. Pupils reactive to light. NECK:  Supple. No JVD or adenopathy. CARDIOVASCULAR:  Heart is regular, 1/6 systolic murmur. CHEST:  Lungs are relatively clear. ABDOMEN:  Soft. No guarding or rigidity. EXTREMITIES:  Showed the patient to have muscle wasting. No edema. No  evidence of calf pain. No cyanosis. IMPRESSION:  1. Hypernatremia secondary to severe dehydration. 2.  Dysphagia possibly related to Parkinson's. 3.  Parkinson's. 4.  Diabetes mellitus type 2.  5.  Hyperlipidemia. PLAN:  The patient is estimated to be 5 liters behind in fluids based on  serum sodium and body kilogram weight.   We will continue present IVs,  give a bolus of dextrose and may need to repeat this tonight pending  serum sodium levels later. Consider possibility down the road of the  PEG for prevention of repeat hypernatremia and dehydration.         Jessica Cage DO    D: 10/06/2022 10:44:38       T: 10/06/2022 10:48:07     [email or web address]  Job#: [id number]     Doc#: [id number]    CC:

## 2022-10-06 NOTE — PROGRESS NOTES
2000 Pt is in bed. No O2 in use. Pt is non verbal. Has history of Parkinsons dementia. Pt understands Chinese. Pt does not follow commands. Moves x4 spontaneously. Restless and agitated. JACE at 3 mm. Pt has external catheter which is draining yellow urine. 2100 Lab was notified x2 to come and draw pts 2100 labwork . No person from lab showed up.     2200 Supervisor Carlos Manuel Guillaume notified that no one can find a lab person. Pt pulled IV out. NG tube paced in R nostril without difficulty. Measured at 60 cm. Gastric PH measured. 2315 Pt placed in soft wrist restraints bilaterally. Pt attempting to remove NG tube and Almita Wang notified via perfect serve the 2300 BMP, Troponin. No orders. 0300 External catheter draining yellow urine. 0500 dr. Jose Wang perfect served results of 0300 BMP. Orders left.

## 2022-10-06 NOTE — PROGRESS NOTES
Glucose 56, pt. Given PRN IV dextrose per order, repeat glucose 102. Continuous fluids changed from 1/2 NS to D5 1/2 NS. Called lab to request results from 7am BMP draw. Patient taken to MRI. Unable to complete MRA as pt. Unable to remain still for testing. Dr. Jesús Samuel and Dr. Modesto Bird aware. 1100 sodium level at 166, Dr. Darryle Pastor, Dr. Jesús Samuel, and Dr. Magdaleno Anderson aware. Per Dr. Magdaleno Anderson, pt. To continue with D5 1/2 NS fluids. Pt. Pulled out NGT. Inserted new NGT. Placement confirmed with xray and TF restarted. Throughout shift vitals stable. Pt. Alternating between awake and restless, and sleeping. Family in room to see pt. And updated.

## 2022-10-06 NOTE — PROGRESS NOTES
Progress Note  Date:10/6/2022       Room:IC02/IC02-01  Patient Dorita Room Tim     YOB: 1950     Age:72 y.o. Subjective      Sodium shayla to 167 from 165 on admission despite administration of NS on admission. Switch to D5 half NS. Hypoglycemic on morning labs, being addressed with D5 half NS. Recheck pending. Objective         Vitals Last 24 Hours:  TEMPERATURE:  Temp  Av.4 °F (36.9 °C)  Min: 97.5 °F (36.4 °C)  Max: 99.1 °F (37.3 °C)  RESPIRATIONS RANGE: Resp  Av.6  Min: 14  Max: 24  PULSE OXIMETRY RANGE: SpO2  Av.7 %  Min: 91 %  Max: 100 %  PULSE RANGE: Pulse  Av.8  Min: 73  Max: 88  BLOOD PRESSURE RANGE: Systolic (97HML), RGC:527 , Min:105 , VYT:041   ; Diastolic (92PCN), UZV:64, Min:53, Max:95    I/O (24Hr): Intake/Output Summary (Last 24 hours) at 10/6/2022 0806  Last data filed at 10/6/2022 0544  Gross per 24 hour   Intake 1154.85 ml   Output 400 ml   Net 754.85 ml     Objective:  Vital signs: (most recent): Blood pressure 139/73, pulse 67, temperature 97.5 °F (36.4 °C), temperature source Rectal, resp. rate 14, height 5' 8\" (1.727 m), weight 119 lb 11.4 oz (54.3 kg), SpO2 97 %. Constitutional: Cachectic elderly critically ill adult male reclining in bed no acute distress  Head: NCAT  Eyes: PERRLA  ENT: Sometimes responds to verbal stimuli. No active bleeding noted in oropharynx today. Neck: Trachea midline. Cardiovascular: RRR. No significant murmurs appreciated. Pulmonary: Normal rate and effort of respiration on room air. Abdomen: Soft, scaphoid, nontense. Hypoactive bowel sounds. No obvious discomfort to palpation. Neurologic: Somnolent. RASS equivalent -1. Not sedated. Two-point soft wrist restraints. Occasionally responds to some stimuli. Follows no commands. Nonverbal at baseline. Appears slightly more awake today when aroused compared to at time of admission. Psychiatric: Not agitated.    Integumentary: Decreased skin boubacar. Labs/Imaging/Diagnostics    Labs:  CBC:  Recent Labs     10/05/22  1530 10/05/22  1711   WBC 10.4  --    RBC 4.58*  --    HGB 13.6* 17.6*   HCT 42.1  --    MCV 91.8  --    RDW 15.3*  --      --      CHEMISTRIES:  Recent Labs     10/05/22  1530 10/05/22  1711 10/05/22  2322 10/06/22  0253   *  --  164* 167*   K 5.6*  --  3.8 4.2   *  --  128* 131*   CO2 27  --  25 23   BUN 57*  --  54* 52*   CREATININE 2.67* 2.7* 2.38* 2.25*   GLUCOSE 445*  --  223* 152*   MG 2.6*  --   --   --      PT/INR:No results for input(s): PROTIME, INR in the last 72 hours. APTT:No results for input(s): APTT in the last 72 hours. LIVER PROFILE:  Recent Labs     10/05/22  1530   AST 57*   ALT 14   BILITOT 0.3   ALKPHOS 222*       Imaging Last 24 Hours:  XR CHEST (2 VW)    Result Date: 10/5/2022  EXAMINATION: TWO XRAY VIEWS OF THE CHEST 10/5/2022 1:39 pm COMPARISON: None. HISTORY: ORDERING SYSTEM PROVIDED HISTORY: Dysphagia, unspecified type TECHNOLOGIST PROVIDED HISTORY: Reason for exam:->possible aspiration What reading provider will be dictating this exam?->[facility] FINDINGS: The cardiomediastinal silhouette is without acute process. Biapical promises visualized osseous of COPD changes. The lungs are without acute focal process. There is no effusion or pneumothorax. The osseous structures are without acute process. No acute cardiopulmonary disease. COPD changes. CT HEAD WO CONTRAST    Result Date: 10/5/2022  EXAMINATION: CT OF THE HEAD WITHOUT CONTRAST  10/5/2022 3:52 pm TECHNIQUE: CT of the head was performed without the administration of intravenous contrast. Automated exposure control, iterative reconstruction, and/or weight based adjustment of the mA/kV was utilized to reduce the radiation dose to as low as reasonably achievable.  COMPARISON: 09/04/2022 HISTORY: ORDERING SYSTEM PROVIDED HISTORY: dysphagia TECHNOLOGIST PROVIDED HISTORY: Reason for exam:->dysphagia Has a \"code stroke\" or \"stroke alert\" been called? ->No Decision Support Exception - unselect if not a suspected or confirmed emergency medical condition->Emergency Medical Condition (MA) What reading provider will be dictating this exam?->CRC FINDINGS: BRAIN/VENTRICLES: No evidence of parenchymal hemorrhages or contusions. No evidence of intra or extra-axial fluid collection is seen. Scattered areas of low attenuation are visualized in the periventricular and subcortical white matter demonstrating no change in comparison to the prior study consistent with chronic microvascular disease. No evidence of acute territorial infarct is seen. Prominence of the ventricles and sulci is visualized demonstrating no change consistent with chronic atrophic brain changes. No evidence of intracranial mass or mass effect, no evidence of midline shift is seen. No evidence of sellar or parasellar mass is visualized. ORBITS: The visualized portion of the orbits demonstrate no acute abnormality. SINUSES: The visualized paranasal sinuses and mastoid air cells demonstrate no acute abnormality. SOFT TISSUES/SKULL:  No acute abnormality of the visualized skull or soft tissues. No acute intracranial abnormality. Chronic microvascular disease and chronic atrophic brain changes seen. If there is a high level of suspicion would recommend further evaluation with an MRI of the brain to rule out subtle brainstem lacunar infarcts or acute on chronic pathology.      Assessment//Plan           Hospital Problems             Last Modified POA    * (Principal) Hypernatremia 10/5/2022 Yes     Assessment & Plan    Acute hypernatremia secondary to severe dehydration with renal failure  Acute hyperkalemia - improved  Acute kidney injury secondary to severe dehydration - rule out urinary retention  Dysphagia - etiology DDX: advanced dementia/Parkinson's disease vs acute CVA  Type 2 dm with hyperlglycemia - improving  Elevated troponin-suspect demand ischemia multifactorial in setting of dehydration, possible CVA  Lactic acidemia secondary to renal failure and dehydration-metabolic acidosis non-anion gap  Parkinson's disease/dementia  DVT prophylaxis    Plan:  Seen and examined in the emergency department  Admit to intensive care unit  BMP every 4 hours  normal saline and 125 cc an hour  Npo  Insert Dobbhoff for medications  frost catheter for intake and output critical care setting  MRI of head/MRA head and neck  Trend troponin  Repeat lactic acid every 4 hours x 3  If repeat potassium greater than 5.6 we will give 1 dose of Lokelma. Insulin has been ordered  Insulin-Lantus initiated along with basal insulin and sliding scale  Will administer sinemet through [address] with family in the emergency department-patient will remain full code. Family would want PEG tube if required  10/6: Sodium shayla to 167 from 165 on admission despite administration of NS on admission. Switch to D5 half NS. Hypoglycemic on morning labs, being addressed with D5 half NS. Recheck pending. Neurology and Nephrology consults pending. Critical Care overseeing management while in ICU. MRI pending.       Electronically signed by Marelyn Meigs, DO on 10/6/22 at 8:06 AM EDT

## 2022-10-06 NOTE — PROGRESS NOTES
Comprehensive Nutrition Assessment    Type and Reason for Visit:  Initial, Consult (TF order and manage)    Nutrition Recommendations/Plan:   Begin TF : Standard with Fiber ( Jevity 1.5) @ 20 ml/hr via NG  Water flushes 300 ml every 4 hrs , as per intensivist team, until hypernatremia improves  Increase, as tolerated to a goal rate of 45 ml/hr  Monitor for changes in renal function/glucose for further TF recommendations     Malnutrition Assessment:  Malnutrition Status:  Severe malnutrition (10/06/22 0945)    Context:  Social/Environmental Circumstances     Findings of the 6 clinical characteristics of malnutrition:  Energy Intake:  50% or less estimated energy requirements for 1 month or longer  Weight Loss:  Greater than 20% over 1 year     Body Fat Loss:  Mild body fat loss (to moderate) Orbital, Fat Overlying Ribs   Muscle Mass Loss:  Mild muscle mass loss Clavicles (pectoralis & deltoids), Temples (temporalis), Hand (interosseous)  Fluid Accumulation:  Unable to assess (not know to be nutritionally related)     Strength:  Not Performed    Nutrition Assessment:    Pt admited with severe malnutrition,  > 30% wt loss < 1 year, to start TF today due to dysphagia, will continue to monitor    Nutrition Related Findings:    \" history of advanced Parkinson's disease dementia, type 2 diabetes mellitus, hyperlipidemia, compression fracture of T3, T4-T5-T6 who was brought into the emergency department for altered mental status. Patient is bedbound at baseline. He is from [facility].  Prior to coming to the [place] he was functionable and family does things for him and since then he has had an overall rapid decline in the past few months. Over the past few days family has noticed that he is choking when he is eating. Chidi Valenzuela Dx: in acute renal failure and  lactic acidosis. \". Chidi Valenzuela too lethargic for BSE, corpak placed, 1+ pitting BLE edema, last BM PTA, IVF via peripheral line D5.45 SN @ 150 ml/hr ( ~1000 kcals), labs noted ( K was 5.6 now 3.3, BUN/creat improving, glucose , na = 163))   meds reivewed Wound Type: None       Current Nutrition Intake & Therapies:    Diet NPO  ADULT TUBE FEEDING; Nasogastric; Peptide Based High Protein; Continuous; 20; No; 300; Q 4 hours  Current Tube Feeding (TF) Orders:  Feeding Route: Nasogastric  Formula: Standard with Fiber  Schedule: Continuous  Feeding Regimen: 20 ml/hr  Water Flushes: 300 ml  every 4 hrs ( 1800 ml)  Current TF & Flush Orders Provides: 720 kcals, 31 g protein, 2165 ml free water  Goal TF & Flush Orders Provides: @ goal rate 45 ml/hr =1620 kcals,  69 g protein, ~2620 ml free water    Anthropometric Measures:  Height: 5' 8\" (172.7 cm)  Ideal Body Weight (IBW): 154 lbs (70 kg)    Admission Body Weight: 125 lb (56.7 kg)  Current Body Weight: 119 lb (54 kg) (edema present),   77% IBW.  Weight Source: Bed Scale  Current BMI (kg/m2): 18.1  Usual Body Weight: 170 lb (77.1 kg) (( 1/22) 170# ( 11/21))  % Weight Change (Calculated): -30  Weight Adjustment For: No Adjustment                 BMI Categories: Underweight (BMI less than 22) age over 72    Estimated Daily Nutrient Needs:  Energy Requirements Based On: Kcal/kg  Weight Used for Energy Requirements: Current  Energy (kcal/day): 1663-1507 kcals @ 30-32 kcal/kg  Weight Used for Protein Requirements: Current  Protein (g/day): 70-81 g protein @ 1.3-1.5 g /kg  Method Used for Fluid Requirements: 1 ml/kcal  Fluid (ml/day): ~1700 ml    Nutrition Diagnosis:   Inadequate oral intake related to swallowing difficulty as evidenced by NPO or clear liquid status due to medical condition  Severe malnutrition, In context of social or environmental circumstances related to cognitive or neurological impairment, swallowing difficulty as evidenced by poor intake prior to admission, Criteria as identified in malnutrition assessment    Nutrition Interventions:   Food and/or Nutrient Delivery: Start Tube Feeding  Nutrition Education/Counseling: Education not indicated  Coordination of Nutrition Care: Continue to monitor while inpatient       Goals:     Goals: Meet at least 75% of estimated needs, by next RD assessment       Nutrition Monitoring and Evaluation:   Behavioral-Environmental Outcomes: None Identified  Food/Nutrient Intake Outcomes: Food and Nutrient Intake, Enteral Nutrition Intake/Tolerance  Physical Signs/Symptoms Outcomes: Chewing or Swallowing, Biochemical Data, Weight    Discharge Planning:     Too soon to determine     Mateo Trent, RD, LD

## 2022-10-06 NOTE — PLAN OF CARE
Problem: Discharge Planning  Goal: Discharge to home or other facility with appropriate resources  Outcome: Progressing     Problem: Pain  Goal: Verbalizes/displays adequate comfort level or baseline comfort level  Outcome: Progressing     Problem: Safety - Medical Restraint  Goal: Remains free of injury from restraints (Restraint for Interference with Medical Device)  Description: INTERVENTIONS:  1. Determine that other, less restrictive measures have been tried or would not be effective before applying the restraint  2. Evaluate the patient's condition at the time of restraint application  3. Inform patient/family regarding the reason for restraint  4. Q2H: Monitor safety, psychosocial status, comfort, nutrition and hydration  Outcome: Progressing     Problem: Skin/Tissue Integrity  Goal: Absence of new skin breakdown  Description: 1. Monitor for areas of redness and/or skin breakdown  2. Assess vascular access sites hourly  3. Every 4-6 hours minimum:  Change oxygen saturation probe site  4. Every 4-6 hours:  If on nasal continuous positive airway pressure, respiratory therapy assess nares and determine need for appliance change or resting period.   Outcome: Progressing     Problem: ABCDS Injury Assessment  Goal: Absence of physical injury  Outcome: Progressing     Problem: Safety - Adult  Goal: Free from fall injury  Outcome: Progressing

## 2022-10-07 NOTE — PROGRESS NOTES
2000 Dr. Sera Guzmán notified of 1900 lab work. Orders received. Pts eyes are opened. Moves x4. Does not follow commands. Pt is non verbal but appears to mouth words. Pt has occasional cough. Monitor shows RSR.    0000 Pt tolerating tube feedings. 0400 Pt pulled external catheter off. Catheter replaced. 0600 Pt had quiet night. Restless at times but then falls asleep.

## 2022-10-07 NOTE — PROGRESS NOTES
Spiritual Care Services     Summary of Visit:  Interdisciplinary rounds completed. No family present. Patient appears to be improving. Spiritual Assessment/Intervention/Outcomes:    Encounter Summary  Encounter Overview/Reason : Interdisciplinary rounds  Service Provided For[de-identified] Patient  Referral/Consult From[de-identified] Rounding  Support System: Family members, Children  Last Encounter : 10/06/22  Complexity of Encounter: Low  Begin Time: 1900  End Time : 1915  Total Time Calculated: 15 min  Encounter   Type: Initial Screen/Assessment     Spiritual/Emotional needs  Type: Spiritual Support                    Values / Beliefs  Do You Have Any Ethnic, Cultural, Sacramental, or Spiritual Tenriism Needs You Would Like Us To Be Aware of While You Are in the Hospital : No    Care Plan:    Continue supporting family and patient. Spiritual Care Services   Electronically signed by Bk Holden on 10/7/22 at 10:03 AM EDT     To reach a  for emotional and spiritual support, place an [facility] consult request.   If a  is needed immediately, dial 0 and ask to page the on-call .

## 2022-10-07 NOTE — PROGRESS NOTES
Critical Care Progress Note    10/7/2022 11:09 AM    Subjective:   Admit Date: 10/5/2022  PCP: Margret Molina MD    Chief Complaint   Patient presents with    Dysphagia     Difficulty swallowing this morning     Interval History: Sodium has been improving. Most recent number is 155. Urine output is 1050 cc overnight. No fever overnight. Started NG tube feeding with water flushes yesterday. There is no major changes in mental status.         Medications:   Scheduled Meds:   [START ON 10/8/2022] enoxaparin  40 mg SubCUTAneous Daily    losartan  50 mg Oral Daily    QUEtiapine  25 mg Oral BID    sertraline  50 mg Oral Daily    tamsulosin  0.4 mg Oral Daily    atorvastatin  20 mg Oral Nightly    pantoprazole (PROTONIX) 40 mg injection  40 mg IntraVENous Daily    insulin lispro  0-16 Units SubCUTAneous Q4H    sodium chloride flush  5-40 mL IntraVENous 2 times per day    insulin glargine  0.15 Units/kg SubCUTAneous Nightly    carbidopa-levodopa  1 tablet Per NG tube BID     Continuous Infusions:   dextrose 5 % and 0.45 % NaCl 75 mL/hr at 10/07/22 0517    sodium chloride      dextrose           Objective:   Vitals:   Temp (24hrs), Av.5 °F (36.4 °C), Min:97 °F (36.1 °C), Max:98.3 °F (36.8 °C)    BP (!) 152/64   Pulse 62   Temp 97.2 °F (36.2 °C) (Axillary)   Resp 15   Ht 5' 8\" (1.727 m)   Wt 119 lb 11.4 oz (54.3 kg)   SpO2 97%   BMI 18.20 kg/m²   I/O:24HR INTAKE/OUTPUT:    Intake/Output Summary (Last 24 hours) at 10/7/2022 1109  Last data filed at 10/7/2022 0518  Gross per 24 hour   Intake 5109.77 ml   Output 1050 ml   Net 4059.77 ml     10/06 0701 - 10/07 07  In: 5159.8 [I.V.:2876.6]  Out: 4223 [Urine:1050]  CVP:      Physical Exam:       General appearance -cachectic and ill appearing  Mental status -encephalopathic  Eyes - pupils equal and reactive   Nose - normal and patent, NG tube in place  Neck - supple, no significant adenopathy  Chest -diminished bilaterally to auscultation, no wheezes, rales or rhonchi, symmetric air entry  Heart - normal rate, regular rhythm, normal S1, S2, no murmurs, rubs, clicks or gallops  Abdomen - soft, nontender, nondistended, no masses or organomegaly  Rectal - deferred, not clinically indicated  Neurological -encephalopathic, nonfocal exam.  Withdraws to pain. Does not follow commands  Musculoskeletal - no joint tenderness, deformity or swelling  Extremities - peripheral pulses normal, no pedal edema, no clubbing or cyanosis  Skin - normal coloration and turgor, no rashes, no suspicious skin lesions noted        BMP:    Recent Labs     10/06/22  2245 10/07/22  0304 10/07/22  0639   * 152* 154*   K 3.5 3.5 3.5   * 120* 123*   CO2 21 24 22   BUN 34* 31* 31*   CREATININE 1.74* 1.65* 1.54*   GLUCOSE 205* 254* 202*    . MG:3,PHOS:3)@  Ionized Calcium: No results found for: IONCA  CBC:   Recent Labs     10/05/22  1530 10/05/22  1711   WBC 10.4  --    HGB 13.6* 17.6*     --       ABG: No results for input(s): PH, PCO2, PO2 in the last 72 hours. Assessment and Plan:       Impression:     -Encephalopathy. This is likely due to hypernatremia in addition to acute renal failure. -Severe life-threatening hypernatremia. Has been improving.  -Acute renal failure. Prerenal due to dehydration. Has been improving  -Hyperglycemia.  -Lactic acidosis. This has resolved  -Failure to thrive.  -History of Parkinson disease with advanced dementia. Recommendations:     -Continue current care in the ICU for hemodynamic and airway monitoring.  - Continue fluids. D5 half-normal saline. Rate was decreased to 75 cc an hour. Strict intake and output. Replete electrolytes every 4 hours.    -Continue water flushes with tube feeding.  - Strict intake and output. Watch urine output. -Nephrology has been consulted. -Tight glucose control  -DVT and GI prophylaxis.          Prophylaxis:  Stress ulcer: [] PPI Agent [] H2RA [] Sucralfate [] Other:   VTE: [] Enoxaparin  [] SC Heparin  [] SCD      Full Code      Excluding procedures, the total critical care time caring for this patient with life threatening, unstable organ failure, including direct patient contact, review of medical record, management of life support systems, review of data including imaging and labs, discussions with other team members, patient's family and physicians at least 31 minutes so far today.         Electronically signed by Windell Dakins, MD on 10/7/2022 at 11:09 AM

## 2022-10-07 NOTE — CARE COORDINATION
LSW and care management team meet with pt at bedside. Quality round completed. DC plan home with family and [facility]. LSW will follow.      Electronically signed by GAMLA Canchola on 10/7/2022 at 11:17 AM

## 2022-10-07 NOTE — PLAN OF CARE
Problem: Discharge Planning  Goal: Discharge to home or other facility with appropriate resources  10/6/2022 2325 by Edwar Varner RN  Outcome: Progressing  10/6/2022 1621 by Shaun Henderson RN  Outcome: Progressing  Flowsheets (Taken 10/6/2022 0800)  Discharge to home or other facility with appropriate resources: Identify barriers to discharge with patient and caregiver     Problem: Pain  Goal: Verbalizes/displays adequate comfort level or baseline comfort level  10/6/2022 2325 by Edwar Varner RN  Outcome: Progressing  10/6/2022 1621 by Shaun Henderson RN  Outcome: Progressing     Problem: Safety - Medical Restraint  Goal: Remains free of injury from restraints (Restraint for Interference with Medical Device)  Description: INTERVENTIONS:  1. Determine that other, less restrictive measures have been tried or would not be effective before applying the restraint  2. Evaluate the patient's condition at the time of restraint application  3. Inform patient/family regarding the reason for restraint  4.  Q2H: Monitor safety, psychosocial status, comfort, nutrition and hydration  10/6/2022 2325 by Edwar Varner RN  Outcome: Progressing  Flowsheets (Taken 10/6/2022 1800 by Shaun Henderson RN)  Remains free of injury from restraints (restraint for interference with medical device): Determine that other, less restrictive measures have been tried or would not be effective before applying the restraint  10/6/2022 1621 by Shaun Henderson RN  Outcome: Progressing  Flowsheets  Taken 10/6/2022 1617  Remains free of injury from restraints (restraint for interference with medical device): Every 2 hours: Monitor safety, psychosocial status, comfort, nutrition and hydration  Taken 10/6/2022 1600  Remains free of injury from restraints (restraint for interference with medical device): Every 2 hours: Monitor safety, psychosocial status, comfort, nutrition and hydration     Problem: Skin/Tissue Integrity  Goal: Absence of new skin breakdown  Description: 1. Monitor for areas of redness and/or skin breakdown  2. Assess vascular access sites hourly  3. Every 4-6 hours minimum:  Change oxygen saturation probe site  4. Every 4-6 hours:  If on nasal continuous positive airway pressure, respiratory therapy assess nares and determine need for appliance change or resting period.   10/6/2022 2325 by Quincy Espinal RN  Outcome: Progressing  10/6/2022 1621 by Brent Hoff RN  Outcome: Progressing     Problem: ABCDS Injury Assessment  Goal: Absence of physical injury  10/6/2022 2325 by Quincy Espinal RN  Outcome: Progressing  10/6/2022 1621 by Bretn Hoff RN  Outcome: Progressing     Problem: Chronic Conditions and Co-morbidities  Goal: Patient's chronic conditions and co-morbidity symptoms are monitored and maintained or improved  10/6/2022 2325 by Quincy Espinal RN  Outcome: Progressing  Flowsheets (Taken 10/6/2022 2000)  Care Plan - Patient's Chronic Conditions and Co-Morbidity Symptoms are Monitored and Maintained or Improved: Monitor and assess patient's chronic conditions and comorbid symptoms for stability, deterioration, or improvement  10/6/2022 1621 by Brent Hoff RN  Outcome: Progressing  Flowsheets (Taken 10/6/2022 0800)  Care Plan - Patient's Chronic Conditions and Co-Morbidity Symptoms are Monitored and Maintained or Improved: Monitor and assess patient's chronic conditions and comorbid symptoms for stability, deterioration, or improvement     Problem: Nutrition Deficit:  Goal: Optimize nutritional status  10/6/2022 2325 by Quincy Espinal RN  Outcome: Progressing  10/6/2022 1621 by Brent Hoff RN  Outcome: Progressing  10/6/2022 0952 by Aditya Kyle RD, LD  Flowsheets (Taken 10/6/2022 2123)  Nutrient intake appropriate for improving, restoring, or maintaining nutritional needs:   Assess nutritional status and recommend course of action   Monitor oral intake, labs, and treatment plans Recommend, monitor, and adjust tube feedings and TPN/PPN based on assessed needs   Continue plan of care.

## 2022-10-07 NOTE — PROGRESS NOTES
Patient assessment complete. Pt. Drowsy/sleeping but responds to voice. Pt. Nonverbal but occasionally mouths words/incomprehensible speech. Does not follow commands. Vitals stable, SR, on room air. Pt. Tolerating tube feed/flushes. External catheter in place. Pt. Had smear BM. Pt. Continuing to run fluids, monitoring Na level/BMP. Rounds done, no changes to fluids or flushes at this time. EEG tech in room. Patient bathed, new external catheter applied, tele monitor applied, pt. Tolerated well. Report called to 2W.

## 2022-10-07 NOTE — PROGRESS NOTES
Progress Note  Date:10/7/2022       [facility]  Patient [facility] Dumont     YOB: 1950     Age:72 y.o. Subjective      Sodium improved to 154 today. Creatinine improved to 1.54. Patient remains nonverbal and largely non-interactive, but is somewhat more active when awake and/stimulated compared to her at time of admission. Pulled out NG overnight, which was replaced. Soft bilateral wrist restraints for tube/line protection at present. Objective         Vitals Last 24 Hours:  TEMPERATURE:  Temp  Av.6 °F (36.4 °C)  Min: 97 °F (36.1 °C)  Max: 98.3 °F (36.8 °C)  RESPIRATIONS RANGE: Resp  Av.9  Min: 10  Max: 19  PULSE OXIMETRY RANGE: SpO2  Av %  Min: 94 %  Max: 99 %  PULSE RANGE: Pulse  Av.4  Min: 60  Max: 81  BLOOD PRESSURE RANGE: Systolic (53JLY), BFH:078 , Min:105 , WXV:399   ; Diastolic (83RPT), LHW:35, Min:55, Max:152    I/O (24Hr): Intake/Output Summary (Last 24 hours) at 10/7/2022 0811  Last data filed at 10/7/2022 0518  Gross per 24 hour   Intake 5109.77 ml   Output 1050 ml   Net 4059.77 ml       Objective:  Vital signs: (most recent): Blood pressure (!) 152/64, pulse 62, temperature 97.2 °F (36.2 °C), temperature source Axillary, resp. rate 15, height 5' 8\" (1.727 m), weight 119 lb 11.4 oz (54.3 kg), SpO2 97 %. Constitutional: Cachectic elderly critically ill adult male reclining in bed no acute distress  Head: NCAT  Eyes: PERRLA  ENT: Sometimes responds to verbal stimuli. Neck: Trachea midline. Cardiovascular: RRR. No significant murmurs appreciated. Pulmonary: Normal rate and effort of respiration on room air. Abdomen: Soft, scaphoid, nontense. Hypoactive bowel sounds. No obvious discomfort to palpation. Neurologic: Somnolent. RASS equivalent -1. Not sedated. Two-point soft wrist restraints. Occasionally responds to some stimuli. Follows no commands. Nonverbal at baseline.   Appears slightly more awake today when aroused compared to at time of admission. Psychiatric: Not agitated. Integumentary: Improving skin turgor. Labs/Imaging/Diagnostics    Labs:  CBC:  Recent Labs     10/05/22  1530 10/05/22  1711   WBC 10.4  --    RBC 4.58*  --    HGB 13.6* 17.6*   HCT 42.1  --    MCV 91.8  --    RDW 15.3*  --      --        CHEMISTRIES:  Recent Labs     10/06/22  2245 10/07/22  0304 10/07/22  0639   * 152* 154*   K 3.5 3.5 3.5   * 120* 123*   CO2 21 24 22   BUN 34* 31* 31*   CREATININE 1.74* 1.65* 1.54*   GLUCOSE 205* 254* 202*   MG 2.1 2.0 2.2       PT/INR:No results for input(s): PROTIME, INR in the last 72 hours. APTT:No results for input(s): APTT in the last 72 hours. LIVER PROFILE:  Recent Labs     10/05/22  1530   AST 57*   ALT 14   BILITOT 0.3   ALKPHOS 222*         Imaging Last 24 Hours:  XR CHEST (2 VW)    Result Date: 10/5/2022  EXAMINATION: TWO XRAY VIEWS OF THE CHEST 10/5/2022 1:39 pm COMPARISON: None. HISTORY: ORDERING SYSTEM PROVIDED HISTORY: Dysphagia, unspecified type TECHNOLOGIST PROVIDED HISTORY: Reason for exam:->possible aspiration What reading provider will be dictating this exam?->[facility] FINDINGS: The cardiomediastinal silhouette is without acute process. Biapical promises visualized osseous of COPD changes. The lungs are without acute focal process. There is no effusion or pneumothorax. The osseous structures are without acute process. No acute cardiopulmonary disease. COPD changes. CT HEAD WO CONTRAST    Result Date: 10/5/2022  EXAMINATION: CT OF THE HEAD WITHOUT CONTRAST  10/5/2022 3:52 pm TECHNIQUE: CT of the head was performed without the administration of intravenous contrast. Automated exposure control, iterative reconstruction, and/or weight based adjustment of the mA/kV was utilized to reduce the radiation dose to as low as reasonably achievable.  COMPARISON: 09/04/2022 HISTORY: ORDERING SYSTEM PROVIDED HISTORY: dysphagia TECHNOLOGIST PROVIDED HISTORY: Reason for exam:->dysphagia Has a \"code stroke\" or \"stroke alert\" been called? ->No Decision Support Exception - unselect if not a suspected or confirmed emergency medical condition->Emergency Medical Condition (MA) What reading provider will be dictating this exam?->CRC FINDINGS: BRAIN/VENTRICLES: No evidence of parenchymal hemorrhages or contusions. No evidence of intra or extra-axial fluid collection is seen. Scattered areas of low attenuation are visualized in the periventricular and subcortical white matter demonstrating no change in comparison to the prior study consistent with chronic microvascular disease. No evidence of acute territorial infarct is seen. Prominence of the ventricles and sulci is visualized demonstrating no change consistent with chronic atrophic brain changes. No evidence of intracranial mass or mass effect, no evidence of midline shift is seen. No evidence of sellar or parasellar mass is visualized. ORBITS: The visualized portion of the orbits demonstrate no acute abnormality. SINUSES: The visualized paranasal sinuses and mastoid air cells demonstrate no acute abnormality. SOFT TISSUES/SKULL:  No acute abnormality of the visualized skull or soft tissues. No acute intracranial abnormality. Chronic microvascular disease and chronic atrophic brain changes seen. If there is a high level of suspicion would recommend further evaluation with an MRI of the brain to rule out subtle brainstem lacunar infarcts or acute on chronic pathology.      Assessment//Plan           Hospital Problems             Last Modified POA    * (Principal) Hypernatremia 10/5/2022 Yes   Assessment & Plan    Acute hypernatremia secondary to severe dehydration with renal failure  Acute hyperkalemia - improved  Acute kidney injury secondary to severe dehydration - rule out urinary retention  Dysphagia - etiology DDX: advanced dementia/Parkinson's disease vs acute CVA  Type 2 dm with hyperlglycemia - improving  Elevated troponin-suspect demand ischemia multifactorial in setting of dehydration, possible CVA  Lactic acidemia secondary to renal failure and dehydration-metabolic acidosis non-anion gap  Parkinson's disease/dementia  DVT prophylaxis    Plan:  Seen and examined in the emergency department  Admit to intensive care unit  BMP every 4 hours  normal saline and 125 cc an hour  Npo  Insert Dobbhoff for medications  frost catheter for intake and output critical care setting  MRI of head/MRA head and neck  Trend troponin  Repeat lactic acid every 4 hours x 3  If repeat potassium greater than 5.6 we will give 1 dose of Lokelma. Insulin has been ordered  Insulin-Lantus initiated along with basal insulin and sliding scale  Will administer sinemet through [place] [address] with family in the emergency department-patient will remain full code. Family would want PEG tube if required  10/6: Sodium shayla to 167 from 165 on admission despite administration of NS on admission. Switch to D5 half NS. Hypoglycemic on morning labs, being addressed with D5 half NS. Recheck pending. Neurology and Nephrology consults pending. Critical Care overseeing management while in ICU. MRI pending. 10/7: Sodium improving but not yet at goal.  Creatinine improving. Currently on D5/0.5 NS at 75 mill/hour plus every 4 hour free water flushes per NG tube. Sodium improving. Adjustments being made cautiously to avoid too rapid of a shift due to neurologic risks. Palliative care consult placed this morning for discussion of goals of care with family. Despite being more awake now than when initially admitted, patient is still persistently somewhat somnolent, follows almost all commands, and is likely unsafe to take sufficient p.o. intake to maintain caloric and hydration needs.   Would likely need consideration for PEG tube placement if family wants to extend life as much as possible, vs discussion of possible palliative/hospice options if they do not want to aggressively intervene.       Electronically signed by Cleveland Nascimento DO on 10/7/2022 at 8:13 AM

## 2022-10-07 NOTE — PROGRESS NOTES
Neurology Follow up    SUBJECTIVE: Son is at the bedside and we discussed with him and he was able to communicate with his father and he reports that he is in his normal since today. Sodium is still elevated 151 but denies any headaches has not been any seizures.     Current Facility-Administered Medications   Medication Dose Route Frequency Provider Last Rate Last Admin    [START ON 10/8/2022] enoxaparin (LOVENOX) injection 40 mg  40 mg SubCUTAneous Daily Benito Ros Dials, APRN - CNS        0.45 % sodium chloride infusion   IntraVENous Continuous [place] Clive, APRN - CNP 75 mL/hr at 10/07/22 1446 Rate Verify at 10/07/22 1446    losartan (COZAAR) tablet 50 mg  50 mg Oral Daily Nicoletto Bolzan-Roche, APRN - CNP   50 mg at 10/07/22 0739    QUEtiapine (SEROQUEL) tablet 25 mg  25 mg Oral BID Nicoletto Bolzan-Roche, APRN - CNP   25 mg at 10/07/22 0739    sertraline (ZOLOFT) tablet 50 mg  50 mg Oral Daily Nicoletto Bolzan-Roche, APRN - CNP   50 mg at 10/07/22 0739    tamsulosin (FLOMAX) capsule 0.4 mg  0.4 mg Oral Daily Nicoletto Bolzan-Roche, APRN - CNP        atorvastatin (LIPITOR) tablet 20 mg  20 mg Oral Nightly Caye Ken, DO   20 mg at 10/06/22 2044    pantoprazole (PROTONIX) 40 mg in sodium chloride (PF) 10 mL injection  40 mg IntraVENous Daily Daniel Duffy, APRN - CNP   40 mg at 10/07/22 0739    insulin lispro (HUMALOG) injection vial 0-16 Units  0-16 Units SubCUTAneous Q4H Daniel Duffy, APRN - CNP   4 Units at 10/07/22 0747    sodium chloride flush 0.9 % injection 5-40 mL  5-40 mL IntraVENous 2 times per day Vergia Saliva, APRN - CNS   10 mL at 10/07/22 0742    sodium chloride flush 0.9 % injection 5-40 mL  5-40 mL IntraVENous PRN Benito Ros Dials, APRN - CNS        0.9 % sodium chloride infusion   IntraVENous PRN Benito Ros Dials, APRN - CNS        ondansetron (ZOFRAN-ODT) disintegrating tablet 4 mg  4 mg Oral Q8H PRN Benito Tams, APRN - CNS        Or    ondansetron (ZOFRAN) injection 4 mg  4 mg IntraVENous Q6H PRN Hammad Khan, APRN - CNS        acetaminophen (TYLENOL) tablet 650 mg  650 mg Oral Q6H PRN Parley Rideau Dials, APRN - CNS        Or    acetaminophen (TYLENOL) suppository 650 mg  650 mg Rectal Q6H PRN Parley Rideau Dials, APRN - CNS        glucose chewable tablet 16 g  4 tablet Oral PRN Parley Rideau Dials, APRN - CNS        dextrose bolus 10% 125 mL  125 mL IntraVENous PRN Parley Rideau Dials, APRN - CNS   Stopped at 10/06/22 8253    Or    dextrose bolus 10% 250 mL  250 mL IntraVENous PRN Parley Rideau Dials, APRN - CNS        glucagon (rDNA) injection 1 mg  1 mg SubCUTAneous PRN Parley Rideau Dials, APRN - CNS        dextrose 10 % infusion   IntraVENous Continuous PRN [facility] D[facility], APR[facility] - CNS        insulin glargine (LANTUS) injection vial 9 Units  0.15 Units/kg SubCUTAneous Nightly [facility] Rideau Dials, APRN - CNS   9 Units at 10/06/22 2339    carbidopa-levodopa (SINEMET)  MG per tablet 1 tablet  1 tablet Per NG tube BID Parkevin Rideau Dials, APRN - CNS   1 tablet at 10/07/22 0739       PHYSICAL EXAM:    BP (!) 147/69   Pulse 69   Temp 96.8 °F (36 °C) (Axillary)   Resp 16   Ht 5' 8\" (1.727 m)   Wt 119 lb 11.4 oz (54.3 kg)   SpO2 97%   BMI 18.20 kg/m²    General Appearance:      Skin:  normal  CVS - Normal sounds, No murmurs , No carotid Bruits  RS -CTA  Abdomen Soft, BS present  Review of Systems   Constitutional:  Negative for fever. HENT:  Negative for ear pain, tinnitus and trouble swallowing. Eyes:  Negative for photophobia and visual disturbance. Respiratory:  Negative for choking and shortness of breath. Cardiovascular:  Negative for chest pain and palpitations. Gastrointestinal:  Negative for nausea and vomiting. Musculoskeletal:  Negative for back pain, gait problem, joint swelling, myalgias, neck pain and neck stiffness. Skin:  Negative for color change. Allergic/Immunologic: Negative for food allergies.    Neurological:  Negative for dizziness, tremors, seizures, syncope, facial asymmetry, speech difficulty, weakness, light-headedness, numbness and headaches. Psychiatric/Behavioral:  Negative for behavioral problems, confusion, hallucinations and sleep disturbance. The above was available through translation though patient may not have understood most of it    Mental Status Exam:             Level of Alertness:   awake            Orientation:   person,  Patient's language appears to be normal  Funduscopic Exam:     Cranial Nerves              Cranial nerve III           Pupils:  equal, round, reactive to light      Cranial nerves III, IV, VI           Extraocular Movements: intact      Cranial nerve V           Facial sensation:  intact      Cranial nerve VII           Facial strength: intact            Cranial nerve XII          Tongue movement:  normal    Motor:    Drift:  absent  Motor exam is symmetrical 4 out of 5 all extremities bilaterally  Tone:  normal  Abnormal Movements:  absent            Sensory: No focal deficits        Pinprick             Right Upper Extremity:  normal             Left Upper Extremity:  normal             Right Lower Extremity:  normal             Left Lower Extremity:  normal           Vibration                         Touch            Proprioception                 Coordination:           Finger/Nose   Right:  normal              Left:  normal          Heel-Knee-Shin                Right:  normal              Left:  normal          Rapid Alternating Movements              Right:  normal              Left:  normal          Gait:                       Casual: Gait is deferred          Reflexes:             Deep Tendon Reflexes:             Reflexes are 2 +             Plantar response:                Right:  downgoing               Left:  downgoing    Vascular:  Cardiac Exam:  normal         XR CHEST (2 VW)    Result Date: 10/5/2022  EXAMINATION: TWO XRAY VIEWS OF THE CHEST 10/5/2022 1:39 pm COMPARISON: None.  HISTORY: ORDERING SYSTEM PROVIDED HISTORY: Dysphagia, unspecified type TECHNOLOGIST PROVIDED HISTORY: Reason for exam:->possible aspiration What reading provider will be dictating this exam?->[facility] FINDINGS: The cardiomediastinal silhouette is without acute process. Biapical promises visualized osseous of COPD changes. The lungs are without acute focal process. There is no effusion or pneumothorax. The osseous structures are without acute process. No acute cardiopulmonary disease. COPD changes. CT HEAD WO CONTRAST    Result Date: 10/5/2022  EXAMINATION: CT OF THE HEAD WITHOUT CONTRAST  10/5/2022 3:52 pm TECHNIQUE: CT of the head was performed without the administration of intravenous contrast. Automated exposure control, iterative reconstruction, and/or weight based adjustment of the mA/kV was utilized to reduce the radiation dose to as low as reasonably achievable. COMPARISON: 09/04/2022 HISTORY: ORDERING SYSTEM PROVIDED HISTORY: dysphagia TECHNOLOGIST PROVIDED HISTORY: Reason for exam:->dysphagia Has a \"code stroke\" or \"stroke alert\" been called? ->No Decision Support Exception - unselect if not a suspected or confirmed emergency medical condition->Emergency Medical Condition (MA) What reading provider will be dictating this exam?->CRC FINDINGS: BRAIN/VENTRICLES: No evidence of parenchymal hemorrhages or contusions. No evidence of intra or extra-axial fluid collection is seen. Scattered areas of low attenuation are visualized in the periventricular and subcortical white matter demonstrating no change in comparison to the prior study consistent with chronic microvascular disease. No evidence of acute territorial infarct is seen. Prominence of the ventricles and sulci is visualized demonstrating no change consistent with chronic atrophic brain changes. No evidence of intracranial mass or mass effect, no evidence of midline shift is seen. No evidence of sellar or parasellar mass is visualized. ORBITS: The visualized portion of the orbits demonstrate no acute abnormality.  SINUSES: The visualized paranasal sinuses and mastoid air cells demonstrate no acute abnormality. SOFT TISSUES/SKULL:  No acute abnormality of the visualized skull or soft tissues. No acute intracranial abnormality. Chronic microvascular disease and chronic atrophic brain changes seen. If there is a high level of suspicion would recommend further evaluation with an MRI of the brain to rule out subtle brainstem lacunar infarcts or acute on chronic pathology. XR CHEST PORTABLE    Result Date: 10/6/2022  EXAMINATION: ONE XRAY VIEW OF THE CHEST 10/6/2022 3:17 pm COMPARISON: None. HISTORY: ORDERING SYSTEM PROVIDED HISTORY: NG placement TECHNOLOGIST PROVIDED HISTORY: Reason for exam:->NG placement What reading provider will be dictating this exam?->CRC FINDINGS: Enteric tube tip in the fundus of the stomach. Nonobstructive bowel gas pattern. No gross free air. The lung bases are normal.     Enteric tube tip in the fundus of the stomach. MRI BRAIN WO CONTRAST    Result Date: 10/6/2022  EXAMINATION: MRI OF THE BRAIN WITHOUT CONTRAST  10/6/2022 11:26 am TECHNIQUE: Multiplanar multisequence MRI of the brain was performed without the administration of intravenous contrast. COMPARISON: CT brain performed 10/05/2022. HISTORY: ORDERING SYSTEM PROVIDED HISTORY: rule out cva TECHNOLOGIST PROVIDED HISTORY: Reason for exam:->rule out cva What reading provider will be dictating this exam?->CRC FINDINGS: INTRACRANIAL STRUCTURES/VENTRICLES: The sellar and suprasellar structures, optic chiasm, corpus callosum, pineal gland, tectum, and midline brainstem structures are unremarkable. The craniocervical junction is unremarkable. There is no acute hemorrhage, mass effect, or midline shift. There is satisfactory overall gray-white matter differentiation. There is extensive FLAIR signal abnormality in the subcortical and periventricular white matter that may relate to chronic microvascular disease.   The ventricular structures are symmetrically prominent. The infratentorial structures including the cerebellopontine angles and internal auditory canals are unremarkable. There is no abnormal restricted diffusion. There is no abnormal blooming artifact on susceptibility weighted imaging. ORBITS: The visualized portion of the orbits demonstrate no acute abnormality. SINUSES: The visualized paranasal sinuses and mastoid air cells demonstrate no acute abnormality. BONES/SOFT TISSUES: The bone marrow signal intensity appears normal. The soft tissues demonstrate no acute abnormality. Extensive chronic microvascular disease without acute intracranial. Symmetrically prominent ventricular system correlation with symptomatology for normal pressure hydrocephalus is needed. FL Modified Barium Swallow W Video    Result Date: 10/7/2022  EXAMINATION: MODIFIED BARIUM SWALLOW WITH SPEECH PATHOLOGY TECHNIQUE: Fluoroscopic evaluation of the swallowing mechanism was performed by speech pathology. FLUOROSCOPY DOSE AND TYPE OR TIME AND EXPOSURES: Fluoroscopy time was 1.1 minutes. Multiple fluoroscopic cine loops were acquired. HISTORY: ORDERING SYSTEM PROVIDED HISTORY: Dysphagia, unspecified type TECHNOLOGIST PROVIDED HISTORY: What reading provider will be dictating this exam?->CHARMAINE       Recent Labs     10/05/22  1530 10/05/22  1711 10/07/22  1448   WBC 10.4  --  8.3   HGB 13.6* 17.6* 10.2*     --  142     Recent Labs     10/07/22  0639 10/07/22  1123 10/07/22  1448   * 149* 151*   K 3.5 3.5 3.5   * 119* 119*   CO2 22 23 23   BUN 31* 27* 27*   CREATININE 1.54* 1.50* 1.41*   GLUCOSE 202* 161* 201*     Recent Labs     10/05/22  1530 10/07/22  1123   BILITOT 0.3 0.4   ALKPHOS 222* 153*   AST 57* 55*   ALT 14 13     No results found for: PROTIME, INR  No results found for: LITHIUM, DILFRTOT, VALPROATE    ASSESSMENT AND PLAN  Encephalopathy which is metabolic secondary to significant hypernatremia. Patient is somewhat more awake.   Patient has a Parkinson's disease and we have not seen him for some time he continues on carbidopa levodopa. None of the medications I see can cause hyponatremia and this is being followed by renal.  For now patient has not developed any suggestion of PRES  No seizures are reported. We will see what comes out of this investigations for now. Syd Younger MD, Giovanna Arellano, American Board of Psychiatry & Neurology  Board Certified in Vascular Neurology  Board Certified in Neuromuscular Medicine  Certified in . [place]

## 2022-10-07 NOTE — CARE COORDINATION
Quality rounds completed at bedside with team. EEG ordered and Neuro on consult. Pt with NG tube. Pt possible transfer to medical floor today. D/C plan remains home with [facility] and family.

## 2022-10-07 NOTE — PROGRESS NOTES
Pharmacy Dose Adjustment Per Protocol    Shanique Mulligan is a 71 y.o. male. Recent Labs     10/07/22  0304 10/07/22  0639   BUN 31* 31*   CREATININE 1.65* 1.54*   Estimated Creatinine Clearance: 34 mL/min (A) (based on SCr of 1.54 mg/dL (H)). Charity Reynoso     Height: 5' 8\" (172.7 cm), Weight: 119 lb 11.4 oz (54.3 kg)      Current order:  Enoxaparin 30 mg SUBQ once daily      Plan:  Pharmacologic VTE prophylaxis modified based on patient renal function per [facility]/P&T approved protocol     Patient Weight (kg)      50.9 and below .9 101-150.9 151-174.9 175 or greater   Estimated   CrCl  (ml/min) 30 or greater []   30 mg   SUBQ daily   [x]   40 mg   SUBQ daily []  30 mg SUBQ   BID  []  40 mg   SUBQ   BID []  60mg SUBQ BID    15-29.9 []  UFH 5000   units SUBQ BID []  30 mg   SUBQ daily [] 30 mg SUBQ   daily []  40 mg SUBQ   daily [] 60 mg SUBQ   daily    Less than 15 or dialysis []  UFH 5000   units SUBQ BID [] UFH 5000 units SUBQ TID []  UFH 7500   units   SUBQ TID       Thank Kati Villarreal [facility]  10/07/22  9:54 AM

## 2022-10-07 NOTE — PROGRESS NOTES
[facility]   Facility/Department: [facility]  Speech Language Pathology    Brock Dumont  1950  [id number]    Date: 10/7/2022      Speech Therapy attempted to see Brock Dumont on this date for a/an:    Clinical Bedside Swallow Evaluation    Pt was unable to be seen due to:   Patient is too lethargic to participate. Patient unable to be awoken w/ verbal and light tactile cues. Patient is currently on room air with SpO2 ~97. SLP to re-attempt when patient is more alert. SAMUEL agee.          Electronically signed by CURTIS Roldan on 10/7/22 at 9:21 AM EDT

## 2022-10-08 PROBLEM — G93.40 ACUTE ENCEPHALOPATHY: Status: ACTIVE | Noted: 2022-01-01

## 2022-10-08 PROBLEM — N17.9 ACUTE RENAL FAILURE (HCC): Status: ACTIVE | Noted: 2022-01-01

## 2022-10-08 PROBLEM — R13.10 DYSPHAGIA: Status: ACTIVE | Noted: 2022-01-01

## 2022-10-08 PROBLEM — E86.0 DEHYDRATION: Status: ACTIVE | Noted: 2022-01-01

## 2022-10-08 NOTE — PROGRESS NOTES
Renal Progress Note    Assessment:  Hypernatremia No DI / dehydration most likely  DM type-2  Dementia  Parkinson           Plan:would prefer to keep IV at dextrose only  intensevit continuing with D5W with 45%saline  // water flushes decrease  200 Q4   unable to swallow evaluation  check labs in morning stable t transfer  consider PEG long term    10/8/2022  Need to check residual  D5 100 cc/h  Clinical laboratory assess the patient tomorrow    Patient Active Problem List:     Lactic acidosis     Impaired mobility     Parkinson's disease ([facility] [address]     Type 2 diabetes mellitus with hyperglycemia, with long-term current use of insulin (HCC)     Essential hypertension     Hyperlipidemia     BMI 28.0-28.9,adult     Uses Luxem[place] as primary spoken language     Other specified hypothyroidism     Hypernatremia     Acute encephalopathy     Acute renal failure (HCC)     Dehydration     Dysphagia      Subjective:   Admit Date: 10/5/2022    Interval History: Alert follow command coughing on tube feed audible fluid in the throat requiring suction and to check residual      Medications:   Scheduled Meds:   potassium chloride  10 mEq IntraVENous Once    enoxaparin  40 mg SubCUTAneous Daily    losartan  50 mg Oral Daily    QUEtiapine  25 mg Oral BID    sertraline  50 mg Oral Daily    tamsulosin  0.4 mg Oral Daily    atorvastatin  20 mg Oral Nightly    pantoprazole (PROTONIX) 40 mg injection  40 mg IntraVENous Daily    insulin lispro  0-16 Units SubCUTAneous Q4H    sodium chloride flush  5-40 mL IntraVENous 2 times per day    insulin glargine  0.15 Units/kg SubCUTAneous Nightly    carbidopa-levodopa  1 tablet Per NG tube BID     Continuous Infusions:   dextrose 100 mL/hr at 10/08/22 1142    sodium chloride      dextrose         CBC:   Recent Labs     10/07/22  1448 10/08/22  0339   WBC 8.3 9.0   HGB 10.2* 10.5*    152     CMP:    Recent Labs     10/08/22  0547 10/08/22  1059 10/08/22  1433   * 148* 149*   K 3.2* 3.4 3.7 * 116* 115*   CO2 22 21 24   BUN 23 20 19   CREATININE 1.32* 1.24* 1.23*   GLUCOSE 59* 103* 93   CALCIUM 8.4* 8.3* 8.4*   LABGLOM 53.4* 57.3* 57.9*     Troponin:   Recent Labs     10/05/22  2322   TROPONINI 0.033*     BNP: No results for input(s): BNP in the last 72 hours. INR: No results for input(s): INR in the last 72 hours. Lipids: No results for input(s): CHOL, LDLDIRECT, TRIG, HDL, AMYLASE, LIPASE in the last 72 hours. Liver:   Recent Labs     10/07/22  1123   AST 55*   ALT 13   ALKPHOS 153*   PROT 5.8*   LABALBU 2.6*   BILITOT 0.4     Iron:  No results for input(s): IRONS, FERRITIN in the last 72 hours. Invalid input(s): LABIRONS  Urinalysis: No results for input(s): UA in the last 72 hours. Objective:   Vitals: /72   Pulse 82   Temp 97 °F (36.1 °C) (Axillary)   Resp 22   Ht 5' 8\" (1.727 m)   Wt 119 lb 11.4 oz (54.3 kg)   SpO2 97%   BMI 18.20 kg/m²    Wt Readings from Last 3 Encounters:   10/05/22 119 lb 11.4 oz (54.3 kg)   09/04/22 170 lb (77.1 kg)   08/16/22 170 lb (77.1 kg)      24HR INTAKE/OUTPUT:    Intake/Output Summary (Last 24 hours) at 10/8/2022 1710  Last data filed at 10/8/2022 0703  Gross per 24 hour   Intake 316.25 ml   Output 1150 ml   Net -833.75 ml       Constitutional:  Alert, awake, no apparent distress   Skin:normal, no rash  HEENT:sclera anicteric.   Head atraumatic normocephalic  Neck:supple with no thyromegally  Cardiovascular:  S1, S2 without m/r/g   Respiratory:  CTA B without w/r/r diminished air entry bibasilar  Abdomen: +bs, soft, nt  Ext: No LE edema  Musculoskeletal:Intact  Neuro:Alert and oriented with no deficit      Electronically signed by Park Bustamante MD on 10/8/2022 at 5:10 PM

## 2022-10-08 NOTE — PROGRESS NOTES
Progress Note  Date:10/8/2022       [facility]  Patient Michelle Dumont     YOB: 1950     Age:72 y.o. Subjective      Sodium unchanged at 154 today. Creatinine improved to 1.32 from 1.54 prior day. Stepped down from ICU yesterday. Pulled out NG repeatedly in prior 24hrs despite 2pt soft wrist restraints, per nursing notes. NG and wrist restraints have been discontinued as of this morning. Hypoglycemic to 49 overnight, improved s/p D10    Objective         Vitals Last 24 Hours:  TEMPERATURE:  Temp  Av.7 °F (36.5 °C)  Min: 96.8 °F (36 °C)  Max: 98.5 °F (36.9 °C)  RESPIRATIONS RANGE: Resp  Av.7  Min: 14  Max: 22  PULSE OXIMETRY RANGE: SpO2  Av %  Min: 94 %  Max: 99 %  PULSE RANGE: Pulse  Av.2  Min: 62  Max: 72  BLOOD PRESSURE RANGE: Systolic (72CXX), XXO:652 , Min:126 , PIF:974   ; Diastolic (50AKZ), DNR:31, Min:64, Max:77    I/O (24Hr): Intake/Output Summary (Last 24 hours) at 10/8/2022 0914  Last data filed at 10/8/2022 0703  Gross per 24 hour   Intake 1728.18 ml   Output 1400 ml   Net 328.18 ml       Objective:  Vital signs: (most recent): Blood pressure (!) 143/66, pulse 68, temperature 97.6 °F (36.4 °C), temperature source Axillary, resp. rate 20, height 5' 8\" (1.727 m), weight 119 lb 11.4 oz (54.3 kg), SpO2 97 %. Constitutional: Cachectic elderly adult male reclining in bed no acute distress, encephalopathic  Head: NCAT  Eyes: PERRLA, occasionally meets gaze  Neck: Trachea midline, occasional non-verbal vocalizations  Cardiovascular: RRR. No significant murmurs appreciated. Pulmonary: Normal rate and effort of respiration on room air. Abdomen: Soft, scaphoid, nontense. Hypoactive bowel sounds. No obvious discomfort to palpation. Neurologic: Alert. Disorientation at/near baseline level. Occasionally responds to some stimuli. Follows no commands. Nonverbal at baseline. Psychiatric: Not agitated.    Integumentary: Improved skin boubacar. Labs/Imaging/Diagnostics    Labs:  CBC:  Recent Labs     10/05/22  1530 10/05/22  1711 10/07/22  1448 10/08/22  0339   WBC 10.4  --  8.3 9.0   RBC 4.58*  --  3.54* 3.67*   HGB 13.6* 17.6* 10.2* 10.5*   HCT 42.1  --  32.2* 33.3*   MCV 91.8  --  90.8 90.6   RDW 15.3*  --  14.4 14.7*     --  142 152       CHEMISTRIES:  Recent Labs     10/07/22  1929 10/07/22  2242 10/08/22  0339 10/08/22  0547   * 148* 152* 154*   K 3.5 3.6 3.2* 3.2*   * 117* 121* 120*   CO2 21 25 25 22   BUN 26* 24* 24* 23   CREATININE 1.37* 1.48* 1.39* 1.32*   GLUCOSE 213* 248* 50* 59*   MG 2.1  --  2.0 2.1       PT/INR:No results for input(s): PROTIME, INR in the last 72 hours. APTT:No results for input(s): APTT in the last 72 hours. LIVER PROFILE:  Recent Labs     10/05/22  1530 10/07/22  1123   AST 57* 55*   ALT 14 13   BILIDIR  --  <0.2   BILITOT 0.3 0.4   ALKPHOS 222* 153*         Imaging Last 24 Hours:  XR CHEST (2 VW)    Result Date: 10/5/2022  EXAMINATION: TWO XRAY VIEWS OF THE CHEST 10/5/2022 1:39 pm COMPARISON: None. HISTORY: ORDERING SYSTEM PROVIDED HISTORY: Dysphagia, unspecified type TECHNOLOGIST PROVIDED HISTORY: Reason for exam:->possible aspiration What reading provider will be dictating this exam?->[facility] FINDINGS: The cardiomediastinal silhouette is without acute process. Biapical promises visualized osseous of COPD changes. The lungs are without acute focal process. There is no effusion or pneumothorax. The osseous structures are without acute process. No acute cardiopulmonary disease. COPD changes. CT HEAD WO CONTRAST    Result Date: 10/5/2022  EXAMINATION: CT OF THE HEAD WITHOUT CONTRAST  10/5/2022 3:52 pm TECHNIQUE: CT of the head was performed without the administration of intravenous contrast. Automated exposure control, iterative reconstruction, and/or weight based adjustment of the mA/kV was utilized to reduce the radiation dose to as low as reasonably achievable.  COMPARISON: 09/04/2022 HISTORY: ORDERING SYSTEM PROVIDED HISTORY: dysphagia TECHNOLOGIST PROVIDED HISTORY: Reason for exam:->dysphagia Has a \"code stroke\" or \"stroke alert\" been called? ->No Decision Support Exception - unselect if not a suspected or confirmed emergency medical condition->Emergency Medical Condition (MA) What reading provider will be dictating this exam?->CRC FINDINGS: BRAIN/VENTRICLES: No evidence of parenchymal hemorrhages or contusions. No evidence of intra or extra-axial fluid collection is seen. Scattered areas of low attenuation are visualized in the periventricular and subcortical white matter demonstrating no change in comparison to the prior study consistent with chronic microvascular disease. No evidence of acute territorial infarct is seen. Prominence of the ventricles and sulci is visualized demonstrating no change consistent with chronic atrophic brain changes. No evidence of intracranial mass or mass effect, no evidence of midline shift is seen. No evidence of sellar or parasellar mass is visualized. ORBITS: The visualized portion of the orbits demonstrate no acute abnormality. SINUSES: The visualized paranasal sinuses and mastoid air cells demonstrate no acute abnormality. SOFT TISSUES/SKULL:  No acute abnormality of the visualized skull or soft tissues. No acute intracranial abnormality. Chronic microvascular disease and chronic atrophic brain changes seen. If there is a high level of suspicion would recommend further evaluation with an MRI of the brain to rule out subtle brainstem lacunar infarcts or acute on chronic pathology.      Assessment//Plan           Hospital Problems             Last Modified POA    * (Principal) Hypernatremia 10/5/2022 Yes   Assessment & Plan    Acute hypernatremia secondary to severe dehydration with renal failure  Acute hyperkalemia - improved  Acute kidney injury secondary to severe dehydration - rule out urinary retention  Dysphagia - etiology DDX: advanced dementia/Parkinson's disease vs acute CVA  Type 2 dm with hyperlglycemia - improving  Elevated troponin-suspect demand ischemia multifactorial in setting of dehydration, possible CVA  Lactic acidemia secondary to renal failure and dehydration-metabolic acidosis non-anion gap  Parkinson's disease/dementia  DVT prophylaxis    Plan:  Seen and examined in the emergency department  Admit to intensive care unit  BMP every 4 hours  normal saline and 125 cc an hour  Npo  Insert Dobbhoff for medications  frost catheter for intake and output critical care setting  MRI of head/MRA head and neck  Trend troponin  Repeat lactic acid every 4 hours x 3  If repeat potassium greater than 5.6 we will give 1 dose of Lokelma. Insulin has been ordered  Insulin-Lantus initiated along with basal insulin and sliding scale  Will administer sinemet through [address] with family in the emergency department-patient will remain full code. Family would want PEG tube if required  10/6: Sodium shayla to 167 from 165 on admission despite administration of NS on admission. Switch to D5 half NS. Hypoglycemic on morning labs, being addressed with D5 half NS. Recheck pending. Neurology and Nephrology consults pending. Critical Care overseeing management while in ICU. MRI pending. 10/7: Sodium improving but not yet at goal.  Creatinine improving. Currently on D5/0.5 NS at 75 mill/hour plus every 4 hour free water flushes per NG tube. Sodium improving. Adjustments being made cautiously to avoid too rapid of a shift due to neurologic risks. Palliative care consult placed this morning for discussion of goals of care with family. Despite being more awake now than when initially admitted, patient is still persistently somewhat somnolent, follows almost all commands, and is likely unsafe to take sufficient p.o. intake to maintain caloric and hydration needs.   Would likely need consideration for PEG tube placement if family wants to extend life as much as possible, vs discussion of possible palliative/hospice options if they do not want to aggressively intervene. 10/8: Pulled NG tube out repeatedly overnight, despite two-point soft wrist restraints. NG and wrist restraints discontinued by this morning at present. At/near baseline encephalopathy level in setting of severely advanced Parkinson's dementia. Sodium continues to trend very slow improvement, not yet at normal range. Creatinine trending slow improvement. Palliative care consultation pending with family for discussion of goals of care and necessity for possible PEG tube placement versus palliative/hospice options.       Electronically signed by Shon Quinonez DO on 10/8/2022 at 9:14 AM

## 2022-10-08 NOTE — PROGRESS NOTES
INPATIENT PROGRESS NOTES    PATIENT NAME: Courtney Dumont  MRN: [id number]  SERVICE DATE:  October 8, 2022   SERVICE TIME:  1:49 PM      PRIMARY SERVICE: Pulmonary Disease    CHIEF COMPLAIN: Encephalopathy    INTERVAL HPI: Patient seen and examined at bedside, Interval Notes, orders reviewed. Nursing notes noted  He has hypernatremia which seems to be improving and today sodium is 148. He is awake but seems to be confused. He is on O2 via nasal cannula and O2 saturation is 97%. He appeared comfortable in bed. No fever or chills. No vomiting or diarrhea. OBJECTIVE    Body mass index is 18.2 kg/m². PHYSICAL EXAM:  Vitals:  BP (!) 143/66   Pulse 68   Temp 97.6 °F (36.4 °C) (Axillary)   Resp 20   Ht 5' 8\" (1.727 m)   Wt 119 lb 11.4 oz (54.3 kg)   SpO2 97%   BMI 18.20 kg/m²   General: Alert, awake, confused and agitated at times. comfortable in bed, No distress. Head: Atraumatic , Normocephalic   Eyes: PERRL. No sclera icterus. No conjunctival injection. No discharge   ENT: No nasal  discharge. Pharynx clear. Neck:  Trachea midline. No thyromegaly, no JVD, No cervical adenopathy. Chest : Bilaterally symmetrical ,Normal effort,  No accessory muscle use  Lung : . Fair BS bilateral, decreased BS at bases. No Rales. No wheezing. No rhonchi. Heart[de-identified] Normal  rate. Regular rhythm. No mumur ,  Rub or gallop  ABD: Non-tender. Non-distended. No masses. No organmegaly. Normal bowel sounds. No hernia.   Ext : No Pitting both leg , No Cyanosis No clubbing  Neuro: no focal weakness          DATA:   Recent Labs     10/07/22  1448 10/08/22  0339   WBC 8.3 9.0   HGB 10.2* 10.5*   HCT 32.2* 33.3*   MCV 90.8 90.6    152     Recent Labs     10/05/22  1530 10/05/22  1711 10/07/22  1123 10/07/22  1448 10/08/22  0547 10/08/22  1059   *   < > 149*   < > 154* 148*   K 5.6*   < > 3.5   < > 3.2* 3.4   *   < > 119*   < > 120* 116*   CO2 27   < > 23   < > 22 21   BUN 57*   < > 27*   < > 23 20   CREATININE 2.67*   < > 1.50*   < > 1.32* 1.24*   GLUCOSE 445*   < > 161*   < > 59* 103*   CALCIUM 9.5   < > 8.1*   < > 8.4* 8.3*   PROT 7.4  --  5.8*  --   --   --    LABALBU 3.4*  --  2.6*  --   --   --    BILITOT 0.3  --  0.4  --   --   --    ALKPHOS 222*  --  153*  --   --   --    AST 57*  --  55*  --   --   --    ALT 14  --  13  --   --   --    LABGLOM 23.7*   < > 46.0*   < > 53.4* 57.3*   GFRAA 28.6*   < > 55.7*   < > >60.0 >60.0   GLOB 4.0*  --   --   --   --   --     < > = values in this interval not displayed. MV Settings:          No results for input(s): PHART, BSZ1UNL, PO2ART, RPW6ZGR, BEART, H6NPBNWL in the last 72 hours. O2 Device: None (Room air)    Diet NPO  ADULT TUBE FEEDING; Nasogastric; Standard with Fiber; Continuous; 20; No; 300; Q 6 hours     MEDICATIONS during current hospitalization:    Continuous Infusions:   dextrose 100 mL/hr at 10/08/22 1142    sodium chloride      dextrose         Scheduled Meds:   potassium chloride  10 mEq IntraVENous Q1H    enoxaparin  40 mg SubCUTAneous Daily    losartan  50 mg Oral Daily    QUEtiapine  25 mg Oral BID    sertraline  50 mg Oral Daily    tamsulosin  0.4 mg Oral Daily    atorvastatin  20 mg Oral Nightly    pantoprazole (PROTONIX) 40 mg injection  40 mg IntraVENous Daily    insulin lispro  0-16 Units SubCUTAneous Q4H    sodium chloride flush  5-40 mL IntraVENous 2 times per day    insulin glargine  0.15 Units/kg SubCUTAneous Nightly    carbidopa-levodopa  1 tablet Per NG tube BID       PRN Meds:sodium chloride flush, sodium chloride, ondansetron **OR** ondansetron, acetaminophen **OR** acetaminophen, glucose, dextrose bolus **OR** dextrose bolus, glucagon (rDNA), dextrose    Radiology  XR CHEST (2 VW)    Result Date: 10/5/2022  EXAMINATION: TWO XRAY VIEWS OF THE CHEST 10/5/2022 1:39 pm COMPARISON: None.  HISTORY: ORDERING SYSTEM PROVIDED HISTORY: Dysphagia, unspecified type TECHNOLOGIST PROVIDED HISTORY: Reason for exam:->possible aspiration What reading provider will be dictating this exam?->[facility] FINDINGS: The cardiomediastinal silhouette is without acute process. Biapical promises visualized osseous of COPD changes. The lungs are without acute focal process. There is no effusion or pneumothorax. The osseous structures are without acute process. No acute cardiopulmonary disease. COPD changes. XR ABDOMEN (KUB) (SINGLE AP VIEW)    Result Date: 10/8/2022  EXAMINATION: ONE SUPINE XRAY VIEW(S) OF THE ABDOMEN 10/7/2022 9:00 pm COMPARISON: None. HISTORY: ORDERING SYSTEM PROVIDED HISTORY: verify NG placement TECHNOLOGIST PROVIDED HISTORY: Portable for NG Placement Reason for exam:->verify NG placement What reading provider will be dictating this exam?->CRC FINDINGS: KUB reveals nasogastric tube identified tip in the stomach. The bowel gas pattern is unremarkable. No abnormal mass or calcifications seen overlying the renal shadows or paths of the ureters. Bony structures are grossly unremarkable. Nasogastric tube tip in the stomach. CT HEAD WO CONTRAST    Result Date: 10/5/2022  EXAMINATION: CT OF THE HEAD WITHOUT CONTRAST  10/5/2022 3:52 pm TECHNIQUE: CT of the head was performed without the administration of intravenous contrast. Automated exposure control, iterative reconstruction, and/or weight based adjustment of the mA/kV was utilized to reduce the radiation dose to as low as reasonably achievable. COMPARISON: 09/04/2022 HISTORY: ORDERING SYSTEM PROVIDED HISTORY: dysphagia TECHNOLOGIST PROVIDED HISTORY: Reason for exam:->dysphagia Has a \"code stroke\" or \"stroke alert\" been called? ->No Decision Support Exception - unselect if not a suspected or confirmed emergency medical condition->Emergency Medical Condition (MA) What reading provider will be dictating this exam?->CRC FINDINGS: BRAIN/VENTRICLES: No evidence of parenchymal hemorrhages or contusions. No evidence of intra or extra-axial fluid collection is seen.  Scattered areas of low attenuation are visualized in the periventricular and subcortical white matter demonstrating no change in comparison to the prior study consistent with chronic microvascular disease. No evidence of acute territorial infarct is seen. Prominence of the ventricles and sulci is visualized demonstrating no change consistent with chronic atrophic brain changes. No evidence of intracranial mass or mass effect, no evidence of midline shift is seen. No evidence of sellar or parasellar mass is visualized. ORBITS: The visualized portion of the orbits demonstrate no acute abnormality. SINUSES: The visualized paranasal sinuses and mastoid air cells demonstrate no acute abnormality. SOFT TISSUES/SKULL:  No acute abnormality of the visualized skull or soft tissues. No acute intracranial abnormality. Chronic microvascular disease and chronic atrophic brain changes seen. If there is a high level of suspicion would recommend further evaluation with an MRI of the brain to rule out subtle brainstem lacunar infarcts or acute on chronic pathology. XR CHEST PORTABLE    Result Date: 10/6/2022  EXAMINATION: ONE XRAY VIEW OF THE CHEST 10/6/2022 3:17 pm COMPARISON: None. HISTORY: ORDERING SYSTEM PROVIDED HISTORY: NG placement TECHNOLOGIST PROVIDED HISTORY: Reason for exam:->NG placement What reading provider will be dictating this exam?->CRC FINDINGS: Enteric tube tip in the fundus of the stomach. Nonobstructive bowel gas pattern. No gross free air. The lung bases are normal.     Enteric tube tip in the fundus of the stomach. MRI BRAIN WO CONTRAST    Result Date: 10/6/2022  EXAMINATION: MRI OF THE BRAIN WITHOUT CONTRAST  10/6/2022 11:26 am TECHNIQUE: Multiplanar multisequence MRI of the brain was performed without the administration of intravenous contrast. COMPARISON: CT brain performed 10/05/2022.  HISTORY: ORDERING SYSTEM PROVIDED HISTORY: rule out cva TECHNOLOGIST PROVIDED HISTORY: Reason for exam:->rule out cva What reading provider will be dictating this exam?->CRC FINDINGS: INTRACRANIAL STRUCTURES/VENTRICLES: The sellar and suprasellar structures, optic chiasm, corpus callosum, pineal gland, tectum, and midline brainstem structures are unremarkable. The craniocervical junction is unremarkable. There is no acute hemorrhage, mass effect, or midline shift. There is satisfactory overall gray-white matter differentiation. There is extensive FLAIR signal abnormality in the subcortical and periventricular white matter that may relate to chronic microvascular disease. The ventricular structures are symmetrically prominent. The infratentorial structures including the cerebellopontine angles and internal auditory canals are unremarkable. There is no abnormal restricted diffusion. There is no abnormal blooming artifact on susceptibility weighted imaging. ORBITS: The visualized portion of the orbits demonstrate no acute abnormality. SINUSES: The visualized paranasal sinuses and mastoid air cells demonstrate no acute abnormality. BONES/SOFT TISSUES: The bone marrow signal intensity appears normal. The soft tissues demonstrate no acute abnormality. Extensive chronic microvascular disease without acute intracranial. Symmetrically prominent ventricular system correlation with symptomatology for normal pressure hydrocephalus is needed. FL Modified Barium Swallow W Video    Result Date: 10/7/2022  EXAMINATION: MODIFIED BARIUM SWALLOW WITH SPEECH PATHOLOGY TECHNIQUE: Fluoroscopic evaluation of the swallowing mechanism was performed by speech pathology. FLUOROSCOPY DOSE AND TYPE OR TIME AND EXPOSURES: Fluoroscopy time was 1.1 minutes. Multiple fluoroscopic cine loops were acquired.  HISTORY: ORDERING SYSTEM PROVIDED HISTORY: Dysphagia, unspecified type TECHNOLOGIST PROVIDED HISTORY: What reading provider will be dictating this exam?->[facility]         IMPRESSION AND SUGGESTION:  Encephalopathy  Hypernatremia  Acute renal failure improving  Failure to thrive  History of Parkinson disease with advanced dementia    Hypernatremia is improving and last sodium is 148. He is more awake and but confused and agitated at times. Neurology is following. Continue water flushes with tube feeding. Nephrology is consulted. DVT GI prophylaxis. Chest x-ray shows COPD changes without any active infiltration. .  At this time continue O2 to keep saturation 90% or above. Aspiration precaution. Discussed with RN. NOTE: This report was transcribed using voice recognition software. Every effort was made to ensure accuracy; however, inadvertent computerized transcription errors may be present.       Electronically signed by Jhon Green MD, FCCP on 10/8/2022 at 1:50 PM

## 2022-10-08 NOTE — FLOWSHEET NOTE
Pt agitated this shift. Repositioned several times and tugging at IV. Pt family came in to visit at noon hour and pt became increasingly agitated. Family came out during visit to notify this nurse that patient had pulled out IV. Pt has no other access available. New IV placed without difficulty. Pt agitated during attempt despite family assistance.

## 2022-10-08 NOTE — PROGRESS NOTES
At [address] during bedside shift report, pt had pulled NG out to allen  er 15, we were able to advance the tube back to the 60 allen. Confirmed placement with xray. Pt still in bilateral wrist restraints to keep from pulling out lines and NG tube. Safety maintained will continue to monitor. Pt is according to shift report advanced dementia, and Serbian speaking only. At 2200 Pt had again pulled NG tube out . While in bilateral wrist restraints. I obtained the  tablet to assist with communication, Pt seemed to be trying to talk to me in [address] [facility], in report given pt was not engaging or speaking to staff.  proved mildly effective. According to  pt was mostly rambling sounds and not forming words. However did communicate well enough to allow me to attempt to place NG tube in again. Pt did not tolerate NG tube placement , pulled out Ng while trying to insert. With  pt educated on the need for the NG and the importance of bringing his Sodium down. MD Musa approached about situation and Verbalized order for  RN to hold Tube feed till morning. 2300- Pt released from bilateral wrist restraints at this time, due to no NG tube in place will continue IV fluids and monitor for S/S of seizure. Pt resting quietly now . 0400- Pt POC was 49, gave 125 ml bolus of 10% dextrose, VSS, Pt awake and resting in bed. Will recheck POC after bolus .  Safety maintained will continue to monitor,   Recheck Pt POC was 85.     0600 POC 54 , 10% dextrose given per protocol , MD notified recheck was 80    Order provided for D5 in 0.45NS @ 75 mls / hr

## 2022-10-08 NOTE — PROGRESS NOTES
[facility]   Facility/Department: [facility]  Speech Language Pathology  Clinical Bedside Swallow Evaluation    Evangelina Dumont  : 1950 (71 y.o.)   [x]   confirmed    MRN: [id number]  ROOM: [id number]  ADMISSION DATE: 10/5/2022  PATIENT DIAGNOSIS(ES): Dehydration [E86.0]  Hypernatremia [E87.0]  Acute renal failure, unspecified acute renal failure type ([address] [N17.9]  Dysphagia, unspecified type [R13.10]  Chief Complaint   Patient presents with    Dysphagia     Difficulty swallowing this morning     Patient Active Problem List    Diagnosis Date Noted    Hypernatremia 10/05/2022    Other specified hypothyroidism 2022    Parkinson's disease ([address] 2019    Type 2 diabetes mellitus with hyperglycemia, with long-term current use of insulin ([address] 2019    Essential hypertension 2019    Hyperlipidemia 2019    BMI 28.0-28.9,adult 2019    Uses Namibian as primary spoken language 2019    Impaired mobility 2019    Lactic acidosis 2019     Past Medical History:   Diagnosis Date    Compression fracture of body of thoracic vertebra ([address] 2019    T3, T4, T5 AND T6 COMPRESSION FX OF INDETERMINATE AGE AND CLINICAL CORRELATION WARRANTED. Gait abnormality due to exacerbation of Parkinson's disease with acute rehab admission on 2019    77year-old male who was brought to the emergency room after syncopal episode in 2019 he was especially diagnosed with exacerbation of Parkinson's disease and electrolyte abnormality as well as hyperosmolar state secondary to elevated blood sugar and uncontrolled diabetes. Cardiopulmonary work-up was negative including chest x-ray EKG and MRI of the brain.   CT the brain showed no acute findi    Hyperlipidemia     Hypertension     Parkinson's disease ([address]     Syncope and collapse     Type 2 diabetes mellitus without complication [facility])      Past Surgical History:   Procedure Laterality Date APPENDECTOMY       No Known Allergies    DATE ONSET: 10/5/2022    Date of Evaluation: 10/8/2022   Evaluating Therapist: Delton Carrel, SLP    Dysphagia Diagnosis  Dysphagia Diagnosis: Moderate oral stage dysphagia; Suspected needs further assessment  Dysphagia Impression : Mod oral phase dysphagia characterized by generalized oral motor weakness and incoordination, decreased bolus acceptance, weak bilabial seal, delayed bolus formation and propulsion. Suspected pharyngeal phase dysphagia characterized by delayed swallow (6-15 seconds) following trials of ice chips and absent swallow following trials of puree consistencies, with the need for mod cues in order to elicit swallow response. Recommended Diet  Recommendations: NPO  Referral To: Dietician  Diet Solids Recommendation: NPO  Liquid Consistency Recommendation: NPO        Reason for Referral  Margarita Chilel was referred for a bedside swallow evaluation to assess the efficiency of his swallow function, identify signs and symptoms of aspiration, identify risk factors, and make recommendations regarding safe dietary consistencies, effective compensatory strategies, and safe eating environment. General  Chart Reviewed: Yes  Comments: Pt admitted following OP MBS that recommended NPO. Pt with pmhx of dementia and parkinson's disease. CXR from 10/5/2022 clear. Per neurology pt presents with encephalopathy secondary to significant hypernatremia. Subjective  Subjective: Alert and cooperative. Pt nonverbal at baseline. Pt does attempt to verbalize, however, no true words observed. Behavior/Cognition: Alert; Cooperative  Respiratory Status: Room air  O2 Device: None (Room air)  Communication Observation: Non-verbal  Follows Directions: None  Dentition: Some missing teeth;Poor dental/oral hygeine  Patient Positioning: Upright in bed  Baseline Vocal Quality:  (Unable to fully assess.  Appeared wet)  Volitional Cough:  (Pt not able to follow commands)  Prior Dysphagia History: Pt discharged from rehab unit in July 2019 with recommended diet of regular consistencies and thin liquids  Consistencies Administered: Pureed; Ice Chips    Vision and Hearing  Vision  Vision: Impaired (Suspected impaired)  Hearing  Hearing: Exceptions to [facility]  Hearing Exceptions: Hard of hearing/hearing concerns (Suspected)    Current Diet level  Current Diet : NPO  Current Liquid Diet : NPO    Oral Motor  Labial: Impaired coordination  Dentition: Limited;Natural  Oral Hygiene: Dried secretions; Xerostomia  Lingual: Decreased strength; Incoordinated    Oral/Pharyngeal Phase  Oral Phase - Comment: Mod oral phase dysphagia characterized by generalized oral motor weakness and incoordination, decreased bolus acceptance, weak bilabial seal, delayed bolus formation and propulsion. Pharyngeal Phase: Suspected pharyngeal phase dysphagia characterized by delayed swallow (6-15 seconds) following trials of ice chips and absent swallow following trials of puree consistencies, with the need for max cues in order to elicit swallow response. PO Trials  Neuromuscular Estim Used: No  Assessment Method(s): Observation;Palpation  Patient Position: Upright in bed  Vocal Quality: Wet  Consistency Presented: Ice Chips;Pureed  How Presented: SLP-fed/Presented  Bolus Acceptance: Impaired  Bolus Formation/Control: Impaired  Type of Impairment: Delayed  Propulsion: Delayed (# of seconds)  Oral Residue: Less than 10% of bolus  Initiation of Swallow: Absent (Absent & delayed)  Laryngeal Elevation: Weak  Aspiration Signs/Symptoms: None  Pharyngeal Phase Characteristics: No impairment, issues, or problems  Cues for Modifications: Moderate    Dysphagia Diagnosis  Dysphagia Diagnosis: Moderate oral stage dysphagia; Suspected needs further assessment  Dysphagia Impression :  Mod oral phase dysphagia characterized by generalized oral motor weakness and incoordination, decreased bolus acceptance, weak bilabial seal, delayed bolus formation and propulsion. Suspected pharyngeal phase dysphagia characterized by delayed swallow (6-15 seconds) following trials of ice chips and absent swallow following trials of puree consistencies, with the need for mod cues in order to elicit swallow response. Dysphagia Outcome Severity Scale: Level 1: Severe dysphagia- NPO. Unable to tolerate any PO safely     Recommendations  Requires SLP Intervention: Yes  Recommendations: NPO  Referral To: Dietician  D/C Recommendations: Ongoing speech therapy is recommended during this hospitalization  Diet Solids Recommendation: NPO  Liquid Consistency Recommendation: NPO  Therapeutic Interventions: Patient/Family education  Duration of Treatment: For LOS or until all goals met  Frequency of Treatment: 2-4x/wk    Prognosis  Speech Therapy Prognosis  Prognosis: Guarded  Prognosis Considerations: Previous Level of Function    Education  Individuals consulted  Consulted and agree with results and recommendations: SAMUEL  RN Name: Radha    Treatment/Goals  Short-term Goals  Timeframe for Short-term Goals: 1 week  Goal 1: Pt will tolerate PO trials deemed appropriate by treating SLP with no overt s/s of aspiration and adequate oral clearance of bolus in all given opportunities. Goal 2: Pt will participate in MBS study as deemed appropriate by treating SLP in order to determine least restrictive diet consistency. Long-term Goals  Timeframe for Long-term Goals: 2 weeks  Goal 1: Pt will tolerate least restrictive diet consistency with no adverse outcomes. Safety Devices  Safety Devices  Safety Devices in place: Yes  Type of devices: Bed alarm in place; Telesitter in use  Restraints Initially in Place: No    Pain Assessment  Patient does not appear in pain. Pain Re-assessment  Patient does not appear in pain.       Therapy Time  SLP Individual Minutes  Time In: 0840  Time Out: 0848  Minutes: 8     Signature: Electronically signed by CURTIS Coronado on 10/8/2022 at 9:41 AM

## 2022-10-08 NOTE — PROGRESS NOTES
Neurology Follow up    SUBJECTIVE: Son is at the bedside and we discussed with him and he was able to communicate with his father and he reports that he is in his normal since today. Sodium is still elevated 151 but denies any headaches has not been any seizures.     Patient remains stable and quite awake without any seizures but confused    Current Facility-Administered Medications   Medication Dose Route Frequency Provider Last Rate Last Admin    potassium chloride 10 mEq/100 mL IVPB (Peripheral Line)  10 mEq IntraVENous Q1H Earma Orf,  mL/hr at 10/08/22 1153 10 mEq at 10/08/22 1153    dextrose 5 % solution   IntraVENous Continuous Gloria Sidles,  mL/hr at 10/08/22 1142 New Bag at 10/08/22 1142    enoxaparin (LOVENOX) injection 40 mg  40 mg SubCUTAneous Daily Camilo Washington APRN - CNS        losartan (COZAAR) tablet 50 mg  50 mg Oral Daily Poppy Romeron-Roche, APRN - CNP   50 mg at 10/07/22 0739    QUEtiapine (SEROQUEL) tablet 25 mg  25 mg Oral BID Poppy Bolzan-Roche, APRN - CNP   25 mg at 10/07/22 0739    sertraline (ZOLOFT) tablet 50 mg  50 mg Oral Daily Riano Bolzan-Roche, APRN - CNP   50 mg at 10/07/22 0739    tamsulosin (FLOMAX) capsule 0.4 mg  0.4 mg Oral Daily Gabletto Bolzan-Roche, APRN - CNP        atorvastatin (LIPITOR) tablet 20 mg  20 mg Oral Nightly Earma Orf, DO   20 mg at 10/06/22 2044    pantoprazole (PROTONIX) 40 mg in sodium chloride (PF) 10 mL injection  40 mg IntraVENous Daily PINEDA Wyman - CNP   40 mg at 10/08/22 0919    insulin lispro (HUMALOG) injection vial 0-16 Units  0-16 Units SubCUTAneous Q4H PINEDA Wyman - CNP   4 Units at 10/08/22 0006    sodium chloride flush 0.9 % injection 5-40 mL  5-40 mL IntraVENous 2 times per day Camilo Washington, APRN - CNS   10 mL at 10/08/22 1030    sodium chloride flush 0.9 % injection 5-40 mL  5-40 mL IntraVENous PRN Camilo Washington, APRN - CNS        0.9 % sodium chloride infusion   IntraVENous PRN Camilo Washington, APRN - CNS        ondansetron (ZOFRAN-ODT) disintegrating tablet 4 mg  4 mg Oral Q8H PRN Mardella Chalk Dials, APRN - CNS        Or    ondansetron (ZOFRAN) injection 4 mg  4 mg IntraVENous Q6H PRN Mardella Chalk Dials, APRN - CNS        acetaminophen (TYLENOL) tablet 650 mg  650 mg Oral Q6H PRN Mardella Chalk Dials, APRN - CNS        Or    acetaminophen (TYLENOL) suppository 650 mg  650 mg Rectal Q6H PRN Mardella Chalk Dials, APRN - CNS        glucose chewable tablet 16 g  4 tablet Oral PRN Mardella Chalk Dials, APRN - CNS        dextrose bolus 10% 125 mL  125 mL IntraVENous PRN Mardella Chalk Dials, APRN - CNS   Stopped at 10/08/22 5388    Or    dextrose bolus 10% 250 mL  250 mL IntraVENous PRN Mardella Chalk Dials, APRN - CNS        glucagon (rDNA) injection 1 mg  1 mg SubCUTAneous PRN Mardella Chalk Dials, APRN - CNS        dextrose 10 % infusion   IntraVENous Continuous PRN Mardella Sharathk Dials, APRN - CNS        insulin glargine (LANTUS) injection vial 9 Units  0.15 Units/kg SubCUTAneous Nightly Mardella Sharathk Dials, APRN - CNS   9 Units at 10/07/22 2100    carbidopa-levodopa (SINEMET)  MG per tablet 1 tablet  1 tablet Per NG tube BID Mardella Sharathk Dials, APRN - CNS   1 tablet at 10/07/22 0739       PHYSICAL EXAM:    BP (!) 143/66   Pulse 68   Temp 97.6 °F (36.4 °C) (Axillary)   Resp 20   Ht 5' 8\" (1.727 m)   Wt 119 lb 11.4 oz (54.3 kg)   SpO2 97%   BMI 18.20 kg/m²    General Appearance:      Skin:  normal  CVS - Normal sounds, No murmurs , No carotid Bruits  RS -CTA  Abdomen Soft, BS present  Review of Systems   Constitutional:  Negative for fever. HENT:  Negative for ear pain, tinnitus and trouble swallowing. Eyes:  Negative for photophobia and visual disturbance. Respiratory:  Negative for choking and shortness of breath. Cardiovascular:  Negative for chest pain and palpitations. Gastrointestinal:  Negative for nausea and vomiting. Musculoskeletal:  Negative for back pain, gait problem, joint swelling, myalgias, neck pain and neck stiffness.    Skin:  Negative for color change. Allergic/Immunologic: Negative for food allergies. Neurological:  Negative for dizziness, tremors, seizures, syncope, facial asymmetry, speech difficulty, weakness, light-headedness, numbness and headaches. Psychiatric/Behavioral:  Negative for behavioral problems, confusion, hallucinations and sleep disturbance.      The above was available through translation though patient may not have understood most of it  Severe system is from yesterday today somewhat more confused  Mental Status Exam:             Level of Alertness:   awake            Orientation:   person,  Patient's language appears to be normal  Funduscopic Exam:     Cranial Nerves              Cranial nerve III           Pupils:  equal, round, reactive to light      Cranial nerves III, IV, VI           Extraocular Movements: intact      Cranial nerve V           Facial sensation:  intact      Cranial nerve VII           Facial strength: intact            Cranial nerve XII          Tongue movement:  normal    Motor:    Drift:  absent  Motor exam is symmetrical 4 out of 5 all extremities bilaterally  Tone:  normal  Abnormal Movements:  absent            Sensory: No focal deficits        Pinprick             Right Upper Extremity:  normal             Left Upper Extremity:  normal             Right Lower Extremity:  normal             Left Lower Extremity:  normal           Vibration                         Touch            Proprioception                 Coordination:           Finger/Nose   Right:  normal              Left:  normal          Heel-Knee-Shin                Right:  normal              Left:  normal          Rapid Alternating Movements              Right:  normal              Left:  normal          Gait:                       Casual: Gait is deferred          Reflexes:             Deep Tendon Reflexes:             Reflexes are 2 +             Plantar response:                Right:  downgoing               Left: downgoing    Vascular:  Cardiac Exam:  normal         XR CHEST (2 VW)    Result Date: 10/5/2022  EXAMINATION: TWO XRAY VIEWS OF THE CHEST 10/5/2022 1:39 pm COMPARISON: None. HISTORY: ORDERING SYSTEM PROVIDED HISTORY: Dysphagia, unspecified type TECHNOLOGIST PROVIDED HISTORY: Reason for exam:->possible aspiration What reading provider will be dictating this exam?->[facility] FINDINGS: The cardiomediastinal silhouette is without acute process. Biapical promises visualized osseous of COPD changes. The lungs are without acute focal process. There is no effusion or pneumothorax. The osseous structures are without acute process. No acute cardiopulmonary disease. COPD changes. CT HEAD WO CONTRAST    Result Date: 10/5/2022  EXAMINATION: CT OF THE HEAD WITHOUT CONTRAST  10/5/2022 3:52 pm TECHNIQUE: CT of the head was performed without the administration of intravenous contrast. Automated exposure control, iterative reconstruction, and/or weight based adjustment of the mA/kV was utilized to reduce the radiation dose to as low as reasonably achievable. COMPARISON: 09/04/2022 HISTORY: ORDERING SYSTEM PROVIDED HISTORY: dysphagia TECHNOLOGIST PROVIDED HISTORY: Reason for exam:->dysphagia Has a \"code stroke\" or \"stroke alert\" been called? ->No Decision Support Exception - unselect if not a suspected or confirmed emergency medical condition->Emergency Medical Condition (MA) What reading provider will be dictating this exam?->CRC FINDINGS: BRAIN/VENTRICLES: No evidence of parenchymal hemorrhages or contusions. No evidence of intra or extra-axial fluid collection is seen. Scattered areas of low attenuation are visualized in the periventricular and subcortical white matter demonstrating no change in comparison to the prior study consistent with chronic microvascular disease. No evidence of acute territorial infarct is seen.  Prominence of the ventricles and sulci is visualized demonstrating no change consistent with chronic atrophic brain changes. No evidence of intracranial mass or mass effect, no evidence of midline shift is seen. No evidence of sellar or parasellar mass is visualized. ORBITS: The visualized portion of the orbits demonstrate no acute abnormality. SINUSES: The visualized paranasal sinuses and mastoid air cells demonstrate no acute abnormality. SOFT TISSUES/SKULL:  No acute abnormality of the visualized skull or soft tissues. No acute intracranial abnormality. Chronic microvascular disease and chronic atrophic brain changes seen. If there is a high level of suspicion would recommend further evaluation with an MRI of the brain to rule out subtle brainstem lacunar infarcts or acute on chronic pathology. XR CHEST PORTABLE    Result Date: 10/6/2022  EXAMINATION: ONE XRAY VIEW OF THE CHEST 10/6/2022 3:17 pm COMPARISON: None. HISTORY: ORDERING SYSTEM PROVIDED HISTORY: NG placement TECHNOLOGIST PROVIDED HISTORY: Reason for exam:->NG placement What reading provider will be dictating this exam?->CRC FINDINGS: Enteric tube tip in the fundus of the stomach. Nonobstructive bowel gas pattern. No gross free air. The lung bases are normal.     Enteric tube tip in the fundus of the stomach. MRI BRAIN WO CONTRAST    Result Date: 10/6/2022  EXAMINATION: MRI OF THE BRAIN WITHOUT CONTRAST  10/6/2022 11:26 am TECHNIQUE: Multiplanar multisequence MRI of the brain was performed without the administration of intravenous contrast. COMPARISON: CT brain performed 10/05/2022. HISTORY: ORDERING SYSTEM PROVIDED HISTORY: rule out cva TECHNOLOGIST PROVIDED HISTORY: Reason for exam:->rule out cva What reading provider will be dictating this exam?->CRC FINDINGS: INTRACRANIAL STRUCTURES/VENTRICLES: The sellar and suprasellar structures, optic chiasm, corpus callosum, pineal gland, tectum, and midline brainstem structures are unremarkable. The craniocervical junction is unremarkable.  There is no acute hemorrhage, mass effect, or midline shift.  There is satisfactory overall gray-white matter differentiation. There is extensive FLAIR signal abnormality in the subcortical and periventricular white matter that may relate to chronic microvascular disease. The ventricular structures are symmetrically prominent. The infratentorial structures including the cerebellopontine angles and internal auditory canals are unremarkable. There is no abnormal restricted diffusion. There is no abnormal blooming artifact on susceptibility weighted imaging. ORBITS: The visualized portion of the orbits demonstrate no acute abnormality. SINUSES: The visualized paranasal sinuses and mastoid air cells demonstrate no acute abnormality. BONES/SOFT TISSUES: The bone marrow signal intensity appears normal. The soft tissues demonstrate no acute abnormality. Extensive chronic microvascular disease without acute intracranial. Symmetrically prominent ventricular system correlation with symptomatology for normal pressure hydrocephalus is needed. FL Modified Barium Swallow W Video    Result Date: 10/7/2022  EXAMINATION: MODIFIED BARIUM SWALLOW WITH SPEECH PATHOLOGY TECHNIQUE: Fluoroscopic evaluation of the swallowing mechanism was performed by speech pathology. FLUOROSCOPY DOSE AND TYPE OR TIME AND EXPOSURES: Fluoroscopy time was 1.1 minutes. Multiple fluoroscopic cine loops were acquired.  HISTORY: ORDERING SYSTEM PROVIDED HISTORY: Dysphagia, unspecified type TECHNOLOGIST PROVIDED HISTORY: What reading provider will be dictating this exam?->[facility]       Recent Labs     10/05/22  1530 10/05/22  1711 10/07/22  1448 10/08/22  0339   WBC 10.4  --  8.3 9.0   HGB 13.6* 17.6* 10.2* 10.5*     --  142 152       Recent Labs     10/08/22  0339 10/08/22  0547 10/08/22  1059   * 154* 148*   K 3.2* 3.2* 3.4   * 120* 116*   CO2 25 22 21   BUN 24* 23 20   CREATININE 1.39* 1.32* 1.24*   GLUCOSE 50* 59* 103*       Recent Labs     10/05/22  1530 10/07/22  1123   BILITOT 0.3 0.4   ALKPHOS 222* 153*   AST 57* 55*   ALT 14 13       No results found for: PROTIME, INR  No results found for: LITHIUM, DILFRTOT, VALPROATE    ASSESSMENT AND PLAN  Encephalopathy which is metabolic secondary to significant hypernatremia. Patient is somewhat more awake. Patient has a Parkinson's disease and we have not seen him for some time he continues on carbidopa levodopa. None of the medications I see can cause hyponatremia and this is being followed by renal.  For now patient has not developed any suggestion of PRES  No seizures are reported. We will see what comes out of this investigations for now. 10/8  More agitated and confused. His sodium still 148. We will keep an eye on this. Patient probably has underlying dementia underneath the hypernatremia. We will keep an eye on this and continue to follow. There is no suggestion of PRES or seizures      Syd Mijares MD, Mozella Babinski, American Board of Psychiatry & Neurology  Board Certified in Vascular Neurology  Board Certified in Neuromuscular Medicine  Certified in [facility]

## 2022-10-08 NOTE — CARE COORDINATION
Spoke with patient's main English-speaking family member, Ramya Son ([phone]) again. She and patient's wife were here earlier today and saw the patient. They're aware of the need for gastric access. They would like him to be considered for PEG placement. They are absolutely not interested in Hospice or Palliative Care at this point. They are amenable to him having the NG re-placed again today if needed for safe cares (it is, given need for particularly Sinnemet and for free water flushes and tube feeds to help with Na management and to prevent acute exacerbation of Parkinson's symptoms), even if it requires soft wrist restraints again to prevent him from pulling the NG out again. They are aware that surgical consult likely won't occur until Monday, and are okay with that. Colorectal Surgery consult placed. Previous Palliative Care consult now cancelled.     Electronically signed by Abdulkadir uLgo DO on 10/8/2022 at 3:56 PM.

## 2022-10-09 NOTE — PROGRESS NOTES
10/09/2022    From:Home with family, Bedbound, non verbal  Iván Conteh, WC, Shw chair, family caregivers, FRATERNAL [facility]    Admit:AMS, Dysphagia     PMH:DM, Parkinson's, Dementia, HLD, HTN    Anticipated Discharge Disposition:Home with [facility]    Patient Mobility or PT/OT ordered:  Consults: PULM, NEPH, NEURO, PALL CARE, JOYCE    Clinical: NPO, NG  Na 163--154--140  K 3.5  BUN 48/2.12 Trop 0.033  MRA Head-  MRA Neck-pend  MRI Brain-NEG  CXR- COPD  CT Head NEG  NG Tube--PT PULLED OUT   10/8 NEW NG PLACED. NEW JOYCE CONSULT FOR ? PEG  Barriers to Discharge:Consults, MEDICAL CLEARANCE. ? PEG, PALL CARE CONSULT  HYPOGLYCEMIA  Assessments: CMI DONE.

## 2022-10-09 NOTE — PROGRESS NOTES
INPATIENT PROGRESS NOTES    PATIENT NAME: Denzel Dumont  MRN: [id number]  SERVICE DATE:  October 9, 2022   SERVICE TIME:  3:04 PM      PRIMARY SERVICE: Pulmonary Disease    CHIEF COMPLAIN: Encephalopathy    INTERVAL HPI: Patient seen and examined at bedside, Interval Notes, orders reviewed. Nursing notes noted  He is very sleepy. Discussed with RN. He is not able to clear his own secretion requiring suctioning as needed. He still able to maintain O2 saturation to 99%  he appeared comfortable in bed. No fever or chills. No vomiting or diarrhea. OBJECTIVE    Body mass index is 18.2 kg/m². PHYSICAL EXAM:  Vitals:  BP (!) 155/64   Pulse 84   Temp 98.2 °F (36.8 °C) (Axillary)   Resp 22   Ht 5' 8\" (1.727 m)   Wt 119 lb 11.4 oz (54.3 kg)   SpO2 99%   BMI 18.20 kg/m²   General: Alert, awake, confused and agitated at times. comfortable in bed, No distress. Head: Atraumatic , Normocephalic   Eyes: PERRL. No sclera icterus. No conjunctival injection. No discharge   ENT: No nasal  discharge. Pharynx clear. Neck:  Trachea midline. No thyromegaly, no JVD, No cervical adenopathy. Chest : Bilaterally symmetrical ,Normal effort,  No accessory muscle use  Lung : . Fair BS bilateral, decreased BS at bases. No Rales. No wheezing. No rhonchi. Heart[de-identified] Normal  rate. Regular rhythm. No mumur ,  Rub or gallop  ABD: Non-tender. Non-distended. No masses. No organmegaly. Normal bowel sounds. No hernia.   Ext : No Pitting both leg , No Cyanosis No clubbing  Neuro: no focal weakness          DATA:   Recent Labs     10/08/22  0339 10/09/22  0541   WBC 9.0 9.7   HGB 10.5* 10.8*   HCT 33.3* 32.7*   MCV 90.6 88.5    146     Recent Labs     10/07/22  1123 10/07/22  1448 10/08/22  1838 10/09/22  0737   *   < > 146* 140   K 3.5   < > 3.7 3.5   *   < > 112* 106   CO2 23   < > 23 23   BUN 27*   < > 18 16   CREATININE 1.50*   < > 1.21* 1.23*   GLUCOSE 161*   < > 100* 230*   CALCIUM 8.1*   < > 8.5 8.5   PROT 5.8* --   --   --    LABALBU 2.6*  --   --   --    BILITOT 0.4  --   --   --    ALKPHOS 153*  --   --   --    AST 55*  --   --   --    ALT 13  --   --   --    LABGLOM 46.0*   < > 59.0* 57.9*   GFRAA 55.7*   < > >60.0 >60.0    < > = values in this interval not displayed. MV Settings:          No results for input(s): PHART, GPA9ZCB, PO2ART, DKQ9TWV, BEART, W3DLLGBH in the last 72 hours. O2 Device: None (Room air)    ADULT TUBE FEEDING; Nasogastric; Standard with Fiber; Continuous; 20; No; 300; Q 6 hours  Diet NPO     MEDICATIONS during current hospitalization:    Continuous Infusions:   dextrose 50 mL/hr at 10/09/22 0957    sodium chloride      dextrose         Scheduled Meds:   enoxaparin  40 mg SubCUTAneous Daily    losartan  50 mg Oral Daily    QUEtiapine  25 mg Oral BID    sertraline  50 mg Oral Daily    tamsulosin  0.4 mg Oral Daily    atorvastatin  20 mg Oral Nightly    pantoprazole (PROTONIX) 40 mg injection  40 mg IntraVENous Daily    insulin lispro  0-16 Units SubCUTAneous Q4H    sodium chloride flush  5-40 mL IntraVENous 2 times per day    insulin glargine  0.15 Units/kg SubCUTAneous Nightly    carbidopa-levodopa  1 tablet Per NG tube BID       PRN Meds:sodium chloride flush, sodium chloride, ondansetron **OR** ondansetron, acetaminophen **OR** acetaminophen, glucose, dextrose bolus **OR** dextrose bolus, glucagon (rDNA), dextrose    Radiology  XR CHEST (2 VW)    Result Date: 10/5/2022  EXAMINATION: TWO XRAY VIEWS OF THE CHEST 10/5/2022 1:39 pm COMPARISON: None. HISTORY: ORDERING SYSTEM PROVIDED HISTORY: Dysphagia, unspecified type TECHNOLOGIST PROVIDED HISTORY: Reason for exam:->possible aspiration What reading provider will be dictating this exam?->[facility] FINDINGS: The cardiomediastinal silhouette is without acute process. Biapical promises visualized osseous of COPD changes. The lungs are without acute focal process. There is no effusion or pneumothorax.  The osseous structures are without acute process. No acute cardiopulmonary disease. COPD changes. XR ABDOMEN (KUB) (SINGLE AP VIEW)    Result Date: 10/8/2022  EXAMINATION: ONE SUPINE XRAY VIEW(S) OF THE ABDOMEN 10/7/2022 9:00 pm COMPARISON: None. HISTORY: ORDERING SYSTEM PROVIDED HISTORY: verify NG placement TECHNOLOGIST PROVIDED HISTORY: Portable for NG Placement Reason for exam:->verify NG placement What reading provider will be dictating this exam?->CRC FINDINGS: KUB reveals nasogastric tube identified tip in the stomach. The bowel gas pattern is unremarkable. No abnormal mass or calcifications seen overlying the renal shadows or paths of the ureters. Bony structures are grossly unremarkable. Nasogastric tube tip in the stomach. CT HEAD WO CONTRAST    Result Date: 10/5/2022  EXAMINATION: CT OF THE HEAD WITHOUT CONTRAST  10/5/2022 3:52 pm TECHNIQUE: CT of the head was performed without the administration of intravenous contrast. Automated exposure control, iterative reconstruction, and/or weight based adjustment of the mA/kV was utilized to reduce the radiation dose to as low as reasonably achievable. COMPARISON: 09/04/2022 HISTORY: ORDERING SYSTEM PROVIDED HISTORY: dysphagia TECHNOLOGIST PROVIDED HISTORY: Reason for exam:->dysphagia Has a \"code stroke\" or \"stroke alert\" been called? ->No Decision Support Exception - unselect if not a suspected or confirmed emergency medical condition->Emergency Medical Condition (MA) What reading provider will be dictating this exam?->CRC FINDINGS: BRAIN/VENTRICLES: No evidence of parenchymal hemorrhages or contusions. No evidence of intra or extra-axial fluid collection is seen. Scattered areas of low attenuation are visualized in the periventricular and subcortical white matter demonstrating no change in comparison to the prior study consistent with chronic microvascular disease. No evidence of acute territorial infarct is seen.  Prominence of the ventricles and sulci is visualized demonstrating no change consistent with chronic atrophic brain changes. No evidence of intracranial mass or mass effect, no evidence of midline shift is seen. No evidence of sellar or parasellar mass is visualized. ORBITS: The visualized portion of the orbits demonstrate no acute abnormality. SINUSES: The visualized paranasal sinuses and mastoid air cells demonstrate no acute abnormality. SOFT TISSUES/SKULL:  No acute abnormality of the visualized skull or soft tissues. No acute intracranial abnormality. Chronic microvascular disease and chronic atrophic brain changes seen. If there is a high level of suspicion would recommend further evaluation with an MRI of the brain to rule out subtle brainstem lacunar infarcts or acute on chronic pathology. XR CHEST PORTABLE    Result Date: 10/6/2022  EXAMINATION: ONE XRAY VIEW OF THE CHEST 10/6/2022 3:17 pm COMPARISON: None. HISTORY: ORDERING SYSTEM PROVIDED HISTORY: NG placement TECHNOLOGIST PROVIDED HISTORY: Reason for exam:->NG placement What reading provider will be dictating this exam?->CRC FINDINGS: Enteric tube tip in the fundus of the stomach. Nonobstructive bowel gas pattern. No gross free air. The lung bases are normal.     Enteric tube tip in the fundus of the stomach. MRI BRAIN WO CONTRAST    Result Date: 10/6/2022  EXAMINATION: MRI OF THE BRAIN WITHOUT CONTRAST  10/6/2022 11:26 am TECHNIQUE: Multiplanar multisequence MRI of the brain was performed without the administration of intravenous contrast. COMPARISON: CT brain performed 10/05/2022. HISTORY: ORDERING SYSTEM PROVIDED HISTORY: rule out cva TECHNOLOGIST PROVIDED HISTORY: Reason for exam:->rule out cva What reading provider will be dictating this exam?->CRC FINDINGS: INTRACRANIAL STRUCTURES/VENTRICLES: The sellar and suprasellar structures, optic chiasm, corpus callosum, pineal gland, tectum, and midline brainstem structures are unremarkable. The craniocervical junction is unremarkable.  There is no acute hemorrhage, mass effect, or midline shift. There is satisfactory overall gray-white matter differentiation. There is extensive FLAIR signal abnormality in the subcortical and periventricular white matter that may relate to chronic microvascular disease. The ventricular structures are symmetrically prominent. The infratentorial structures including the cerebellopontine angles and internal auditory canals are unremarkable. There is no abnormal restricted diffusion. There is no abnormal blooming artifact on susceptibility weighted imaging. ORBITS: The visualized portion of the orbits demonstrate no acute abnormality. SINUSES: The visualized paranasal sinuses and mastoid air cells demonstrate no acute abnormality. BONES/SOFT TISSUES: The bone marrow signal intensity appears normal. The soft tissues demonstrate no acute abnormality. Extensive chronic microvascular disease without acute intracranial. Symmetrically prominent ventricular system correlation with symptomatology for normal pressure hydrocephalus is needed. FL Modified Barium Swallow W Video    Result Date: 10/7/2022  EXAMINATION: MODIFIED BARIUM SWALLOW WITH SPEECH PATHOLOGY TECHNIQUE: Fluoroscopic evaluation of the swallowing mechanism was performed by speech pathology. FLUOROSCOPY DOSE AND TYPE OR TIME AND EXPOSURES: Fluoroscopy time was 1.1 minutes. Multiple fluoroscopic cine loops were acquired. HISTORY: ORDERING SYSTEM PROVIDED HISTORY: Dysphagia, unspecified type TECHNOLOGIST PROVIDED HISTORY: What reading provider will be dictating this exam?->[facility]         IMPRESSION AND SUGGESTION:  Encephalopathy  Hypernatremia, improved  Acute renal failure improving  Failure to thrive  History of Parkinson disease with advanced dementia    Hypernatremia is improving currently sodium is 140. He is very sleepy today. Neurology is following regarding encephalopathy. Continue water flushes with tube feeding. Nephrology is consulted.   DVT GI prophylaxis. Chest x-ray shows COPD changes without any active infiltration. .  At this time continue O2 to keep saturation 90% or above. Aspiration precaution, suction as needed. Discussed with RN. NOTE: This report was transcribed using voice recognition software. Every effort was made to ensure accuracy; however, inadvertent computerized transcription errors may be present.       Electronically signed by Rober Durham MD, FCCP on 10/9/2022 at 3:04 PM

## 2022-10-09 NOTE — PROGRESS NOTES
Progress Note  Date:10/9/2022       Room:W282/W282-01  Patient Anselmo Shone Colon     YOB: 1950     Age:72 y.o. Subjective      Sodium significantly decreased to 140 today. NG tube replaced overnight, patient back in two-point soft wrist restraints. Hypoglycemic 2 days ago, mildly hyperglycemic overnight last night in 200s. Per discussion with family 10/8, colorectal surgery consult placed 10/8 for consideration for PEG tube placement. Objective         Vitals Last 24 Hours:  TEMPERATURE:  Temp  Av.6 °F (36.4 °C)  Min: 97 °F (36.1 °C)  Max: 98.2 °F (36.8 °C)  RESPIRATIONS RANGE: Resp  Av  Min: 22  Max: 22  PULSE OXIMETRY RANGE: SpO2  Av %  Min: 99 %  Max: 99 %  PULSE RANGE: Pulse  Av  Min: 82  Max: 84  BLOOD PRESSURE RANGE: Systolic (98VCR), RQR:665 , Min:136 , CIQ:313   ; Diastolic (13LZA), RYT:71, Min:67, Max:106    I/O (24Hr): Intake/Output Summary (Last 24 hours) at 10/9/2022 0910  Last data filed at 10/9/2022 0653  Gross per 24 hour   Intake --   Output 1600 ml   Net -1600 ml       Objective:  Vital signs: (most recent): Blood pressure (!) 155/64, pulse 84, temperature 98.2 °F (36.8 °C), temperature source Axillary, resp. rate 22, height 5' 8\" (1.727 m), weight 119 lb 11.4 oz (54.3 kg), SpO2 99 %. Constitutional: Cachectic elderly adult male reclining in bed no acute distress, encephalopathic  Head: NCAT  Eyes: PERRLA, occasionally meets gaze  ENT: NG tube in place. Neck: Trachea midline, occasional non-verbal vocalizations  Cardiovascular: RRR. No significant murmurs appreciated. Pulmonary: Normal rate and effort of respiration on room air. Abdomen: Soft, scaphoid, nontense. Hypoactive bowel sounds. No obvious discomfort to palpation. Neurologic: Alert. Disorientation at/near baseline level. Occasionally responds to some stimuli. Follows no commands. Nonverbal at baseline. Occasionally meets gaze.   Two-point soft wrist restraints in place.  Psychiatric: Not agitated. Integumentary: Improved skin turgor. Labs/Imaging/Diagnostics    Labs:  CBC:  Recent Labs     10/07/22  1448 10/08/22  0339 10/09/22  0541   WBC 8.3 9.0 9.7   RBC 3.54* 3.67* 3.70*   HGB 10.2* 10.5* 10.8*   HCT 32.2* 33.3* 32.7*   MCV 90.8 90.6 88.5   RDW 14.4 14.7* 14.2    152 146       CHEMISTRIES:  Recent Labs     10/08/22  0547 10/08/22  1059 10/08/22  1433 10/08/22  1838 10/09/22  0541 10/09/22  0737   * 148* 149* 146*  --  140   K 3.2* 3.4 3.7 3.7  --  3.5   * 116* 115* 112*  --  106   CO2 22 21 24 23  --  23   BUN 23 20 19 18  --  16   CREATININE 1.32* 1.24* 1.23* 1.21*  --  1.23*   GLUCOSE 59* 103* 93 100*  --  230*   MG 2.1 2.1  --   --  1.9  --        PT/INR:  Recent Labs     10/09/22  0541   PROTIME 13.4   INR 1.0     APTT:  Recent Labs     10/09/22  0541   APTT 33.4     LIVER PROFILE:  Recent Labs     10/07/22  1123   AST 55*   ALT 13   BILIDIR <0.2   BILITOT 0.4   ALKPHOS 153*         Imaging Last 24 Hours:  XR CHEST (2 VW)    Result Date: 10/5/2022  EXAMINATION: TWO XRAY VIEWS OF THE CHEST 10/5/2022 1:39 pm COMPARISON: None. HISTORY: ORDERING SYSTEM PROVIDED HISTORY: Dysphagia, unspecified type TECHNOLOGIST PROVIDED HISTORY: Reason for exam:->possible aspiration What reading provider will be dictating this exam?->[facility] FINDINGS: The cardiomediastinal silhouette is without acute process. Biapical promises visualized osseous of COPD changes. The lungs are without acute focal process. There is no effusion or pneumothorax. The osseous structures are without acute process. No acute cardiopulmonary disease. COPD changes.      CT HEAD WO CONTRAST    Result Date: 10/5/2022  EXAMINATION: CT OF THE HEAD WITHOUT CONTRAST  10/5/2022 3:52 pm TECHNIQUE: CT of the head was performed without the administration of intravenous contrast. Automated exposure control, iterative reconstruction, and/or weight based adjustment of the mA/kV was utilized to reduce the radiation dose to as low as reasonably achievable. COMPARISON: 09/04/2022 HISTORY: ORDERING SYSTEM PROVIDED HISTORY: dysphagia TECHNOLOGIST PROVIDED HISTORY: Reason for exam:->dysphagia Has a \"code stroke\" or \"stroke alert\" been called? ->No Decision Support Exception - unselect if not a suspected or confirmed emergency medical condition->Emergency Medical Condition (MA) What reading provider will be dictating this exam?->CRC FINDINGS: BRAIN/VENTRICLES: No evidence of parenchymal hemorrhages or contusions. No evidence of intra or extra-axial fluid collection is seen. Scattered areas of low attenuation are visualized in the periventricular and subcortical white matter demonstrating no change in comparison to the prior study consistent with chronic microvascular disease. No evidence of acute territorial infarct is seen. Prominence of the ventricles and sulci is visualized demonstrating no change consistent with chronic atrophic brain changes. No evidence of intracranial mass or mass effect, no evidence of midline shift is seen. No evidence of sellar or parasellar mass is visualized. ORBITS: The visualized portion of the orbits demonstrate no acute abnormality. SINUSES: The visualized paranasal sinuses and mastoid air cells demonstrate no acute abnormality. SOFT TISSUES/SKULL:  No acute abnormality of the visualized skull or soft tissues. No acute intracranial abnormality. Chronic microvascular disease and chronic atrophic brain changes seen. If there is a high level of suspicion would recommend further evaluation with an MRI of the brain to rule out subtle brainstem lacunar infarcts or acute on chronic pathology.      Assessment//Plan           Hospital Problems             Last Modified POA    * (Principal) Hypernatremia 10/5/2022 Yes    Acute encephalopathy 10/8/2022 Yes    Acute renal failure ([facility] [address] 10/8/2022 Yes    Dehydration 10/8/2022 Yes    Dysphagia 10/8/2022 Yes   Assessment & Plan    Acute hypernatremia secondary to severe dehydration with renal failure  Acute hyperkalemia - improved  Acute kidney injury secondary to severe dehydration - rule out urinary retention  Dysphagia - etiology DDX: advanced dementia/Parkinson's disease vs acute CVA  Type 2 dm with hyperlglycemia - improving  Elevated troponin-suspect demand ischemia multifactorial in setting of dehydration, possible CVA  Lactic acidemia secondary to renal failure and dehydration-metabolic acidosis non-anion gap  Parkinson's disease/dementia  DVT prophylaxis    Plan:  Seen and examined in the emergency department  Admit to intensive care unit  BMP every 4 hours  normal saline and 125 cc an hour  Npo  Insert Dobbhoff for medications  frost catheter for intake and output critical care setting  MRI of head/MRA head and neck  Trend troponin  Repeat lactic acid every 4 hours x 3  If repeat potassium greater than 5.6 we will give 1 dose of Lokelma. Insulin has been ordered  Insulin-Lantus initiated along with basal insulin and sliding scale  Will administer sinemet through [address] with family in the emergency department-patient will remain full code. Family would want PEG tube if required  10/6: Sodium shayla to 167 from 165 on admission despite administration of NS on admission. Switch to D5 half NS. Hypoglycemic on morning labs, being addressed with D5 half NS. Recheck pending. Neurology and Nephrology consults pending. Critical Care overseeing management while in ICU. MRI pending. 10/7: Sodium improving but not yet at goal.  Creatinine improving. Currently on D5/0.5 NS at 75 mill/hour plus every 4 hour free water flushes per NG tube. Sodium improving. Adjustments being made cautiously to avoid too rapid of a shift due to neurologic risks. Palliative care consult placed this morning for discussion of goals of care with family.   Despite being more awake now than when initially admitted, patient is still persistently somewhat somnolent, follows almost all commands, and is likely unsafe to take sufficient p.o. intake to maintain caloric and hydration needs. Would likely need consideration for PEG tube placement if family wants to extend life as much as possible, vs discussion of possible palliative/hospice options if they do not want to aggressively intervene. 10/8: Pulled NG tube out repeatedly overnight, despite two-point soft wrist restraints. NG and wrist restraints discontinued by this morning at present. At/near baseline encephalopathy level in setting of severely advanced Parkinson's dementia. Sodium continues to trend very slow improvement, not yet at normal range. Creatinine trending slow improvement. Palliative care consultation pending with family for discussion of goals of care and necessity for possible PEG tube placement versus palliative/hospice options. 10/9: NG tube successfully re-placed. Soft wrist restraints for patient's safety/NG tube protection. Per discussion with family 10/8, colorectal surgery consultation placed 10/8 for consideration for PEG tube placement. Sodium significantly improved to 140 today, but fairly rapid shifts from previous day initial value 154. Monitor for any worsening neurologic status in setting of large shift. IV fluids and electrolyte management per nephrology. Anticipate likely NPO after midnight tonight for possible PEG placement Monday 10/10.       Electronically signed by Elke Mann DO on 10/9/2022 at 9:10 AM

## 2022-10-09 NOTE — PROGRESS NOTES
Per trauma, they reached out to this RN to see if the granddaughter who is able to sign consent if she can come in tomorrow morning around 8 am to go over PEG tube consent with trauma team. Granddaughter states she will be in tomorrow 10/10/22 between 730-8am.

## 2022-10-09 NOTE — CONSULTS
EMERGENCY GENERAL SURGERY CONSULTATION / H&P    Reason for Consultation:  peg tube placement  Consulting Provider: Javier Chavez DO    HISTORY OF PRESENT INJURY:   Abdullahi Mondragon is a 71 y.o. male with PMHx of HTN, HLD, DMII, previous thoracic spine fx's and advanced Parkinson's with severe dysphagia. Patient's family does not want to pursue hospice or palliative measures at this time. EGS was placed on consult for peg tube placement. PMH:    Past Medical History:   Diagnosis Date    Compression fracture of body of thoracic vertebra ([address] 6/23/2019    T3, T4, T5 AND T6 COMPRESSION FX OF INDETERMINATE AGE AND CLINICAL CORRELATION WARRANTED. Gait abnormality due to exacerbation of Parkinson's disease with acute rehab admission on 6/19/2019 6/20/2019    77year-old male who was brought to the emergency room after syncopal episode in 6/16/2019 he was especially diagnosed with exacerbation of Parkinson's disease and electrolyte abnormality as well as hyperosmolar state secondary to elevated blood sugar and uncontrolled diabetes. Cardiopulmonary work-up was negative including chest x-ray EKG and MRI of the brain. CT the brain showed no acute findi    Hyperlipidemia     Hypertension     Parkinson's disease ([address]     Syncope and collapse     Type 2 diabetes mellitus without complication (HCC)        PSH:    Past Surgical History:   Procedure Laterality Date    APPENDECTOMY         FH:    Family History   Problem Relation Age of Onset    Diabetes Mother     Heart Disease Mother     Diabetes Father      UTS h/o bleeding or clotting disorders    SH:    Social History     Tobacco Use    Smoking status: Never    Smokeless tobacco: Never   Vaping Use    Vaping Use: Never used   Substance Use Topics    Alcohol use: Never    Drug use: Never       Home Medications:  No current facility-administered medications on file prior to encounter.      Current Outpatient Medications on File Prior to Encounter   Medication Sig Dispense Refill    Vitamin D-Vitamin K (VITAMIN K2-VITAMIN D3)  MCG-UNIT CAPS       pramipexole (MIRAPEX) 0.125 MG tablet       QUEtiapine (SEROQUEL) 25 MG tablet       nystatin (MYCOSTATIN) 450868 UNIT/ML suspension Take 5 mLs by mouth 4 times daily for 14 days Swish and swallow over 20 minutes. 280 mL 0    losartan (COZAAR) 50 MG tablet TAKE 1 TABLET BY MOUTH DAILY 30 tablet 3    Continuous Blood Gluc Sensor (FREESTYLE MARY CAREMN 2 SENSOR) MISC Change every 14 days 2 each 5    Continuous Blood Gluc  (FREESTYLE MARY CARMEN 2 READER) YENY Give 1 meter 1 each 0    BAQSIMI ONE PACK 3 MG/DOSE POWD 1 APPLICATOR BY NASAL ROUTE EVERY 4 HOURS AS NEEDED (BLOOD GLUCOSE LESS THAN 70) 1 each 3    Nutritional Supplements (ENSURE HIGH PROTEIN) LIQD Take 1 Bottle by mouth 3 times daily (with meals) 21 each 5    metFORMIN (GLUCOPHAGE) 1000 MG tablet MARICARMEN 1 TABLETA POR BOCA DOS VECES AL PHYLLIS ANTES DE COMER.  (Patient not taking: No sig reported) 60 tablet 3    Cholecalciferol (VITAMIN D3) 50 MCG (2000 UT) CAPS 1 po daily with meal 30 capsule 11    Blood Pressure KIT 1 each by Does not apply route daily 1 kit 0    Incontinence Supply Disposable (DEPEND UNDERWEAR LARGE) MISC 1 each by Does not apply route 8 times daily 240 each 12    Soap & Cleansers (CLEAN & CLEAR ABSORBING SHEETS) PADS Apply 1 each topically in the morning, at noon, and at bedtime 90 each 12    Disposable Gloves (NITRILE GLOVES LARGE) MISC 2 each by Does not apply route 8 times daily 480 each 12    insulin glargine (LANTUS SOLOSTAR) 100 UNIT/ML injection pen Inject 20 Units into the skin nightly 15 mL 3    simvastatin (ZOCOR) 40 MG tablet Take 1 tablet by mouth nightly 30 tablet 5    Glucagon, rDNA, (GLUCAGON EMERGENCY) 1 MG KIT INJECT 1 MG INTO THE MUSCLE SEE ADMIN INSTRUCTIONS FOLLOW PACKAGE DIRECTIONS FOR LOW BLOOD SUGAR. 1 kit 3    HUMALOG KWIKPEN 100 UNIT/ML SOPN 35 UNITS BEFORE BREAKFAST, 25 UNITS BEFORE LUNCH, 35 UNITS BEFORE DINNER 15 mL 3    Insulin Pen Needle (NOVOFINE) 32G X 6 MM MISC 1 Device by Does not apply route 4 times daily (before meals and nightly) 300 each 3    blood glucose monitor strips 1 strip by Other route 4 times daily (before meals and nightly) 150 strip 3    blood glucose monitor kit and supplies 1 kit by Other route 4 times daily (before meals and nightly) 1 kit 0    Lancets MISC 1 each by Does not apply route 4 times daily (before meals and nightly) 150 each 3    tamsulosin (FLOMAX) 0.4 MG capsule   0    carbidopa-levodopa (SINEMET)  MG per tablet Take 1 tablet by mouth 2 times daily 90 tablet 3    sertraline (ZOLOFT) 50 MG tablet Take 50 mg by mouth daily         Review Of Systems:  Unable to obtain secondary to mental status       PHYSICAL EXAM:  Vitals: BP (!) 155/64   Pulse 84   Temp 98.2 °F (36.8 °C) (Axillary)   Resp 22   Ht 5' 8\" (1.727 m)   Wt 119 lb 11.4 oz (54.3 kg)   SpO2 99%   BMI 18.20 kg/m²   Constituational: NAD lying in bed, intermittently participating in conversation with garbled sounds. Cachectic and elderly. Cardiovascular: Regular rate and rhythm. Pulmonary: Clear to auscultation bilaterally. No acute distress or labored breathing. Symmetric chest rise. On RA  Abdominal: Soft. Non-distended. Non-tender. Musculoskeletal: Nonpitting edema  Neurological: Encephalopathic, not following commands. Disoriented but alert reportedly at baseline. Requires tactile stimuli for response.       BASIC LABS:  CBC with Differential:    Lab Results   Component Value Date/Time    WBC 9.7 10/09/2022 05:41 AM    RBC 3.70 10/09/2022 05:41 AM    HGB 10.8 10/09/2022 05:41 AM    HCT 32.7 10/09/2022 05:41 AM     10/09/2022 05:41 AM    MCV 88.5 10/09/2022 05:41 AM    MCH 29.3 10/09/2022 05:41 AM    MCHC 33.1 10/09/2022 05:41 AM    RDW 14.2 10/09/2022 05:41 AM    LYMPHOPCT 4.7 10/09/2022 05:41 AM    MONOPCT 3.9 10/09/2022 05:41 AM    BASOPCT 0.2 10/09/2022 05:41 AM    MONOSABS 0.4 10/09/2022 05:41 AM    LYMPHSABS 0.5 10/09/2022 05:41 AM    EOSABS 0.1 10/09/2022 05:41 AM    BASOSABS 0.0 10/09/2022 05:41 AM     CMP:    Lab Results   Component Value Date/Time     10/09/2022 07:37 AM    K 3.5 10/09/2022 07:37 AM    K 3.7 10/08/2022 06:38 PM     10/09/2022 07:37 AM    CO2 23 10/09/2022 07:37 AM    BUN 16 10/09/2022 07:37 AM    CREATININE 1.23 10/09/2022 07:37 AM    GFRAA >60.0 10/09/2022 07:37 AM    LABGLOM 57.9 10/09/2022 07:37 AM    GLUCOSE 230 10/09/2022 07:37 AM    PROT 5.8 10/07/2022 11:23 AM    LABALBU 2.6 10/07/2022 11:23 AM    CALCIUM 8.5 10/09/2022 07:37 AM    BILITOT 0.4 10/07/2022 11:23 AM    ALKPHOS 153 10/07/2022 11:23 AM    AST 55 10/07/2022 11:23 AM    ALT 13 10/07/2022 11:23 AM     Magnesium:  Lab Results   Component Value Date/Time    MG 1.9 10/09/2022 05:41 AM     Troponin:    Lab Results   Component Value Date/Time    TROPONINI 0.015 10/09/2022 08:29 AM     Additional Labs:   T&S and coags ordered preoperatively      IMAGING:  None pertinent to peg placement    ASSESSMENT/RECOMMENDATIONS:  with PMHx of HTN, HLD, DMII, previous thoracic spine fx's and advanced Parkinson's with severe dysphagia. Patient's family does not want to pursue hospice or palliative measures at this time. EGS was placed on consult for peg tube placement. - Although patient with CKD, elevated troponin noted at admission which was new and with mild uptrend. EKG obtained which is NSR and repeat troponin obtained and down to 0.015 (0.033). Echo reviewed from 2019 with EF 70% and trace TR.  - Patient also noted to be hypernatremic yesterday but now normalized at 140 (146)  - Will need to obtain consent from wife when she is at bedside as she is the next of kin and Green[place]ic speaking only, and so  services will need to be used.   Please notify EGS if wife at bedside.   - T&S and coags ordered pre op, NPO at 0000 for tentative peg tube tomorrow pending ability to obtain conset      PINEDA Munoz - NP  Emergency General Surgery  [See treatment team sticky note for contact information]     The patient and plan of care was discussed with attending EGS surgeon, Dr. Clare Guo. Teaching Physician Note    I saw and evaluated the patient and reviewed all labs and imaging in the last 24 hours. I personally obtained the key and critical portions of the history and physical exam.  I reviewed the JENNY's documentation and discussed the patient with the JENNY. I agree with the JENNY's medical decision making as documented in the JENNY note. History and exam by me demonstrates:  Delayed entry - pt seen and examined around 1600 on 10/8. 70y male with Parkinson's and dementia admitted with dysphagia. Failed mulitple speech evaluations and family would like surgical feeding tube - not interested in palliative care. Pt with history of open appendectomy. No other apparent surgical scars on abdomen. Pt confused with garbled speech. Opens eyes to voice, however, and does not appear to be in pain. Plan/MDM:  Surgery available to place PEG both today and tomorrow however family has not been present at bedside this weekend to consent. Will try again tomorrow. Tentatively add on to OR schedule tomorrow for Dr. Kitty Gann.     Zahira Holloway MD  Trauma, Surgical Critical Care, and Emergency General Surgery

## 2022-10-09 NOTE — PROGRESS NOTES
Neurology Follow up    SUBJECTIVE: Son is at the bedside and we discussed with him and he was able to communicate with his father and he reports that he is in his normal since today. Sodium is still elevated 146 , but denies any headaches has not been any seizures. Patient remains stable and quite awake without any seizures but confused  Patient is much more awake and interactive though given that he has not received his carbidopa levodopa he appears to be bradykinetic.     Current Facility-Administered Medications   Medication Dose Route Frequency Provider Last Rate Last Admin    dextrose 5 % solution   IntraVENous Continuous Terra Stacks, DO 50 mL/hr at 10/09/22 0957 Rate Change at 10/09/22 0957    enoxaparin (LOVENOX) injection 40 mg  40 mg SubCUTAneous Daily Colton Moritz Dials, APRN - CNS        losartan (COZAAR) tablet 50 mg  50 mg Oral Daily Nicoletto Bolzan-Roche, APRN - CNP   50 mg at 10/09/22 0847    QUEtiapine (SEROQUEL) tablet 25 mg  25 mg Oral BID Nicoletto Bolzan-Roche, APRN - CNP   25 mg at 10/07/22 0739    sertraline (ZOLOFT) tablet 50 mg  50 mg Oral Daily Nicoletto Bolzan-Roche, APRN - CNP   50 mg at 10/07/22 0739    tamsulosin (FLOMAX) capsule 0.4 mg  0.4 mg Oral Daily Nicoletto Bolzan-Roche, APRN - CNP        atorvastatin (LIPITOR) tablet 20 mg  20 mg Oral Nightly Derril Jung, DO   20 mg at 10/06/22 2044    pantoprazole (PROTONIX) 40 mg in sodium chloride (PF) 10 mL injection  40 mg IntraVENous Daily Shanice Beaurelia, APRN - CNP   40 mg at 10/09/22 0847    insulin lispro (HUMALOG) injection vial 0-16 Units  0-16 Units SubCUTAneous Q4H Shanice Bewillies, APRN - CNP   4 Units at 10/09/22 0848    sodium chloride flush 0.9 % injection 5-40 mL  5-40 mL IntraVENous 2 times per day Tabatha Rhoades, APRN - CNS   10 mL at 10/09/22 0847    sodium chloride flush 0.9 % injection 5-40 mL  5-40 mL IntraVENous PRN Colton Moritz Dials, APRN - CNS        0.9 % sodium chloride infusion   IntraVENous PRN Colton Moritz Dials, APRN - CNS ondansetron (ZOFRAN-ODT) disintegrating tablet 4 mg  4 mg Oral Q8H PRN Zeenat Living Dials, APRN - CNS        Or    ondansetron (ZOFRAN) injection 4 mg  4 mg IntraVENous Q6H PRN Zeenat Living Dials, APRN - CNS        acetaminophen (TYLENOL) tablet 650 mg  650 mg Oral Q6H PRN Zeenat Living Dials, APRN - CNS        Or    acetaminophen (TYLENOL) suppository 650 mg  650 mg Rectal Q6H PRN Zeenat Living Dials, APRN - CNS        glucose chewable tablet 16 g  4 tablet Oral PRN Zeenat Living Dials, APRN - CNS        dextrose bolus 10% 125 mL  125 mL IntraVENous PRN Zeenat Living Dials, APRN - CNS   Stopped at 10/08/22 5078    Or    dextrose bolus 10% 250 mL  250 mL IntraVENous PRN Zeenat Living Dials, APRN - CNS        glucagon (rDNA) injection 1 mg  1 mg SubCUTAneous PRN Zeenat Living Dials, APRN - CNS        dextrose 10 % infusion   IntraVENous Continuous PRN Zeenat Living Dials, APRN - CNS        insulin glargine (LANTUS) injection vial 9 Units  0.15 Units/kg SubCUTAneous Nightly Zeenat Living Dials, APRN - CNS   9 Units at 10/07/22 2100    carbidopa-levodopa (SINEMET)  MG per tablet 1 tablet  1 tablet Per NG tube BID Zeenat Living Dials, APRN - CNS   1 tablet at 10/07/22 0739       PHYSICAL EXAM:    BP (!) 155/64   Pulse 84   Temp 98.2 °F (36.8 °C) (Axillary)   Resp 22   Ht 5' 8\" (1.727 m)   Wt 119 lb 11.4 oz (54.3 kg)   SpO2 99%   BMI 18.20 kg/m²    General Appearance:      Skin:  normal  CVS - Normal sounds, No murmurs , No carotid Bruits  RS -CTA  Abdomen Soft, BS present  Review of Systems   Constitutional:  Negative for fever. HENT:  Negative for ear pain, tinnitus and trouble swallowing. Eyes:  Negative for photophobia and visual disturbance. Respiratory:  Negative for choking and shortness of breath. Cardiovascular:  Negative for chest pain and palpitations. Gastrointestinal:  Negative for nausea and vomiting. Musculoskeletal:  Negative for back pain, gait problem, joint swelling, myalgias, neck pain and neck stiffness. Skin:  Negative for color change. Allergic/Immunologic: Negative for food allergies. Neurological:  Negative for dizziness, tremors, seizures, syncope, facial asymmetry, speech difficulty, weakness, light-headedness, numbness and headaches. Psychiatric/Behavioral:  Negative for behavioral problems, confusion, hallucinations and sleep disturbance.      The above was available through translation though patient may not have understood most of it  ROS  system is from yesterday today somewhat more confused  Mental Status Exam:             Level of Alertness:   awake            Orientation:   person,  Patient's language appears to be normal  Funduscopic Exam:     Cranial Nerves              Cranial nerve III           Pupils:  equal, round, reactive to light      Cranial nerves III, IV, VI           Extraocular Movements: intact      Cranial nerve V           Facial sensation:  intact      Cranial nerve VII           Facial strength: intact            Cranial nerve XII          Tongue movement:  normal    Motor:    Drift:  absent  Motor exam is symmetrical 4 out of 5 all extremities bilaterally  Tone:  normal  Abnormal Movements:  absent            Sensory: No focal deficits        Pinprick             Right Upper Extremity:  normal             Left Upper Extremity:  normal             Right Lower Extremity:  normal             Left Lower Extremity:  normal           Vibration                         Touch            Proprioception                 Coordination:           Finger/Nose   Right:  normal              Left:  normal          Heel-Knee-Shin                Right:  normal              Left:  normal          Rapid Alternating Movements              Right:  normal              Left:  normal          Gait:                       Casual: Gait is deferred          Reflexes:             Deep Tendon Reflexes:             Reflexes are 2 +             Plantar response:                Right:  downgoing               Left:  downgoing    Vascular: Cardiac Exam:  normal         XR CHEST (2 VW)    Result Date: 10/5/2022  EXAMINATION: TWO XRAY VIEWS OF THE CHEST 10/5/2022 1:39 pm COMPARISON: None. HISTORY: ORDERING SYSTEM PROVIDED HISTORY: Dysphagia, unspecified type TECHNOLOGIST PROVIDED HISTORY: Reason for exam:->possible aspiration What reading provider will be dictating this exam?->[facility] FINDINGS: The cardiomediastinal silhouette is without acute process. Biapical promises visualized osseous of COPD changes. The lungs are without acute focal process. There is no effusion or pneumothorax. The osseous structures are without acute process. No acute cardiopulmonary disease. COPD changes. CT HEAD WO CONTRAST    Result Date: 10/5/2022  EXAMINATION: CT OF THE HEAD WITHOUT CONTRAST  10/5/2022 3:52 pm TECHNIQUE: CT of the head was performed without the administration of intravenous contrast. Automated exposure control, iterative reconstruction, and/or weight based adjustment of the mA/kV was utilized to reduce the radiation dose to as low as reasonably achievable. COMPARISON: 09/04/2022 HISTORY: ORDERING SYSTEM PROVIDED HISTORY: dysphagia TECHNOLOGIST PROVIDED HISTORY: Reason for exam:->dysphagia Has a \"code stroke\" or \"stroke alert\" been called? ->No Decision Support Exception - unselect if not a suspected or confirmed emergency medical condition->Emergency Medical Condition (MA) What reading provider will be dictating this exam?->[facility] FINDINGS: BRAIN/VENTRICLES: No evidence of parenchymal hemorrhages or contusions. No evidence of intra or extra-axial fluid collection is seen. Scattered areas of low attenuation are visualized in the periventricular and subcortical white matter demonstrating no change in comparison to the prior study consistent with chronic microvascular disease. No evidence of acute territorial infarct is seen. Prominence of the ventricles and sulci is visualized demonstrating no change consistent with chronic atrophic brain changes.  No evidence of intracranial mass or mass effect, no evidence of midline shift is seen. No evidence of sellar or parasellar mass is visualized. ORBITS: The visualized portion of the orbits demonstrate no acute abnormality. SINUSES: The visualized paranasal sinuses and mastoid air cells demonstrate no acute abnormality. SOFT TISSUES/SKULL:  No acute abnormality of the visualized skull or soft tissues. No acute intracranial abnormality. Chronic microvascular disease and chronic atrophic brain changes seen. If there is a high level of suspicion would recommend further evaluation with an MRI of the brain to rule out subtle brainstem lacunar infarcts or acute on chronic pathology. XR CHEST PORTABLE    Result Date: 10/6/2022  EXAMINATION: ONE XRAY VIEW OF THE CHEST 10/6/2022 3:17 pm COMPARISON: None. HISTORY: ORDERING SYSTEM PROVIDED HISTORY: NG placement TECHNOLOGIST PROVIDED HISTORY: Reason for exam:->NG placement What reading provider will be dictating this exam?->[facility] FINDINGS: Enteric tube tip in the fundus of the stomach. Nonobstructive bowel gas pattern. No gross free air. The lung bases are normal.     Enteric tube tip in the fundus of the stomach. MRI BRAIN WO CONTRAST    Result Date: 10/6/2022  EXAMINATION: MRI OF THE BRAIN WITHOUT CONTRAST  10/6/2022 11:26 am TECHNIQUE: Multiplanar multisequence MRI of the brain was performed without the administration of intravenous contrast. COMPARISON: CT brain performed 10/05/2022. HISTORY: ORDERING SYSTEM PROVIDED HISTORY: rule out cva TECHNOLOGIST PROVIDED HISTORY: Reason for exam:->rule out cva What reading provider will be dictating this exam?->CRC FINDINGS: INTRACRANIAL STRUCTURES/VENTRICLES: The sellar and suprasellar structures, optic chiasm, corpus callosum, pineal gland, tectum, and midline brainstem structures are unremarkable. The craniocervical junction is unremarkable. There is no acute hemorrhage, mass effect, or midline shift.   There is satisfactory overall gray-white matter differentiation. There is extensive FLAIR signal abnormality in the subcortical and periventricular white matter that may relate to chronic microvascular disease. The ventricular structures are symmetrically prominent. The infratentorial structures including the cerebellopontine angles and internal auditory canals are unremarkable. There is no abnormal restricted diffusion. There is no abnormal blooming artifact on susceptibility weighted imaging. ORBITS: The visualized portion of the orbits demonstrate no acute abnormality. SINUSES: The visualized paranasal sinuses and mastoid air cells demonstrate no acute abnormality. BONES/SOFT TISSUES: The bone marrow signal intensity appears normal. The soft tissues demonstrate no acute abnormality. Extensive chronic microvascular disease without acute intracranial. Symmetrically prominent ventricular system correlation with symptomatology for normal pressure hydrocephalus is needed. FL Modified Barium Swallow W Video    Result Date: 10/7/2022  EXAMINATION: MODIFIED BARIUM SWALLOW WITH SPEECH PATHOLOGY TECHNIQUE: Fluoroscopic evaluation of the swallowing mechanism was performed by speech pathology. FLUOROSCOPY DOSE AND TYPE OR TIME AND EXPOSURES: Fluoroscopy time was 1.1 minutes. Multiple fluoroscopic cine loops were acquired.  HISTORY: ORDERING SYSTEM PROVIDED HISTORY: Dysphagia, unspecified type TECHNOLOGIST PROVIDED HISTORY: What reading provider will be dictating this exam?->[facility]       Recent Labs     10/07/22  1448 10/08/22  0339 10/09/22  0541   WBC 8.3 9.0 9.7   HGB 10.2* 10.5* 10.8*    152 146       Recent Labs     10/08/22  1433 10/08/22  1838 10/09/22  0737   * 146* 140   K 3.7 3.7 3.5   * 112* 106   CO2 24 23 23   BUN 19 18 16   CREATININE 1.23* 1.21* 1.23*   GLUCOSE 93 100* 230*       Recent Labs     10/07/22  1123   BILITOT 0.4   ALKPHOS 153*   AST 55*   ALT 13       Lab Results   Component Value Date/Time    PROTIME 13.4 10/09/2022 05:41 AM    INR 1.0 10/09/2022 05:41 AM     No results found for: LITHIUM, DILFRTOT, VALPROATE    ASSESSMENT AND PLAN  Encephalopathy which is metabolic secondary to significant hypernatremia. Patient is somewhat more awake. Patient has a Parkinson's disease and we have not seen him for some time he continues on carbidopa levodopa. None of the medications I see can cause hyponatremia and this is being followed by renal.  For now patient has not developed any suggestion of PRES  No seizures are reported. We will see what comes out of this investigations for now. 10/8  More agitated and confused. His sodium still 148. We will keep an eye on this. Patient probably has underlying dementia underneath the hypernatremia. We will keep an eye on this and continue to follow. There is no suggestion of PRES or seizures    10/9  Today much improved patient is more awake though very bradykinetic he has not received his carbidopa levodopa. He is dysphagic. Recommended a PEG tube and then we reinstate his carbidopa levodopa is mostly nonambulatory and we had not seen him for a few years and has continued to decline. He has underlying dementia likely. Syd Pacheco MD, Juliana Meraz, American Board of Psychiatry & Neurology  Board Certified in Vascular Neurology  Board Certified in Neuromuscular Medicine  Certified in . [place]

## 2022-10-09 NOTE — FLOWSHEET NOTE
NG tube advanced to 60 cm after previous x-ray result indicated that NG tube needed advancement. Pt tolerated advancement. Verification X-ray reordered to confirm placement after advancement made. Electronically signed by Aquilino Edward RN on 10/9/2022 at 3:32 AM    X-ray completed; verification confirmed by PUJA Phillips.  Electronically signed by Aquilino Edward RN on 10/9/2022 at 4:26 AM'

## 2022-10-10 NOTE — FLOWSHEET NOTE
Pt returned to floor @1346. Peg is intact with abdominal binder in place and flushing without difficulty. Pt sleeping upon arrival to unit. No new skin issues noted. No signs/symptoms of pain or discomfort. Dr. Ky Sarmiento notified of pt no longer being safety risk due to removal of IV lines and abdominal binder. Physician ordered for restraints to be discontinued. Pt continues to rest at this time with call light within reach.

## 2022-10-10 NOTE — FLOWSHEET NOTE
Pt moved to air loss mattress bed @1820. Pt tolerated well. Pt is resting in bed at this time. Vitals remain stable. Family is at bedside.

## 2022-10-10 NOTE — BRIEF OP NOTE
Brief Postoperative Note      Patient: Mima Dumont  YOB: 1950  MRN: [id number]    Date of Procedure: 10/10/2022    Pre-Op Diagnosis: DYSPHAGIA    Post-Op Diagnosis: Same       Procedure(s):  PERCUTANEOUS ENDOSCOPIC GASTROSTOMY TUBE PLACEMENT    Surgeon(s):  Jair Lubin MD    Assistant:  * No surgical staff found *    Anesthesia: Monitor Anesthesia Care    Estimated Blood Loss (mL): Minimal    Complications: None    Specimens:   * No specimens in log *    Implants:  * No implants in log *      Drains:   NG/OG/NJ/NE Tube Nasogastric 14 fr Left nostril (Active)   Securement device Adhesive based abreu 10/10/22 1045   Status Clamped 10/10/22 1045   Placement Verified X-Ray (Initial); External Catheter Length;Respiratory Status; Other (comment) 10/10/22 0915   Tube Feeding Standard with Fiber 10/09/22 1230   Tube feeding/verify rate (mL/hr) 20 mL/hr 10/09/22 1230   Free Water/Flush (mL) 300 mL 10/09/22 1230   Residual Volume (ml) 0 ml 10/09/22 1230       Gastrostomy/Enterostomy/Jejunostomy Tube Percutaneous Endoscopic Gastrostomy (PEG) LUQ 1 20 fr (Active)       External Urinary Catheter (Active)   Site Assessment Clean,dry & intact 10/09/22 2000   Placement Replaced 10/06/22 2000   Securement Method Leg strap 10/09/22 2000   Perineal Care Yes 10/09/22 2000   Suction N/A 10/07/22 0400   Urine Color Yellow 10/10/22 0615   Urine Appearance Clear 10/10/22 0615   Urine Odor Malodorous 10/10/22 0615   Output (mL) 850 mL 10/10/22 0615       [REMOVED] NG/OG/NJ/NE Tube Right nostril (Removed)   Surrounding Skin Clean, dry & intact 10/06/22 1200   Securement device Adhesive based abreu 10/06/22 1200   Status Clamped 10/06/22 1200   Placement Verified Respiratory Status; External Catheter Length;Gastric Contents 10/06/22 1200   NG/OG/NJ/NE External Measurement (cm) 60 cm 10/06/22 1200   Drainage Appearance Brown;Bile 10/06/22 1200   Tube feeding/verify rate (mL/hr) 20 mL/hr 10/06/22 1200   Free Water/Flush (mL) 300 mL 10/06/22 1138   Action Taken Feed set changed;Placement verified (comment); Repositioned 10/06/22 1200   Residual Volume (ml) 10 ml 10/06/22 1200       [REMOVED] NG/OG/NJ/NE Tube Nasogastric 14 fr Right nostril (Removed)   Surrounding Skin Clean, dry & intact 10/07/22 2000   Securement device Adhesive based abreu 10/07/22 2000   Status Continuous feeding 10/07/22 2000   Placement Verified Gastric Contents; External Catheter Length;Respiratory Status 10/07/22 2000   NG/OG/NJ/NE External Measurement (cm) 60 cm 10/07/22 2000   Drainage Appearance Bile;Brown 10/06/22 1600   Tube Feeding Other Tube Feeding (must specify product in comment) 10/07/22 2000   Tube feeding/verify rate (mL/hr) 20 mL/hr 10/07/22 0800   Tube Feeding Intake (mL) 100 ml 10/07/22 1740   Free Water/Flush (mL) 600 mL 10/07/22 1146   Action Taken Placement verified (comment) 10/07/22 2000   Residual Volume (ml) 5 ml 10/07/22 1740       Findings: PEG tube 2cm at skin; good light reflex and one to one      OKAY TO START TUBE FEEDS IMMEDIATELY; ABDOMINAL BINDER AT ALL TIME TO PROTECT TUBE;  NO DRESSING OR ANYTHING TO BE PLACED BETWEEN SKIN AND BUMPER    Electronically signed by Cornelio Lozano MD on 10/10/2022 at 11:28 AM

## 2022-10-10 NOTE — PROGRESS NOTES
Iv to right AC area is infiltrated intra-op. D/c'd in pacu area-catheter and tubing intact. Swelling and redness noted.-the discoloration to area was noted in pre op area per pre op staff. Warm blankets and elevation applied to both arms/hands.

## 2022-10-10 NOTE — PROGRESS NOTES
Nephrology Progress Note    Assessment:  Hypernatremia corrected  Hypokalemia 3.1mEq  Dementia  DM type-2  Armendariz in place      Plan: PEG planned for today  replace KCl IV    Patient Active Problem List:     Lactic acidosis     Impaired mobility     Parkinson's disease ([facility] [address]     Type 2 diabetes mellitus with hyperglycemia, with long-term current use of insulin (HCC)     Essential hypertension     Hyperlipidemia     BMI 28.0-28.9,adult     Uses Belizean as primary spoken language     Other specified hypothyroidism     Hypernatremia     Acute encephalopathy     Acute renal failure (HCC)     Dehydration     Dysphagia      Subjective:  Admit Date: 10/5/2022    Interval History: quiet  family in room    Medications:  Scheduled Meds:   potassium chloride  10 mEq IntraVENous Q1H    potassium bicarbonate  50 mEq Per NG tube Once    enoxaparin  40 mg SubCUTAneous Daily    losartan  50 mg Oral Daily    QUEtiapine  25 mg Oral BID    sertraline  50 mg Oral Daily    tamsulosin  0.4 mg Oral Daily    atorvastatin  20 mg Oral Nightly    pantoprazole (PROTONIX) 40 mg injection  40 mg IntraVENous Daily    insulin lispro  0-16 Units SubCUTAneous Q4H    sodium chloride flush  5-40 mL IntraVENous 2 times per day    insulin glargine  0.15 Units/kg SubCUTAneous Nightly    carbidopa-levodopa  1 tablet Per NG tube BID     Continuous Infusions:   lactated ringers 75 mL/hr at 10/09/22 1832    sodium chloride      dextrose         CBC:   Recent Labs     10/09/22  0541 10/10/22  0452   WBC 9.7 10.5   HGB 10.8* 9.9*    134     CMP:    Recent Labs     10/08/22  1838 10/09/22  0737 10/10/22  0452   * 140 138   K 3.7 3.5 3.1*   * 106 104   CO2 23 23 20   BUN 18 16 18   CREATININE 1.21* 1.23* 1.32*   GLUCOSE 100* 230* 213*   CALCIUM 8.5 8.5 8.2*   LABGLOM 59.0* 57.9* 53.4*     Troponin:   Recent Labs     10/09/22  0829   TROPONINI 0.015*     BNP: No results for input(s): BNP in the last 72 hours.   INR:   Recent Labs 10/09/22  0541   INR 1.0     Lipids: No results for input(s): CHOL, LDLDIRECT, TRIG, HDL, AMYLASE, LIPASE in the last 72 hours. Liver:   Recent Labs     10/07/22  1123   AST 55*   ALT 13   ALKPHOS 153*   PROT 5.8*   LABALBU 2.6*   BILITOT 0.4     Iron:  No results for input(s): IRONS, FERRITIN in the last 72 hours. Invalid input(s): LABIRONS  Urinalysis: No results for input(s): UA in the last 72 hours.     Objective:  Vitals: BP (!) 158/74   Pulse 94   Temp 98.2 °F (36.8 °C) (Axillary)   Resp 20   Ht 5' 8\" (1.727 m)   Wt 119 lb 11.4 oz (54.3 kg)   SpO2 99%   BMI 18.20 kg/m²    Wt Readings from Last 3 Encounters:   10/05/22 119 lb 11.4 oz (54.3 kg)   09/04/22 170 lb (77.1 kg)   08/16/22 170 lb (77.1 kg)      24HR INTAKE/OUTPUT:    Intake/Output Summary (Last 24 hours) at 10/10/2022 0850  Last data filed at 10/10/2022 0615  Gross per 24 hour   Intake 300 ml   Output 1500 ml   Net -1200 ml       General: alert, in no apparent distress  HEENT: normocephalic, atraumatic, anicteric  Neck: supple, no mass  Lungs: non-labored respirations, clear to auscultation bilaterally  Heart: regular rate and rhythm, no murmurs or rubs  Abdomen: soft, non-tender, non-distended  Ext: no cyanosis, no peripheral edema  Neuro: alert and oriented, no gross abnormalities  Psych: normal mood and affect  Skin: no rash   frost in place      Electronically signed by Bowen Caldwell DO, MD

## 2022-10-10 NOTE — PROGRESS NOTES
Physician Progress Note      PATIENT:               Trinh Canales  CSN #:                  [id number]  :                       1950  ADMIT DATE:       10/5/2022 3:03 PM  [address] [place] DATE:  RESPONDING  PROVIDER #:        Joel Mcfadden DO          QUERY TEXT:    Patient admitted with hypernatremia, dehydration, MADDI, dysphagia secondary to   dementia & Parkinson's disease with documentation in the Dietician assessment   of severe malnutrition. If possible, please document in progress notes and   discharge summary if you are evaluating and /or treating any of the following: The medical record reflects the following:  Risk Factors: hypernatremia, dehydration, MADDI, dysphagia secondary to dementia   & Parkinson's disease  Clinical Indicators: Meets criteria for severe malnutrition in the context of   social/Environmental circumstances; ASPEN criteria met: Energy Intake:  50% or   less estimated energy requirements for 1 month or longer  Weight Loss:  Greater than 20% over 1 year  Body Fat Loss:  Mild body fat loss (to moderate) Orbital, Fat Overlying Ribs  Muscle Mass Loss:  Mild muscle mass loss Clavicles (pectoralis & deltoids),   Temples (temporalis), Hand (interosseous)  Treatment: planned PEG w/EN, I&O, daily weights, labs and monitoring    ASPEN Criteria:    https://aspenjournals. onlinelibrary. orellana. com/doi/full/10.1177/238890128198118  5    Thank you,  Sandy Posey   Clinical Documentation Improvement Specialist  W: [phone]  Options provided:  -- Protein calorie malnutrition severe  -- Other - I will add my own diagnosis  -- Disagree - Not applicable / Not valid  -- Disagree - Clinically unable to determine / Unknown  -- Refer to Clinical Documentation Reviewer    PROVIDER RESPONSE TEXT:    This patient has severe protein calorie malnutrition.     Query created by: Cliff Mc on 10/10/2022 2:39 PM      Electronically signed by:  Joel Mcfadden DO 10/10/2022 3:20 PM

## 2022-10-10 NOTE — ANESTHESIA PRE PROCEDURE
Department of Anesthesiology  Preprocedure Note       Name:  Julio Cesar Moralez   Age:  71 y.o.  :  1950                                          MRN:  [id number]         Date:  10/10/2022      Surgeon: Gil Calvert):  Sherly Rivas MD    Procedure: Procedure(s):  PERCUTANEOUS ENDOSCOPIC GASTROSTOMY TUBE PLACEMENT    Medications prior to admission:   Prior to Admission medications    Medication Sig Start Date End Date Taking? Authorizing Provider   Vitamin D-Vitamin K (VITAMIN K2-VITAMIN D3)  MCG-UNIT CAPS  22   Historical Provider, MD   pramipexole (MIRAPEX) 0.125 MG tablet  22   Historical Provider, MD   QUEtiapine (SEROQUEL) 25 MG tablet  22   Historical Provider, MD   nystatin (MYCOSTATIN) 043355 UNIT/ML suspension Take 5 mLs by mouth 4 times daily for 14 days Swish and swallow over 20 minutes. 10/5/22 10/19/22  PINEDA Naranjo - CNP   losartan (COZAAR) 50 MG tablet TAKE 1 TABLET BY MOUTH DAILY 10/3/22   Pily Gambino MD   Continuous Blood Gluc Sensor (FREESTYLE MARY CARMEN 2 SENSOR) MISC Change every 14 days 22   Pily Gambino MD   Continuous Blood Gluc  (FREESTYLE MARY CARMEN 2 READER) YENY Give 1 meter 22   Pily Gambino MD   BAQSIMI ONE PACK 3 MG/DOSE POWD 1 APPLICATOR BY NASAL ROUTE EVERY 4 HOURS AS NEEDED (BLOOD GLUCOSE LESS THAN 70) 22   SOCORRO Ray   Nutritional Supplements (ENSURE HIGH PROTEIN) LIQD Take 1 Bottle by mouth 3 times daily (with meals) 22   Josefina Mcdaniel MD   metFORMIN (GLUCOPHAGE) 1000 MG tablet MARICARMEN 1 TABLETA POR BOCA DOS VECES AL PHYLLIS ANTES DE COMER.   Patient not taking: No sig reported 22   SOCORRO Ray   Cholecalciferol (VITAMIN D3) 50 MCG ( UT) CAPS 1 po daily with meal 6/3/22   Fred Coley MD   Blood Pressure KIT 1 each by Does not apply route daily 6/3/22   Fred Coley MD   Incontinence Supply Disposable (DEPEND UNDERWEAR LARGE) MISC 1 each by Does not apply route 8 times daily 6/3/22 Meri Marx MD   Soap & Cleansers [facility] LUIS GRANT & CLEAR ABSORBING SHEETS) PADS Apply 1 each topically in the morning, at noon, and at bedtime 6/3/22   Meri Marx MD   Disposable Gloves (NITRILE GLOVES LARGE) MISC 2 each by Does not apply route 8 times daily 6/3/22   Meri Marx MD   insulin glargine (LANTUS SOLOSTAR) 100 UNIT/ML injection pen Inject 20 Units into the skin nightly 5/25/22   SOCORRO Bey   simvastatin (ZOCOR) 40 MG tablet Take 1 tablet by mouth nightly 5/25/22   SOCORRO Bey   Glucagon, rDNA, (GLUCAGON EMERGENCY) 1 MG KIT INJECT 1 MG INTO THE MUSCLE SEE ADMIN INSTRUCTIONS FOLLOW PACKAGE DIRECTIONS FOR LOW BLOOD SUGAR. 11/15/21   SOCORRO Bey   HUMALOG KWIKPEN 100 UNIT/ML SOPN 35 UNITS BEFORE BREAKFAST, 25 UNITS BEFORE LUNCH, 35 UNITS BEFORE DINNER 10/19/21   SOCORRO Bey   Insulin Pen Needle (NOVOFINE) 32G X 6 MM MISC 1 Device by Does not apply route 4 times daily (before meals and nightly) 10/8/20   SOCORRO Bey   blood glucose monitor strips 1 strip by Other route 4 times daily (before meals and nightly) 10/3/19   Dillon Castro MD   blood glucose monitor kit and supplies 1 kit by Other route 4 times daily (before meals and nightly) 10/3/19   Dillon Castro MD   Lancets MISC 1 each by Does not apply route 4 times daily (before meals and nightly) 10/3/19   Dillon Castro MD   tamsulosin (FLOMAX) 0.4 MG capsule  7/11/19   Historical Provider, MD   carbidopa-levodopa (SINEMET)  MG per tablet Take 1 tablet by mouth 2 times daily 7/2/19   Jyoti Ribeiro DO   sertraline (ZOLOFT) 50 MG tablet Take 50 mg by mouth daily    Historical Provider, MD       Current medications:    Current Facility-Administered Medications   Medication Dose Route Frequency Provider Last Rate Last Admin    potassium chloride 10 mEq/100 mL IVPB (Peripheral Line)  10 mEq IntraVENous Q1H Sarkis Brock  mL/hr at 10/10/22 0939 10 mEq at 10/10/22 3060  potassium bicarbonate (K-LYTE) disintegrating tablet 50 mEq  50 mEq Per NG tube Once Jose Soto, DO        lactated ringers infusion   IntraVENous Continuous Carolyn Coup, DO 75 mL/hr at 10/09/22 1832 New Bag at 10/09/22 1832    enoxaparin (LOVENOX) injection 40 mg  40 mg SubCUTAneous Daily Maple Lacy Dials, APRN - CNS   40 mg at 10/09/22 1238    losartan (COZAAR) tablet 50 mg  50 mg Oral Daily Nicoletto Bolzan-Roche, APRN - CNP   50 mg at 10/09/22 0847    QUEtiapine (SEROQUEL) tablet 25 mg  25 mg Oral BID Nicoletto Bolzan-Roche, APRN - CNP   25 mg at 10/09/22 2109    sertraline (ZOLOFT) tablet 50 mg  50 mg Oral Daily Nicoletto Bolzan-Roche, APRN - CNP   50 mg at 10/09/22 1239    tamsulosin (FLOMAX) capsule 0.4 mg  0.4 mg Oral Daily Nicoletto Bolzan-Roche, APRN - CNP   0.4 mg at 10/09/22 1239    atorvastatin (LIPITOR) tablet 20 mg  20 mg Oral Nightly Carolyn Coup, DO   20 mg at 10/09/22 2109    pantoprazole (PROTONIX) 40 mg in sodium chloride (PF) 10 mL injection  40 mg IntraVENous Daily Sintia Diaz, APRN - CNP   40 mg at 10/09/22 0847    insulin lispro (HUMALOG) injection vial 0-16 Units  0-16 Units SubCUTAneous Q4H Sintia Diaz APRN - CNP   8 Units at 10/09/22 1647    sodium chloride flush 0.9 % injection 5-40 mL  5-40 mL IntraVENous 2 times per day Pineda Bee, APRN - CNS   5 mL at 10/09/22 2100    sodium chloride flush 0.9 % injection 5-40 mL  5-40 mL IntraVENous PRN Maple Lacy Dials, APRN - CNS        0.9 % sodium chloride infusion   IntraVENous PRN Maple Lacy Dials, APRN - CNS        ondansetron (ZOFRAN-ODT) disintegrating tablet 4 mg  4 mg Oral Q8H PRN Maple Lacy Dials, APRN - CNS        Or    ondansetron (ZOFRAN) injection 4 mg  4 mg IntraVENous Q6H PRN Maple Lacy Dials, APRN - CNS        acetaminophen (TYLENOL) tablet 650 mg  650 mg Oral Q6H PRN Maple Lacy Dials, APRN - CNS        Or    acetaminophen (TYLENOL) suppository 650 mg  650 mg Rectal Q6H PRN Maple Lacy Dials, APRN - CNS        glucose chewable tablet 16 g  4 tablet Oral PRN Sendy Washington, APRN - CNS        dextrose bolus 10% 125 mL  125 mL IntraVENous PRN Sendy Washington, APRN - CNS   Stopped at 10/08/22 4946    Or    dextrose bolus 10% 250 mL  250 mL IntraVENous PRN Sendy Lewis Dials, APRN - CNS        glucagon (rDNA) injection 1 mg  1 mg SubCUTAneous PRN Sendy Lewis Dials, APRN - CNS        dextrose 10 % infusion   IntraVENous Continuous PRN Sendy Washington, APRN - CNS        insulin glargine (LANTUS) injection vial 9 Units  0.15 Units/kg SubCUTAneous Nightly Sendy Washington, APRN - CNS   9 Units at 10/07/22 2100    carbidopa-levodopa (SINEMET)  MG per tablet 1 tablet  1 tablet Per NG tube BID Milena Shelton APRN - CNS   1 tablet at 10/09/22 2109       Allergies:  No Known Allergies    Problem List:    Patient Active Problem List   Diagnosis Code    Lactic acidosis E87.20    Impaired mobility Z74.09    Parkinson's disease ([facility] G20    Type 2 diabetes mellitus with hyperglycemia, with long-term current use of insulin ([facility] E11.65, Z79.4    Essential hypertension I10    Hyperlipidemia E78.5    BMI 28.0-28.9,adult Z68.28    Uses [place]an as primary spoken language Z68.5    Other specified hypothyroidism E03.8    Hypernatremia E87.0    Acute encephalopathy G93.40    Acute renal failure ([address] N17.9    Dehydration E86.0    Dysphagia R13.10       Past Medical History:        Diagnosis Date    Compression fracture of body of thoracic vertebra ([facility] 6/23/2019    T3, T4, T5 AND T6 COMPRESSION FX OF INDETERMINATE AGE AND CLINICAL CORRELATION WARRANTED.  Gait abnormality due to exacerbation of Parkinson's disease with acute rehab admission on 6/19/2019 6/20/2019    77year-old male who was brought to the emergency room after syncopal episode in 6/16/2019 he was especially diagnosed with exacerbation of Parkinson's disease and electrolyte abnormality as well as hyperosmolar state secondary to elevated blood sugar and uncontrolled diabetes. Cardiopulmonary work-up was negative including chest x-ray EKG and MRI of the brain. CT the brain showed no acute findi    Hyperlipidemia     Hypertension     Parkinson's disease ([facility]     Syncope and collapse     Type 2 diabetes mellitus without complication ([facility]        Past Surgical History:        Procedure Laterality Date    APPENDECTOMY         Social History:    Social History     Tobacco Use    Smoking status: Never    Smokeless tobacco: Never   Substance Use Topics    Alcohol use: Never                                Counseling given: Not Answered      Vital Signs (Current):   Vitals:    10/09/22 0857 10/09/22 0956 10/09/22 2000 10/10/22 0915   BP: (!) 191/72 (!) 155/64 (!) 158/74 133/68   Pulse:   94    Resp:   20    Temp:   98.2 °F (36.8 °C) 98 °F (36.7 °C)   TempSrc:   Axillary    SpO2:       Weight:       Height:                                                  BP Readings from Last 3 Encounters:   10/10/22 133/68   10/05/22 110/68   09/14/22 130/80       NPO Status: Time of last liquid consumption: 0000                        Time of last solid consumption: 0000                        Date of last liquid consumption: 10/10/22                        Date of last solid food consumption: 10/10/22    BMI:   Wt Readings from Last 3 Encounters:   10/05/22 119 lb 11.4 oz (54.3 kg)   09/04/22 170 lb (77.1 kg)   08/16/22 170 lb (77.1 kg)     Body mass index is 18.2 kg/m².     CBC:   Lab Results   Component Value Date/Time    WBC 10.5 10/10/2022 04:52 AM    RBC 3.48 10/10/2022 04:52 AM    HGB 9.9 10/10/2022 04:52 AM    HCT 30.8 10/10/2022 04:52 AM    MCV 88.6 10/10/2022 04:52 AM    RDW 14.3 10/10/2022 04:52 AM     10/10/2022 04:52 AM       CMP:   Lab Results   Component Value Date/Time     10/10/2022 04:52 AM    K 3.1 10/10/2022 04:52 AM    K 3.7 10/08/2022 06:38 PM     10/10/2022 04:52 AM    CO2 20 10/10/2022 04:52 AM    BUN 18 10/10/2022 04:52 AM    CREATININE 1.32 10/10/2022 04:52 AM    GFRAA >60.0 10/10/2022 04:52 AM    LABGLOM 53.4 10/10/2022 04:52 AM    GLUCOSE 213 10/10/2022 04:52 AM    PROT 5.8 10/07/2022 11:23 AM    CALCIUM 8.2 10/10/2022 04:52 AM    BILITOT 0.4 10/07/2022 11:23 AM    ALKPHOS 153 10/07/2022 11:23 AM    AST 55 10/07/2022 11:23 AM    ALT 13 10/07/2022 11:23 AM       POC Tests:   Recent Labs     10/10/22  0812   POCGLU 199*       Coags:   Lab Results   Component Value Date/Time    PROTIME 13.4 10/09/2022 05:41 AM    INR 1.0 10/09/2022 05:41 AM    APTT 33.4 10/09/2022 05:41 AM       HCG (If Applicable): No results found for: PREGTESTUR, PREGSERUM, HCG, HCGQUANT     ABGs:   Lab Results   Component Value Date/Time    PHART 7.393 06/16/2019 03:19 AM    PO2ART 72 06/16/2019 03:19 AM    OSM4BSB 39 06/16/2019 03:19 AM    TEJ9UFL 24.0 06/16/2019 03:19 AM    BEART -1 06/16/2019 03:19 AM    Y2IZIQMW 94 06/16/2019 03:19 AM        Type & Screen (If Applicable):  No results found for: LABABO, LABRH    Drug/Infectious Status (If Applicable):  Lab Results   Component Value Date/Time    HEPCAB NONREACTIVE 09/24/2022 10:24 AM       COVID-19 Screening (If Applicable):   Lab Results   Component Value Date/Time    COVID19 Not Detected 09/04/2022 02:12 PM           Anesthesia Evaluation  Patient summary reviewed and Nursing notes reviewed no history of anesthetic complications:   Airway: Mallampati: II  TM distance: >3 FB   Neck ROM: full  Mouth opening: > = 3 FB   Dental: normal exam         Pulmonary:Negative Pulmonary ROS and normal exam                               Cardiovascular:  Exercise tolerance: good (>4 METS),   (+) hypertension:,       ECG reviewed               Beta Blocker:  Not on Beta Blocker         Neuro/Psych:   (+) neuromuscular disease: Parkinson's disease,             GI/Hepatic/Renal: Neg GI/Hepatic/Renal ROS            Endo/Other:    (+) DiabetesType II DM, using insulin, hypothyroidism, blood dyscrasia: anemia:., .          Pt had PAT visit.        Abdominal: Vascular: negative vascular ROS. Other Findings:           Anesthesia Plan      MAC     ASA 4       Induction: intravenous. MIPS: Prophylactic antiemetics administered. Plan discussed with CRNA.     Attending anesthesiologist reviewed and agrees with Preprocedure content                Celeste Chavez DO   10/10/2022

## 2022-10-10 NOTE — ANESTHESIA POSTPROCEDURE EVALUATION
Department of Anesthesiology  Postprocedure Note    Patient: Yunior Solo  MRN: [id number]  YOB: 1950  Date of evaluation: 10/10/2022      Procedure Summary     Date: 10/10/22 Room / Location: [facility]    Anesthesia Start: 8358 Anesthesia Stop: 8388    Procedure: PERCUTANEOUS ENDOSCOPIC GASTROSTOMY TUBE PLACEMENT Diagnosis:       Dysphagia, unspecified type      (DYSPHAGIA)    Surgeons: Cholo Olivo MD Responsible Provider: Aditya Wilkins MD    Anesthesia Type: MAC ASA Status: 4          Anesthesia Type: No value filed.     Eulalia Phase I:      Eulalia Phase II:        Anesthesia Post Evaluation    Patient location during evaluation: PACU  Patient participation: complete - patient participated  Level of consciousness: awake and alert  Airway patency: patent  Nausea & Vomiting: no vomiting and no nausea  Complications: no  Cardiovascular status: hemodynamically stable  Respiratory status: nasal cannula  Hydration status: stable

## 2022-10-10 NOTE — PROGRESS NOTES
Comprehensive Nutrition Assessment    Type and Reason for Visit:  Reassess    Nutrition Recommendations/Plan:    Continue NPO, Modify Tube Feeding (Glucerna 1.5)     Malnutrition Assessment:  Malnutrition Status:  Severe malnutrition (10/06/22 0945)    Context:  Social/Environmental Circumstances     Findings of the 6 clinical characteristics of malnutrition:  Energy Intake:  50% or less estimated energy requirements for 1 month or longer  Weight Loss:  Greater than 20% over 1 year     Body Fat Loss:  Mild body fat loss (to moderate) Orbital, Fat Overlying Ribs   Muscle Mass Loss:  Mild muscle mass loss Clavicles (pectoralis & deltoids), Temples (temporalis), Hand (interosseous)  Fluid Accumulation:  Unable to assess (not know to be nutritionally related)     Strength:  Not Performed    Nutrition Assessment:    Pt presents with severe malnutrition, evidenced by poor intake pta, weight loss, and as per NFPA. Pt receiving EN support d/t dysphagia per BSE. PEG placed today. Recommend diabetic formula (Glucerna 1.5) at 20ml/hr s/p PEG, with goal of 42ml/hr after 12 hrs if tolerated. Nutrition Related Findings:    PMH- advanced Parkinson's disease dementia, type 2 diabetes mellitus, hyperlipidemia, compression fracture of T3, T4-T5-T6, bedbound; adm for altered mental status and in acute renal failure and  lactic acidosis. TF via corpak initiated 10/7, but pt pulled out, then replaced. BSE 10/8-NPO. PEG placed today. +1 BUE & trace BLE edema noted. Last BM noted 10/9. Meds reviewed. Labs (10/10) Na-138, K-3.1; gluc-. Current Nutrition Intake & Therapies:    Diet NPO  Current Tube Feeding (TF) Orders:  On hold for PEG placement today  Feeding Route: PEG  Formula: Diabetic (Glucerna 1.5)  Schedule: Continuous  Feeding Regimen: 20 ml/hr x 12 hours  Additives/Modulars: None  Water Flushes: 220mls qid  Current TF & Flush Orders Provides: 720 kcals, 39 g protein,1360 ml free water  Goal TF & Flush Orders Provides: 42ml/hr(1L)=1500kcals/82.5gms protein/1640mls free H20). If transition to bolus is opted once pt is home and tolerance is established, suggest 250mls Glucerna 1.5 qid, with 220mls H2O flush qid. Anthropometric Measures:  Height: 5' 8\" (172.7 cm)  Ideal Body Weight (IBW): 154 lbs (70 kg)    Admission Body Weight: 125 lb (56.7 kg)  Current Body Weight: 119 lb (54 kg) (edema present),  Weight Source: Bed Scale  Current BMI (kg/m2): 18.1  Usual Body Weight: 170 lb (77.1 kg) (( 1/22) 170# ( 11/21))  % Weight Change (Calculated): -30  Weight Adjustment For: No Adjustment                 BMI Categories: Underweight (BMI less than 22) age over 72    Estimated Daily Nutrient Needs:  Energy Requirements Based On: Kcal/kg  Weight Used for Energy Requirements: Current  Energy (kcal/day): 1650-1730 kcals @ 30-32 kcal/kg  Weight Used for Protein Requirements: Current  Protein (g/day): 70-81 g protein @ 1.3-1.5 g /kg  Method Used for Fluid Requirements: 30 ml/kg  Fluid (ml/day): ~1620    Nutrition Diagnosis:   Inadequate oral intake related to swallowing difficulty as evidenced by NPO or clear liquid status due to medical condition  Severe malnutrition, In context of social or environmental circumstances related to cognitive or neurological impairment, swallowing difficulty as evidenced by poor intake prior to admission, Criteria as identified in malnutrition assessment    Nutrition Interventions:   Food and/or Nutrient Delivery: Continue NPO, Modify Tube Feeding  Nutrition Education/Counseling: Education not indicated  Coordination of Nutrition Care: Continue to monitor while inpatient       Goals:     Goals: Tolerate nutrition support at goal rate (Weight gain.  Gluc-.)       Nutrition Monitoring and Evaluation:   Behavioral-Environmental Outcomes: None Identified  Food/Nutrient Intake Outcomes: Food and Nutrient Intake, Enteral Nutrition Intake/Tolerance  Physical Signs/Symptoms Outcomes: Biochemical Data, Weight, Chewing or Swallowing    Discharge Planning:    EN support    Vesta Menjivar RD, LD

## 2022-10-10 NOTE — PROGRESS NOTES
[place] catheter in place creamy white discharge noted from urethra. 1310--notified via phone-that pt. Did not receive infusion of K+ and has no iv access after several attempts -plan to monitor pt. Vitals and status and go from there. Medications will be given via the PEG tube per . brittnee Ruiz on [address] notified.

## 2022-10-10 NOTE — FLOWSHEET NOTE
1015- Pt arrived to Short stay via bed from floor, pt bilateral upper arms noted to be swollen and bruised, IV found to be leaking, pharmacy notified of pt having IV potassium running with fluids, pharmacy recommends pt to receive hyaluronidase, Dr. Merlyn Johnson made [address]  Electronically signed by Maggy Aguilar RN on 10/10/2022

## 2022-10-10 NOTE — OP NOTE
Operative Note      Patient: Joce Dumont  YOB: 1950  MRN: [id number]    Date of Procedure: 10/10/2022    Pre-Op Diagnosis: DYSPHAGIA    Post-Op Diagnosis: Same       Procedure(s):  PERCUTANEOUS ENDOSCOPIC GASTROSTOMY TUBE PLACEMENT    Surgeon(s):  Mary Valerio MD    Assistant:   * No surgical staff found *    Anesthesia: Monitor Anesthesia Care    Estimated Blood Loss (mL): Minimal    Complications: None    Specimens:   * No specimens in log *    Implants:  * No implants in log *      Drains:   NG/OG/NJ/NE Tube Nasogastric 14 fr Left nostril (Active)   Securement device Adhesive based abreu 10/10/22 1045   Status Clamped 10/10/22 1045   Placement Verified X-Ray (Initial); External Catheter Length;Respiratory Status; Other (comment) 10/10/22 0915   Tube Feeding Standard with Fiber 10/09/22 1230   Tube feeding/verify rate (mL/hr) 20 mL/hr 10/09/22 1230   Free Water/Flush (mL) 300 mL 10/09/22 1230   Residual Volume (ml) 0 ml 10/09/22 1230       Gastrostomy/Enterostomy/Jejunostomy Tube Percutaneous Endoscopic Gastrostomy (PEG) LUQ 1 20 fr (Active)       External Urinary Catheter (Active)   Site Assessment Clean,dry & intact 10/09/22 2000   Placement Replaced 10/06/22 2000   Securement Method Leg strap 10/09/22 2000   Perineal Care Yes 10/09/22 2000   Suction N/A 10/07/22 0400   Urine Color Yellow 10/10/22 0615   Urine Appearance Clear 10/10/22 0615   Urine Odor Malodorous 10/10/22 0615   Output (mL) 850 mL 10/10/22 0615       [REMOVED] NG/OG/NJ/NE Tube Right nostril (Removed)   Surrounding Skin Clean, dry & intact 10/06/22 1200   Securement device Adhesive based abreu 10/06/22 1200   Status Clamped 10/06/22 1200   Placement Verified Respiratory Status; External Catheter Length;Gastric Contents 10/06/22 1200   NG/OG/NJ/NE External Measurement (cm) 60 cm 10/06/22 1200   Drainage Appearance Brown;Bile 10/06/22 1200   Tube feeding/verify rate (mL/hr) 20 mL/hr 10/06/22 1200   Free Water/Flush (mL) 300 mL 10/06/22 1138   Action Taken Feed set changed;Placement verified (comment); Repositioned 10/06/22 1200   Residual Volume (ml) 10 ml 10/06/22 1200       [REMOVED] NG/OG/NJ/NE Tube Nasogastric 14 fr Right nostril (Removed)   Surrounding Skin Clean, dry & intact 10/07/22 2000   Securement device Adhesive based abreu 10/07/22 2000   Status Continuous feeding 10/07/22 2000   Placement Verified Gastric Contents; External Catheter Length;Respiratory Status 10/07/22 2000   NG/OG/NJ/NE External Measurement (cm) 60 cm 10/07/22 2000   Drainage Appearance Bile;Brown 10/06/22 1600   Tube Feeding Other Tube Feeding (must specify product in comment) 10/07/22 2000   Tube feeding/verify rate (mL/hr) 20 mL/hr 10/07/22 0800   Tube Feeding Intake (mL) 100 ml 10/07/22 1740   Free Water/Flush (mL) 600 mL 10/07/22 1146   Action Taken Placement verified (comment) 10/07/22 2000   Residual Volume (ml) 5 ml 10/07/22 1740       Findings: G tube 2cm at skin, snug but spins freely; good one to one and light reflex    Detailed Description of Procedure:       A timeout was performed confirming the correct patient and procedure. Appropriate anesthesia was administered as needed. An endoscope was inserted orally and taken down to the stomach. The stomach was fully insufflated. We identified a good light reflex at least 2 fingerbreadths under the left costal margin just lateral to the rectus. There was good one-to-one response visualized on endoscopy at our proposed G tube site. The snare was inserted into the scope. The abdomen was prepped and draped. A transverse incision ~1cm in length was made at our G tube site. The needle was inserted from the 'pull' G tube kit. We inserted the guidewire into the angio cath and visualized it going through the snare. The snare grasped the guidewire and we pulled it out through the mouth, attached the G tube and pulled it back through into the stomach and followed it down with the scope.   We visualized the satisfactory position of our G tube that rotated freely but was snug to the stomach and abdominal wall at 2 cm. We desufflated the stomach and removed the scope and finished securing the G tube with the phalange from the kit and cutting to appropriately length and placing the cap. We placed bacitracin around the wound. An abdominal binder is to be placed at the end of the case. I was scrubbed for the critical portion of the procedure and immediately available for the non-critical portions.       Electronically signed by Rosey Martinez MD on 10/10/2022 at 11:30 AM

## 2022-10-10 NOTE — PROGRESS NOTES
at bedside with ultra sound to attempt iv -x3  to rt. AC area-above . Infused LR for several minutes-noted to be increased swelling - notified-d/c iv.

## 2022-10-10 NOTE — PROGRESS NOTES
EMERGENCY GENERAL SURGERY PROGRESS NOTE      HISTORY OF PRESENT ILLNESS:   Adriane Barillas is a 71 y.o. male with PMHx of HTN, HLD, DMII, previous thoracic spine fx's and advanced Parkinson's with severe dysphagia. Patient's family did not want to pursue hospice or palliative measures, requiring NGT for feeding. EGS was consulted for placement of PEG tube on 10/9/2022. PEG tube placed in OR by Dr. Netta Bell this morning. No complications. Pt returned to RNF post-op. PHYSICAL EXAM:  Vitals: BP (!) 156/71   Pulse 89   Temp 98 °F (36.7 °C) (Temporal)   Resp 26   Ht 5' 8\" (1.727 m)   Wt 119 lb 11.4 oz (54.3 kg)   SpO2 95%   BMI 18.20 kg/m²     Constituational: Laying in bed, asleep. Does not wake to voice or touch. Cachectic and elderly. Cardiovascular: Regular rate and rhythm. Pulmonary: Clear to auscultation bilaterally. No acute distress or labored breathing. Symmetric chest rise. On 3 L supplemental O2 via NC. Abdominal: Soft. Non-distended. Non-tender appearing. LUQ peg tube in place. C/D/I incision. Continuous tube feed running. Musculoskeletal: Non-pitting edema to b/l upper and lower extremities. Passive ROM intact. Neurological: Encephalopathic, not following commands. Disoriented but alert reportedly at baseline. Requires tactile stimuli for response.       BASIC LABS:  CBC with Differential:    Lab Results   Component Value Date/Time    WBC 10.5 10/10/2022 04:52 AM    RBC 3.48 10/10/2022 04:52 AM    HGB 9.9 10/10/2022 04:52 AM    HCT 30.8 10/10/2022 04:52 AM     10/10/2022 04:52 AM    MCV 88.6 10/10/2022 04:52 AM    MCH 28.6 10/10/2022 04:52 AM    MCHC 32.3 10/10/2022 04:52 AM    RDW 14.3 10/10/2022 04:52 AM    LYMPHOPCT 5.7 10/10/2022 04:52 AM    MONOPCT 5.9 10/10/2022 04:52 AM    BASOPCT 0.2 10/10/2022 04:52 AM    MONOSABS 0.6 10/10/2022 04:52 AM    LYMPHSABS 0.6 10/10/2022 04:52 AM    EOSABS 0.1 10/10/2022 04:52 AM    BASOSABS 0.0 10/10/2022 04:52 AM     CMP:    Lab Results   Component Value Date/Time     10/10/2022 04:52 AM    K 3.1 10/10/2022 04:52 AM    K 3.7 10/08/2022 06:38 PM     10/10/2022 04:52 AM    CO2 20 10/10/2022 04:52 AM    BUN 18 10/10/2022 04:52 AM    CREATININE 1.32 10/10/2022 04:52 AM    GFRAA >60.0 10/10/2022 04:52 AM    LABGLOM 53.4 10/10/2022 04:52 AM    GLUCOSE 213 10/10/2022 04:52 AM    PROT 5.8 10/07/2022 11:23 AM    LABALBU 2.6 10/07/2022 11:23 AM    CALCIUM 8.2 10/10/2022 04:52 AM    BILITOT 0.4 10/07/2022 11:23 AM    ALKPHOS 153 10/07/2022 11:23 AM    AST 55 10/07/2022 11:23 AM    ALT 13 10/07/2022 11:23 AM     Magnesium:  Lab Results   Component Value Date/Time    MG 1.9 10/10/2022 04:52 AM     Troponin:    Lab Results   Component Value Date/Time    TROPONINI 0.015 10/09/2022 08:29 AM       IMAGING:  None pertinent to peg placement    ASSESSMENT/RECOMMENDATIONS:  David Odonnell is a 71 y.o. male with PMHx of HTN, HLD, DMII, previous thoracic spine fx's and advanced Parkinson's with severe dysphagia. Patient's family did not want to pursue hospice or palliative measures, requiring NGT for feeding. EGS was consulted for placement of PEG tube on 10/9/2022. PEG tube placed in OR by Dr. Yogesh Alvarenga this morning. No complications. Pt returned to RNF post-op. Pt appears to be tolerating continuous tube feeds. Wound c/d/I. No drainage and no erythema.     - Okay to use PEG tube for feeding once dietary orders placed. - EGS will follow for post-op check in AM.     Bhavin Loco PA-C  Trauma/Critical Care  Emergency General Surgery  [See treatment team sticky note for contact information]    The patient and plan of care was discussed with attending EGS surgeon, Dr. Mercedez Fontaine

## 2022-10-10 NOTE — PROGRESS NOTES
Physician Progress Note    10/10/2022   3:21 PM    Name:  Adama Montelongo  MRN:    [id number]      Day: 5     Admit Date: 10/5/2022  3:03 PM  PCP: Nishant Schmitz MD    Code Status:  Full Code    Subjective:     No subjective history    Current Facility-Administered Medications   Medication Dose Route Frequency Provider Last Rate Last Admin    lactated ringers infusion   IntraVENous Continuous Rosstmguillermo Hart, DO 75 mL/hr at 10/09/22 1832 New Bag at 10/09/22 1832    enoxaparin (LOVENOX) injection 40 mg  40 mg SubCUTAneous Daily Reena Dark Dials, APRN - CNS   40 mg at 10/10/22 1452    losartan (COZAAR) tablet 50 mg  50 mg Oral Daily Nicoletto Bolzan-Roche, APRN - CNP   50 mg at 10/09/22 0847    QUEtiapine (SEROQUEL) tablet 25 mg  25 mg Oral BID Nicoletto Bolzan-Roche, APRN - CNP   25 mg at 10/10/22 1452    sertraline (ZOLOFT) tablet 50 mg  50 mg Oral Daily Nicoletto Bolzan-Roche, APRN - CNP   50 mg at 10/10/22 1452    tamsulosin (FLOMAX) capsule 0.4 mg  0.4 mg Oral Daily Nicoletto Bolzan-Roche, APRN - CNP   0.4 mg at 10/10/22 1451    atorvastatin (LIPITOR) tablet 20 mg  20 mg Oral Nightly Patdishafouzia Hailey, DO   20 mg at 10/09/22 2109    pantoprazole (PROTONIX) 40 mg in sodium chloride (PF) 10 mL injection  40 mg IntraVENous Daily Yolonda Homans, APRN - CNP   40 mg at 10/09/22 0847    insulin lispro (HUMALOG) injection vial 0-16 Units  0-16 Units SubCUTAneous Q4H Yolonda Homans, APRN - CNP   8 Units at 10/09/22 1647    sodium chloride flush 0.9 % injection 5-40 mL  5-40 mL IntraVENous 2 times per day Janice Burnett APRN - CNS   5 mL at 10/09/22 2100    sodium chloride flush 0.9 % injection 5-40 mL  5-40 mL IntraVENous PRN Reena Dark Dials, APRN - CNS        0.9 % sodium chloride infusion   IntraVENous PRN Reena Dark Dials, APRN - CNS        ondansetron (ZOFRAN-ODT) disintegrating tablet 4 mg  4 mg Oral Q8H PRN Reena [place], APRN - CNS        Or    ondansetron (ZOFRAN) injection 4 mg  4 mg IntraVENous Q6H PRN Reena Alonso Dials, APRN - CNS acetaminophen (TYLENOL) tablet 650 mg  650 mg Oral Q6H PRN Tamia Iba Dials, APRN - CNS        Or    acetaminophen (TYLENOL) suppository 650 mg  650 mg Rectal Q6H PRN Tamia Iba Dials, APRN - CNS        glucose chewable tablet 16 g  4 tablet Oral PRN Tamia Iba Dials, APRN - CNS        dextrose bolus 10% 125 mL  125 mL IntraVENous PRN Tamia Iba Dials, APRN - CNS   Stopped at 10/08/22 9314    Or    dextrose bolus 10% 250 mL  250 mL IntraVENous PRN Tamia Iba Dials, APRN - CNS        glucagon (rDNA) injection 1 mg  1 mg SubCUTAneous PRN Tamia Iba Dials, APRN - CNS        dextrose 10 % infusion   IntraVENous Continuous PRN Tamia Iba Dials, APRN - CNS        insulin glargine (LANTUS) injection vial 9 Units  0.15 Units/kg SubCUTAneous Nightly Tamia Iba Dials, APRN - CNS   9 Units at 10/07/22 2100    carbidopa-levodopa (SINEMET)  MG per tablet 1 tablet  1 tablet Per NG tube BID Tamia Iba Dials, APRN - CNS   1 tablet at 10/10/22 1452       Physical Examination:      Vitals:  BP (!) 156/71   Pulse 89   Temp 98 °F (36.7 °C) (Temporal)   Resp 26   Ht 5' 8\" (1.727 m)   Wt 119 lb 11.4 oz (54.3 kg)   SpO2 95%   BMI 18.20 kg/m²   Temp (24hrs), Av.3 °F (36.8 °C), Min:98 °F (36.7 °C), Max:98.9 °F (37.2 °C)      General appearance: Sleeping. Arouses to painful stimulus. Chronically ill-appearing. Lungs: clear to auscultation bilaterally, normal effort  Heart: regular rate and rhythm, no murmur  Abdomen: soft, nontender, nondistended, bowel sounds present, no masses  Extremities: no edema, redness, tenderness in the calves. Cap refill <2s    Data:     Labs:  Recent Labs     10/09/22  0541 10/10/22  0452   WBC 9.7 10.5   HGB 10.8* 9.9*    134     Recent Labs     10/09/22  0737 10/10/22  0452    138   K 3.5 3.1*    104   CO2 23 20   BUN 16 18   CREATININE 1.23* 1.32*   GLUCOSE 230* 213*     No results for input(s): AST, ALT, ALB, BILITOT, ALKPHOS in the last 72 hours. Assessment and Plan:        1.   Adult failure to thrive secondary to Parkinson's disease/dementia, severe dehydration, renal failure and hyperkalemia related to dehydration, type 2 diabetes  -S/p PEG tube. Tube feeds started  -Off IVF. Renal failure, hypernatremia,hyperkalemia resolved  -Postop monitoring of vitals and oxygenation. Noted he is without IV  -Continue home Parkinson's medications  -Extensive goals of care conversations performed by prior physicians-family electing for full code, aggressive measures, and to take patient home  -[place] was evaluated today and a DME order was entered for variable height hospital bed because he requires assistance for positioning needs not possible in an ordinary bed, complexity of body positioning needs, requirement of elevation of head of bed more than 30 degrees for the diagnosis of severe protein calorie malnutrition requiring tube feed . Patient needs variability of bed height to perform patient transfers and for eating, personal cares, and meal preparation. Current body Weight: 119 lb 11.4 oz (54.3 kg). The need for this equipment was discussed with the patient and he understands and is in agreement. Type 2 diabetes hyperglycemia  Severe protein calorie malnutrition  Hypertension    Diet: Diet NPO  Ppx: Lovenox  Full Code    Hopeful for discharge home tomorrow. Hospital bed ordered as described above.   Dietitian to be contacted for tube feed order    >35 minutes in total care time    Electronically signed by Josh Cerda DO on 10/10/2022 at 3:21 PM

## 2022-10-11 NOTE — PROGRESS NOTES
Nephrology Progress Note    Assessment:  MADDI dehydration  Electrolytes fine  S/P PEG yesterday  Dementia  DM type-2    Plan: maintain hydration change water flushes increase amount till renal stable  Lactated ringers bolus given  Patient Active Problem List:     Lactic acidosis     Impaired mobility     Parkinson's disease (HCC)     Type 2 diabetes mellitus with hyperglycemia, with long-term current use of insulin (HCC)     Essential hypertension     Hyperlipidemia     BMI 28.0-28.9,adult     Uses Hungarian as primary spoken language     Other specified hypothyroidism     Hypernatremia     Acute encephalopathy     Acute renal failure (HCC)     Dehydration     Dysphagia      Subjective:  Admit Date: 10/5/2022    Interval History: unable to communicate  family to take him home    Medications:  Scheduled Meds:   enoxaparin  40 mg SubCUTAneous Daily    losartan  50 mg Oral Daily    QUEtiapine  25 mg Oral BID    sertraline  50 mg Oral Daily    tamsulosin  0.4 mg Oral Daily    atorvastatin  20 mg Oral Nightly    pantoprazole (PROTONIX) 40 mg injection  40 mg IntraVENous Daily    insulin lispro  0-16 Units SubCUTAneous Q4H    sodium chloride flush  5-40 mL IntraVENous 2 times per day    insulin glargine  0.15 Units/kg SubCUTAneous Nightly    carbidopa-levodopa  1 tablet Per NG tube BID     Continuous Infusions:   sodium chloride      dextrose         CBC:   Recent Labs     10/10/22  0452 10/11/22  0502   WBC 10.5 9.4   HGB 9.9* 10.0*    167     CMP:    Recent Labs     10/09/22  0737 10/10/22  0452 10/11/22  0502    138 142   K 3.5 3.1* 3.8    104 107   CO2 23 20 23   BUN 16 18 30*   CREATININE 1.23* 1.32* 1.43*   GLUCOSE 230* 213* 171*   CALCIUM 8.5 8.2* 8.4*   LABGLOM 57.9* 53.4* 48.6*     Troponin:   Recent Labs     10/09/22  0829   TROPONINI 0.015*     BNP: No results for input(s): BNP in the last 72 hours.   INR:   Recent Labs     10/09/22  0541   INR 1.0     Lipids: No results for input(s): CHOL, LDLDIRECT, TRIG, HDL, AMYLASE, LIPASE in the last 72 hours. Liver: No results for input(s): AST, ALT, ALKPHOS, PROT, LABALBU, BILITOT in the last 72 hours. Invalid input(s): BILDIR  Iron:  No results for input(s): IRONS, FERRITIN in the last 72 hours. Invalid input(s): LABIRONS  Urinalysis: No results for input(s): UA in the last 72 hours.     Objective:  Vitals: BP (!) 128/56   Pulse (!) 102   Temp 98.2 °F (36.8 °C) (Temporal)   Resp 18   Ht 5' 8\" (1.727 m)   Wt 119 lb 11.4 oz (54.3 kg)   SpO2 98%   BMI 18.20 kg/m²    Wt Readings from Last 3 Encounters:   10/05/22 119 lb 11.4 oz (54.3 kg)   09/04/22 170 lb (77.1 kg)   08/16/22 170 lb (77.1 kg)      24HR INTAKE/OUTPUT:    Intake/Output Summary (Last 24 hours) at 10/11/2022 0843  Last data filed at 10/11/2022 0554  Gross per 24 hour   Intake 220 ml   Output 900 ml   Net -680 ml       General: alert, in no apparent distress  HEENT: normocephalic, atraumatic, anicteric  Neck: supple, no mass  Lungs: non-labored respirations, clear to auscultation bilaterally  Heart: regular rate and rhythm, no murmurs or rubs  Abdomen: soft, non-tender, non-distended  Ext: no cyanosis, no peripheral edema  Neuro: alert and oriented, no gross abnormalities  Psych: normal mood and affect  Skin: no rash      Electronically signed by Suzanne Cage DO, MD

## 2022-10-11 NOTE — CONSULTS
Hospice referral meeting scheduled for Wednesday 10/12 at 0930.  [address] RN will need access to Interpretor device and private area for meeting as pt's spouse, 2 sons, and granddaughter will be in attendance with the addition of conferencing calling pt's other 2 sons who live in [facility].

## 2022-10-11 NOTE — FLOWSHEET NOTE
Pt in be with eyes closed, only opens eyes when name called. No s/s of pain or discomfort. Enteral feeding continues per peg tube. Tolerating well, call light within reach. Electronically signed by Noa Manrique RN on 10/11/2022 at 2:14 PM

## 2022-10-11 NOTE — CARE COORDINATION
PATIENT FAMILY IS NOW REQUESTING [facility] CONSULT. [address] FOR 10/12 AT 9:30 AM. FAMILY WILL NEED INTERPRETATION SERVICES AND TELEPHONE ACCESS FOR FAMILY MEMBERS IN [place] PER NURSING. UPDATED LSW//NURSING AWARE.

## 2022-10-11 NOTE — TELEPHONE ENCOUNTER
Thank you for your assistance. Patient was seen once by me on 9/14/22 and a request was made for a hospital bed which I placed an order for, as well as for [address] [place] [address]. All the necessary paperwork was completed and faxed over to [facility] as requested.

## 2022-10-11 NOTE — PROGRESS NOTES
INPATIENT PROGRESS NOTES    PATIENT NAME: Svetlana Dumont  MRN: [id number]  SERVICE DATE:  October 11, 2022   SERVICE TIME:  11:33 AM      PRIMARY SERVICE: Pulmonary Disease    CHIEF COMPLAIN: Encephalopathy    INTERVAL HPI: Patient seen and examined at bedside, Interval Notes, orders reviewed. Nursing notes noted  He is very sleepy. Unable to give any review of system. He is not able to clear his own secretion requiring suctioning as needed. He still able to maintain O2 saturation to 98%  he appeared comfortable in bed. No fever or chills. No vomiting or diarrhea. OBJECTIVE    Body mass index is 18.2 kg/m². PHYSICAL EXAM:  Vitals:  BP (!) 128/56   Pulse (!) 102   Temp 98.2 °F (36.8 °C) (Temporal)   Resp 18   Ht 5' 8\" (1.727 m)   Wt 119 lb 11.4 oz (54.3 kg)   SpO2 98%   BMI 18.20 kg/m²   General: Alert, awake, confused and agitated at times. comfortable in bed, No distress. Head: Atraumatic , Normocephalic   Eyes: PERRL. No sclera icterus. No conjunctival injection. No discharge   ENT: No nasal  discharge. Pharynx clear. Neck:  Trachea midline. No thyromegaly, no JVD, No cervical adenopathy. Chest : Bilaterally symmetrical ,Normal effort,  No accessory muscle use  Lung : . Fair BS bilateral, decreased BS at bases. No Rales. No wheezing. No rhonchi. Heart[de-identified] Normal  rate. Regular rhythm. No mumur ,  Rub or gallop  ABD: Non-tender. Non-distended. No masses. No organmegaly. Normal bowel sounds. No hernia.   Ext : No Pitting both leg , No Cyanosis No clubbing  Neuro: no focal weakness          DATA:   Recent Labs     10/10/22  0452 10/11/22  0502   WBC 10.5 9.4   HGB 9.9* 10.0*   HCT 30.8* 30.3*   MCV 88.6 87.8    167     Recent Labs     10/10/22  0452 10/11/22  0502    142   K 3.1* 3.8    107   CO2 20 23   BUN 18 30*   CREATININE 1.32* 1.43*   GLUCOSE 213* 171*   CALCIUM 8.2* 8.4*   LABGLOM 53.4* 48.6*   GFRAA >60.0 58.9*       MV Settings:          No results for input(s): PHART, IVK6SCZ, PO2ART, GYE3PDD, BEART, H8NOCZSI in the last 72 hours. O2 Device: Nasal cannula  O2 Flow Rate (L/min): 2 L/min    Diet NPO  ADULT TUBE FEEDING; PEG; Diabetic; Continuous; 20; Yes; 15; Q 12 hours; 42; 220; Q 6 hours     MEDICATIONS during current hospitalization:    Continuous Infusions:   sodium chloride      dextrose         Scheduled Meds:   enoxaparin  40 mg SubCUTAneous Daily    losartan  50 mg Oral Daily    QUEtiapine  25 mg Oral BID    sertraline  50 mg Oral Daily    tamsulosin  0.4 mg Oral Daily    atorvastatin  20 mg Oral Nightly    pantoprazole (PROTONIX) 40 mg injection  40 mg IntraVENous Daily    insulin lispro  0-16 Units SubCUTAneous Q4H    sodium chloride flush  5-40 mL IntraVENous 2 times per day    insulin glargine  0.15 Units/kg SubCUTAneous Nightly    carbidopa-levodopa  1 tablet Per NG tube BID       PRN Meds:sodium chloride flush, sodium chloride, ondansetron **OR** ondansetron, acetaminophen **OR** acetaminophen, glucose, dextrose bolus **OR** dextrose bolus, glucagon (rDNA), dextrose    Radiology  XR CHEST (2 VW)    Result Date: 10/5/2022  EXAMINATION: TWO XRAY VIEWS OF THE CHEST 10/5/2022 1:39 pm COMPARISON: None. HISTORY: ORDERING SYSTEM PROVIDED HISTORY: Dysphagia, unspecified type TECHNOLOGIST PROVIDED HISTORY: Reason for exam:->possible aspiration What reading provider will be dictating this exam?->[facility] FINDINGS: The cardiomediastinal silhouette is without acute process. Biapical promises visualized osseous of COPD changes. The lungs are without acute focal process. There is no effusion or pneumothorax. The osseous structures are without acute process. No acute cardiopulmonary disease. COPD changes. XR ABDOMEN (KUB) (SINGLE AP VIEW)    Result Date: 10/8/2022  EXAMINATION: ONE SUPINE XRAY VIEW(S) OF THE ABDOMEN 10/7/2022 9:00 pm COMPARISON: None.  HISTORY: ORDERING SYSTEM PROVIDED HISTORY: verify NG placement TECHNOLOGIST PROVIDED HISTORY: Portable for NG Placement Reason for exam:->verify NG placement What reading provider will be dictating this exam?->[facility] FINDINGS: KUB reveals nasogastric tube identified tip in the stomach. The bowel gas pattern is unremarkable. No abnormal mass or calcifications seen overlying the renal shadows or paths of the ureters. Bony structures are grossly unremarkable. Nasogastric tube tip in the stomach. CT HEAD WO CONTRAST    Result Date: 10/5/2022  EXAMINATION: CT OF THE HEAD WITHOUT CONTRAST  10/5/2022 3:52 pm TECHNIQUE: CT of the head was performed without the administration of intravenous contrast. Automated exposure control, iterative reconstruction, and/or weight based adjustment of the mA/kV was utilized to reduce the radiation dose to as low as reasonably achievable. COMPARISON: 09/04/2022 HISTORY: ORDERING SYSTEM PROVIDED HISTORY: dysphagia TECHNOLOGIST PROVIDED HISTORY: Reason for exam:->dysphagia Has a \"code stroke\" or \"stroke alert\" been called? ->No Decision Support Exception - unselect if not a suspected or confirmed emergency medical condition->Emergency Medical Condition (MA) What reading provider will be dictating this exam?->[facility] FINDINGS: BRAIN/VENTRICLES: No evidence of parenchymal hemorrhages or contusions. No evidence of intra or extra-axial fluid collection is seen. Scattered areas of low attenuation are visualized in the periventricular and subcortical white matter demonstrating no change in comparison to the prior study consistent with chronic microvascular disease. No evidence of acute territorial infarct is seen. Prominence of the ventricles and sulci is visualized demonstrating no change consistent with chronic atrophic brain changes. No evidence of intracranial mass or mass effect, no evidence of midline shift is seen. No evidence of sellar or parasellar mass is visualized. ORBITS: The visualized portion of the orbits demonstrate no acute abnormality.  SINUSES: The visualized paranasal sinuses and mastoid air cells demonstrate no acute abnormality. SOFT TISSUES/SKULL:  No acute abnormality of the visualized skull or soft tissues. No acute intracranial abnormality. Chronic microvascular disease and chronic atrophic brain changes seen. If there is a high level of suspicion would recommend further evaluation with an MRI of the brain to rule out subtle brainstem lacunar infarcts or acute on chronic pathology. XR CHEST PORTABLE    Result Date: 10/6/2022  EXAMINATION: ONE XRAY VIEW OF THE CHEST 10/6/2022 3:17 pm COMPARISON: None. HISTORY: ORDERING SYSTEM PROVIDED HISTORY: NG placement TECHNOLOGIST PROVIDED HISTORY: Reason for exam:->NG placement What reading provider will be dictating this exam?->[facility] FINDINGS: Enteric tube tip in the fundus of the stomach. Nonobstructive bowel gas pattern. No gross free air. The lung bases are normal.     Enteric tube tip in the fundus of the stomach. MRI BRAIN WO CONTRAST    Result Date: 10/6/2022  EXAMINATION: MRI OF THE BRAIN WITHOUT CONTRAST  10/6/2022 11:26 am TECHNIQUE: Multiplanar multisequence MRI of the brain was performed without the administration of intravenous contrast. COMPARISON: CT brain performed 10/05/2022. HISTORY: ORDERING SYSTEM PROVIDED HISTORY: rule out cva TECHNOLOGIST PROVIDED HISTORY: Reason for exam:->rule out cva What reading provider will be dictating this exam?->[facility] FINDINGS: INTRACRANIAL STRUCTURES/VENTRICLES: The sellar and suprasellar structures, optic chiasm, corpus callosum, pineal gland, tectum, and midline brainstem structures are unremarkable. The craniocervical junction is unremarkable. There is no acute hemorrhage, mass effect, or midline shift. There is satisfactory overall gray-white matter differentiation. There is extensive FLAIR signal abnormality in the subcortical and periventricular white matter that may relate to chronic microvascular disease. The ventricular structures are symmetrically prominent.   The infratentorial structures including the cerebellopontine angles and internal auditory canals are unremarkable. There is no abnormal restricted diffusion. There is no abnormal blooming artifact on susceptibility weighted imaging. ORBITS: The visualized portion of the orbits demonstrate no acute abnormality. SINUSES: The visualized paranasal sinuses and mastoid air cells demonstrate no acute abnormality. BONES/SOFT TISSUES: The bone marrow signal intensity appears normal. The soft tissues demonstrate no acute abnormality. Extensive chronic microvascular disease without acute intracranial. Symmetrically prominent ventricular system correlation with symptomatology for normal pressure hydrocephalus is needed. FL Modified Barium Swallow W Video    Result Date: 10/7/2022  EXAMINATION: MODIFIED BARIUM SWALLOW WITH SPEECH PATHOLOGY TECHNIQUE: Fluoroscopic evaluation of the swallowing mechanism was performed by speech pathology. FLUOROSCOPY DOSE AND TYPE OR TIME AND EXPOSURES: Fluoroscopy time was 1.1 minutes. Multiple fluoroscopic cine loops were acquired. HISTORY: ORDERING SYSTEM PROVIDED HISTORY: Dysphagia, unspecified type TECHNOLOGIST PROVIDED HISTORY: What reading provider will be dictating this exam?->[facility]         IMPRESSION AND SUGGESTION:  Encephalopathy  Hypernatremia, improved  Acute renal failure improving  Failure to thrive  History of Parkinson disease with advanced dementia    No new change over night. He is on 2 L O2 via nasal cannula O2 saturation 98%. DVT GI prophylaxis. Chest x-ray shows COPD changes without any active infiltration. .  At this time continue O2 to keep saturation 90% or above. Aspiration precaution, suction as needed. NOTE: This report was transcribed using voice recognition software. Every effort was made to ensure accuracy; however, inadvertent computerized transcription errors may be present.       Electronically signed by Dewey Sims MD, FCCP on 10/11/2022 at 11:33 AM

## 2022-10-11 NOTE — PROGRESS NOTES
EMERGENCY GENERAL SURGERY PROGRESS NOTE      HISTORY OF PRESENT ILLNESS:   Shanique Mulligan is a 71 y.o. male with PMHx of HTN, HLD, DMII, previous thoracic spine fx's and advanced Parkinson's with severe dysphagia. Patient's family did not want to pursue hospice or palliative measures, requiring NGT for feeding. EGS was consulted for placement of PEG tube on 10/9/2022. PEG tube placed in OR by Dr. Alma Rosa Martinez on 10/10. No complications. Pt returned to RNF post-op and tube feed started. Per nursing report, no nausea or emesis. Pt resting comfortably in bed. PHYSICAL EXAM:  Vitals: BP (!) 128/56   Pulse (!) 102   Temp 98.2 °F (36.8 °C) (Temporal)   Resp 18   Ht 5' 8\" (1.727 m)   Wt 119 lb 11.4 oz (54.3 kg)   SpO2 98%   BMI 18.20 kg/m²     Constituational: Laying in bed, asleep. Does not wake to voice or touch. Cachectic and elderly. Cardiovascular: Regular rate and rhythm. Pulmonary: Clear to auscultation bilaterally. No acute distress or labored breathing. Symmetric chest rise. On 2 L supplemental O2 via NC. Abdominal: Soft. Non-distended. Non-tender appearing. LUQ peg tube in place. C/D/I incision with minimal dried blood crusting surrounding tube. No erythema. No swelling around tube. Tube mobile and stopper in place. Continuous tube feed running. Musculoskeletal: Non-pitting edema to b/l upper and lower extremities. Passive ROM intact. Neurological: Encephalopathic, not following commands. Disoriented but alert reportedly at baseline. Requires tactile stimuli for response.       BASIC LABS:  CBC with Differential:    Lab Results   Component Value Date/Time    WBC 9.4 10/11/2022 05:02 AM    RBC 3.45 10/11/2022 05:02 AM    HGB 10.0 10/11/2022 05:02 AM    HCT 30.3 10/11/2022 05:02 AM     10/11/2022 05:02 AM    MCV 87.8 10/11/2022 05:02 AM    MCH 29.1 10/11/2022 05:02 AM    MCHC 33.1 10/11/2022 05:02 AM    RDW 14.4 10/11/2022 05:02 AM    LYMPHOPCT 6.7 10/11/2022 05:02 AM    MONOPCT 6.4 10/11/2022 05:02 AM    BASOPCT 0.2 10/11/2022 05:02 AM    MONOSABS 0.6 10/11/2022 05:02 AM    LYMPHSABS 0.6 10/11/2022 05:02 AM    EOSABS 0.0 10/11/2022 05:02 AM    BASOSABS 0.0 10/11/2022 05:02 AM     CMP:    Lab Results   Component Value Date/Time     10/11/2022 05:02 AM    K 3.8 10/11/2022 05:02 AM    K 3.7 10/08/2022 06:38 PM     10/11/2022 05:02 AM    CO2 23 10/11/2022 05:02 AM    BUN 30 10/11/2022 05:02 AM    CREATININE 1.43 10/11/2022 05:02 AM    GFRAA 58.9 10/11/2022 05:02 AM    LABGLOM 48.6 10/11/2022 05:02 AM    GLUCOSE 171 10/11/2022 05:02 AM    PROT 5.8 10/07/2022 11:23 AM    LABALBU 2.6 10/07/2022 11:23 AM    CALCIUM 8.4 10/11/2022 05:02 AM    BILITOT 0.4 10/07/2022 11:23 AM    ALKPHOS 153 10/07/2022 11:23 AM    AST 55 10/07/2022 11:23 AM    ALT 13 10/07/2022 11:23 AM     Magnesium:  Lab Results   Component Value Date/Time    MG 2.1 10/11/2022 05:02 AM     Troponin:    Lab Results   Component Value Date/Time    TROPONINI 0.015 10/09/2022 08:29 AM       IMAGING:  None pertinent to peg placement    ASSESSMENT/RECOMMENDATIONS:  Omer Anthony is a 71 y.o. male with PMHx of HTN, HLD, DMII, previous thoracic spine fx's and advanced Parkinson's with severe dysphagia. Patient's family did not want to pursue hospice or palliative measures, requiring NGT for feeding. EGS was consulted for placement of PEG tube on 10/9/2022. PEG tube placed in OR by Dr. Sofie Forman on 10/10. No complications. Pt returned to RNF post-op and has been tolerating continuous TF for the last 12hrs. Surgical site c/d/I, and healing appropriately. No drainage and no erythema.     - Okay to d/c from EGS perspective. No further follow up needed. EGS will sign off at this time. Please call with any questions/concerns.       Chery Nayak PA-C  Trauma/Critical Care  Emergency General Surgery  [See treatment team sticky note for contact information]    The patient and plan of care was discussed with attending EGS surgeon, Dr. Latrice Alejandro. Detail Level: Detailed

## 2022-10-11 NOTE — PROGRESS NOTES
Physician Progress Note    10/11/2022   12:23 PM    Name:  Lam Peña  MRN:    [id number]      Day: 6     Admit Date: 10/5/2022  3:03 PM  PCP: Ellender Lombard, MD    Code Status:  DNR-CCA    Subjective:     No subjective history    Current Facility-Administered Medications   Medication Dose Route Frequency Provider Last Rate Last Admin    enoxaparin (LOVENOX) injection 40 mg  40 mg SubCUTAneous Daily Zaira Kin Dials, APRN - CNS   40 mg at 10/11/22 1059    losartan (COZAAR) tablet 50 mg  50 mg Oral Daily Nicoletto Bolzan-Roche, APRN - CNP   50 mg at 10/11/22 1059    QUEtiapine (SEROQUEL) tablet 25 mg  25 mg Oral BID Nicoletto Bolzan-Roche, APRN - CNP   25 mg at 10/11/22 1059    sertraline (ZOLOFT) tablet 50 mg  50 mg Oral Daily Nicoletto Bolzan-Roche, APRN - CNP   50 mg at 10/11/22 1059    tamsulosin (FLOMAX) capsule 0.4 mg  0.4 mg Oral Daily Nicoletto Bolzan-Roche, APRN - CNP   0.4 mg at 10/11/22 1059    atorvastatin (LIPITOR) tablet 20 mg  20 mg Oral Nightly Kendal Hatchet, DO   20 mg at 10/10/22 2123    pantoprazole (PROTONIX) 40 mg in sodium chloride (PF) 10 mL injection  40 mg IntraVENous Daily Yareli Bail, APRN - CNP   40 mg at 10/09/22 0847    insulin lispro (HUMALOG) injection vial 0-16 Units  0-16 Units SubCUTAneous Q4H Yareli Bail, APRN - CNP   4 Units at 10/10/22 2123    sodium chloride flush 0.9 % injection 5-40 mL  5-40 mL IntraVENous 2 times per day Robert Clemons, APRN - CNS   10 mL at 10/10/22 0939    sodium chloride flush 0.9 % injection 5-40 mL  5-40 mL IntraVENous PRN Zaira Kin Dials, APRN - CNS        0.9 % sodium chloride infusion   IntraVENous PRN Zaira Kin Dials, APRN - CNS        ondansetron (ZOFRAN-ODT) disintegrating tablet 4 mg  4 mg Oral Q8H PRN Zaira Kin Dials, APRN - CNS        Or    ondansetron (ZOFRAN) injection 4 mg  4 mg IntraVENous Q6H PRN Zaira Garcia Dials, APRN - CNS        acetaminophen (TYLENOL) tablet 650 mg  650 mg Oral Q6H PRN Zaira Garcia Dials, APRN - CNS        Or    acetaminophen (TYLENOL) suppository 650 mg  650 mg Rectal Q6H PRN Sudhir Herrs Dials, APRN - CNS        glucose chewable tablet 16 g  4 tablet Oral PRN Sudhir Madden Dials, APRN - CNS        dextrose bolus 10% 125 mL  125 mL IntraVENous PRN Sudhir Herrs Dials, APRN - CNS   Stopped at 10/08/22 5785    Or    dextrose bolus 10% 250 mL  250 mL IntraVENous PRN Sudhir Madden Dials, APRN - CNS        glucagon (rDNA) injection 1 mg  1 mg SubCUTAneous PRN Sudhir Herrs Dials, APRN - CNS        dextrose 10 % infusion   IntraVENous Continuous PRN Sudhir Madden Dials, APRN - CNS        insulin glargine (LANTUS) injection vial 9 Units  0.15 Units/kg SubCUTAneous Nightly Sudhir Madden DialPINEDA chavis - CNS   9 Units at 10/10/22 2123    carbidopa-levodopa (SINEMET)  MG per tablet 1 tablet  1 tablet Per NG tube BID Sudhir Madden DialTORIBIO chavisN - CNS   1 tablet at 10/11/22 1059       Physical Examination:      Vitals:  BP (!) 146/52   Pulse (!) 102   Temp 98.2 °F (36.8 °C) (Temporal)   Resp 18   Ht 5' 8\" (1.727 m)   Wt 119 lb 11.4 oz (54.3 kg)   SpO2 98%   BMI 18.20 kg/m²   Temp (24hrs), Av.4 °F (36.9 °C), Min:98 °F (36.7 °C), Max:98.9 °F (37.2 °C)      General appearance: Sleeping. Arouses to painful stimulus. Chronically ill-appearing. Lungs: clear to auscultation bilaterally, normal effort  Heart: regular rate and rhythm, no murmur  Abdomen: soft, nontender, nondistended, bowel sounds present, no masses  Extremities: no edema, redness, tenderness in the calves. Cap refill <2s    Data:     Labs:  Recent Labs     10/10/22  0452 10/11/22  0502   WBC 10.5 9.4   HGB 9.9* 10.0*    167     Recent Labs     10/10/22  0452 10/11/22  0502    142   K 3.1* 3.8    107   CO2 20 23   BUN 18 30*   CREATININE 1.32* 1.43*   GLUCOSE 213* 171*     No results for input(s): AST, ALT, ALB, BILITOT, ALKPHOS in the last 72 hours. Assessment and Plan:        1.   Adult failure to thrive secondary to Parkinson's disease/dementia, severe dehydration, renal failure and hyperkalemia related to dehydration, type 2 diabetes  -S/p PEG tube. Tube feeds started  -Off IVF. Renal failure, hypernatremia,hyperkalemia resolved  -Postop monitoring of vitals and oxygenation. Noted he is without IV  -Continue home Parkinson's medications    2. Goals of care: I performed 30 minutes of advance care planning discussing goals of care and code status with the patient's son and granddaughter. Family states that since diagnosis of Parkinson's 1 year ago, patient has had steady decline. 2 months ago, he was only able to speak a little bit and was wheelchair-bound. In the weeks prior to admission, he was not talking, swallowing, eating and he required 24-hour care for toileting. Since admission, his dehydration/renal failure/hypernatremia has been stabilized and he has been started on tube feeds. He unfortunately remains weak and unable to talk or follow commands. I discussed hospice and comfort care strategy-family would like to meet with hospice team.  Due to his requirement of 24-hour care, my recommendation is for inpatient hospice. I also discussed code status-if patient's heart were to stop or he were to stop breathing, family would like him to pass naturally. No shocks, intubation, CPR.  Code status updated    Type 2 diabetes hyperglycemia  Severe protein calorie malnutrition  Hypertension    Diet: Diet NPO  ADULT TUBE FEEDING; PEG; Diabetic; Continuous; 20; Yes; 15; Q 12 hours; 42; 220; Q 6 hours  Ppx: Lovenox  DNR-CCA    Discharge plan dependent on hospice meeting.    >35 minutes in total care time    Electronically signed by Brianna Mendenhall DO on 10/11/2022 at 12:23 PM

## 2022-10-11 NOTE — PROGRESS NOTES
Neurology Follow up    SUBJECTIVE: Patient seen and examined for neurology follow-up for acute metabolic encephalopathy in the setting of hypernatremia and MADDI with underlying Parkinson's disease and vascular dementia. Patient currently somnolent. Does awaken to verbal stimuli. Confused. No obvious focal deficits. Difficulty following commands. Remains n.p.o. with PEG tube in place. Generalized edema noted at 2-3+. Rhonchi noted. Afebrile. No seizure activity reported.     Pt somewhat more awake  Current Facility-Administered Medications   Medication Dose Route Frequency Provider Last Rate Last Admin    lactated ringers bolus  500 mL IntraVENous Once Sarkis Brock DO        enoxaparin (LOVENOX) injection 40 mg  40 mg SubCUTAneous Daily Arlana Roa Dials, APRN - CNS   40 mg at 10/10/22 1452    losartan (COZAAR) tablet 50 mg  50 mg Oral Daily Nicoletto Bolzan-Roche, APRN - CNP   50 mg at 10/10/22 1425    QUEtiapine (SEROQUEL) tablet 25 mg  25 mg Oral BID Nicoletto Bolzan-Roche, APRN - CNP   25 mg at 10/10/22 2123    sertraline (ZOLOFT) tablet 50 mg  50 mg Oral Daily Nicoletto Bolzan-Roche, APRN - CNP   50 mg at 10/10/22 1452    tamsulosin (FLOMAX) capsule 0.4 mg  0.4 mg Oral Daily Nicoletto Bolzan-Roche, APRN - CNP   0.4 mg at 10/10/22 1451    atorvastatin (LIPITOR) tablet 20 mg  20 mg Oral Nightly Bhavesh Sabridger, DO   20 mg at 10/10/22 2123    pantoprazole (PROTONIX) 40 mg in sodium chloride (PF) 10 mL injection  40 mg IntraVENous Daily Linda Starch, APRN - CNP   40 mg at 10/09/22 0847    insulin lispro (HUMALOG) injection vial 0-16 Units  0-16 Units SubCUTAneous Q4H Linda Starch, APRN - CNP   4 Units at 10/10/22 2123    sodium chloride flush 0.9 % injection 5-40 mL  5-40 mL IntraVENous 2 times per day Emerson Reyna, APRN - CNS   10 mL at 10/10/22 0939    sodium chloride flush 0.9 % injection 5-40 mL  5-40 mL IntraVENous PRN Arlana Roa Dials, APRN - CNS        0.9 % sodium chloride infusion   IntraVENous PRN Joseline Day M Dials, APRN - CNS        ondansetron (ZOFRAN-ODT) disintegrating tablet 4 mg  4 mg Oral Q8H PRN Gerlene Pacer Dials, APRN - CNS        Or    ondansetron (ZOFRAN) injection 4 mg  4 mg IntraVENous Q6H PRN Gerlene Pacer Dials, APRN - CNS        acetaminophen (TYLENOL) tablet 650 mg  650 mg Oral Q6H PRN Gerlene Pacer Dials, APRN - CNS        Or    acetaminophen (TYLENOL) suppository 650 mg  650 mg Rectal Q6H PRN Gerlene Pacer Dials, APRN - CNS        glucose chewable tablet 16 g  4 tablet Oral PRN Gerlene Pacer Dials, APRN - CNS        dextrose bolus 10% 125 mL  125 mL IntraVENous PRN Gerlene Pacer Dials, APRN - CNS   Stopped at 10/08/22 2964    Or    dextrose bolus 10% 250 mL  250 mL IntraVENous PRN Gerlene Pacer Dials, APRN - CNS        glucagon (rDNA) injection 1 mg  1 mg SubCUTAneous PRN Gerlene Pacer Dials, APRN - CNS        dextrose 10 % infusion   IntraVENous Continuous PRN Gerlene Pacer Dials, APRN - CNS        insulin glargine (LANTUS) injection vial 9 Units  0.15 Units/kg SubCUTAneous Nightly Gerlene Pacer Dials, APRN - CNS   9 Units at 10/10/22 2123    carbidopa-levodopa (SINEMET)  MG per tablet 1 tablet  1 tablet Per NG tube BID Gerlene Pacer Dials, APRN - CNS   1 tablet at 10/10/22 2123       PHYSICAL EXAM:    BP (!) 128/56   Pulse (!) 102   Temp 98.2 °F (36.8 °C) (Temporal)   Resp 18   Ht 5' 8\" (1.727 m)   Wt 119 lb 11.4 oz (54.3 kg)   SpO2 98%   BMI 18.20 kg/m²    General Appearance:      Skin:  normal  CVS - Normal sounds, No murmurs , No carotid Bruits  RS -CTA  Abdomen Soft, BS present  Review of Systems   Constitutional:  Negative for fever. HENT:  Positive for trouble swallowing. Respiratory:  Positive for cough. Negative for wheezing. Cardiovascular:  Positive for leg swelling. Gastrointestinal:  Negative for vomiting. Musculoskeletal:  Negative for myalgias. Skin:  Negative for color change. Allergic/Immunologic: Negative for food allergies. Neurological:  Positive for weakness (Generalized).  Negative for tremors, seizures, syncope, facial asymmetry and speech difficulty. Psychiatric/Behavioral:  Positive for confusion. Negative for behavioral problems, hallucinations and sleep disturbance. Mental Status Exam:             Level of Alertness:   awake            Orientation:   person,  Patient's language appears to be normal  Funduscopic Exam:     Cranial Nerves              Cranial nerve III           Pupils:  equal, round, reactive to light      Cranial nerves III, IV, VI           Extraocular Movements: intact      Cranial nerve V           Facial sensation:  intact      Cranial nerve VII           Facial strength: intact            Cranial nerve XII          Tongue movement:  normal    Motor:    Drift:  absent  Motor exam is symmetrical 4 out of 5 all extremities bilaterally  Tone:  normal  Abnormal Movements:  absent            Sensory: No focal deficits        Pinprick             Right Upper Extremity:  normal             Left Upper Extremity:  normal             Right Lower Extremity:  normal             Left Lower Extremity:  normal           Vibration                         Touch            Proprioception                 Coordination: Unable to assess as patient has difficulty following commands                                    Gait:                       Casual: Gait is deferred          Reflexes:             Deep Tendon Reflexes:             Reflexes are 2 +             Plantar response:                Right:  downgoing               Left:  downgoing    Vascular:  Cardiac Exam:  normal         XR CHEST (2 VW)    Result Date: 10/5/2022  EXAMINATION: TWO XRAY VIEWS OF THE CHEST 10/5/2022 1:39 pm COMPARISON: None. HISTORY: ORDERING SYSTEM PROVIDED HISTORY: Dysphagia, unspecified type TECHNOLOGIST PROVIDED HISTORY: Reason for exam:->possible aspiration What reading provider will be dictating this exam?->[facility] FINDINGS: The cardiomediastinal silhouette is without acute process. Biapical promises visualized osseous of COPD changes. The lungs are without acute focal process. There is no effusion or pneumothorax. The osseous structures are without acute process. No acute cardiopulmonary disease. COPD changes. CT HEAD WO CONTRAST    Result Date: 10/5/2022  EXAMINATION: CT OF THE HEAD WITHOUT CONTRAST  10/5/2022 3:52 pm TECHNIQUE: CT of the head was performed without the administration of intravenous contrast. Automated exposure control, iterative reconstruction, and/or weight based adjustment of the mA/kV was utilized to reduce the radiation dose to as low as reasonably achievable. COMPARISON: 09/04/2022 HISTORY: ORDERING SYSTEM PROVIDED HISTORY: dysphagia TECHNOLOGIST PROVIDED HISTORY: Reason for exam:->dysphagia Has a \"code stroke\" or \"stroke alert\" been called? ->No Decision Support Exception - unselect if not a suspected or confirmed emergency medical condition->Emergency Medical Condition (MA) What reading provider will be dictating this exam?->CRC FINDINGS: BRAIN/VENTRICLES: No evidence of parenchymal hemorrhages or contusions. No evidence of intra or extra-axial fluid collection is seen. Scattered areas of low attenuation are visualized in the periventricular and subcortical white matter demonstrating no change in comparison to the prior study consistent with chronic microvascular disease. No evidence of acute territorial infarct is seen. Prominence of the ventricles and sulci is visualized demonstrating no change consistent with chronic atrophic brain changes. No evidence of intracranial mass or mass effect, no evidence of midline shift is seen. No evidence of sellar or parasellar mass is visualized. ORBITS: The visualized portion of the orbits demonstrate no acute abnormality. SINUSES: The visualized paranasal sinuses and mastoid air cells demonstrate no acute abnormality. SOFT TISSUES/SKULL:  No acute abnormality of the visualized skull or soft tissues. No acute intracranial abnormality.  Chronic microvascular disease and chronic atrophic brain changes seen. If there is a high level of suspicion would recommend further evaluation with an MRI of the brain to rule out subtle brainstem lacunar infarcts or acute on chronic pathology. XR CHEST PORTABLE    Result Date: 10/6/2022  EXAMINATION: ONE XRAY VIEW OF THE CHEST 10/6/2022 3:17 pm COMPARISON: None. HISTORY: ORDERING SYSTEM PROVIDED HISTORY: NG placement TECHNOLOGIST PROVIDED HISTORY: Reason for exam:->NG placement What reading provider will be dictating this exam?->CRC FINDINGS: Enteric tube tip in the fundus of the stomach. Nonobstructive bowel gas pattern. No gross free air. The lung bases are normal.     Enteric tube tip in the fundus of the stomach. MRI BRAIN WO CONTRAST    Result Date: 10/6/2022  EXAMINATION: MRI OF THE BRAIN WITHOUT CONTRAST  10/6/2022 11:26 am TECHNIQUE: Multiplanar multisequence MRI of the brain was performed without the administration of intravenous contrast. COMPARISON: CT brain performed 10/05/2022. HISTORY: ORDERING SYSTEM PROVIDED HISTORY: rule out cva TECHNOLOGIST PROVIDED HISTORY: Reason for exam:->rule out cva What reading provider will be dictating this exam?->CRC FINDINGS: INTRACRANIAL STRUCTURES/VENTRICLES: The sellar and suprasellar structures, optic chiasm, corpus callosum, pineal gland, tectum, and midline brainstem structures are unremarkable. The craniocervical junction is unremarkable. There is no acute hemorrhage, mass effect, or midline shift. There is satisfactory overall gray-white matter differentiation. There is extensive FLAIR signal abnormality in the subcortical and periventricular white matter that may relate to chronic microvascular disease. The ventricular structures are symmetrically prominent. The infratentorial structures including the cerebellopontine angles and internal auditory canals are unremarkable. There is no abnormal restricted diffusion.   There is no abnormal blooming artifact on susceptibility weighted imaging. ORBITS: The visualized portion of the orbits demonstrate no acute abnormality. SINUSES: The visualized paranasal sinuses and mastoid air cells demonstrate no acute abnormality. BONES/SOFT TISSUES: The bone marrow signal intensity appears normal. The soft tissues demonstrate no acute abnormality. Extensive chronic microvascular disease without acute intracranial. Symmetrically prominent ventricular system correlation with symptomatology for normal pressure hydrocephalus is needed. FL Modified Barium Swallow W Video    Result Date: 10/7/2022  EXAMINATION: MODIFIED BARIUM SWALLOW WITH SPEECH PATHOLOGY TECHNIQUE: Fluoroscopic evaluation of the swallowing mechanism was performed by speech pathology. FLUOROSCOPY DOSE AND TYPE OR TIME AND EXPOSURES: Fluoroscopy time was 1.1 minutes. Multiple fluoroscopic cine loops were acquired. HISTORY: ORDERING SYSTEM PROVIDED HISTORY: Dysphagia, unspecified type TECHNOLOGIST PROVIDED HISTORY: What reading provider will be dictating this exam?->CHARMAINE       Recent Labs     10/09/22  0541 10/10/22  0452 10/11/22  0502   WBC 9.7 10.5 9.4   HGB 10.8* 9.9* 10.0*    134 167       Recent Labs     10/09/22  0737 10/10/22  0452 10/11/22  0502    138 142   K 3.5 3.1* 3.8    104 107   CO2 23 20 23   BUN 16 18 30*   CREATININE 1.23* 1.32* 1.43*   GLUCOSE 230* 213* 171*       No results for input(s): BILITOT, ALKPHOS, AST, ALT in the last 72 hours. Lab Results   Component Value Date/Time    PROTIME 13.4 10/09/2022 05:41 AM    INR 1.0 10/09/2022 05:41 AM     No results found for: LITHIUM, DILFRTOT, VALPROATE    ASSESSMENT AND PLAN  Encephalopathy which is metabolic secondary to significant hypernatremia. Patient is somewhat more awake. Patient has a Parkinson's disease and we have not seen him for some time he continues on carbidopa levodopa.   None of the medications I see can cause hyponatremia and this is being followed by renal.  For now patient has not developed any suggestion of PRES  No seizures are reported. We will see what comes out of this investigations for now. 10/8  More agitated and confused. His sodium still 148. We will keep an eye on this. Patient probably has underlying dementia underneath the hypernatremia. We will keep an eye on this and continue to follow. There is no suggestion of PRES or seizures    10/9  Today much improved patient is more awake though very bradykinetic he has not received his carbidopa levodopa. He is dysphagic. Recommended a PEG tube and then we reinstate his carbidopa levodopa is mostly nonambulatory and we had not seen him for a few years and has continued to decline. He has underlying dementia likely. 66/21/2004:  Acute metabolic encephalopathy in the setting of hypernatremia, MADDI with underlying Parkinson's disease and dementia. MRI of the brain negative for acute findings. Extensive SVID noted. EEG completed with report pending  Dysphagia, PEG tube placed yesterday. Hypernatremia resolved  MADDI, improved overall  Sinemet has been resumed. Will follow clinical course. I have personally performed a face to face diagnostic evaluation on this patient, reviewed all data and investigations, and am the sole provider of all clinical decisions on the neurological status of this patient. as noted above, need family to be there for future discussion as he only responds to them, will increse sinemet as tolerated, 60 % time spent on pt      Syd Olson MD, Will Schreiber, American Board of Psychiatry & Neurology  Board Certified in Vascular Neurology  Board Certified in Neuromuscular Medicine  Certified in . [address]

## 2022-10-11 NOTE — CARE COORDINATION
CM/LSW TO ADDRESS FAMILY CONCERNS REACHING TO OUT FAMILY TO DISCUSS DISCHARGE PLANNING AND ARRANGEMENT FOR D/C NEEDS.

## 2022-10-11 NOTE — PLAN OF CARE
Problem: Discharge Planning  Goal: Discharge to home or other facility with appropriate resources  Outcome: Progressing     Problem: Pain  Goal: Verbalizes/displays adequate comfort level or baseline comfort level  Outcome: Progressing     Problem: Safety - Medical Restraint  Goal: Remains free of injury from restraints (Restraint for Interference with Medical Device)  Description: INTERVENTIONS:  1. Determine that other, less restrictive measures have been tried or would not be effective before applying the restraint  2. Evaluate the patient's condition at the time of restraint application  3. Inform patient/family regarding the reason for restraint  4. Q2H: Monitor safety, psychosocial status, comfort, nutrition and hydration  Outcome: Progressing     Problem: Skin/Tissue Integrity  Goal: Absence of new skin breakdown  Description: 1. Monitor for areas of redness and/or skin breakdown  2. Assess vascular access sites hourly  3. Every 4-6 hours minimum:  Change oxygen saturation probe site  4. Every 4-6 hours:  If on nasal continuous positive airway pressure, respiratory therapy assess nares and determine need for appliance change or resting period.   Outcome: Progressing     Problem: ABCDS Injury Assessment  Goal: Absence of physical injury  Outcome: Progressing     Problem: Safety - Adult  Goal: Free from fall injury  Outcome: Progressing     Problem: Chronic Conditions and Co-morbidities  Goal: Patient's chronic conditions and co-morbidity symptoms are monitored and maintained or improved  Outcome: Progressing     Problem: Nutrition Deficit:  Goal: Optimize nutritional status  Outcome: Progressing

## 2022-10-11 NOTE — PROGRESS NOTES
[facility]  Facility[facility]Department: [facility]  Speech Language Pathology   Treatment Note      Alana Lennon Colon  1950  [id number]  [x]   confirmed      Date: 10/11/2022    Dehydration [E86.0]  Hypernatremia [E87.0]  Acute renal failure, unspecified acute renal failure type ([facility] [address] [N17.9]  Dysphagia, unspecified type [R13.10]         Weight: 119 lb 11.4 oz (54.3 kg)     Diet NPO  ADULT TUBE FEEDING; PEG; Diabetic; Continuous; 20; Yes; 15; Q 12 hours; 42; 220; Q 6 hours    SpO2: 98 % (10/10/22 1945)  O2 Flow Rate (L/min): 2 L/min (10/10/22 1945)  No active isolations      Subjective:  Alert and Lethargic    Patient asleep upon SLP arrival, required auditory stimuli and sternal rub to awake. Difficulty maintaining alertness throughout treatment session. Audible rhonchi noted. Interventions used this date:  Dysphagia Treatment      Objective/Assessment:  Patient progressing towards goals:  Short-term Goals  Timeframe for Short-term Goals: 1 week  Goal 1: Pt will tolerate PO trials deemed appropriate by treating SLP with no overt s/s of aspiration and adequate oral clearance of bolus in all given opportunities. Due to fluctuating alertness, patient offered moistened toothette only. Max tactile cueing required for patient to accept toothette. Minimal lingual manipulation of toothette observed despite verbal prompting. Patient made no attempts to transfer liquid from toothette, attempted x3 with absent swallow. Goal 2: Pt will participate in MBS study as deemed appropriate by treating SLP in order to determine least restrictive diet consistency. Not recommended at this time. Recommend continue NPO. Notified RN Andria Kirkpatrick. Treatment/Activity Tolerance:  Patient limited by fatigue    Plan:  Continue per POC    Pain Assessment:  Patient does not appear in pain. Pain Re-assessment:  Patient does not appear in pain.     Patient/Caregiver Education:  No education provided at this time.    Safety Devices:  Bed alarm in place      Therapy Time  Time in: 1015  Time out: 1023  Total minutes: 8      Signature: Electronically signed by CURTIS Roy on 10/11/2022 at 11:20 AM Electrodesiccation Text: The wound bed was treated with electrodesiccation after the biopsy was performed.

## 2022-10-11 NOTE — CARE COORDINATION
ACM received message from PCP office regarding patient concerns. Per office staff member, Meliza Reyes patient's granddaughter called very upset regarding patient. ACM was provided little information other than patient is potentially going to be discharged today and did not have anything at home ready. It was mentioned hospital bed, bedside commode. ACM called inpatient [facility] spoke with unit secretary Meliza Reyes. ACM reviewed concerns Regarding family member calling PCP office. She states they have already received a call from nurse practitioner Adrienne Moncada. Staff member stated that she will relay message to , care coordinator and .

## 2022-10-12 NOTE — PROGRESS NOTES
Patient continues to sleep. Slightly responsive to painful stimulus (sternal rub). Tube feed and H20 flush continue to infuse via pump without difficulty. HOB remains elevated. Patient rotated/turned slightly on sides throughout shift for comfort and to protect skin integrity. Respirations are even and non-labored.   NSR on telemetry with heart rate of 80bpm.

## 2022-10-12 NOTE — CONSULTS
This RN met with family utilizing interpretive services. Patient found appropriate for Hospice. Consents signed. DME ordered.  Transportation via [facility] for a 7PM

## 2022-10-12 NOTE — DISCHARGE SUMMARY
[facility] Medicine Discharge Summary    Luba Dumont  :  1950  MRN:  [id number]    Admit date:  10/5/2022  Discharge date:  10/12/2022    Admitting Physician:  Kishore Ellington DO  Primary Care Physician:  Tricia Anand MD    Discharge Diagnoses:    Principal Problem:    Hypernatremia  Active Problems:    Acute encephalopathy    Acute renal failure ([address]    Dehydration    Dysphagia  Resolved Problems:    * No resolved hospital problems. *    Chief Complaint   Patient presents with    Dysphagia     Difficulty swallowing this morning       Condition: unchanged   Activity: no strenuous activity   Diet: TF vs pleasure / comfort feed  Disposition: home with hospice  Functional Status: bed bound    Significant Findings:     MRI Brain:  Extensive chronic microvascular disease without acute intracranial.       Symmetrically prominent ventricular system correlation with symptomatology   for normal pressure hydrocephalus is needed. Recent Labs     10/12/22  0515 10/11/22  0502 10/10/22  0452   WBC 6.6 9.4 10.5   HGB 9.5* 10.0* 9.9*   HCT 28.6* 30.3* 30.8*   MCV 88.1 87.8 88.6    167 134     Labs Renal Latest Ref Rng & Units 10/12/2022 10/11/2022 10/10/2022 10/9/2022 10/8/2022   BUN 8 - 23 mg/dL 27(H) 30(H) 18 16 18   Cr 0.70 - 1.20 mg/dL 1.16 1.43(H) 1.32(H) 1.23(H) 1.21(H)   K 3.4 - 4.9 mEq/L 4.1 3.8 3. 1(L) 3.5 3.7   Na 135 - 144 mEq/L 139 142 138 140 146(H)         Hospital Course:   78yo M PMH T2DM, Parkinson's disease, HTN presented with worsening decline. He had been declining over the past year and for the past 2 weeks, he was not talking, eating, or following commands. He was found to have hypernatremia, MADDI secondary to severe dehydration. This was corrected with IVF and he ultimately had PEG tube placed for TF. He unfortunately remained very weak and was unable to talk or follow commands. Family transitioned to comfort care approach.  He will be discharged home with hospice on 10/12. Exam on Discharge:  BP (!) 155/71   Pulse 78   Temp 97.5 °F (36.4 °C) (Oral)   Resp 16   Ht 5' 8\" (1.727 m)   Wt 119 lb 11.4 oz (54.3 kg)   SpO2 97%   BMI 18.20 kg/m²   General appearance: lethargic. Chronically ill appearing. Withdraws to pain but does not talk or follow commands. Lungs: clear to auscultation bilaterally, normal effort  Heart: regular rate and rhythm, no murmur  Abdomen: soft, nontender, nondistended, bowel sounds present, no masses  Extremities: no edema, redness, tenderness in the calves  Skin: no gross lesions, rashes    Discharge Medication List:     Medication List        CHANGE how you take these medications      carbidopa-levodopa  MG per tablet  Commonly known as: SINEMET  What changed: how to take this     HumaLOG KwikPen 100 UNIT/ML Sopn  Generic drug: insulin lispro (1 Unit Dial)  What changed: additional instructions     Lantus SoloStar 100 UNIT/ML injection pen  Generic drug: insulin glargine  What changed: how much to take     losartan 50 MG tablet  Commonly known as: COZAAR  What changed: See the new instructions.      QUEtiapine 25 MG tablet  Commonly known as: SEROQUEL  What changed:   how much to take  how to take this  when to take this     simvastatin 40 MG tablet  Commonly known as: ZOCOR  What changed: how to take this     Vitamin D3 50 MCG (2000 UT) Caps  What changed:   how much to take  how to take this  when to take this            CONTINUE taking these medications      Baqsimi One Pack 3 MG/DOSE Powd  Generic drug: Glucagon  1 APPLICATOR BY NASAL ROUTE EVERY 4 HOURS AS NEEDED (BLOOD GLUCOSE LESS THAN 70)     blood glucose monitor kit and supplies  1 kit by Other route 4 times daily (before meals and nightly)     blood glucose test strips  1 strip by Other route 4 times daily (before meals and nightly)     Blood Pressure Kit  1 each by Does not apply route daily     Clean & Clear Absorbing Sheets Pads  Apply 1 each topically in the morning, at noon, and at bedtime     Depend Underwear Large Misc  1 each by Does not apply route 8 times daily     Ensure High Protein Liqd  Take 1 Bottle by mouth 3 times daily (with meals)     FreeStyle Scott 2 Divide Joselito Boys  Give 1 meter     FreeStyle Scott 2 Sensor Misc  Change every 14 days     Glucagon Emergency 1 MG Kit  INJECT 1 MG INTO THE MUSCLE SEE ADMIN INSTRUCTIONS FOLLOW PACKAGE DIRECTIONS FOR LOW BLOOD SUGAR.      Lancets Misc  1 each by Does not apply route 4 times daily (before meals and nightly)     Nitrile Gloves Large Misc  2 each by Does not apply route 8 times daily     NovoFine 32G X 6 MM Misc  Generic drug: Insulin Pen Needle  1 Device by Does not apply route 4 times daily (before meals and nightly)     pramipexole 0.125 MG tablet  Commonly known as: MIRAPEX     sertraline 50 MG tablet  Commonly known as: ZOLOFT     tamsulosin 0.4 MG capsule  Commonly known as: FLOMAX            STOP taking these medications      metFORMIN 1000 MG tablet  Commonly known as: GLUCOPHAGE     nystatin [id number] UNIT/ML suspension  Commonly known as: MYCOSTATIN     Vitamin K2-Vitamin D3  MCG-UNIT Caps              DC time 37 minutes    Signed:  Zabrina Perez DO  10/12/2022, 2:39 PM

## 2022-10-12 NOTE — PROGRESS NOTES
Neurology Follow up    SUBJECTIVE: Patient seen and examined for neurology follow-up for acute metabolic encephalopathy in the setting of hypernatremia and MADDI with underlying Parkinson's disease and vascular dementia. Patient currently somnolent. Does awaken to verbal stimuli. Confused. No obvious focal deficits. Difficulty following commands. Remains n.p.o. with PEG tube in place. Generalized edema noted at 2-3+. Rhonchi noted. Afebrile. No seizure activity reported. Exam as noted above. Patient has metabolic encephalopathy with hyponatremia appears to be still ongoing hyponatremia improved he has not improved he has significant parkinson's with a dementia complex consistent with possibility of Lewy body dementia.     Current Facility-Administered Medications   Medication Dose Route Frequency Provider Last Rate Last Admin    insulin lispro (HUMALOG) injection vial 0-16 Units  0-16 Units SubCUTAneous Q6H PINEDA Rockwell - CNP   8 Units at 10/12/22 0645    enoxaparin (LOVENOX) injection 40 mg  40 mg SubCUTAneous Daily Lj Washington, APRN - CNS   40 mg at 10/12/22 3209    losartan (COZAAR) tablet 50 mg  50 mg Oral Daily Nicoletto Bolzan-Roche, APRN - CNP   50 mg at 10/12/22 0823    QUEtiapine (SEROQUEL) tablet 25 mg  25 mg Oral BID Nicoletto Bolzan-Roche, APRN - CNP   25 mg at 10/12/22 0823    sertraline (ZOLOFT) tablet 50 mg  50 mg Oral Daily Nicoletto Bolzan-Roche, APRN - CNP   50 mg at 10/12/22 0823    tamsulosin (FLOMAX) capsule 0.4 mg  0.4 mg Oral Daily Nicoletto Bolzan-Roche, APRN - CNP   0.4 mg at 10/12/22 0823    atorvastatin (LIPITOR) tablet 20 mg  20 mg Oral Nightly Cleveland Nascimento DO   20 mg at 10/11/22 2147    pantoprazole (PROTONIX) 40 mg in sodium chloride (PF) 10 mL injection  40 mg IntraVENous Daily Renée Lindsey APRN - CNP   40 mg at 10/09/22 0847    sodium chloride flush 0.9 % injection 5-40 mL  5-40 mL IntraVENous PRN Lj Washington, APRN - CNS        0.9 % sodium chloride infusion IntraVENous PRN Sendy Tams, APRN - CNS        ondansetron (ZOFRAN-ODT) disintegrating tablet 4 mg  4 mg Oral Q8H PRN Sendy Washington, APRN - CNS        Or    ondansetron [facility]) injection 4 mg  4 mg IntraVENous Q6H PRN Sendy Washington, APRN - CNS        acetaminophen (TYLENOL) tablet 650 mg  650 mg Oral Q6H PRN Sendy Washington, APRN - CNS        Or    acetaminophen (TYLENOL) suppository 650 mg  650 mg Rectal Q6H PRN Sendy Washington, APRN - CNS        glucose chewable tablet 16 g  4 tablet Oral PRN Sendy Washington, APRN - CNS        dextrose bolus 10% 125 mL  125 mL IntraVENous PRN Sendy Washington, APRN - CNS   Stopped at 10/08/22 6408    Or    dextrose bolus 10% 250 mL  250 mL IntraVENous PRN Sendy Washington, APRN - CNS        glucagon (rDNA) injection 1 mg  1 mg SubCUTAneous PRN Sendy Washington, APRN - CNS        dextrose 10 % infusion   IntraVENous Continuous PRN Sendy Washington, APRN - CNS        insulin glargine (LANTUS) injection vial 9 Units  0.15 Units/kg SubCUTAneous Nightly PINEDA Hernández - CNS   9 Units at 10/11/22 2152    carbidopa-levodopa (SINEMET)  MG per tablet 1 tablet  1 tablet Per NG tube BID Sendy Washington APRN - CNS   1 tablet at 10/12/22 1397       PHYSICAL EXAM:    BP (!) 141/66   Pulse 80   Temp 98.1 °F (36.7 °C)   Resp 16   Ht 5' 8\" (1.727 m)   Wt 119 lb 11.4 oz (54.3 kg)   SpO2 98%   BMI 18.20 kg/m²    General Appearance:      Skin:  normal  CVS - Normal sounds, No murmurs , No carotid Bruits  RS -CTA  Abdomen Soft, BS present  Review of Systems   Constitutional:  Negative for fever. HENT:  Positive for trouble swallowing. Respiratory:  Positive for cough. Negative for wheezing. Cardiovascular:  Positive for leg swelling. Gastrointestinal:  Negative for vomiting. Musculoskeletal:  Negative for myalgias. Skin:  Negative for color change. Allergic/Immunologic: Negative for food allergies. Neurological:  Positive for weakness (Generalized).  Negative for tremors, seizures, syncope, facial asymmetry and speech difficulty. Psychiatric/Behavioral:  Positive for confusion. Negative for behavioral problems, hallucinations and sleep disturbance. Mental Status Exam:             Level of Alertness:   awake            Orientation:   person,  Patient's language appears to be normal  Funduscopic Exam:     Cranial Nerves              Cranial nerve III           Pupils:  equal, round, reactive to light      Cranial nerves III, IV, VI           Extraocular Movements: intact      Cranial nerve V           Facial sensation:  intact      Cranial nerve VII           Facial strength: intact            Cranial nerve XII          Tongue movement:  normal    Motor:    Drift:  absent  Motor exam is symmetrical 4 out of 5 all extremities bilaterally  Tone:  normal  Abnormal Movements:  absent            Sensory: No focal deficits        Pinprick             Right Upper Extremity:  normal             Left Upper Extremity:  normal             Right Lower Extremity:  normal             Left Lower Extremity:  normal           Vibration                         Touch            Proprioception                 Coordination: Unable to assess as patient has difficulty following commands                                    Gait:                       Casual: Gait is deferred          Reflexes:             Deep Tendon Reflexes:             Reflexes are 2 +             Plantar response:                Right:  downgoing               Left:  downgoing    Vascular:  Cardiac Exam:  normal         XR CHEST (2 VW)    Result Date: 10/5/2022  EXAMINATION: TWO XRAY VIEWS OF THE CHEST 10/5/2022 1:39 pm COMPARISON: None. HISTORY: ORDERING SYSTEM PROVIDED HISTORY: Dysphagia, unspecified type TECHNOLOGIST PROVIDED HISTORY: Reason for exam:->possible aspiration What reading provider will be dictating this exam?->[facility] FINDINGS: The cardiomediastinal silhouette is without acute process.  Biapical promises visualized osseous of COPD changes. The lungs are without acute focal process. There is no effusion or pneumothorax. The osseous structures are without acute process. No acute cardiopulmonary disease. COPD changes. CT HEAD WO CONTRAST    Result Date: 10/5/2022  EXAMINATION: CT OF THE HEAD WITHOUT CONTRAST  10/5/2022 3:52 pm TECHNIQUE: CT of the head was performed without the administration of intravenous contrast. Automated exposure control, iterative reconstruction, and/or weight based adjustment of the mA/kV was utilized to reduce the radiation dose to as low as reasonably achievable. COMPARISON: 09/04/2022 HISTORY: ORDERING SYSTEM PROVIDED HISTORY: dysphagia TECHNOLOGIST PROVIDED HISTORY: Reason for exam:->dysphagia Has a \"code stroke\" or \"stroke alert\" been called? ->No Decision Support Exception - unselect if not a suspected or confirmed emergency medical condition->Emergency Medical Condition (MA) What reading provider will be dictating this exam?->CRC FINDINGS: BRAIN/VENTRICLES: No evidence of parenchymal hemorrhages or contusions. No evidence of intra or extra-axial fluid collection is seen. Scattered areas of low attenuation are visualized in the periventricular and subcortical white matter demonstrating no change in comparison to the prior study consistent with chronic microvascular disease. No evidence of acute territorial infarct is seen. Prominence of the ventricles and sulci is visualized demonstrating no change consistent with chronic atrophic brain changes. No evidence of intracranial mass or mass effect, no evidence of midline shift is seen. No evidence of sellar or parasellar mass is visualized. ORBITS: The visualized portion of the orbits demonstrate no acute abnormality. SINUSES: The visualized paranasal sinuses and mastoid air cells demonstrate no acute abnormality. SOFT TISSUES/SKULL:  No acute abnormality of the visualized skull or soft tissues. No acute intracranial abnormality.  Chronic microvascular disease and chronic atrophic brain changes seen. If there is a high level of suspicion would recommend further evaluation with an MRI of the brain to rule out subtle brainstem lacunar infarcts or acute on chronic pathology. XR CHEST PORTABLE    Result Date: 10/6/2022  EXAMINATION: ONE XRAY VIEW OF THE CHEST 10/6/2022 3:17 pm COMPARISON: None. HISTORY: ORDERING SYSTEM PROVIDED HISTORY: NG placement TECHNOLOGIST PROVIDED HISTORY: Reason for exam:->NG placement What reading provider will be dictating this exam?->CRC FINDINGS: Enteric tube tip in the fundus of the stomach. Nonobstructive bowel gas pattern. No gross free air. The lung bases are normal.     Enteric tube tip in the fundus of the stomach. MRI BRAIN WO CONTRAST    Result Date: 10/6/2022  EXAMINATION: MRI OF THE BRAIN WITHOUT CONTRAST  10/6/2022 11:26 am TECHNIQUE: Multiplanar multisequence MRI of the brain was performed without the administration of intravenous contrast. COMPARISON: CT brain performed 10/05/2022. HISTORY: ORDERING SYSTEM PROVIDED HISTORY: rule out cva TECHNOLOGIST PROVIDED HISTORY: Reason for exam:->rule out cva What reading provider will be dictating this exam?->CRC FINDINGS: INTRACRANIAL STRUCTURES/VENTRICLES: The sellar and suprasellar structures, optic chiasm, corpus callosum, pineal gland, tectum, and midline brainstem structures are unremarkable. The craniocervical junction is unremarkable. There is no acute hemorrhage, mass effect, or midline shift. There is satisfactory overall gray-white matter differentiation. There is extensive FLAIR signal abnormality in the subcortical and periventricular white matter that may relate to chronic microvascular disease. The ventricular structures are symmetrically prominent. The infratentorial structures including the cerebellopontine angles and internal auditory canals are unremarkable. There is no abnormal restricted diffusion.   There is no abnormal blooming artifact on susceptibility weighted imaging. ORBITS: The visualized portion of the orbits demonstrate no acute abnormality. SINUSES: The visualized paranasal sinuses and mastoid air cells demonstrate no acute abnormality. BONES/SOFT TISSUES: The bone marrow signal intensity appears normal. The soft tissues demonstrate no acute abnormality. Extensive chronic microvascular disease without acute intracranial. Symmetrically prominent ventricular system correlation with symptomatology for normal pressure hydrocephalus is needed. FL Modified Barium Swallow W Video    Result Date: 10/7/2022  EXAMINATION: MODIFIED BARIUM SWALLOW WITH SPEECH PATHOLOGY TECHNIQUE: Fluoroscopic evaluation of the swallowing mechanism was performed by speech pathology. FLUOROSCOPY DOSE AND TYPE OR TIME AND EXPOSURES: Fluoroscopy time was 1.1 minutes. Multiple fluoroscopic cine loops were acquired. HISTORY: ORDERING SYSTEM PROVIDED HISTORY: Dysphagia, unspecified type TECHNOLOGIST PROVIDED HISTORY: What reading provider will be dictating this exam?->CHARMAINE       Recent Labs     10/10/22  0452 10/11/22  0502 10/12/22  0515   WBC 10.5 9.4 6.6   HGB 9.9* 10.0* 9.5*    167 171       Recent Labs     10/10/22  0452 10/11/22  0502 10/12/22  0515    142 139   K 3.1* 3.8 4.1    107 105   CO2 20 23 24   BUN 18 30* 27*   CREATININE 1.32* 1.43* 1.16   GLUCOSE 213* 171* 240*       No results for input(s): BILITOT, ALKPHOS, AST, ALT in the last 72 hours. Lab Results   Component Value Date/Time    PROTIME 13.4 10/09/2022 05:41 AM    INR 1.0 10/09/2022 05:41 AM     No results found for: LITHIUM, DILFRTOT, VALPROATE    ASSESSMENT AND PLAN  Encephalopathy which is metabolic secondary to significant hypernatremia. Patient is somewhat more awake. Patient has a Parkinson's disease and we have not seen him for some time he continues on carbidopa levodopa.   None of the medications I see can cause hyponatremia and this is being followed by renal.  For now patient has not developed any suggestion of PRES  No seizures are reported. We will see what comes out of this investigations for now. 10/8  More agitated and confused. His sodium still 148. We will keep an eye on this. Patient probably has underlying dementia underneath the hypernatremia. We will keep an eye on this and continue to follow. There is no suggestion of PRES or seizures    10/9  Today much improved patient is more awake though very bradykinetic he has not received his carbidopa levodopa. He is dysphagic. Recommended a PEG tube and then we reinstate his carbidopa levodopa is mostly nonambulatory and we had not seen him for a few years and has continued to decline. He has underlying dementia likely. 65/09/6601:  Acute metabolic encephalopathy in the setting of hypernatremia, MADDI with underlying Parkinson's disease and dementia. MRI of the brain negative for acute findings. Extensive SVID noted. EEG completed with report pending  Dysphagia, PEG tube placed yesterday. Hypernatremia resolved  MADDI, improved overall  Sinemet has been resumed. Will follow clinical course. I have personally performed a face to face diagnostic evaluation on this patient, reviewed all data and investigations, and am the sole provider of all clinical decisions on the neurological status of this patient. as noted above, need family to be there for future discussion as he only responds to them, will increse sinemet as tolerated, 60 % time spent on pt      10/12/2022:  Family meeting with hospice this morning. Have not heard word back yet if they decided to sign onto hospice services. We will hold off on ordering any further testing pending hospice decision. Patient is somnolent and should family opt to not sign onto hospice services will obtain ABGs.     I have personally performed a face to face diagnostic evaluation on this patient, reviewed all data and investigations, and am the sole provider of all clinical decisions on the neurological status of this patient. Findings consistent with Lewy body dementia with Parkinson's disease. Hospice meeting is in place which is reasonable as patient is unlikely to respond to dopamine agonist and improved. 60% time spent on reviewing this patient myself      Syd Olson MD, Will Schreiber, American Board of Psychiatry & Neurology  Board Certified in Vascular Neurology  Board Certified in Neuromuscular Medicine  Certified in . [place]

## 2022-10-12 NOTE — CARE COORDINATION
HOSPICE REFERRAL COMPLETED WITH [facility]. PER HEIKE BAKER INTAKE WITH [facility] FAMILY WOULD LIKE TO DISCHARGE PT HOME WITH [address] [address]. HEIKE TO CALL HOSPICE MEDICAL DIRECTOR FOR ORDERS.

## 2022-10-12 NOTE — CARE COORDINATION
PER KANCHAN RN WITH [facility], PT TO BE PICKED UP TODAY AT 7PM [facility] TO [facility] WITH FAMILY. REQUESTED [facility] TO SEND 1 BOTTLE OF GLUCERNA TF THAT IS IN PT ROOM, UPDATED BEDSIDE RN WHO IS AWARE.

## 2022-10-13 NOTE — PROGRESS NOTES
Nephrology Progress Note    Assessment:  MADDI dehydration  Electrolytes fine  S/P PEG yesterday  Dementia  DM type-2    Plan:   - function improving, ok to discharge from renal standpoint     Lactated ringers bolus given  Patient Active Problem List:     Lactic acidosis     Impaired mobility     Parkinson's disease ([facility] [address]     Type 2 diabetes mellitus with hyperglycemia, with long-term current use of insulin (HCC)     Essential hypertension     Hyperlipidemia     BMI 28.0-28.9,adult     Uses Dutch as primary spoken language     Other specified hypothyroidism     Hypernatremia     Acute encephalopathy     Acute renal failure (HCC)     Dehydration     Dysphagia      Subjective:  Admit Date: 10/5/2022    Interval History: function improving    Medications:  Scheduled Meds:   insulin lispro  0-16 Units SubCUTAneous Q6H    enoxaparin  40 mg SubCUTAneous Daily    losartan  50 mg Oral Daily    QUEtiapine  25 mg Oral BID    sertraline  50 mg Oral Daily    tamsulosin  0.4 mg Oral Daily    atorvastatin  20 mg Oral Nightly    pantoprazole (PROTONIX) 40 mg injection  40 mg IntraVENous Daily    insulin glargine  0.15 Units/kg SubCUTAneous Nightly    carbidopa-levodopa  1 tablet Per NG tube BID     Continuous Infusions:   sodium chloride      dextrose         CBC:   Recent Labs     10/11/22  0502 10/12/22  0515   WBC 9.4 6.6   HGB 10.0* 9.5*    171       CMP:    Recent Labs     10/10/22  0452 10/11/22  0502 10/12/22  0515    142 139   K 3.1* 3.8 4.1    107 105   CO2 20 23 24   BUN 18 30* 27*   CREATININE 1.32* 1.43* 1.16   GLUCOSE 213* 171* 240*   CALCIUM 8.2* 8.4* 8.2*   LABGLOM 53.4* 48.6* >60.0       Troponin:   No results for input(s): TROPONINI in the last 72 hours. BNP: No results for input(s): BNP in the last 72 hours. INR:   No results for input(s): INR in the last 72 hours. Lipids: No results for input(s): CHOL, LDLDIRECT, TRIG, HDL, AMYLASE, LIPASE in the last 72 hours.   Liver: No results for input(s): AST, ALT, ALKPHOS, PROT, LABALBU, BILITOT in the last 72 hours. Invalid input(s): BILDIR  Iron:  No results for input(s): IRONS, FERRITIN in the last 72 hours. Invalid input(s): LABIRONS  Urinalysis: No results for input(s): UA in the last 72 hours.     Objective:  Vitals: BP (!) 158/67   Pulse 81   Temp 99.4 °F (37.4 °C) (Tympanic)   Resp 16   Ht 5' 8\" (1.727 m)   Wt 119 lb 11.4 oz (54.3 kg)   SpO2 95%   BMI 18.20 kg/m²    Wt Readings from Last 3 Encounters:   10/05/22 119 lb 11.4 oz (54.3 kg)   09/04/22 170 lb (77.1 kg)   08/16/22 170 lb (77.1 kg)      24HR INTAKE/OUTPUT:    Intake/Output Summary (Last 24 hours) at 10/12/2022 2248  Last data filed at 10/12/2022 1716  Gross per 24 hour   Intake --   Output 800 ml   Net -800 ml         General: alert, in no apparent distress  HEENT: normocephalic, atraumatic, anicteric  Neck: supple, no mass  Lungs: non-labored respirations, clear to auscultation bilaterally  Heart: regular rate and rhythm, no murmurs or rubs  Abdomen: soft, non-tender, non-distended  Ext: no cyanosis, no peripheral edema  Neuro: alert and oriented, no gross abnormalities  Electronically signed by Luis Silva MD, MD

## 2022-10-13 NOTE — PROGRESS NOTES
[facility]. Packed up pt's belongings including 2 bottles of tube feed that were at bedside.

## 2023-12-18 NOTE — PROGRESS NOTES
Other PATIENT'S PARTNER IS CALLING REQUESTING MRI ORDER TO BE RESENT. [phone]  ALSO THEY WANTED THE OFFICE TO KNOW THAT WHEN TRYING TO SEND [employer] MESSAGE EDILSON OWEN NAME IS GRAYED OUT. PATIENT WOULD LIKE A CALL BACK ONCE SENT STILL IN PAIN STILL HASNT BEEN ABLE TO MOVE AROUND. [phone]       available    []Glucose Meter     []Insulin Kit     []Other      Encounter Type Date Start Time End Time Comments No Show Dates   Assessment   07/15/19   0900 1100 Pt with limited understanding of the disease process presents with caregiver (Son, Vikas Muro). Both are poor historians. Limited information received as to his medication compliance at this time. Poor results reported verbally. They did not bring log book although they were instructed to do so prior to this session. Pt interested in learning more about this condition and adopting a healthier lifestyle. Admits to making poor food choices and not controlling portions or following a carb controlled diet at home. Will follow up to remind pt to continue to monitor glucose and update log sheets that were provided at this appointment. Has appt with [place] Micheal on 7/17/19, message sent to provider through in-basket with concerns.     Class 1 - Understanding diabetes         Class 2- Nutrition and diabetes          Class 3 - Preventing Complications         Individual MNT         3 Month Follow-up      []In Person  []Telephone    Meter Instrx        Insulin Instrx      []Pen  []Vial & Syringe      DSMS Support Plan:  Follow-up plan:     [] MNT referral request / Appointment     [] Annual update referral request and appointment after      []ADA  Where do I Begin, Living with Type 2 Diabetes ADA home support program     [] [address] [place] [address] [phone]     [] Fit Walks : [facility] or [facility]    []  Diabetes support Group      []   Emotional Support      [] [place] on phone      []  Internet web sites - ADA and D- life    []  Journals/Magazines    [x]  Other __phone call to pt in 1 week_________________________      Post Education Referrals:      [] [address] [place] [address] information sheet and [address] [facility] , [address] [phone]      [] Dental care    [] Podiatrist     []  Opthamologist      []Other    Leonid Ramsay RN   07/15/19

## (undated) DEVICE — GLOVE ORANGE PI 7   MSG9070

## (undated) DEVICE — SINGLE PORT MANIFOLD: Brand: NEPTUNE 2

## (undated) DEVICE — APPLICATOR MEDICATED 10.5 CC SOLUTION HI LT ORNG CHLORAPREP

## (undated) DEVICE — BINDER ABD UNISX 9IN 45IN SM AND M UNIV

## (undated) DEVICE — RESTRAINT EXTREMITY CONTACT CLOSURE

## (undated) DEVICE — TOWEL,OR,DSP,ST,BLUE,STD,4/PK,20PK/CS: Brand: MEDLINE

## (undated) DEVICE — COVER LT HNDL BLU PLAS

## (undated) DEVICE — Device

## (undated) DEVICE — COVER,TABLE,44X90,STERILE: Brand: MEDLINE

## (undated) DEVICE — LABEL MED MINI W/ MARKER